# Patient Record
Sex: FEMALE | Race: WHITE | Employment: OTHER | URBAN - METROPOLITAN AREA
[De-identification: names, ages, dates, MRNs, and addresses within clinical notes are randomized per-mention and may not be internally consistent; named-entity substitution may affect disease eponyms.]

---

## 2018-09-18 ENCOUNTER — APPOINTMENT (OUTPATIENT)
Dept: GENERAL RADIOLOGY | Age: 53
End: 2018-09-18
Attending: PHYSICIAN ASSISTANT
Payer: COMMERCIAL

## 2018-09-18 ENCOUNTER — APPOINTMENT (OUTPATIENT)
Dept: CT IMAGING | Age: 53
End: 2018-09-18
Attending: PHYSICIAN ASSISTANT
Payer: COMMERCIAL

## 2018-09-18 ENCOUNTER — HOSPITAL ENCOUNTER (EMERGENCY)
Age: 53
Discharge: HOME | End: 2018-09-19
Attending: EMERGENCY MEDICINE
Payer: COMMERCIAL

## 2018-09-18 DIAGNOSIS — E87.6 HYPOKALEMIA: ICD-10-CM

## 2018-09-18 DIAGNOSIS — R42 DIZZINESS: Primary | ICD-10-CM

## 2018-09-18 DIAGNOSIS — R11.2 NAUSEA AND VOMITING, INTRACTABILITY OF VOMITING NOT SPECIFIED, UNSPECIFIED VOMITING TYPE: ICD-10-CM

## 2018-09-18 LAB
ALBUMIN SERPL-MCNC: 4.4 G/DL (ref 3.5–5)
ALBUMIN/GLOB SERPL: 1.2 {RATIO} (ref 1.1–2.2)
ALP SERPL-CCNC: 73 U/L (ref 45–117)
ALT SERPL-CCNC: 26 U/L (ref 12–78)
ANION GAP SERPL CALC-SCNC: 12 MMOL/L (ref 5–15)
APPEARANCE UR: CLEAR
AST SERPL-CCNC: 17 U/L (ref 15–37)
BACTERIA URNS QL MICRO: NEGATIVE /HPF
BASOPHILS # BLD: 0 K/UL (ref 0–0.1)
BASOPHILS NFR BLD: 0 % (ref 0–1)
BILIRUB SERPL-MCNC: 0.7 MG/DL (ref 0.2–1)
BILIRUB UR QL: NEGATIVE
BUN SERPL-MCNC: 10 MG/DL (ref 6–20)
BUN/CREAT SERPL: 12 (ref 12–20)
CALCIUM SERPL-MCNC: 10.7 MG/DL (ref 8.5–10.1)
CHLORIDE SERPL-SCNC: 102 MMOL/L (ref 97–108)
CO2 SERPL-SCNC: 26 MMOL/L (ref 21–32)
COLOR UR: ABNORMAL
COMMENT, HOLDF: NORMAL
CREAT SERPL-MCNC: 0.83 MG/DL (ref 0.55–1.02)
DIFFERENTIAL METHOD BLD: ABNORMAL
EOSINOPHIL # BLD: 0 K/UL (ref 0–0.4)
EOSINOPHIL NFR BLD: 1 % (ref 0–7)
EPITH CASTS URNS QL MICRO: ABNORMAL /LPF
ERYTHROCYTE [DISTWIDTH] IN BLOOD BY AUTOMATED COUNT: 11.6 % (ref 11.5–14.5)
GLOBULIN SER CALC-MCNC: 3.8 G/DL (ref 2–4)
GLUCOSE SERPL-MCNC: 109 MG/DL (ref 65–100)
GLUCOSE UR STRIP.AUTO-MCNC: NEGATIVE MG/DL
HCT VFR BLD AUTO: 42.9 % (ref 35–47)
HGB BLD-MCNC: 15.2 G/DL (ref 11.5–16)
HGB UR QL STRIP: NEGATIVE
IMM GRANULOCYTES # BLD: 0 K/UL (ref 0–0.04)
IMM GRANULOCYTES NFR BLD AUTO: 0 % (ref 0–0.5)
KETONES UR QL STRIP.AUTO: 40 MG/DL
LEUKOCYTE ESTERASE UR QL STRIP.AUTO: ABNORMAL
LYMPHOCYTES # BLD: 2.3 K/UL (ref 0.8–3.5)
LYMPHOCYTES NFR BLD: 31 % (ref 12–49)
MCH RBC QN AUTO: 34.8 PG (ref 26–34)
MCHC RBC AUTO-ENTMCNC: 35.4 G/DL (ref 30–36.5)
MCV RBC AUTO: 98.2 FL (ref 80–99)
MONOCYTES # BLD: 0.6 K/UL (ref 0–1)
MONOCYTES NFR BLD: 9 % (ref 5–13)
NEUTS SEG # BLD: 4.4 K/UL (ref 1.8–8)
NEUTS SEG NFR BLD: 59 % (ref 32–75)
NITRITE UR QL STRIP.AUTO: NEGATIVE
NRBC # BLD: 0 K/UL (ref 0–0.01)
NRBC BLD-RTO: 0 PER 100 WBC
PH UR STRIP: 8.5 [PH] (ref 5–8)
PLATELET # BLD AUTO: 257 K/UL (ref 150–400)
PMV BLD AUTO: 9.9 FL (ref 8.9–12.9)
POTASSIUM SERPL-SCNC: 3.2 MMOL/L (ref 3.5–5.1)
PROT SERPL-MCNC: 8.2 G/DL (ref 6.4–8.2)
PROT UR STRIP-MCNC: ABNORMAL MG/DL
RBC # BLD AUTO: 4.37 M/UL (ref 3.8–5.2)
RBC #/AREA URNS HPF: ABNORMAL /HPF (ref 0–5)
SAMPLES BEING HELD,HOLD: NORMAL
SODIUM SERPL-SCNC: 140 MMOL/L (ref 136–145)
SP GR UR REFRACTOMETRY: 1.02 (ref 1–1.03)
UR CULT HOLD, URHOLD: NORMAL
UROBILINOGEN UR QL STRIP.AUTO: 0.2 EU/DL (ref 0.2–1)
WBC # BLD AUTO: 7.4 K/UL (ref 3.6–11)
WBC URNS QL MICRO: ABNORMAL /HPF (ref 0–4)

## 2018-09-18 PROCEDURE — 36415 COLL VENOUS BLD VENIPUNCTURE: CPT | Performed by: EMERGENCY MEDICINE

## 2018-09-18 PROCEDURE — 93005 ELECTROCARDIOGRAM TRACING: CPT

## 2018-09-18 PROCEDURE — 70450 CT HEAD/BRAIN W/O DYE: CPT

## 2018-09-18 PROCEDURE — 80053 COMPREHEN METABOLIC PANEL: CPT | Performed by: EMERGENCY MEDICINE

## 2018-09-18 PROCEDURE — 71046 X-RAY EXAM CHEST 2 VIEWS: CPT

## 2018-09-18 PROCEDURE — 85025 COMPLETE CBC W/AUTO DIFF WBC: CPT | Performed by: EMERGENCY MEDICINE

## 2018-09-18 PROCEDURE — 74011250637 HC RX REV CODE- 250/637: Performed by: PHYSICIAN ASSISTANT

## 2018-09-18 PROCEDURE — 84484 ASSAY OF TROPONIN QUANT: CPT | Performed by: PHYSICIAN ASSISTANT

## 2018-09-18 PROCEDURE — 99285 EMERGENCY DEPT VISIT HI MDM: CPT

## 2018-09-18 PROCEDURE — 74011250636 HC RX REV CODE- 250/636: Performed by: PHYSICIAN ASSISTANT

## 2018-09-18 PROCEDURE — 96361 HYDRATE IV INFUSION ADD-ON: CPT

## 2018-09-18 PROCEDURE — 81001 URINALYSIS AUTO W/SCOPE: CPT | Performed by: PHYSICIAN ASSISTANT

## 2018-09-18 RX ORDER — SODIUM CHLORIDE 9 MG/ML
1000 INJECTION, SOLUTION INTRAVENOUS ONCE
Status: COMPLETED | OUTPATIENT
Start: 2018-09-18 | End: 2018-09-19

## 2018-09-18 RX ORDER — ONDANSETRON 4 MG/1
4 TABLET, ORALLY DISINTEGRATING ORAL
Status: COMPLETED | OUTPATIENT
Start: 2018-09-18 | End: 2018-09-18

## 2018-09-18 RX ADMIN — SODIUM CHLORIDE 1000 ML/HR: 900 INJECTION, SOLUTION INTRAVENOUS at 23:27

## 2018-09-18 RX ADMIN — ONDANSETRON 4 MG: 4 TABLET, ORALLY DISINTEGRATING ORAL at 22:47

## 2018-09-18 NOTE — Clinical Note
Klor merline twice daily Zofran 1-2 tabs every 8 hrs as needed for nausea and vomiting Push fluids Follow-up with primary care Return for any new or worsening.  Siena Bo [place]

## 2018-09-19 VITALS
SYSTOLIC BLOOD PRESSURE: 164 MMHG | OXYGEN SATURATION: 100 % | HEIGHT: 68 IN | TEMPERATURE: 97.4 F | RESPIRATION RATE: 20 BRPM | HEART RATE: 68 BPM | DIASTOLIC BLOOD PRESSURE: 89 MMHG

## 2018-09-19 LAB — TROPONIN I SERPL-MCNC: <0.05 NG/ML

## 2018-09-19 PROCEDURE — 74011250637 HC RX REV CODE- 250/637: Performed by: PHYSICIAN ASSISTANT

## 2018-09-19 PROCEDURE — 74011250636 HC RX REV CODE- 250/636: Performed by: PHYSICIAN ASSISTANT

## 2018-09-19 PROCEDURE — 74011250636 HC RX REV CODE- 250/636

## 2018-09-19 PROCEDURE — 74011250636 HC RX REV CODE- 250/636: Performed by: EMERGENCY MEDICINE

## 2018-09-19 PROCEDURE — 96374 THER/PROPH/DIAG INJ IV PUSH: CPT

## 2018-09-19 PROCEDURE — 96375 TX/PRO/DX INJ NEW DRUG ADDON: CPT

## 2018-09-19 PROCEDURE — 96361 HYDRATE IV INFUSION ADD-ON: CPT

## 2018-09-19 RX ORDER — FAMOTIDINE 10 MG/ML
INJECTION INTRAVENOUS
Status: COMPLETED
Start: 2018-09-19 | End: 2018-09-19

## 2018-09-19 RX ORDER — POTASSIUM CHLORIDE 1500 MG/1
20 TABLET, FILM COATED, EXTENDED RELEASE ORAL 2 TIMES DAILY
Qty: 14 TAB | Refills: 0 | Status: SHIPPED | OUTPATIENT
Start: 2018-09-19 | End: 2018-09-26

## 2018-09-19 RX ORDER — POTASSIUM CHLORIDE 750 MG/1
40 TABLET, FILM COATED, EXTENDED RELEASE ORAL
Status: COMPLETED | OUTPATIENT
Start: 2018-09-19 | End: 2018-09-19

## 2018-09-19 RX ORDER — ONDANSETRON 4 MG/1
4 TABLET, ORALLY DISINTEGRATING ORAL
Qty: 12 TAB | Refills: 0 | Status: SHIPPED | OUTPATIENT
Start: 2018-09-19 | End: 2019-03-26

## 2018-09-19 RX ORDER — MECLIZINE HCL 12.5 MG 12.5 MG/1
25 TABLET ORAL
Status: COMPLETED | OUTPATIENT
Start: 2018-09-19 | End: 2018-09-19

## 2018-09-19 RX ADMIN — MECLIZINE 25 MG: 12.5 TABLET ORAL at 00:43

## 2018-09-19 RX ADMIN — POTASSIUM CHLORIDE 40 MEQ: 750 TABLET, FILM COATED, EXTENDED RELEASE ORAL at 00:48

## 2018-09-19 RX ADMIN — PROMETHAZINE HYDROCHLORIDE 12.5 MG: 25 INJECTION INTRAMUSCULAR; INTRAVENOUS at 02:07

## 2018-09-19 RX ADMIN — SODIUM CHLORIDE 1000 ML: 900 INJECTION, SOLUTION INTRAVENOUS at 02:40

## 2018-09-19 RX ADMIN — FAMOTIDINE: 10 INJECTION, SOLUTION INTRAVENOUS at 02:07

## 2018-09-19 NOTE — ED NOTES
Pt is sitting in room crying states she's tired of feeling. therapeutic communication, music and imagery was used to calm pt

## 2018-09-19 NOTE — DISCHARGE INSTRUCTIONS
Dizziness: Care Instructions  Your Care Instructions  Dizziness is the feeling of unsteadiness or fuzziness in your head. It is different than having vertigo, which is a feeling that the room is spinning or that you are moving or falling. It is also different from lightheadedness, which is the feeling that you are about to faint. It can be hard to know what causes dizziness. Some people feel dizzy when they have migraine headaches. Sometimes bouts of flu can make you feel dizzy. Some medical conditions, such as heart problems or high blood pressure, can make you feel dizzy. Many medicines can cause dizziness, including medicines for high blood pressure, pain, or anxiety. If a medicine causes your symptoms, your doctor may recommend that you stop or change the medicine. If it is a problem with your heart, you may need medicine to help your heart work better. If there is no clear reason for your symptoms, your doctor may suggest watching and waiting for a while to see if the dizziness goes away on its own. Follow-up care is a key part of your treatment and safety. Be sure to make and go to all appointments, and call your doctor if you are having problems. It's also a good idea to know your test results and keep a list of the medicines you take. How can you care for yourself at home? · If your doctor recommends or prescribes medicine, take it exactly as directed. Call your doctor if you think you are having a problem with your medicine. · Do not drive while you feel dizzy. · Try to prevent falls. Steps you can take include:  ¨ Using nonskid mats, adding grab bars near the tub, and using night-lights. ¨ Clearing your home so that walkways are free of anything you might trip on. ¨ Letting family and friends know that you have been feeling dizzy. This will help them know how to help you. When should you call for help? Call 911 anytime you think you may need emergency care.  For example, call if:    · You passed out (lost consciousness).     · You have dizziness along with symptoms of a heart attack. These may include:  ¨ Chest pain or pressure, or a strange feeling in the chest.  ¨ Sweating. ¨ Shortness of breath. ¨ Nausea or vomiting. ¨ Pain, pressure, or a strange feeling in the back, neck, jaw, or upper belly or in one or both shoulders or arms. ¨ Lightheadedness or sudden weakness. ¨ A fast or irregular heartbeat.     · You have symptoms of a stroke. These may include:  ¨ Sudden numbness, tingling, weakness, or loss of movement in your face, arm, or leg, especially on only one side of your body. ¨ Sudden vision changes. ¨ Sudden trouble speaking. ¨ Sudden confusion or trouble understanding simple statements. ¨ Sudden problems with walking or balance. ¨ A sudden, severe headache that is different from past headaches.    Call your doctor now or seek immediate medical care if:    · You feel dizzy and have a fever, headache, or ringing in your ears.     · You have new or increased nausea and vomiting.     · Your dizziness does not go away or comes back.    Watch closely for changes in your health, and be sure to contact your doctor if:    · You do not get better as expected. Where can you learn more? Go to http://louis-daniel.info/. Enter F954 in the search box to learn more about \"Dizziness: Care Instructions. \"  Current as of: November 20, 2017  Content Version: 11.7  © 1337-7779 [employer]. Care instructions adapted under license by [employer] (which disclaims liability or warranty for this information). If you have questions about a medical condition or this instruction, always ask your healthcare professional. [place] [address] any warranty or liability for your use of this information. Hypokalemia: Care Instructions  Your Care Instructions    Hypokalemia (say \"xo-em-rth-FERNANDO-odilia-uh\") is a low level of potassium.  The heart, muscles, kidneys, and nervous system all need potassium to work well. This problem has many different causes. Kidney problems, diet, and medicines like diuretics and laxatives can cause it. So can vomiting or diarrhea. In some cases, cancer is the cause. Your doctor may do tests to find the cause of your low potassium levels. You may need medicines to bring your potassium levels back to normal. You may also need regular blood tests to check your potassium. If you have very low potassium, you may need intravenous (IV) medicines. You also may need tests to check the electrical activity of your heart. Heart problems caused by low potassium levels can be very serious. Follow-up care is a key part of your treatment and safety. Be sure to make and go to all appointments, and call your doctor if you are having problems. It's also a good idea to know your test results and keep a list of the medicines you take. How can you care for yourself at home? · If your doctor recommends it, eat foods that have a lot of potassium. These include fresh fruits, juices, and vegetables. They also include nuts, beans, and milk. · Be safe with medicines. If your doctor prescribes medicines or potassium supplements, take them exactly as directed. Call your doctor if you have any problems with your medicines. · Get your potassium levels tested as often as your doctor tells you. When should you call for help? Call 911 anytime you think you may need emergency care. For example, call if:    · You feel like your heart is missing beats.  Heart problems caused by low potassium can cause death.     · You passed out (lost consciousness).     · You have a seizure.    Call your doctor now or seek immediate medical care if:    · You feel weak or unusually tired.     · You have severe arm or leg cramps.     · You have tingling or numbness.     · You feel sick to your stomach, or you vomit.     · You have belly cramps.     · You feel bloated or constipated.     · You have to urinate a lot.     · You feel very thirsty most of the time.     · You are dizzy or lightheaded, or you feel like you may faint.     · You feel depressed, or you lose touch with reality.    Watch closely for changes in your health, and be sure to contact your doctor if:    · You do not get better as expected. Where can you learn more? Go to http://louis-daniel.info/. Enter G358 in the search box to learn more about \"Hypokalemia: Care Instructions. \"  Current as of: May 12, 2017  Content Version: 11.7  © 4552-9473 [employer]. Care instructions adapted under license by [employer] (which disclaims liability or warranty for this information). If you have questions about a medical condition or this instruction, always ask your healthcare professional. [address] any warranty or liability for your use of this information.

## 2018-09-19 NOTE — ED NOTES
Bedside shift change report given to meagan (oncoming nurse) by Tobias Suazo (offgoing nurse). Report included the following information SBAR, Kardex, ED Summary, Procedure Summary, Intake/Output, MAR and Recent Results.

## 2018-09-19 NOTE — ED PROVIDER NOTES
HPI Comments: This is a 51 yo  female with medical hx remarkable for arthritis and thyroid disease presenting ambulatory to the ED with complaint of dizziness that began about one week ago, described as feeling imbalanced while standing initially with associated nausea and vomiting. Was seen by PCP last week and prescribed valium for presumed vertigo, since hx of the same several years ago. The next day developed headache, and bilateral maxillary sinus pain with nasal congestion and \"odd taste\" in the mouth. Contacted PCP yesterday and was prescribed augmentin for sinus infection. Developed \"shaking\" last night and contacted PCP again by phone and was changed to cefdinir. Today has not been able to hold down PO including abx. Associated mid chest pain described as tightness with associated cough and upper abdominal pain which she thinks is secondary to vomiting. Dizziness has improved and now has more nausea and vomiting. No fever, chills, ear pain, sore throat, SOB, MULLER, extremity numbness, extremity weakness, LE edema, constipation, dysuria, urinary urgency or frequency. Urine output decreased. The history is provided by the patient. Past Medical History:  
Diagnosis Date  Arthritis  Thyroid disease Past Surgical History:  
Procedure Laterality Date  HX ORTHOPAEDIC    
 back  HX THYROIDECTOMY History reviewed. No pertinent family history. Social History Social History  Marital status: SINGLE Spouse name: N/A  
 Number of children: N/A  
 Years of education: N/A Occupational History  Not on file. Social History Main Topics  Smoking status: Never Smoker  Smokeless tobacco: Not on file  Alcohol use Yes  Drug use: No  
 Sexual activity: Not on file Other Topics Concern  Not on file Social History Narrative ALLERGIES: Augmentin [amoxicillin-pot clavulanate] Review of Systems Constitutional: Negative. Negative for chills, fatigue and fever. HENT: Positive for congestion. Negative for ear pain, facial swelling, rhinorrhea, sneezing and sore throat. Respiratory: Positive for cough and chest tightness. Negative for shortness of breath. Cardiovascular: Positive for chest pain. Gastrointestinal: Positive for abdominal pain. Negative for constipation, diarrhea, nausea and vomiting. Genitourinary: Negative for difficulty urinating, frequency and urgency. Skin: Negative for color change and rash. Neurological: Positive for dizziness. Negative for headaches. All other systems reviewed and are negative. Vitals:  
 09/18/18 2223 BP: 146/90 Pulse: 81 Resp: 20 Temp: 97.4 °F (36.3 °C) SpO2: 100% Height: 5' 8\" (1.727 m) Physical Exam  
Constitutional: She is oriented to person, place, and time. She appears well-developed and well-nourished. No distress.  adult female pleasant in NAD HENT:  
Head: Normocephalic and atraumatic. Right Ear: Tympanic membrane, external ear and ear canal normal.  
Left Ear: Tympanic membrane, external ear and ear canal normal.  
Nose: Nose normal.  
Mouth/Throat: Uvula is midline, oropharynx is clear and moist and mucous membranes are normal. No oropharyngeal exudate. Eyes: Conjunctivae and EOM are normal. Pupils are equal, round, and reactive to light. Right eye exhibits no discharge. Left eye exhibits no discharge. No scleral icterus. Right eye exhibits normal extraocular motion and no nystagmus. Left eye exhibits normal extraocular motion and no nystagmus. Neck: Normal range of motion. Neck supple. Cardiovascular: Normal rate and regular rhythm. Exam reveals no gallop and no friction rub. No murmur heard. Pulmonary/Chest: Effort normal and breath sounds normal. She has no wheezes. She has no rales. Abdominal: Soft. Bowel sounds are normal. She exhibits no distension. There is no tenderness. There is no rebound and no guarding. Neurological: She is alert and oriented to person, place, and time. No cranial nerve deficit. Coordination normal.  
Skin: Skin is warm and dry. She is not diaphoretic. Psychiatric: She has a normal mood and affect. Her behavior is normal.  
Nursing note and vitals reviewed. MDM Number of Diagnoses or Management Options Diagnosis management comments: 51 yo  female with complaint of dizziness, nausea, vomiting, chest pain and bilateral maxillary sinus pain. Concern for ACS vs arrhythmia vs electrolyte derangement vs intra cranial process or infection Plan EKg Trop CBC CMP Xray chest 
Ct head IVf 
zofran 
monitor Amount and/or Complexity of Data Reviewed Clinical lab tests: ordered and reviewed Tests in the radiology section of CPT®: ordered and reviewed Independent visualization of images, tracings, or specimens: yes ED Course Procedures Progress note EKG interpretation: 
Rhythm: normal sinus rhythm; and regular . Rate (approx.): 62; Axis: normal; P wave: normal; QRS interval: normal ; ST/T wave: normal; in  Leads. [place], [place] Labs and imaging reviewed. Hypokalemia noted. Appears dehydrate with ketones in urine. Calcium mildly elevated. Tolerating PO. Feeling better. April [place], [place] Patient's results have been reviewed with them. Patient and/or family have verbally conveyed their understanding and agreement of the patient's signs, symptoms, diagnosis, treatment and prognosis and additionally agree to follow up as recommended or return to the Emergency Room should their condition change prior to follow-up. Discharge instructions have also been provided to the patient with some educational information regarding their diagnosis as well a list of reasons why they would want to return to the ER prior to their follow-up appointment should their condition change. [place], [place] 
 
A/P Hypokalemia/nausea and vomiting/ dizziness: Push fluids. Zofran 1-2 tabs every 8 hrs as needed for nausea and vomiting. Timmyor merline twice daily. Return for any new or worsening.  Siena ASTORGA [facility] [place]

## 2018-09-19 NOTE — ED TRIAGE NOTES
Patient arrives ambulatory from home with c/o dizziness that started on Wednesday. Pt saw her PCP and was told it was vertigo and was given Valium with no relief. Pt also believes she has a sinus infection, +cough +congestion +nausea and vomiting. Pt reports chest tightness. Denies fevers /chills at home. Currently taking antibiotics for sinus infection.

## 2018-09-19 NOTE — ED NOTES
The documentation for this period is being entered following the guidelines as defined in the [facility] policy by Claudia Dillon RN. 
 
 
[id number]

## 2018-09-20 LAB
ATRIAL RATE: 62 BPM
CALCULATED P AXIS, ECG09: 66 DEGREES
CALCULATED R AXIS, ECG10: -2 DEGREES
CALCULATED T AXIS, ECG11: 44 DEGREES
DIAGNOSIS, 93000: NORMAL
P-R INTERVAL, ECG05: 180 MS
Q-T INTERVAL, ECG07: 406 MS
QRS DURATION, ECG06: 94 MS
QTC CALCULATION (BEZET), ECG08: 412 MS
VENTRICULAR RATE, ECG03: 62 BPM

## 2019-03-26 ENCOUNTER — OFFICE VISIT (OUTPATIENT)
Dept: URGENT CARE | Age: 54
End: 2019-03-26

## 2019-03-26 VITALS
WEIGHT: 194 LBS | OXYGEN SATURATION: 98 % | RESPIRATION RATE: 18 BRPM | HEIGHT: 68 IN | SYSTOLIC BLOOD PRESSURE: 154 MMHG | DIASTOLIC BLOOD PRESSURE: 88 MMHG | TEMPERATURE: 98.2 F | HEART RATE: 88 BPM | BODY MASS INDEX: 29.4 KG/M2

## 2019-03-26 DIAGNOSIS — J40 BRONCHITIS: Primary | ICD-10-CM

## 2019-03-26 RX ORDER — GLUCOSAMINE SULFATE 1500 MG
5000 POWDER IN PACKET (EA) ORAL DAILY
COMMUNITY

## 2019-03-26 RX ORDER — AZITHROMYCIN 250 MG/1
TABLET, FILM COATED ORAL
Qty: 6 TAB | Refills: 0 | Status: SHIPPED | OUTPATIENT
Start: 2019-03-26 | End: 2019-04-09

## 2019-03-26 RX ORDER — BENZONATATE 200 MG/1
200 CAPSULE ORAL
Qty: 30 CAP | Refills: 0 | Status: SHIPPED | OUTPATIENT
Start: 2019-03-26 | End: 2019-04-09

## 2019-03-26 RX ORDER — PREDNISONE 5 MG/1
TABLET ORAL
Qty: 21 TAB | Refills: 0 | Status: SHIPPED | OUTPATIENT
Start: 2019-03-26 | End: 2019-04-09

## 2019-03-26 NOTE — PATIENT INSTRUCTIONS
Bronchitis: Care Instructions Your Care Instructions Bronchitis is inflammation of the bronchial tubes, which carry air to the lungs. The tubes swell and produce mucus, or phlegm. The mucus and inflamed bronchial tubes make you cough. You may have trouble breathing. Most cases of bronchitis are caused by viruses like those that cause colds. Antibiotics usually do not help and they may be harmful. Bronchitis usually develops rapidly and lasts about 2 to 3 weeks in otherwise healthy people. Follow-up care is a key part of your treatment and safety. Be sure to make and go to all appointments, and call your doctor if you are having problems. It's also a good idea to know your test results and keep a list of the medicines you take. How can you care for yourself at home? · Take all medicines exactly as prescribed. Call your doctor if you think you are having a problem with your medicine. · Get some extra rest. 
· Take an over-the-counter pain medicine, such as acetaminophen (Tylenol), ibuprofen (Advil, Motrin), or naproxen (Aleve) to reduce fever and relieve body aches. Read and follow all instructions on the label. · Do not take two or more pain medicines at the same time unless the doctor told you to. Many pain medicines have acetaminophen, which is Tylenol. Too much acetaminophen (Tylenol) can be harmful. · Take an over-the-counter cough medicine that contains dextromethorphan to help quiet a dry, hacking cough so that you can sleep. Avoid cough medicines that have more than one active ingredient. Read and follow all instructions on the label. · Breathe moist air from a humidifier, hot shower, or sink filled with hot water. The heat and moisture will thin mucus so you can cough it out. · Do not smoke. Smoking can make bronchitis worse. If you need help quitting, talk to your doctor about stop-smoking programs and medicines. These can increase your chances of quitting for good. When should you call for help? Call 911 anytime you think you may need emergency care. For example, call if: 
  · You have severe trouble breathing.  
 Call your doctor now or seek immediate medical care if: 
  · You have new or worse trouble breathing.  
  · You cough up dark brown or bloody mucus (sputum).  
  · You have a new or higher fever.  
  · You have a new rash.  
 Watch closely for changes in your health, and be sure to contact your doctor if: 
  · You cough more deeply or more often, especially if you notice more mucus or a change in the color of your mucus.  
  · You are not getting better as expected. Where can you learn more? Go to http://louis-daniel.info/. Enter H333 in the search box to learn more about \"Bronchitis: Care Instructions. \" Current as of: September 5, 2018 Content Version: 11.9 © 1445-4005 "[employer]. Care instructions adapted under license by [employer] (which disclaims liability or warranty for this information). If you have questions about a medical condition or this instruction, always ask your healthcare professional. [address] any warranty or liability for your use of this information.

## 2019-03-26 NOTE — PROGRESS NOTES
Cold Symptoms   The history is provided by the patient. This is a new problem. Episode onset: 5 days ago. The problem occurs constantly. The problem has been gradually worsening. The cough is productive of sputum. There has been no fever. Associated symptoms include headaches and rhinorrhea. Pertinent negatives include no chest pain, no chills, no sweats, no ear congestion, no ear pain, no sore throat, no myalgias, no shortness of breath, no wheezing, no nausea and no vomiting. She has tried decongestants for the symptoms. The treatment provided no relief. She is not a smoker. Her past medical history does not include asthma. Past Medical History:   Diagnosis Date    Arthritis     Thyroid disease         Past Surgical History:   Procedure Laterality Date    HX ORTHOPAEDIC      back    HX THYROIDECTOMY           History reviewed. No pertinent family history. Social History     Socioeconomic History    Marital status: UNKNOWN     Spouse name: Not on file    Number of children: Not on file    Years of education: Not on file    Highest education level: Not on file   Occupational History    Not on file   Social Needs    Financial resource strain: Not on file    Food insecurity:     Worry: Not on file     Inability: Not on file    Transportation needs:     Medical: Not on file     Non-medical: Not on file   Tobacco Use    Smoking status: Never Smoker    Smokeless tobacco: Never Used   Substance and Sexual Activity    Alcohol use:  Yes    Drug use: No    Sexual activity: Not on file   Lifestyle    Physical activity:     Days per week: Not on file     Minutes per session: Not on file    Stress: Not on file   Relationships    Social connections:     Talks on phone: Not on file     Gets together: Not on file     Attends [employer] service: Not on file     Active member of club or organization: Not on file     Attends meetings of clubs or organizations: Not on file     Relationship status: Not on file    Intimate partner violence:     Fear of current or ex partner: Not on file     Emotionally abused: Not on file     Physically abused: Not on file     Forced sexual activity: Not on file   Other Topics Concern    Not on file   Social History Narrative    Not on file                ALLERGIES: Augmentin [amoxicillin-pot clavulanate]    Review of Systems   Constitutional: Negative for activity change, appetite change, chills and fever. HENT: Positive for congestion and rhinorrhea. Negative for ear pain, sinus pressure, sinus pain, sore throat and trouble swallowing. Respiratory: Positive for cough. Negative for shortness of breath and wheezing. Cardiovascular: Negative for chest pain and palpitations. Gastrointestinal: Negative for nausea and vomiting. Musculoskeletal: Negative for myalgias. Neurological: Positive for headaches. Negative for dizziness. Hematological: Negative for adenopathy. Vitals:    03/26/19 1409   BP: 154/88   Pulse: 88   Resp: 18   Temp: 98.2 °F (36.8 °C)   SpO2: 98%   Weight: 194 lb (88 kg)   Height: 5' 8\" (1.727 m)       Physical Exam   Constitutional: She appears well-developed and well-nourished. No distress. HENT:   Right Ear: Tympanic membrane, external ear and ear canal normal.   Left Ear: Tympanic membrane, external ear and ear canal normal.   Nose: Rhinorrhea present. Right sinus exhibits no maxillary sinus tenderness and no frontal sinus tenderness. Left sinus exhibits no maxillary sinus tenderness and no frontal sinus tenderness. Mouth/Throat: Oropharynx is clear and moist and mucous membranes are normal. No oropharyngeal exudate, posterior oropharyngeal edema, posterior oropharyngeal erythema or tonsillar abscesses. Cardiovascular: Normal rate, regular rhythm and normal heart sounds. Pulmonary/Chest: Effort normal. No respiratory distress. She has no decreased breath sounds. She has wheezes in the right lower field. She has no rhonchi.  She has no rales.   Lymphadenopathy:     She has no cervical adenopathy. Neurological: She is alert. Skin: She is not diaphoretic. Psychiatric: She has a normal mood and affect. Her behavior is normal. Judgment and thought content normal.   Nursing note and vitals reviewed. MDM    ICD-10-CM ICD-9-CM    1. Bronchitis J40 490      Medications Ordered Today   Medications    azithromycin (ZITHROMAX) 250 mg tablet     Sig: Take two tablets today then one tablet daily     Dispense:  6 Tab     Refill:  0    predniSONE (STERAPRED) 5 mg dose pack     Sig: See administration instruction per 5mg dose pack     Dispense:  21 Tab     Refill:  0    benzonatate (TESSALON) 200 mg capsule     Sig: Take 1 Cap by mouth three (3) times daily as needed for Cough. Dispense:  30 Cap     Refill:  0     The patients condition was discussed with the patient and they understand. The patient is to follow up with PCP [facility]. If signs and symptoms become worse the pt is to go to the ER. The patient is to take medications as prescribed.              Procedures

## 2019-04-09 ENCOUNTER — OFFICE VISIT (OUTPATIENT)
Dept: URGENT CARE | Age: 54
End: 2019-04-09

## 2019-04-09 VITALS
WEIGHT: 194 LBS | HEART RATE: 78 BPM | BODY MASS INDEX: 29.4 KG/M2 | SYSTOLIC BLOOD PRESSURE: 160 MMHG | TEMPERATURE: 97 F | RESPIRATION RATE: 18 BRPM | HEIGHT: 68 IN | OXYGEN SATURATION: 98 % | DIASTOLIC BLOOD PRESSURE: 71 MMHG

## 2019-04-09 DIAGNOSIS — R42 DIZZINESS: ICD-10-CM

## 2019-04-09 DIAGNOSIS — H66.92 ACUTE LEFT OTITIS MEDIA: Primary | ICD-10-CM

## 2019-04-09 RX ORDER — PREDNISONE 5 MG/1
TABLET ORAL
Qty: 21 TAB | Refills: 0 | Status: SHIPPED | OUTPATIENT
Start: 2019-04-09 | End: 2019-05-14 | Stop reason: ALTCHOICE

## 2019-04-09 RX ORDER — MECLIZINE HYDROCHLORIDE 25 MG/1
25 TABLET ORAL
Qty: 30 TAB | Refills: 0 | Status: SHIPPED | OUTPATIENT
Start: 2019-04-09 | End: 2019-05-14

## 2019-04-09 RX ORDER — CEFDINIR 300 MG/1
300 CAPSULE ORAL 2 TIMES DAILY
Qty: 20 CAP | Refills: 0 | Status: SHIPPED | OUTPATIENT
Start: 2019-04-09 | End: 2019-04-19

## 2019-04-09 NOTE — PROGRESS NOTES
The history is provided by the patient. Ear Pain   This is a new problem. The current episode started 2 days ago (left ear pain and popping). The problem occurs constantly. The problem has not changed since onset. Pertinent negatives include no chest pain, no abdominal pain, no headaches and no shortness of breath. Associated symptoms comments: Dizziness described as room spinning worse with head movement. She has tried nothing for the symptoms. Past Medical History:   Diagnosis Date    Arthritis     Thyroid disease         Past Surgical History:   Procedure Laterality Date    HX ORTHOPAEDIC      back    HX THYROIDECTOMY           History reviewed. No pertinent family history. Social History     Socioeconomic History    Marital status: SINGLE     Spouse name: Not on file    Number of children: Not on file    Years of education: Not on file    Highest education level: Not on file   Occupational History    Not on file   Social Needs    Financial resource strain: Not on file    Food insecurity:     Worry: Not on file     Inability: Not on file    Transportation needs:     Medical: Not on file     Non-medical: Not on file   Tobacco Use    Smoking status: Never Smoker    Smokeless tobacco: Never Used   Substance and Sexual Activity    Alcohol use:  Yes    Drug use: No    Sexual activity: Not on file   Lifestyle    Physical activity:     Days per week: Not on file     Minutes per session: Not on file    Stress: Not on file   Relationships    Social connections:     Talks on phone: Not on file     Gets together: Not on file     Attends Restorationist service: Not on file     Active member of club or organization: Not on file     Attends meetings of clubs or organizations: Not on file     Relationship status: Not on file    Intimate partner violence:     Fear of current or ex partner: Not on file     Emotionally abused: Not on file     Physically abused: Not on file     Forced sexual activity: Not on file   Other Topics Concern    Not on file   Social History Narrative    Not on file                ALLERGIES: Augmentin [amoxicillin-pot clavulanate]    Review of Systems   Constitutional: Negative for activity change, appetite change, chills and fever. HENT: Positive for congestion and ear pain. Negative for rhinorrhea, sinus pressure, sinus pain, sore throat and trouble swallowing. Respiratory: Negative for cough, shortness of breath and wheezing. Cardiovascular: Negative for chest pain and palpitations. Gastrointestinal: Negative for abdominal pain, nausea and vomiting. Musculoskeletal: Negative for myalgias. Neurological: Negative for dizziness and headaches. Hematological: Negative for adenopathy. Vitals:    04/09/19 1438   BP: 160/71   Pulse: 78   Resp: 18   Temp: 97 °F (36.1 °C)   SpO2: 98%   Weight: 194 lb (88 kg)   Height: 5' 8\" (1.727 m)       Physical Exam   Constitutional: She appears well-developed and well-nourished. No distress. HENT:   Right Ear: Tympanic membrane, external ear and ear canal normal.   Left Ear: External ear and ear canal normal. Tympanic membrane is erythematous and retracted. A middle ear effusion is present. Nose: Nose normal. Right sinus exhibits no maxillary sinus tenderness and no frontal sinus tenderness. Left sinus exhibits no maxillary sinus tenderness and no frontal sinus tenderness. Mouth/Throat: Oropharynx is clear and moist and mucous membranes are normal. No oropharyngeal exudate, posterior oropharyngeal edema, posterior oropharyngeal erythema or tonsillar abscesses. Eyes: Pupils are equal, round, and reactive to light. Conjunctivae and EOM are normal.   Cardiovascular: Normal rate, regular rhythm and normal heart sounds. Pulmonary/Chest: Effort normal and breath sounds normal. No respiratory distress. She has no wheezes. She has no rales. Lymphadenopathy:     She has no cervical adenopathy. Neurological: She is alert.  She has normal strength. No cranial nerve deficit or sensory deficit. Coordination and gait normal.   Skin: She is not diaphoretic. Psychiatric: She has a normal mood and affect. Her behavior is normal. Judgment and thought content normal.   Nursing note and vitals reviewed. MDM    ICD-10-CM ICD-9-CM    1. Acute left otitis media H66.92 382.9    2. Dizziness R42 780.4      Medications Ordered Today   Medications    predniSONE (STERAPRED) 5 mg dose pack     Sig: See administration instruction per 5mg dose pack     Dispense:  21 Tab     Refill:  0    meclizine (ANTIVERT) 25 mg tablet     Sig: Take 1 Tab by mouth three (3) times daily as needed for Dizziness. Dispense:  30 Tab     Refill:  0    cefdinir (OMNICEF) 300 mg capsule     Sig: Take 1 Cap by mouth two (2) times a day for 10 days. Dispense:  20 Cap     Refill:  0     The patients condition was discussed with the patient and they understand. The patient is to follow up with PCP. If signs and symptoms become worse the pt is to go to the ER. The patient is to take medications as prescribed.              Procedures

## 2019-04-09 NOTE — PATIENT INSTRUCTIONS
Ear Infection (Otitis Media): Care Instructions Your Care Instructions An ear infection may start with a cold and affect the middle ear (otitis media). It can hurt a lot. Most ear infections clear up on their own in a couple of days. Most often you will not need antibiotics. This is because many ear infections are caused by a virus. Antibiotics don't work against a virus. Regular doses of pain medicines are the best way to reduce your fever and help you feel better. Follow-up care is a key part of your treatment and safety. Be sure to make and go to all appointments, and call your doctor if you are having problems. It's also a good idea to know your test results and keep a list of the medicines you take. How can you care for yourself at home? · Take pain medicines exactly as directed. ? If the doctor gave you a prescription medicine for pain, take it as prescribed. ? If you are not taking a prescription pain medicine, take an over-the-counter medicine, such as acetaminophen (Tylenol), ibuprofen (Advil, Motrin), or naproxen (Aleve). Read and follow all instructions on the label. ? Do not take two or more pain medicines at the same time unless the doctor told you to. Many pain medicines have acetaminophen, which is Tylenol. Too much acetaminophen (Tylenol) can be harmful. · Plan to take a full dose of pain reliever before bedtime. Getting enough sleep will help you get better. · Try a warm, moist washcloth on the ear. It may help relieve pain. · If your doctor prescribed antibiotics, take them as directed. Do not stop taking them just because you feel better. You need to take the full course of antibiotics. When should you call for help? Call your doctor now or seek immediate medical care if: 
  · You have new or increasing ear pain.  
  · You have new or increasing pus or blood draining from your ear.  
  · You have a fever with a stiff neck or a severe headache.  Watch closely for changes in your health, and be sure to contact your doctor if: 
  · You have new or worse symptoms.  
  · You are not getting better after taking an antibiotic for 2 days. Where can you learn more? Go to http://louis-daniel.info/. Enter F451 in the search box to learn more about \"Ear Infection (Otitis Media): Care Instructions. \" Current as of: March 27, 2018 Content Version: 11.9 © 8143-3215 [employer]. Care instructions adapted under license by [employer] (which disclaims liability or warranty for this information). If you have questions about a medical condition or this instruction, always ask your healthcare professional. [address] any warranty or liability for your use of this information. Dizziness: Care Instructions Your Care Instructions Dizziness is the feeling of unsteadiness or fuzziness in your head. It is different than having vertigo, which is a feeling that the room is spinning or that you are moving or falling. It is also different from lightheadedness, which is the feeling that you are about to faint. It can be hard to know what causes dizziness. Some people feel dizzy when they have migraine headaches. Sometimes bouts of flu can make you feel dizzy. Some medical conditions, such as heart problems or high blood pressure, can make you feel dizzy. Many medicines can cause dizziness, including medicines for high blood pressure, pain, or anxiety. If a medicine causes your symptoms, your doctor may recommend that you stop or change the medicine. If it is a problem with your heart, you may need medicine to help your heart work better. If there is no clear reason for your symptoms, your doctor may suggest watching and waiting for a while to see if the dizziness goes away on its own. Follow-up care is a key part of your treatment and safety.  Be sure to make and go to all appointments, and call your doctor if you are having problems. It's also a good idea to know your test results and keep a list of the medicines you take. How can you care for yourself at home? · If your doctor recommends or prescribes medicine, take it exactly as directed. Call your doctor if you think you are having a problem with your medicine. · Do not drive while you feel dizzy. · Try to prevent falls. Steps you can take include: ? Using nonskid mats, adding grab bars near the tub, and using night-lights. ? Clearing your home so that walkways are free of anything you might trip on. 
? Letting family and friends know that you have been feeling dizzy. This will help them know how to help you. When should you call for help? Call 911 anytime you think you may need emergency care. For example, call if: 
  · You passed out (lost consciousness).  
  · You have dizziness along with symptoms of a heart attack. These may include: 
? Chest pain or pressure, or a strange feeling in the chest. 
? Sweating. ? Shortness of breath. ? Nausea or vomiting. ? Pain, pressure, or a strange feeling in the back, neck, jaw, or upper belly or in one or both shoulders or arms. ? Lightheadedness or sudden weakness. ? A fast or irregular heartbeat.  
  · You have symptoms of a stroke. These may include: 
? Sudden numbness, tingling, weakness, or loss of movement in your face, arm, or leg, especially on only one side of your body. ? Sudden vision changes. ? Sudden trouble speaking. ? Sudden confusion or trouble understanding simple statements. ? Sudden problems with walking or balance. ? A sudden, severe headache that is different from past headaches.  
 Call your doctor now or seek immediate medical care if: 
  · You feel dizzy and have a fever, headache, or ringing in your ears.  
  · You have new or increased nausea and vomiting.  
  · Your dizziness does not go away or comes back.  Watch closely for changes in your health, and be sure to contact your doctor if: 
  · You do not get better as expected. Where can you learn more? Go to http://louis-daniel.info/. Enter A873 in the search box to learn more about \"Dizziness: Care Instructions. \" Current as of: September 23, 2018 Content Version: 11.9 © 3532-8668 [employer]. Care instructions adapted under license by [employer] (which disclaims liability or warranty for this information). If you have questions about a medical condition or this instruction, always ask your healthcare professional. [place] [address] any warranty or liability for your use of this information.

## 2019-05-14 ENCOUNTER — OFFICE VISIT (OUTPATIENT)
Dept: INTERNAL MEDICINE CLINIC | Age: 54
End: 2019-05-14

## 2019-05-14 VITALS
WEIGHT: 198.8 LBS | OXYGEN SATURATION: 96 % | HEART RATE: 77 BPM | HEIGHT: 68 IN | DIASTOLIC BLOOD PRESSURE: 75 MMHG | TEMPERATURE: 97.7 F | SYSTOLIC BLOOD PRESSURE: 115 MMHG | RESPIRATION RATE: 17 BRPM | BODY MASS INDEX: 30.13 KG/M2

## 2019-05-14 DIAGNOSIS — F32.A ANXIETY AND DEPRESSION: ICD-10-CM

## 2019-05-14 DIAGNOSIS — F41.9 ANXIETY AND DEPRESSION: ICD-10-CM

## 2019-05-14 DIAGNOSIS — F32.A DEPRESSION, UNSPECIFIED DEPRESSION TYPE: Primary | ICD-10-CM

## 2019-05-14 DIAGNOSIS — G47.00 INSOMNIA, UNSPECIFIED TYPE: ICD-10-CM

## 2019-05-14 RX ORDER — SERTRALINE HYDROCHLORIDE 50 MG/1
50 TABLET, FILM COATED ORAL DAILY
Status: CANCELLED | OUTPATIENT
Start: 2019-05-14

## 2019-05-14 RX ORDER — CLONAZEPAM 0.5 MG/1
0.5 TABLET ORAL
Status: CANCELLED | OUTPATIENT
Start: 2019-05-14

## 2019-05-14 RX ORDER — TRAZODONE HYDROCHLORIDE 50 MG/1
50 TABLET ORAL
Qty: 30 TAB | Refills: 3 | Status: SHIPPED | OUTPATIENT
Start: 2019-05-14 | End: 2019-10-16 | Stop reason: SDUPTHER

## 2019-05-14 RX ORDER — SERTRALINE HYDROCHLORIDE 100 MG/1
50 TABLET, FILM COATED ORAL DAILY
Qty: 30 TAB | Refills: 3 | Status: SHIPPED | OUTPATIENT
Start: 2019-05-14 | End: 2019-10-16 | Stop reason: SDUPTHER

## 2019-05-14 NOTE — PROGRESS NOTES
HISTORY OF PRESENT ILLNESS  Lobo Neal is a 52 y.o. female presents to establish care   HPI   [facility] [address] [facility];  Clonazepam for 1 months and Zoloft 1 12 months     On Zoloft for a long time. Started antidepressant in her 19's,  was on Prozac and Effexor, had side effects    Clonazepam for sleep     Sleep aids wires her. On Nyquil since out of clonazepam     Could not tolerate Ambien and Belsomra    Endocrinologist Dr. Ludin Longo is seen q 6 months. Recent lab evaluation. Blood sugar and cholesterol wnl      SCC  left cheek, BSC back and chest.  Mohs procedure. Most resent  3 years. Dermatologist , Dr Anika Barrera, gyn provide. Menses stopped a few years ago after insertion of implantable contraceptive     Pap mammogram due. She plans to schedule    Dr Jenny Silva, GI provider. Colonoscopy at 48 needs 5 year follow. Father with colon cancer. She has received reminder to schedule follow up and plans to     Psychosocial Hx:  No tobacco  ETOH before bed  + depression  Single  2 children      Past Medical History:   Diagnosis Date    Arthritis     History of seasonal allergies     Thyroid disease        Current Outpatient Medications   Medication Sig    sertraline (ZOLOFT) 100 mg tablet Take 0.5 Tabs by mouth daily.  traZODone (DESYREL) 50 mg tablet Take 1 Tab by mouth nightly.  cholecalciferol (VITAMIN D3) 1,000 unit cap Take  by mouth daily.  liothyronine (CYTOMEL) 5 mcg tablet Take 5 mcg by mouth daily.  levothyroxine (SYNTHROID) 50 mcg tablet Take 50 mcg by mouth Daily (before breakfast).  clonazePAM (KLONOPIN) 0.5 mg tablet Take 0.5 mg by mouth nightly as needed. No current facility-administered medications for this visit.         Allergies   Allergen Reactions    Augmentin [Amoxicillin-Pot Clavulanate] Unknown (comments)       Past Surgical History:   Procedure Laterality Date    HX KNEE ARTHROSCOPY Left     HX ORTHOPAEDIC      back    HX SHOULDER ARTHROSCOPY Right     HX THYROIDECTOMY           wReview of Systems   Constitutional: Positive for malaise/fatigue. HENT: Negative. Eyes: Negative. Respiratory: Negative. Cardiovascular: Negative. Gastrointestinal: Negative. Genitourinary: Negative. Skin: Negative. Neurological: Negative for dizziness and headaches. Psychiatric/Behavioral: Positive for depression. The patient is nervous/anxious and has insomnia. Visit Vitals  /75 (BP 1 Location: Left arm, BP Patient Position: Sitting)   Pulse 77   Temp 97.7 °F (36.5 °C) (Oral)   Resp 17   Ht 5' 8\" (1.727 m)   Wt 198 lb 12.8 oz (90.2 kg)   SpO2 96%   BMI 30.23 kg/m²     Physical Exam   Constitutional: She is oriented to person, place, and time. She appears well-nourished. No distress. HENT:   Right Ear: External ear normal.   Left Ear: External ear normal.   Nose: Nose normal.   Mouth/Throat: No oropharyngeal exudate. Eyes: Conjunctivae are normal. Right eye exhibits no discharge. Left eye exhibits no discharge. Neck: Normal range of motion. Neck supple. Cardiovascular: Normal rate, regular rhythm and normal heart sounds. Pulmonary/Chest: Effort normal and breath sounds normal.   Abdominal: Soft. Musculoskeletal: She exhibits no edema or deformity. Lymphadenopathy:     She has no cervical adenopathy. Neurological: She is alert and oriented to person, place, and time. No cranial nerve deficit. Skin: Skin is warm and dry. No rash noted. She is not diaphoretic. No erythema. Surgical scars upper back and chest   Psychiatric: Her speech is normal and behavior is normal. Judgment and thought content normal. Her mood appears anxious.  Cognition and memory are normal.       Visit Vitals  /75 (BP 1 Location: Left arm, BP Patient Position: Sitting)   Pulse 77   Temp 97.7 °F (36.5 °C) (Oral)   Resp 17   Ht 5' 8\" (1.727 m)   Wt 198 lb 12.8 oz (90.2 kg)   SpO2 96%   BMI 30.23 kg/m²       ASSESSMENT and PLAN ICD-10-CM ICD-9-CM    1. Depression, unspecified depression type F32.9 311 sertraline (ZOLOFT) 100 mg tablet   2. Anxiety and depression F41.9 300.00 sertraline (ZOLOFT) 100 mg tablet    F32.9 311    3. Insomnia, unspecified type G47.00 780.52 traZODone (DESYREL) 50 mg tablet     current treatment plan is effective, no change in therapy  reviewed medications and side effects in detail    Discussed risk associated with long term use of  Benzodiazepine  Encouraged use of alternate medication since already off benzo. Patient willing to try new medication. Encouraged to call with any concerns r/t new medications,  Schedule follow up with PCP    Continue Zoloft at current dose    Follow up with specialists    She plans to schedule mammogram and follow up colonoscopy    Patient voices understanding and acceptance of this advice and will call back if any further questions or concerns. An After Visit Summary was printed and given to the patient.

## 2019-05-14 NOTE — PROGRESS NOTES
Exam room 9    Annalee Merrill is a 52 y.o. female    Chief Complaint   Patient presents with   [address]     per patient needs a refill on medications due to having to switch providers     1. Have you been to the ER, urgent care clinic since your last visit? Hospitalized since your last visit? Yes, 3/26/2019 Bronchitis, 4/9/2019, Left ear Infection, [facility]    2. Have you seen or consulted any other health care providers outside of the [address] [place] [address] since your last visit? Include any pap smears or colon screening.  No    Health Maintenance Due   Topic Date Due    Hepatitis C Screening  1965    PAP AKA CERVICAL CYTOLOGY  08/07/1984    Shingrix Vaccine Age 50> (1 of 2) 08/07/2013    FOBT Q 1 YEAR AGE 50-75  08/07/2013    BREAST CANCER SCRN MAMMOGRAM  07/13/2018

## 2019-05-14 NOTE — PATIENT INSTRUCTIONS
Anxiety Disorder: Care Instructions  Your Care Instructions    Anxiety is a normal reaction to stress. Difficult situations can cause you to have symptoms such as sweaty palms and a nervous feeling. In an anxiety disorder, the symptoms are far more severe. Constant worry, muscle tension, trouble sleeping, nausea and diarrhea, and other symptoms can make normal daily activities difficult or impossible. These symptoms may occur for no reason, and they can affect your work, school, or social life. Medicines, counseling, and self-care can all help. Follow-up care is a key part of your treatment and safety. Be sure to make and go to all appointments, and call your doctor if you are having problems. It's also a good idea to know your test results and keep a list of the medicines you take. How can you care for yourself at home? · Take medicines exactly as directed. Call your doctor if you think you are having a problem with your medicine. · Go to your counseling sessions and follow-up appointments. · Recognize and accept your anxiety. Then, when you are in a situation that makes you anxious, say to yourself, \"This is not an emergency. I feel uncomfortable, but I am not in danger. I can keep going even if I feel anxious. \"  · Be kind to your body:  ? Relieve tension with exercise or a massage. ? Get enough rest.  ? Avoid alcohol, caffeine, nicotine, and illegal drugs. They can increase your anxiety level and cause sleep problems. ? Learn and do relaxation techniques. See below for more about these techniques. · Engage your mind. Get out and do something you enjoy. Go to a funny movie, or take a walk or hike. Plan your day. Having too much or too little to do can make you anxious. · Keep a record of your symptoms. Discuss your fears with a good friend or family member, or join a support group for people with similar problems. Talking to others sometimes relieves stress.   · Get involved in social groups, or volunteer to help others. Being alone sometimes makes things seem worse than they are. · Get at least 30 minutes of exercise on most days of the week to relieve stress. Walking is a good choice. You also may want to do other activities, such as running, swimming, cycling, or playing tennis or team sports. Relaxation techniques  Do relaxation exercises 10 to 20 minutes a day. You can play soothing, relaxing music while you do them, if you wish. · Tell others in your house that you are going to do your relaxation exercises. Ask them not to disturb you. · Find a comfortable place, away from all distractions and noise. · Lie down on your back, or sit with your back straight. · Focus on your breathing. Make it slow and steady. · Breathe in through your nose. Breathe out through either your nose or mouth. · Breathe deeply, filling up the area between your navel and your rib cage. Breathe so that your belly goes up and down. · Do not hold your breath. · Breathe like this for 5 to 10 minutes. Notice the feeling of calmness throughout your whole body. As you continue to breathe slowly and deeply, relax by doing the following for another 5 to 10 minutes:  · Tighten and relax each muscle group in your body. You can begin at your toes and work your way up to your head. · Imagine your muscle groups relaxing and becoming heavy. · Empty your mind of all thoughts. · Let yourself relax more and more deeply. · Become aware of the state of calmness that surrounds you. · When your relaxation time is over, you can bring yourself back to alertness by moving your fingers and toes and then your hands and feet and then stretching and moving your entire body. Sometimes people fall asleep during relaxation, but they usually wake up shortly afterward. · Always give yourself time to return to full alertness before you drive a car or do anything that might cause an accident if you are not fully alert.  Never play a relaxation tape while you drive a car. When should you call for help? Call 911 anytime you think you may need emergency care. For example, call if:    · You feel you cannot stop from hurting yourself or someone else.   Cody [place] the numbers for these national suicide hotlines: 4-255-071-TALK (5-280-307-706-610-8039) and 6-698-FEPORRS (6-807-703--847-556-4802). If you or someone you know talks about suicide or feeling hopeless, get help right away.   Watch closely for changes in your health, and be sure to contact your doctor if:    · You have anxiety or fear that affects your life.     · You have symptoms of anxiety that are new or different from those you had before. Where can you learn more? Go to http://louisFunderadaniel.info/. Enter P754 in the search box to learn more about \"Anxiety Disorder: Care Instructions. \"  Current as of: September 11, 2018  Content Version: 11.9  © 1542-3794 [employer]. Care instructions adapted under license by [employer] (which disclaims liability or warranty for this information). If you have questions about a medical condition or this instruction, always ask your healthcare professional. [place] [address] any warranty or liability for your use of this information. Insomnia: Care Instructions  Your Care Instructions    Insomnia is the inability to sleep well. It is a common problem for most people at some time. Insomnia may make it hard for you to get to sleep, stay asleep, or sleep as long as you need to. This can make you tired and grouchy during the day. It can also make you forgetful, less effective at work, and unhappy. Insomnia can be caused by conditions such as depression or anxiety. Pain can also affect your ability to sleep. When these problems are solved, the insomnia usually clears up. But sometimes bad sleep habits can cause insomnia.   If insomnia is affecting your work or your enjoyment of life, you can take steps to improve your sleep. Follow-up care is a key part of your treatment and safety. Be sure to make and go to all appointments, and call your doctor if you are having problems. It's also a good idea to know your test results and keep a list of the medicines you take. How can you care for yourself at home? What to avoid  · Do not have drinks with caffeine, such as coffee or black tea, for 8 hours before bed. · Do not smoke or use other types of tobacco near bedtime. Nicotine is a stimulant and can keep you awake. · Avoid drinking alcohol late in the evening, because it can cause you to wake in the middle of the night. · Do not eat a big meal close to bedtime. If you are hungry, eat a light snack. · Do not drink a lot of water close to bedtime, because the need to urinate may wake you up during the night. · Do not read or watch TV in bed. Use the bed only for sleeping and sexual activity. What to try  · Go to bed at the same time every night, and wake up at the same time every morning. Do not take naps during the day. · Keep your bedroom quiet, dark, and cool. · Sleep on a comfortable pillow and mattress. · If watching the clock makes you anxious, turn it facing away from you so you cannot see the time. · If you worry when you lie down, start a worry book. Well before bedtime, write down your worries, and then set the book and your concerns aside. · Try meditation or other relaxation techniques before you go to bed. · If you cannot fall asleep, get up and go to another room until you feel sleepy. Do something relaxing. Repeat your bedtime routine before you go to bed again. · Make your house quiet and calm about an hour before bedtime. Turn down the lights, turn off the TV, log off the computer, and turn down the volume on music. This can help you relax after a busy day. When should you call for help?   Watch closely for changes in your health, and be sure to contact your doctor if:    · Your efforts to improve your sleep do not work.     · Your insomnia gets worse.     · You have been feeling down, depressed, or hopeless or have lost interest in things that you usually enjoy. Where can you learn more? Go to http://louis-daniel.info/. Enter P513 in the search box to learn more about \"Insomnia: Care Instructions. \"  Current as of: June 28, 2018  Content Version: 11.9  © 9373-0365 [employer]. Care instructions adapted under license by [employer] (which disclaims liability or warranty for this information). If you have questions about a medical condition or this instruction, always ask your healthcare professional. [address] any warranty or liability for your use of this information.

## 2019-05-24 ENCOUNTER — HOSPITAL ENCOUNTER (OUTPATIENT)
Dept: MAMMOGRAPHY | Age: 54
Discharge: HOME | End: 2019-05-24
Attending: OBSTETRICS & GYNECOLOGY
Payer: MEDICAID

## 2019-05-24 DIAGNOSIS — N64.59 INVERTED NIPPLE: ICD-10-CM

## 2019-05-24 PROCEDURE — 77066 DX MAMMO INCL CAD BI: CPT

## 2019-05-24 PROCEDURE — 76642 ULTRASOUND BREAST LIMITED: CPT

## 2019-05-24 NOTE — PROGRESS NOTES
I spoke with Dr Pb Heard she will fax an order for left breast and node bx, I also gave her the info for appt with Dr Antoine Park on 6/3/2019 at 8:30am, the pt is unaware of this appt!

## 2019-05-28 ENCOUNTER — HOSPITAL ENCOUNTER (OUTPATIENT)
Dept: MAMMOGRAPHY | Age: 54
Discharge: HOME | End: 2019-05-28
Attending: OBSTETRICS & GYNECOLOGY
Payer: MEDICAID

## 2019-05-28 DIAGNOSIS — N64.59 INVERTED NIPPLE: ICD-10-CM

## 2019-05-28 DIAGNOSIS — N63.20 MASS OF BREAST, LEFT: ICD-10-CM

## 2019-05-28 DIAGNOSIS — R22.32 AXILLARY MASS, LEFT: ICD-10-CM

## 2019-05-28 PROCEDURE — 88360 TUMOR IMMUNOHISTOCHEM/MANUAL: CPT

## 2019-05-28 PROCEDURE — 38505 NEEDLE BIOPSY LYMPH NODES: CPT

## 2019-05-28 PROCEDURE — 77065 DX MAMMO INCL CAD UNI: CPT

## 2019-05-28 PROCEDURE — 74011000250 HC RX REV CODE- 250: Performed by: RADIOLOGY

## 2019-05-28 PROCEDURE — 19083 BX BREAST 1ST LESION US IMAG: CPT

## 2019-05-28 PROCEDURE — 74011250636 HC RX REV CODE- 250/636: Performed by: RADIOLOGY

## 2019-05-28 PROCEDURE — 88305 TISSUE EXAM BY PATHOLOGIST: CPT

## 2019-05-28 RX ORDER — LIDOCAINE HYDROCHLORIDE 10 MG/ML
20 INJECTION INFILTRATION; PERINEURAL
Status: COMPLETED | OUTPATIENT
Start: 2019-05-28 | End: 2019-05-28

## 2019-05-28 RX ORDER — LIDOCAINE HYDROCHLORIDE AND EPINEPHRINE 10; 10 MG/ML; UG/ML
10 INJECTION, SOLUTION INFILTRATION; PERINEURAL ONCE
Status: COMPLETED | OUTPATIENT
Start: 2019-05-28 | End: 2019-05-28

## 2019-05-28 RX ADMIN — LIDOCAINE HYDROCHLORIDE,EPINEPHRINE BITARTRATE 100 MG: 10; .01 INJECTION, SOLUTION INFILTRATION; PERINEURAL at 08:15

## 2019-05-28 RX ADMIN — LIDOCAINE HYDROCHLORIDE 20 ML: 10 INJECTION, SOLUTION INFILTRATION; PERINEURAL at 08:15

## 2019-05-28 NOTE — PROGRESS NOTES
Patient tolerated left breast and left axilla biopsy well with scant bleeding. Biopsy site was bandaged in the usual fashion and discharge instructions were reviewed with the patient. She was provided with a written copy as well. Advised patient that results should be available by Thursday and that she will receive a phone call in the afternoon. Encouraged her to call with any questions or concerns.

## 2019-05-30 NOTE — PROGRESS NOTES
Pathology called to patient by Dr. Sterling Almendarez. Spoke with patient, she reports that the biopsy site is healing well and she has no concerns. Patient already has an appointment with Dr. Eri Wynne on 6/3. Encouraged patient to call with any questions or concerns.

## 2019-06-03 ENCOUNTER — HOSPITAL ENCOUNTER (OUTPATIENT)
Dept: LAB | Age: 54
Discharge: HOME | End: 2019-06-03

## 2019-06-03 ENCOUNTER — OFFICE VISIT (OUTPATIENT)
Dept: SURGERY | Age: 54
End: 2019-06-03

## 2019-06-03 ENCOUNTER — DOCUMENTATION ONLY (OUTPATIENT)
Dept: SURGERY | Age: 54
End: 2019-06-03

## 2019-06-03 VITALS
HEART RATE: 78 BPM | HEIGHT: 68 IN | BODY MASS INDEX: 30.23 KG/M2 | SYSTOLIC BLOOD PRESSURE: 160 MMHG | DIASTOLIC BLOOD PRESSURE: 80 MMHG

## 2019-06-03 DIAGNOSIS — Z80.3 FAMILY HISTORY OF BREAST CANCER: ICD-10-CM

## 2019-06-03 DIAGNOSIS — C50.912 BREAST CANCER METASTASIZED TO AXILLARY LYMPH NODE, LEFT (HCC): Primary | ICD-10-CM

## 2019-06-03 DIAGNOSIS — C77.3 BREAST CANCER METASTASIZED TO AXILLARY LYMPH NODE, LEFT (HCC): Primary | ICD-10-CM

## 2019-06-03 NOTE — COMMUNICATION BODY
HISTORY OF PRESENT ILLNESS  Pennie Whittaker is a 52 y.o. female. HPI NEW patient referral for consultation by [facility] imaging for a new LEFT breast cancer diagnosis. She is here to discuss her surgical options. The patient noticed LEFT breast skin changes and a palpable lump. The LEFT nipple was retracted and the entire breast was red and warm to touch. Approximately 2 weeks ago the patient had 2 episodes of spontaneous clear nipple discharge. The above symptoms prompted breast imaging which led to a biopsy. Family history - possibly maternal grandmother with breast cancer but not sure? Father - colon cancer  [facility] Results (most recent):  Results from [place] encounter on 05/28/19   [facility] POST BX IMAGING LT INCL CAD    Addendum Addendum: ADDENDUM TO ultrasound-guided left BREAST BIOPSY, PERFORMED ON 5/20/2019  PATHOLOGY RESULTS:  Breast mass: IDC and DCIS. Left axilla: Adipose tissue with rare detached atypical cells. No lymph  node tissue identified. RESULTS CONVEYED: To the patient via phone by Dr. Ramesh Valenzuela 5/30/2019 at  09:10  ASSESSMENT: These results are concordant with the imaging findings. While  only rare atypical cells were identified on the challenging left axillary  biopsy, the lymph node is considered highly suspicious. RECOMMENDATIONS: Surgical consultation. The patient has follow-up  scheduled with Dr. Adelaida Doan. Jaquan Zhang MD 5/30/2019  9:24 AM          Narrative STUDY:  ULTRASOUND-GUIDED CORE NEEDLE BIOPSY OF THE left BREAST and  ultrasound-guided biopsy of a left axillary lymph node    INDICATION: Suspicious left breast mass and left axillary node. PROCEDURE:    The risks and benefits of the procedure were explained to the patient, questions  were answered, and informed consent was obtained. The mass with calcifications at 2 o'clock in the left breast was localized with  ultrasound. The breast was prepped in the usual sterile manner.   Following the  administration of a local anesthetic and dermatotomy, and using ultrasound  guidance,  a 12 gauge Celero  needle was advanced to the lesion, and 5 cores  were obtained. Pre and post-fire images confirmed accurate needle trajectory. A metallic clip was placed at the biopsy site. A specimen radiograph confirmed  calcifications in the sample. The above steps were then repeated with an  18-gauge Temno device targeting the largest left axillary lymph node. The biopsy  was challenging due to the deep location of the node. Post-biopsy digital  mammogram confirms the presence of the clip at the intended breast biopsy site. The biopsied node is not visualized mammographically. The patient tolerated the  procedure well without complication. Final pathology is pending. Impression Successful ultrasound-guided biopsy yielding cores submitted for histologic  analysis. Clips were placed at the biopsy sites. Post-biopsy digital mammogram  confirms the presence of the clip at the intended biopsy site within the breast.   The sampled lymph node was too deep to be visualized mammographically. Further  recommendations will be based on final pathology results. Interpretation   HORMONE RECEPTOR IMMUNOHISTOCHEMISTRY   RESULTS:   Invasive carcinoma   ER [place]   Interpretation: Positive Interpretation: Positive   Nuclear staining %: 100 Intensity: 3 Nuclear staining %: 20 Intensity: 1   In situ carcinoma   ER [place]   Interpretation: Positive Interpretation: Positive   Nuclear staining %: 100 Intensity: 3 Nuclear staining %: 30 Intensity: 2   HER-2/greg Immunohistochemistry (IHC)   Results: Equivocal, Score = 2+.  See comment   Cold ischemia time: [<1 hour]   Fixation time: 6-72 hours   Fixation type: 10% Neutral buffered formalin   Processing Method: Routine (Eight hour processing)   Block Tested: [1A]   Standard Assay Conditions were met   Sample is adequate for evaluation   Comments:   Paraffin-based immunohistochemical assay of formalin fixed neoplastic cell nuclei using HER2/greg ([place] Rabbit Monoclonal Antibody) quantitated by light microscopy using established methods. High, Low and Negative external controls stain appropriately. Testing is performed by [address] [place] [address] Laboratory using the [facility] Immunostainer and a FDA Approved Kit. Tissue handling and scoring are in accordance with 2018 ASCO/CAP guidelines. This assay has not been validated on decalcified tissues. Results on decalcified tissues should be interpreted with caution given the likelihood of false negativity on decalcified specimens. Clinical History   BIRADS 5 left breast mass and calcifications, left axillary node with calcifications, challenging biopsy   FINAL PATHOLOGIC DIAGNOSIS   1. Breast, left, core biopsy: In situ and invasive ductal carcinoma   Invasive carcinoma is nuclear grade 2 and measures up to 0.6 cm in greatest dimension on slide   Ductal carcinoma in situ is nuclear grade 3 with central necrosis   2. Soft tissue, left axilla, core biopsy:    Adipose tissue with rare detached atypical cells   No lymph node tissue identified   See comment     Past Medical History:   Diagnosis Date    Arthritis     Basal cell carcinoma of skin     History of seasonal allergies     Squamous cell skin cancer     Dr. Francheska Tomlinson Thyroid disease      Past Surgical History:   Procedure Laterality Date    HX KNEE ARTHROSCOPY Left     HX ORTHOPAEDIC      back    HX SHOULDER ARTHROSCOPY Right     HX THYROIDECTOMY       Social History     Socioeconomic History    Marital status: SINGLE     Spouse name: Not on file    Number of children: Not on file    Years of education: Not on file    Highest education level: Not on file   Occupational History    Not on file   Social Needs    Financial resource strain: Not on file    Food insecurity:     Worry: Not on file     Inability: Not on file    Transportation needs:     Medical: Not on file Non-medical: Not on file   Tobacco Use    Smoking status: Never Smoker    Smokeless tobacco: Never Used   Substance and Sexual Activity    Alcohol use: Yes    Drug use: No    Sexual activity: Not on file   Lifestyle    Physical activity:     Days per week: Not on file     Minutes per session: Not on file    Stress: Not on file   Relationships    Social connections:     Talks on phone: Not on file     Gets together: Not on file     Attends Gnosticist service: Not on file     Active member of club or organization: Not on file     Attends meetings of clubs or organizations: Not on file     Relationship status: Not on file    Intimate partner violence:     Fear of current or ex partner: Not on file     Emotionally abused: Not on file     Physically abused: Not on file     Forced sexual activity: Not on file   Other Topics Concern    Not on file   Social History Narrative    Not on file     OB History    None         Current Outpatient Medications:     sertraline (ZOLOFT) 100 mg tablet, Take 0.5 Tabs by mouth daily. , Disp: 30 Tab, Rfl: 3    traZODone (DESYREL) 50 mg tablet, Take 1 Tab by mouth nightly., Disp: 30 Tab, Rfl: 3    cholecalciferol (VITAMIN D3) 1,000 unit cap, Take  by mouth daily. , Disp: , Rfl:     liothyronine (CYTOMEL) 5 mcg tablet, Take 5 mcg by mouth daily. , Disp: , Rfl:     levothyroxine (SYNTHROID) 50 mcg tablet, Take 50 mcg by mouth Daily (before breakfast). , Disp: , Rfl:   Allergies   Allergen Reactions    Augmentin [Amoxicillin-Pot Clavulanate] Unknown (comments)       Review of Systems   Constitutional: Negative. HENT: Negative. Eyes: Negative. Respiratory: Negative. Cardiovascular: Negative. Gastrointestinal: Negative. Genitourinary: Negative. Musculoskeletal: Negative. Skin: Negative. Neurological: Negative. Endo/Heme/Allergies: Negative. Psychiatric/Behavioral: Negative. All other systems reviewed and are negative.       Physical Exam Constitutional: She appears well-developed and well-nourished. HENT:   Head: Normocephalic. Eyes: EOM are normal.   Neck: Neck supple. No thyromegaly present. Cardiovascular: Normal rate, regular rhythm, normal heart sounds and intact distal pulses. Pulmonary/Chest: Effort normal and breath sounds normal. Right breast exhibits no inverted nipple, no mass, no nipple discharge, no skin change and no tenderness. Left breast exhibits inverted nipple, mass (retroareolar thickening; entire luoq firm) and skin change (periareolar erythema and skin thickening). Left breast exhibits no nipple discharge and no tenderness. Abdominal: Soft. Musculoskeletal: Normal range of motion. Lymphadenopathy:     She has no cervical adenopathy. Neurological: She is alert. Skin: Skin is warm and dry. Psychiatric: She has a normal mood and affect. Vitals reviewed. Procedure: skin punch biopsy 4 mm  Indication: nipple thickening, redness  After informed consent was obtained left breast skin was anesthesized with 1% lidocaine. A 4 mm punch biopsy of lesion was taken at 12:00 off the nipple. A 4-0 nylon mattress suture was used to close the skin. No complications.      [address] [facility] [place] MAIN OFFICE [address] [facility]  OFFICE PROCEDURE PROGRESS NOTE        Chart reviewed for the following:   Karla Patel MD, have reviewed the History, Physical and updated the Allergic reactions for [address] [place] [address] performed immediately prior to start of procedure:   Karla Patel MD, have performed the following reviews on Jonathan Singh prior to the start of the procedure:            * Patient was identified by name and date of birth   * Agreement on procedure being performed was verified  * Risks and Benefits explained to the patient  * Procedure site verified and marked as necessary  * Patient was positioned for comfort  * Consent was signed and verified     Time: 10:15 am      Date of procedure: 6/3/2019    Procedure performed by:  Carolyne Sequeira MD    Provider assisted by: Jenna Urban RN    Patient assisted by: self    How tolerated by patient: tolerated the procedure well with no complications    Post Procedural Pain Scale: 0 - No Hurt    Comments: none          ASSESSMENT and PLAN    ICD-10-CM ICD-9-CM    1. Breast cancer metastasized to axillary lymph node, left (HCC) C50.912 174.9 MRI BREAST BI W WO CONT    C77.3 196.3 CT CHEST W WO CONT      CT ABD W WO CONT      CT PELV W WO CONT      NM BONE SCAN [address] [facility] BODY      SURGICAL PATHOLOGY   2. Family history of breast cancer Z80.3 V16.3      51 yo female with left breast IDC and DCIS  T2 N1, potential skin involvement  Er/Pr + Her 2 greg equivocal   Not enough invasive to send Her 2 greg for fish  She is here with her family  I have reviewed the imaging and pathology with her and she was given copies of these reports. She will likely need a mastectomy on left with removal of nodes. Will go ahead refer to plastics and schedule mri. I will do skin punch biopsy today for skin changes. There is still more information to know. Will order breast mri and staging scans since likely skin involvement  I will send genetic testing and mammaprint off of biopsy. At this point still not clear as to if she will need chemo or when will depend on genomic profiling and skin punch biopsy. They understood we need more information to determine next step. I Will call as results come back. 90 minutes were spent face-to-face with the patient during this encounter and 90% of that time was spent on counseling and coordination of care.

## 2019-06-03 NOTE — LETTER
6/3/2019 10:45 AM 
 
Patient:  Corine Crawley YOB: 1965 Date of Visit: 6/3/2019 Dear Linda Montoya MD 
[address] [facility] [address] [facility] [address] VIA In Basket 
 : Thank you for referring Ms. Taisha Vann to me for evaluation/treatment. Below are the relevant portions of my assessment and plan of care. HISTORY OF PRESENT ILLNESS Corine Crawley is a 52 y.o. female. HPI NEW patient referral for consultation by [facility] imaging for a new LEFT breast cancer diagnosis. She is here to discuss her surgical options. The patient noticed LEFT breast skin changes and a palpable lump. The LEFT nipple was retracted and the entire breast was red and warm to touch. Approximately 2 weeks ago the patient had 2 episodes of spontaneous clear nipple discharge. The above symptoms prompted breast imaging which led to a biopsy. Family history - possibly maternal grandmother with breast cancer but not sure? Father - colon cancer DRAKE Results (most recent): 
Results from Hospital Encounter encounter on 05/28/19 [facility] POST BX IMAGING LT INCL CAD Addendum Addendum: ADDENDUM TO ultrasound-guided left BREAST BIOPSY, PERFORMED ON 5/20/2019  PATHOLOGY RESULTS:  Breast mass: IDC and DCIS. Left axilla: Adipose tissue with rare detached atypical cells. No lymph  node tissue identified. RESULTS CONVEYED: To the patient via phone by Dr. Titi Muse 5/30/2019 at  09:10  ASSESSMENT: These results are concordant with the imaging findings. While  only rare atypical cells were identified on the challenging left axillary  biopsy, the lymph node is considered highly suspicious. RECOMMENDATIONS: Surgical consultation. The patient has follow-up  scheduled with Dr. Kailyn Deluca. Nubia Anderson MD 5/30/2019  9:24 AM        
 Narrative STUDY:  ULTRASOUND-GUIDED CORE NEEDLE BIOPSY OF THE left BREAST and 
ultrasound-guided biopsy of a left axillary lymph node INDICATION: Suspicious left breast mass and left axillary node. PROCEDURE: The risks and benefits of the procedure were explained to the patient, questions 
were answered, and informed consent was obtained. The mass with calcifications at 2 o'clock in the left breast was localized with 
ultrasound. The breast was prepped in the usual sterile manner. Following the 
administration of a local anesthetic and dermatotomy, and using ultrasound 
guidance,  a 12 gauge Celero  needle was advanced to the lesion, and 5 cores 
were obtained. Pre and post-fire images confirmed accurate needle trajectory. A metallic clip was placed at the biopsy site. A specimen radiograph confirmed 
calcifications in the sample. The above steps were then repeated with an 
18-gauge Temno device targeting the largest left axillary lymph node. The biopsy 
was challenging due to the deep location of the node. Post-biopsy digital 
mammogram confirms the presence of the clip at the intended breast biopsy site. The biopsied node is not visualized mammographically. The patient tolerated the 
procedure well without complication. Final pathology is pending. Impression Successful ultrasound-guided biopsy yielding cores submitted for histologic 
analysis. Clips were placed at the biopsy sites. Post-biopsy digital mammogram 
confirms the presence of the clip at the intended biopsy site within the breast. 
 The sampled lymph node was too deep to be visualized mammographically. Further 
recommendations will be based on final pathology results. Interpretation HORMONE RECEPTOR IMMUNOHISTOCHEMISTRY RESULTS:  
Invasive carcinoma ER [place] Interpretation: Positive Interpretation: Positive Nuclear staining %: 100 Intensity: 3 Nuclear staining %: 20 Intensity: 1 In situ carcinoma ER [place] Interpretation: Positive Interpretation: Positive Nuclear staining %: 100 Intensity: 3 Nuclear staining %: 30 Intensity: 2 HER-2/greg Immunohistochemistry (IHC) Results: Equivocal, Score = 2+. See comment Cold ischemia time: [<1 hour] Fixation time: 6-72 hours Fixation type: 10% Neutral buffered formalin Processing Method: Routine (Eight hour processing) Block Tested: [1A] Standard Assay Conditions were met Sample is adequate for evaluation Comments:  
Paraffin-based immunohistochemical assay of formalin fixed neoplastic cell nuclei using HER2/greg ([place] Rabbit Monoclonal Antibody) quantitated by light microscopy using established methods. High, Low and Negative external controls stain appropriately. Testing is performed by [facility] using the [facility] Immunostainer and a FDA Approved Kit. Tissue handling and scoring are in accordance with 2018 ASCO/CAP guidelines. This assay has not been validated on decalcified tissues. Results on decalcified tissues should be interpreted with caution given the likelihood of false negativity on decalcified specimens. Clinical History BIRADS 5 left breast mass and calcifications, left axillary node with calcifications, challenging biopsy FINAL PATHOLOGIC DIAGNOSIS 1. 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 Soft tissue, left axilla, core biopsy: Adipose tissue with rare detached atypical cells No lymph node tissue identified See comment Past Medical History:  
Diagnosis Date  Arthritis  Basal cell carcinoma of skin  History of seasonal allergies  Squamous cell skin cancer Dr. Conti Just  Thyroid disease Past Surgical History:  
Procedure Laterality Date  HX KNEE ARTHROSCOPY Left  HX ORTHOPAEDIC    
 back  HX SHOULDER ARTHROSCOPY Right  HX THYROIDECTOMY Social History Socioeconomic History  Marital status: SINGLE Spouse name: Not on file  Number of children: Not on file  Years of education: Not on file  Highest education level: Not on file Occupational History  Not on file Social Needs  Financial resource strain: Not on file  Food insecurity:  
  Worry: Not on file Inability: Not on file  Transportation needs:  
  Medical: Not on file Non-medical: Not on file Tobacco Use  Smoking status: Never Smoker  Smokeless tobacco: Never Used Substance and Sexual Activity  Alcohol use: Yes  Drug use: No  
 Sexual activity: Not on file Lifestyle  Physical activity:  
  Days per week: Not on file Minutes per session: Not on file  Stress: Not on file Relationships  Social connections:  
  Talks on phone: Not on file Gets together: Not on file Attends Yarsani service: Not on file Active member of club or organization: Not on file Attends meetings of clubs or organizations: Not on file Relationship status: Not on file  Intimate partner violence:  
  Fear of current or ex partner: Not on file Emotionally abused: Not on file Physically abused: Not on file Forced sexual activity: Not on file Other Topics Concern  Not on file Social History Narrative  Not on file OB History None Current Outpatient Medications:  
  sertraline (ZOLOFT) 100 mg tablet, Take 0.5 Tabs by mouth daily. , Disp: 30 Tab, Rfl: 3 
  traZODone (DESYREL) 50 mg tablet, Take 1 Tab by mouth nightly., Disp: 30 Tab, Rfl: 3   cholecalciferol (VITAMIN D3) 1,000 unit cap, Take  by mouth daily. , Disp: , Rfl:  
  liothyronine (CYTOMEL) 5 mcg tablet, Take 5 mcg by mouth daily. , Disp: , Rfl:  
  levothyroxine (SYNTHROID) 50 mcg tablet, Take 50 mcg by mouth Daily (before breakfast). , Disp: , Rfl:  
Allergies Allergen Reactions  Augmentin [Amoxicillin-Pot Clavulanate] Unknown (comments) Review of Systems Constitutional: Negative. HENT: Negative. Eyes: Negative. Respiratory: Negative. Cardiovascular: Negative. Gastrointestinal: Negative. Genitourinary: Negative. Musculoskeletal: Negative. Skin: Negative. Neurological: Negative. Endo/Heme/Allergies: Negative. Psychiatric/Behavioral: Negative. All other systems reviewed and are negative. Physical Exam  
Constitutional: She appears well-developed and well-nourished. HENT:  
Head: Normocephalic. Eyes: EOM are normal.  
Neck: Neck supple. No thyromegaly present. Cardiovascular: Normal rate, regular rhythm, normal heart sounds and intact distal pulses. Pulmonary/Chest: Effort normal and breath sounds normal. Right breast exhibits no inverted nipple, no mass, no nipple discharge, no skin change and no tenderness. Left breast exhibits inverted nipple, mass (retroareolar thickening; entire luoq firm) and skin change (periareolar erythema and skin thickening). Left breast exhibits no nipple discharge and no tenderness. Abdominal: Soft. Musculoskeletal: Normal range of motion. Lymphadenopathy:  
  She has no cervical adenopathy. Neurological: She is alert. Skin: Skin is warm and dry. Psychiatric: She has a normal mood and affect. Vitals reviewed. Procedure: skin punch biopsy 4 mm Indication: nipple thickening, redness After informed consent was obtained left breast skin was anesthesized with 1% lidocaine. A 4 mm punch biopsy of lesion was taken at 12:00 off the nipple. A 4-0 nylon mattress suture was used to close the skin. No complications. [address] [facility] MAIN OFFICE [id number] PROCEDURE PROGRESS NOTE Chart reviewed for the following: 
 Quinn Ortiz MD, have reviewed the History, Physical and updated the Allergic reactions for Steven Maza TIME OUT performed immediately prior to start of procedure: Karla Patel MD, have performed the following reviews on Jonathan Singh prior to the start of the procedure: 
         
* Patient was identified by name and date of birth * Agreement on procedure being performed was verified * Risks and Benefits explained to the patient * Procedure site verified and marked as necessary * Patient was positioned for comfort * Consent was signed and verified Time: 10:15 am 
 
 
Date of procedure: 6/3/2019 Procedure performed by:  Pb Danielle MD 
 
Provider assisted by: Tiago Walker RN Patient assisted by: self How tolerated by patient: tolerated the procedure well with no complications Post Procedural Pain Scale: 0 - No Hurt Comments: none ASSESSMENT and PLAN 
  ICD-10-CM ICD-9-CM 1. Breast cancer metastasized to axillary lymph node, left (HCC) C50.912 174.9 MRI BREAST BI W WO CONT  
 C77.3 196.3 CT CHEST W WO CONT  
   CT ABD W WO CONT  
   CT PELV W WO CONT  
   NM BONE SCAN WH BODY SURGICAL PATHOLOGY 2. Family history of breast cancer Z80.3 V16.3   
 
53 yo female with left breast IDC and DCIS 
T2 N1, potential skin involvement Er/Pr + Her 2 greg equivocal  
Not enough invasive to send Her 2 greg for fish She is here with her family I have reviewed the imaging and pathology with her and she was given copies of these reports. She will likely need a mastectomy on left with removal of nodes. Will go ahead refer to plastics and schedule mri. I will do skin punch biopsy today for skin changes. There is still more information to know. Will order breast mri and staging scans since likely skin involvement I will send genetic testing and mammaprint off of biopsy. At this point still not clear as to if she will need chemo or when will depend on genomic profiling and skin punch biopsy. They understood we need more information to determine next step. I Will call as results come back. 90 minutes were spent face-to-face with the patient during this encounter and 90% of that time was spent on counseling and coordination of care. If you have questions, please do not hesitate to call me. I look forward to following [facility] along with you. Sincerely, Beverley Canela MD

## 2019-06-03 NOTE — PROGRESS NOTES
The patient's breast skin biopsy specimen was called to [facility] for . Spoke to Linda Gomez who will have   the specimen in the box outside [address].

## 2019-06-03 NOTE — PATIENT INSTRUCTIONS
Learning About Breast Cancer Surgery  What is breast cancer surgery? Surgery is a key part of treatment for breast cancer. The type of surgery you have depends on the size, location, and type of the cancer. It also depends on your health and what is important to you. Your doctor may combine treatments. This is a common way to treat breast cancer. You may have surgery to remove all the cancer that can be seen. After surgery you may also need radiation, chemotherapy, or hormone therapy. These treatments get rid of any cancer cells that may be left. During the surgery, the doctor may remove lymph nodes from the armpit. The lymph nodes will be looked at under a microscope. This is used to check if cancer has spread from the breast into the lymph nodes. What is a lumpectomy? Lumpectomy    Lumpectomy removes the tumor in the breast. The incision is made close to the tumor. This shows common places where the cut is made. Simple mastectomy    Simple mastectomy removes the whole breast, including nipple and skin. This shows where the cut is often made. Nipple-sparing mastectomy with inframammary cut    Nipple-sparing mastectomy removes the whole breast but leaves the skin and nipple. This shows the cut in the curve under the breast (inframammary). Nipple-sparing mastectomy with lateral cut    Nipple-sparing mastectomy removes the whole breast but leaves the skin and nipple. This shows the cut from the nipple along the side of the breast toward the armpit (lateral). Nipple-sparing mastectomy with periareolar cut    Nipple-sparing mastectomy removes the whole breast but leaves the skin and nipple. This shows the cut around the top of the nipple and along the side of the breast toward the armpit (periareolar).   Skin-sparing mastectomy with periareolar cut    Skin-sparing mastectomy removes the whole breast and the nipple, but it keeps the skin that covers the breast. This shows the cut around the nipple (periareloar). Skin-sparing mastectomy with teardrop cut    Skin-sparing mastectomy removes the whole breast and the nipple, but it keeps the skin that covers the breast. This shows the cut around the nipple in a teardrop shape. Skin-sparing mastectomy with tennis racket cut    Skin-sparing mastectomy removes the whole breast and the nipple, but it keeps the skin that covers the breast. This shows the cut around the nipple and along the side of the breast toward the armpit (tennis racket shape). What can you expect after surgery? Your doctor will send the breast tissue to a lab for testing. This will help the doctor know more about the type of cancer you have. It may take up to a week or more to get the results back. Your doctor will discuss the results with you. You may meet with a doctor who specializes in cancer treatment (an oncologist). This doctor can help you decide about any other treatment you may need. Your needs and values are important when choosing a treatment that is right for you. The amount of time you will need to recover depends on the type of surgery you had. It also depends on whether you need any more treatment. If you had a lumpectomy, you will probably look the same in a bra. But your breasts may not match in size or shape after surgery. This depends on the size of your breasts and how much tissue was removed. Surgery to create a new breast is called reconstruction. This may be done at the same time as a mastectomy. Or it may be done later. Some women choose not to do reconstruction at all. You choose what feels right for you. No matter what kind of surgery you have, you will get information about your treatment. This includes how to prepare, what to expect, and what to do afterward. Follow-up care is a key part of your treatment and safety. Be sure to make and go to all appointments, and call your doctor if you are having problems.  It's also a good idea to know your test results and keep a list of the medicines you take. Where can you learn more? Go to http://louis-daniel.info/. Enter P020 in the search box to learn more about \"Learning About Breast Cancer Surgery. \"  Current as of: March 27, 2018  Content Version: 11.9  © 8219-8579 [employer]. Care instructions adapted under license by [employer] (which disclaims liability or warranty for this information). If you have questions about a medical condition or this instruction, always ask your healthcare professional. [address] any warranty or liability for your use of this information.

## 2019-06-03 NOTE — PROGRESS NOTES
HISTORY OF PRESENT ILLNESS  Aldo Arias is a 52 y.o. female. HPI NEW patient referral for consultation by [facility] imaging for a new LEFT breast cancer diagnosis. She is here to discuss her surgical options. The patient noticed LEFT breast skin changes and a palpable lump. The LEFT nipple was retracted and the entire breast was red and warm to touch. Approximately 2 weeks ago the patient had 2 episodes of spontaneous clear nipple discharge. The above symptoms prompted breast imaging which led to a biopsy. Family history - possibly maternal grandmother with breast cancer but not sure? Father - colon cancer  [facility] Results (most recent):  Results from [place] encounter on 05/28/19   [facility] POST BX IMAGING LT INCL CAD    Addendum Addendum: ADDENDUM TO ultrasound-guided left BREAST BIOPSY, PERFORMED ON 5/20/2019  PATHOLOGY RESULTS:  Breast mass: IDC and DCIS. Left axilla: Adipose tissue with rare detached atypical cells. No lymph  node tissue identified. RESULTS CONVEYED: To the patient via phone by Dr. Jerri Felix 5/30/2019 at  09:10  ASSESSMENT: These results are concordant with the imaging findings. While  only rare atypical cells were identified on the challenging left axillary  biopsy, the lymph node is considered highly suspicious. RECOMMENDATIONS: Surgical consultation. The patient has follow-up  scheduled with Dr. Prateek Montgomery. Barbara Nice MD 5/30/2019  9:24 AM          Narrative STUDY:  ULTRASOUND-GUIDED CORE NEEDLE BIOPSY OF THE left BREAST and  ultrasound-guided biopsy of a left axillary lymph node    INDICATION: Suspicious left breast mass and left axillary node. PROCEDURE:    The risks and benefits of the procedure were explained to the patient, questions  were answered, and informed consent was obtained. The mass with calcifications at 2 o'clock in the left breast was localized with  ultrasound. The breast was prepped in the usual sterile manner.   Following the  administration of a local anesthetic and dermatotomy, and using ultrasound  guidance,  a 12 gauge Celero  needle was advanced to the lesion, and 5 cores  were obtained. Pre and post-fire images confirmed accurate needle trajectory. A metallic clip was placed at the biopsy site. A specimen radiograph confirmed  calcifications in the sample. The above steps were then repeated with an  18-gauge Temno device targeting the largest left axillary lymph node. The biopsy  was challenging due to the deep location of the node. Post-biopsy digital  mammogram confirms the presence of the clip at the intended breast biopsy site. The biopsied node is not visualized mammographically. The patient tolerated the  procedure well without complication. Final pathology is pending. Impression Successful ultrasound-guided biopsy yielding cores submitted for histologic  analysis. Clips were placed at the biopsy sites. Post-biopsy digital mammogram  confirms the presence of the clip at the intended biopsy site within the breast.   The sampled lymph node was too deep to be visualized mammographically. Further  recommendations will be based on final pathology results. Interpretation   HORMONE RECEPTOR IMMUNOHISTOCHEMISTRY   RESULTS:   Invasive carcinoma   ER UT   Interpretation: Positive Interpretation: Positive   Nuclear staining %: 100 Intensity: 3 Nuclear staining %: 20 Intensity: 1   In situ carcinoma   ER UT   Interpretation: Positive Interpretation: Positive   Nuclear staining %: 100 Intensity: 3 Nuclear staining %: 30 Intensity: 2   HER-2/greg Immunohistochemistry (IHC)   Results: Equivocal, Score = 2+.  See comment   Cold ischemia time: [<1 hour]   Fixation time: 6-72 hours   Fixation type: 10% Neutral buffered formalin   Processing Method: Routine (Eight hour processing)   Block Tested: [1A]   Standard Assay Conditions were met   Sample is adequate for evaluation   Comments:   Paraffin-based immunohistochemical assay of formalin fixed neoplastic cell nuclei using HER2/greg (Tucker Rabbit Monoclonal Antibody) quantitated by light microscopy using established methods. High, Low and Negative external controls stain appropriately. Testing is performed by [facility] using the [facility] Immunostainer and a FDA Approved Kit. Tissue handling and scoring are in accordance with 2018 ASCO/CAP guidelines. This assay has not been validated on decalcified tissues. Results on decalcified tissues should be interpreted with caution given the likelihood of false negativity on decalcified specimens. Clinical History   BIRADS 5 left breast mass and calcifications, left axillary node with calcifications, challenging biopsy   FINAL PATHOLOGIC DIAGNOSIS   1. Breast, left, core biopsy: In situ and invasive ductal carcinoma   Invasive carcinoma is nuclear grade 2 and measures up to 0.6 cm in greatest dimension on slide   Ductal carcinoma in situ is nuclear grade 3 with central necrosis   2. Soft tissue, left axilla, core biopsy:    Adipose tissue with rare detached atypical cells   No lymph node tissue identified   See comment     Past Medical History:   Diagnosis Date    Arthritis     Basal cell carcinoma of skin     History of seasonal allergies     Squamous cell skin cancer     Dr. Manzanares Leisure Thyroid disease      Past Surgical History:   Procedure Laterality Date    HX KNEE ARTHROSCOPY Left     HX ORTHOPAEDIC      back    HX SHOULDER ARTHROSCOPY Right     HX THYROIDECTOMY       Social History     Socioeconomic History    Marital status: SINGLE     Spouse name: Not on file    Number of children: Not on file    Years of education: Not on file    Highest education level: Not on file   Occupational History    Not on file   Social Needs    Financial resource strain: Not on file    Food insecurity:     Worry: Not on file     Inability: Not on file    Transportation needs:     Medical: Not on file Non-medical: Not on file   Tobacco Use    Smoking status: Never Smoker    Smokeless tobacco: Never Used   Substance and Sexual Activity    Alcohol use: Yes    Drug use: No    Sexual activity: Not on file   Lifestyle    Physical activity:     Days per week: Not on file     Minutes per session: Not on file    Stress: Not on file   Relationships    Social connections:     Talks on phone: Not on file     Gets together: Not on file     Attends Yazdanism service: Not on file     Active member of club or organization: Not on file     Attends meetings of clubs or organizations: Not on file     Relationship status: Not on file    Intimate partner violence:     Fear of current or ex partner: Not on file     Emotionally abused: Not on file     Physically abused: Not on file     Forced sexual activity: Not on file   Other Topics Concern    Not on file   Social History Narrative    Not on file     OB History    None         Current Outpatient Medications:     sertraline (ZOLOFT) 100 mg tablet, Take 0.5 Tabs by mouth daily. , Disp: 30 Tab, Rfl: 3    traZODone (DESYREL) 50 mg tablet, Take 1 Tab by mouth nightly., Disp: 30 Tab, Rfl: 3    cholecalciferol (VITAMIN D3) 1,000 unit cap, Take  by mouth daily. , Disp: , Rfl:     liothyronine (CYTOMEL) 5 mcg tablet, Take 5 mcg by mouth daily. , Disp: , Rfl:     levothyroxine (SYNTHROID) 50 mcg tablet, Take 50 mcg by mouth Daily (before breakfast). , Disp: , Rfl:   Allergies   Allergen Reactions    Augmentin [Amoxicillin-Pot Clavulanate] Unknown (comments)       Review of Systems   Constitutional: Negative. HENT: Negative. Eyes: Negative. Respiratory: Negative. Cardiovascular: Negative. Gastrointestinal: Negative. Genitourinary: Negative. Musculoskeletal: Negative. Skin: Negative. Neurological: Negative. Endo/Heme/Allergies: Negative. Psychiatric/Behavioral: Negative. All other systems reviewed and are negative.       Physical Exam Constitutional: She appears well-developed and well-nourished. HENT:   Head: Normocephalic. Eyes: EOM are normal.   Neck: Neck supple. No thyromegaly present. Cardiovascular: Normal rate, regular rhythm, normal heart sounds and intact distal pulses. Pulmonary/Chest: Effort normal and breath sounds normal. Right breast exhibits no inverted nipple, no mass, no nipple discharge, no skin change and no tenderness. Left breast exhibits inverted nipple, mass (retroareolar thickening; entire luoq firm) and skin change (periareolar erythema and skin thickening). Left breast exhibits no nipple discharge and no tenderness. Abdominal: Soft. Musculoskeletal: Normal range of motion. Lymphadenopathy:     She has no cervical adenopathy. Neurological: She is alert. Skin: Skin is warm and dry. Psychiatric: She has a normal mood and affect. Vitals reviewed. Procedure: skin punch biopsy 4 mm  Indication: nipple thickening, redness  After informed consent was obtained left breast skin was anesthesized with 1% lidocaine. A 4 mm punch biopsy of lesion was taken at 12:00 off the nipple. A 4-0 nylon mattress suture was used to close the skin. No complications.      [address] [facility] [place] MAIN OFFICE [address] [facility]  OFFICE PROCEDURE PROGRESS NOTE        Chart reviewed for the following:   Rama Gordon MD, have reviewed the History, Physical and updated the Allergic reactions for [address] [place] [address] performed immediately prior to start of procedure:   Rama Gordon MD, have performed the following reviews on Sherrilyn Hand prior to the start of the procedure:            * Patient was identified by name and date of birth   * Agreement on procedure being performed was verified  * Risks and Benefits explained to the patient  * Procedure site verified and marked as necessary  * Patient was positioned for comfort  * Consent was signed and verified     Time: 10:15 am      Date of procedure: 6/3/2019    Procedure performed by:  Candi Fofana MD    Provider assisted by: Wisam Jean RN    Patient assisted by: self    How tolerated by patient: tolerated the procedure well with no complications    Post Procedural Pain Scale: 0 - No Hurt    Comments: none          ASSESSMENT and PLAN    ICD-10-CM ICD-9-CM    1. Breast cancer metastasized to axillary lymph node, left (HCC) C50.912 174.9 MRI BREAST BI W WO CONT    C77.3 196.3 CT CHEST W WO CONT      CT ABD W WO CONT      CT PELV W WO CONT      NM BONE SCAN [facility] BODY      SURGICAL PATHOLOGY   2. Family history of breast cancer Z80.3 V16.3      51 yo female with left breast IDC and DCIS  T2 N1, potential skin involvement  Er/Pr + Her 2 greg equivocal   Not enough invasive to send Her 2 greg for fish  She is here with her family  I have reviewed the imaging and pathology with her and she was given copies of these reports. She will likely need a mastectomy on left with removal of nodes. Will go ahead refer to plastics and schedule mri. I will do skin punch biopsy today for skin changes. There is still more information to know. Will order breast mri and staging scans since likely skin involvement  I will send genetic testing and mammaprint off of biopsy. At this point still not clear as to if she will need chemo or when will depend on genomic profiling and skin punch biopsy. They understood we need more information to determine next step. I Will call as results come back. 90 minutes were spent face-to-face with the patient during this encounter and 90% of that time was spent on counseling and coordination of care.

## 2019-06-04 ENCOUNTER — CLINICAL SUPPORT (OUTPATIENT)
Dept: SURGERY | Age: 54
End: 2019-06-04

## 2019-06-04 DIAGNOSIS — C77.3 BREAST CANCER METASTASIZED TO AXILLARY LYMPH NODE, LEFT (HCC): Primary | ICD-10-CM

## 2019-06-04 DIAGNOSIS — C50.912 BREAST CANCER METASTASIZED TO AXILLARY LYMPH NODE, LEFT (HCC): Primary | ICD-10-CM

## 2019-06-04 NOTE — PROGRESS NOTES
HISTORY OF PRESENT ILLNESS  Humphrey Flaherty is a 52 y.o. female. HPI  Patient of Dr. Phillip Catching here for genetic testing. ROS na    Physical Exam na    ASSESSMENT and PLAN  Specimen for Breast Next - [facility] genetics collected and sent. Will call patient with results.

## 2019-06-05 ENCOUNTER — DOCUMENTATION ONLY (OUTPATIENT)
Dept: SURGERY | Age: 54
End: 2019-06-05

## 2019-06-10 ENCOUNTER — CLINICAL SUPPORT (OUTPATIENT)
Dept: SURGERY | Age: 54
End: 2019-06-10

## 2019-06-10 VITALS
WEIGHT: 198 LBS | BODY MASS INDEX: 30.01 KG/M2 | HEART RATE: 84 BPM | SYSTOLIC BLOOD PRESSURE: 130 MMHG | HEIGHT: 68 IN | DIASTOLIC BLOOD PRESSURE: 86 MMHG

## 2019-06-10 DIAGNOSIS — Z98.890 H/O LEFT BREAST BIOPSY: Primary | ICD-10-CM

## 2019-06-10 NOTE — PROGRESS NOTES
HISTORY OF PRESENT ILLNESS  Marleni Montaño is a 52 y.o. female. HPI ESTABLISHED patient here today for a suture removal on her LEFT breast. She denies any complaints. ROS    Physical Exam not done    ASSESSMENT and PLAN  The LEFT breast has one suture to be removed. The area is dry and intact. The suture was removed without incident.

## 2019-06-11 ENCOUNTER — HOSPITAL ENCOUNTER (OUTPATIENT)
Dept: NUCLEAR MEDICINE | Age: 54
Discharge: HOME | End: 2019-06-11
Attending: SURGERY
Payer: MEDICAID

## 2019-06-11 ENCOUNTER — HOSPITAL ENCOUNTER (OUTPATIENT)
Dept: MRI IMAGING | Age: 54
Discharge: HOME | End: 2019-06-11
Attending: SURGERY
Payer: MEDICAID

## 2019-06-11 ENCOUNTER — TELEPHONE (OUTPATIENT)
Dept: SURGERY | Age: 54
End: 2019-06-11

## 2019-06-11 ENCOUNTER — DOCUMENTATION ONLY (OUTPATIENT)
Dept: SURGERY | Age: 54
End: 2019-06-11

## 2019-06-11 DIAGNOSIS — C50.912 BREAST CANCER METASTASIZED TO AXILLARY LYMPH NODE, LEFT (HCC): ICD-10-CM

## 2019-06-11 DIAGNOSIS — C77.3 BREAST CANCER METASTASIZED TO AXILLARY LYMPH NODE, LEFT (HCC): ICD-10-CM

## 2019-06-11 PROCEDURE — A9585 GADOBUTROL INJECTION: HCPCS | Performed by: SURGERY

## 2019-06-11 PROCEDURE — 77049 MRI BREAST C-+ W/CAD BI: CPT

## 2019-06-11 PROCEDURE — 74011250636 HC RX REV CODE- 250/636: Performed by: SURGERY

## 2019-06-11 PROCEDURE — 74011000258 HC RX REV CODE- 258: Performed by: SURGERY

## 2019-06-11 PROCEDURE — 78306 BONE IMAGING WHOLE BODY: CPT

## 2019-06-11 RX ADMIN — GADOBUTROL 9 ML: 604.72 INJECTION INTRAVENOUS at 07:50

## 2019-06-11 RX ADMIN — SODIUM CHLORIDE 100 ML: 900 INJECTION, SOLUTION INTRAVENOUS at 07:50

## 2019-06-11 NOTE — TELEPHONE ENCOUNTER
Received request for peer to peer from Sara Iniguez RN. Peer to peer completed and CTs of chest, abdomen and pelvis with contrast approved. Case - [id number] - auth# - [id number]    Case [id number] - denied as it was a duplicate order to the above studies.

## 2019-06-11 NOTE — PROGRESS NOTES
Patient was in the building for an appointment. Stopped by the office at [facility] because she was called by John Michele at [address] [place] [address] to let her know that her CT Scan for tomorrow had not been approved. I let the patient know that Consuelo Rod NP had done a peer to peer earlier today and had gotten this approved. I called Jena Lockett at [address] [place] [address] to let her know the authorization number and that the study was approved for tomorrow. She made a note in the computer to this effect.

## 2019-06-12 ENCOUNTER — HOSPITAL ENCOUNTER (OUTPATIENT)
Dept: CT IMAGING | Age: 54
Discharge: HOME | End: 2019-06-12
Attending: SURGERY
Payer: MEDICAID

## 2019-06-12 DIAGNOSIS — C77.3 BREAST CANCER METASTASIZED TO AXILLARY LYMPH NODE, LEFT (HCC): ICD-10-CM

## 2019-06-12 DIAGNOSIS — C50.912 BREAST CANCER METASTASIZED TO AXILLARY LYMPH NODE, LEFT (HCC): ICD-10-CM

## 2019-06-12 PROCEDURE — 74177 CT ABD & PELVIS W/CONTRAST: CPT

## 2019-06-12 PROCEDURE — 71260 CT THORAX DX C+: CPT

## 2019-06-12 PROCEDURE — 74011636320 HC RX REV CODE- 636/320: Performed by: SURGERY

## 2019-06-12 PROCEDURE — 74011000258 HC RX REV CODE- 258: Performed by: SURGERY

## 2019-06-12 RX ORDER — SODIUM CHLORIDE 0.9 % (FLUSH) 0.9 %
10 SYRINGE (ML) INJECTION
Status: COMPLETED | OUTPATIENT
Start: 2019-06-12 | End: 2019-06-12

## 2019-06-12 RX ADMIN — Medication 10 ML: at 13:47

## 2019-06-12 RX ADMIN — IOPAMIDOL 100 ML: 755 INJECTION, SOLUTION INTRAVENOUS at 13:47

## 2019-06-12 RX ADMIN — SODIUM CHLORIDE 100 ML: 900 INJECTION, SOLUTION INTRAVENOUS at 13:47

## 2019-06-13 ENCOUNTER — TELEPHONE (OUTPATIENT)
Dept: SURGERY | Age: 54
End: 2019-06-13

## 2019-06-13 NOTE — TELEPHONE ENCOUNTER
Called patient with result of genetic test which is negative. She is happy to hear this. Confirmed her address so I can mail her a copy. Met with Dr. Sandy Rose yesterday and really liked him. Has appt with Dr. Jaycob Alfaro next week. Mammaprint pending. Patient was very appreciative.

## 2019-06-17 ENCOUNTER — DOCUMENTATION ONLY (OUTPATIENT)
Dept: SURGERY | Age: 54
End: 2019-06-17

## 2019-06-17 NOTE — PROGRESS NOTES
Just spoke to Scarlet Faulkner at [facility]  Dr. Jonelle Sr has inadvertently ordered mammaprint twice on same (biopsy) specimen. Mammaprint request is still pending from 6/3/19. Apparently there was hold up from [facility] because \"[employer]\"/HER 2 testing. Cuca is going to contact Tanya at [facility] now so that we can be sure to get a mammaprint result ASAP.

## 2019-06-19 ENCOUNTER — TELEPHONE (OUTPATIENT)
Dept: SURGERY | Age: 54
End: 2019-06-19

## 2019-06-19 ENCOUNTER — OFFICE VISIT (OUTPATIENT)
Dept: ONCOLOGY | Age: 54
End: 2019-06-19

## 2019-06-19 ENCOUNTER — DOCUMENTATION ONLY (OUTPATIENT)
Dept: ONCOLOGY | Age: 54
End: 2019-06-19

## 2019-06-19 VITALS
OXYGEN SATURATION: 90 % | WEIGHT: 200 LBS | BODY MASS INDEX: 30.31 KG/M2 | DIASTOLIC BLOOD PRESSURE: 109 MMHG | TEMPERATURE: 98.4 F | HEART RATE: 69 BPM | HEIGHT: 68 IN | SYSTOLIC BLOOD PRESSURE: 156 MMHG

## 2019-06-19 DIAGNOSIS — C50.412 MALIGNANT NEOPLASM OF UPPER-OUTER QUADRANT OF LEFT BREAST IN FEMALE, ESTROGEN RECEPTOR POSITIVE (HCC): Primary | ICD-10-CM

## 2019-06-19 DIAGNOSIS — E66.09 CLASS 1 OBESITY DUE TO EXCESS CALORIES WITHOUT SERIOUS COMORBIDITY WITH BODY MASS INDEX (BMI) OF 30.0 TO 30.9 IN ADULT: ICD-10-CM

## 2019-06-19 DIAGNOSIS — Z17.0 MALIGNANT NEOPLASM OF UPPER-OUTER QUADRANT OF LEFT BREAST IN FEMALE, ESTROGEN RECEPTOR POSITIVE (HCC): Primary | ICD-10-CM

## 2019-06-19 NOTE — TELEPHONE ENCOUNTER
Made appt for patient to see Dr. Anthony Ward tomorrow for US guided biopsy. Elizabeth - can you fill Mere in?     Thanks,  D

## 2019-06-19 NOTE — PROGRESS NOTES
This note will not be viewable in [address] Oncology Navigator  Psychosocial Assessment    Reason for Assessment:    []Depression  []Anxiety  []Caregiver [place]  []Maladaptive Coping with Serious Illness   [] Social Work Referral   [x] Other Initial assessment     Sources of Information:    [x]Patient  [x]Family  []Staff  []Medical Record    Advance Care Planning:  No flowsheet data found. Patient does not have an advanced medical directive, but plans to think about completing one. Provided an [facility] Invitation guide to the patient today to review.       Mental Status:    [x]Alert  []Lethargic  []Unresponsive   [] Unable to assess   Oriented to:  [x]Person  [x]Place  [x]Time  [x]Situation      Barriers to Learning:    []Language  []Developmental  []Cognitive  []Altered Mental Status  []Visual/Hearing Impairment  []Unable to Read/Write  []Motivational   [x]No Barriers Identified  []Other:    Relationship Status:  []Single  []  [x]Significant Other/Life Partner  []  []  []      Living Circumstances:  []Lives Alone  [x]Family/Significant Other in Household  []Roommates  []Children in the Home  []Paid Caregivers  []Assisted Living Facility/Group Home  []Skilled [address]  []Homeless  []Incarcerated  []Environmental/Care Concerns  []other:    Employment Status:  [x]Employed Full-time []Employed Part-time []Homemaker [] Disabled  [] Retired []Other:    Support System:    [x]Strong  []Fair  []Limited    Financial/Legal Concerns:    []Uninsured  []Limited Income/Resources  []Non-Citizen  [x]No Concerns Identified  []Financial POA:    []Other:    Roman Catholic/Spiritual/Existential:  []Strong Sense of Spirituality  []Involved in [employer]  []Request  Visit  []Expressing Ever Ego  [x]No Concerns Identified    Coping with Illness:         Patient: Family/Caregiver:   Understanding and Acceptance of Illness/Prognosis  [x] []   Strong Sense of Resilience [x] []   Self Reflection [x] []   Engaged Support System [x] []   Does not Readily Discuss Illness [] []   Denial of Terminal Status [] []   Anger [] []   Depression [] []   Anxiety/Fear [] []   Bargaining [] []   Recent Diagnosis/Prognosis [] []   Difficulties with Body Image [] []   Loss of Identity [] []   Excessive Substance Use [] []   Mental Health History [] []   Enmeshed Relationships [] []   History of Loss [] []   Anticipatory Grief [] []   Concern for Complicated Grief [] []   Suicidal Ideation or Plan [] []   Unable to assess [] []            Narrative:   Met with the patient and her life partner, Shavonne Ledesma, during her initial office visit today. The patient is being evaluated for left breast cancer. She explained that she felt a sensation in the shower, and her breast changed in appearance. She got a mammogram, and was evaluated by surgeon, Dr. Merced Kwok. The patient has a PCP - Dr. Karan Willams. The patient said   \"I'm ready to get started with whatever treatment I will need. \"      The patient lives with her life partner and their two children, Juno (16) and Santy (13). 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 The patient explained that she has a large social network, and has many people who are interested in helping with transportation, meals, and assisting her children. Offered active listening and emotional support as the patient explained \"a friend of ours just  after a 6 year castro with breast cancer, and the kids know that, so my oldest has been researching it. \"  This  provided a list of websites and books to assist in having conversations regarding cancer.       Patient was also provided a beauty bag through the [facility] Feeling Beautiful Again program.  Provided the patient with a list of places to obtain wigs, head scarves, mastectomy bras, prosthesis, and other accessories in the event that she needs it. Provided this 's contact information as well as information regarding the complementary services provided by the [facility]. Assessment/Action:  1. Introduced self and role of this  in the [facility]. 2.  Informed the patient of the [employer] and available resources there. 3.  Continue to meet with the patient when she returns to the clinic for ongoing assessment of the patients adjustment to her diagnosis and treatment. 4.  Ongoing psychosocial support as desired by patient.       Plan/Referral:   Transportation referral  Complementary therapies referral    Asher Wild LCSW

## 2019-06-19 NOTE — PROGRESS NOTES
Corine Crawley is a 52 y.o. female  Chief Complaint   Patient presents with    Breast Cancer     NP referral from [facility]

## 2019-06-19 NOTE — PROGRESS NOTES
This note will not be viewable in [employer].    ====Diana Edmond Invitation====    Patient was invited to [address] on this date and given the information folder for review. Recommended appointment with Diana Edmond facilitator for ACP conversation regarding advance directives. [x] Yes  [] No  Referral sent to [facility] Choices team member or Coordinator for follow-up    [] Yes  [x] No  Patient scheduled an appointment.         Site of Referral: [address] [place] [address], [facility]

## 2019-06-19 NOTE — PROGRESS NOTES
[facility] [place] [address] [address], [phone]  F: [phone]    Reason for Visit:   Linda Olivares is a 52 y.o. female who is seen in consultation at the request of Dr. Alberta Mir for evaluation of Left breast cancer    Treatment History:   · : Mammogram:  Left breast mass with skin thickening, 2 suspicious nodes. Mass measures 1.4 x 0.9 x 1.3  · : MRI breast: Multicentric left breast carcinoma. Non-masslike enhancement extends from the nipple to the posterior third, involves all quadrants, and measures 106 x 44 x 79 mm. · 19: Biopsy breast- IDC % % HER 2 new 2+ FISH could not be done, skin biopsy benign , node biopsy mostly adipose tissue with atypical cells . BRCA1 and 2 negative. CT and bone scan negative for metastasis    History of Present Illness:   Patient is a 52 y.o. seen for L breast cancer. She felt a sensation in the shower about 3-4 weeks ago. She also noted a lump and  an inverted nipple. She had a physician friend look at this who directed her to mammogram and recommended she see Dr. Alberta Mir. This led to imaging and diagnosis as above    She states that she is noting now a red and swollen L breast, no pain,  she has no arm swelling, declines fevers, chills, sweats, HA, falls, neuropathy or diarrhea. Comes with supportive partner Leida DUONG  She does not smoke.      Father had colon cancer      Past Medical History:   Diagnosis Date    Arthritis     Basal cell carcinoma of skin     History of seasonal allergies     Squamous cell skin cancer     Dr. Orlando Ventura Thyroid disease       Past Surgical History:   Procedure Laterality Date    HX KNEE ARTHROSCOPY Left     HX ORTHOPAEDIC      back    HX SHOULDER ARTHROSCOPY Right     HX THYROIDECTOMY        Social History     Tobacco Use    Smoking status: Never Smoker    Smokeless tobacco: Never Used   Substance Use Topics    Alcohol use: Yes      Family History   Problem Relation Age of Onset    Arthritis-osteo Mother     Cancer Father         Colon Cancer     Current Outpatient Medications   Medication Sig    sertraline (ZOLOFT) 100 mg tablet Take 0.5 Tabs by mouth daily.  traZODone (DESYREL) 50 mg tablet Take 1 Tab by mouth nightly.  cholecalciferol (VITAMIN D3) 1,000 unit cap Take  by mouth daily.  liothyronine (CYTOMEL) 5 mcg tablet Take 5 mcg by mouth daily.  levothyroxine (SYNTHROID) 50 mcg tablet Take 50 mcg by mouth Daily (before breakfast). No current facility-administered medications for this visit. Allergies   Allergen Reactions    Augmentin [Amoxicillin-Pot Clavulanate] Unknown (comments)        Review of Systems: A complete review of systems was obtained, negative except as described above. Physical Exam:     Visit Vitals  BP (!) 156/109   Pulse 69   Temp 98.4 °F (36.9 °C)   Ht 5' 8\" (1.727 m)   Wt 200 lb (90.7 kg)   SpO2 90%   BMI 30.41 kg/m²     ECOG PS: 1  General: No distress  Eyes: PERRLA, anicteric sclerae  HENT: Atraumatic, OP clear  Neck: Supple  Lymphatic: No cervical, supraclavicular, or inguinal adenopathy  Breasts: L breast with inverted nipple, minimal redness, thick skin, no nodules or ulcers, LUOQ mass with indiscrete borders  Respiratory: CTAB, normal respiratory effort  CV: Normal rate, regular rhythm, no murmurs, no peripheral edema  GI: Soft, nontender, nondistended, no masses, no hepatomegaly, no splenomegaly  MS: Normal gait and station. Digits without clubbing or cyanosis. Skin: No rashes, ecchymoses, or petechiae. Normal temperature, turgor, and texture. Psych: Alert, oriented, appropriate affect, normal judgment/insight    Results:     Lab Results   Component Value Date/Time    WBC 7.4 09/18/2018 11:05 PM    HGB 15.2 09/18/2018 11:05 PM    HCT 42.9 09/18/2018 11:05 PM    PLATELET 467 97/35/5619 11:05 PM    MCV 98.2 09/18/2018 11:05 PM    ABS.  NEUTROPHILS 4.4 09/18/2018 11:05 PM Lab Results   Component Value Date/Time    Sodium 140 09/18/2018 11:05 PM    Potassium 3.2 (L) 09/18/2018 11:05 PM    Chloride 102 09/18/2018 11:05 PM    CO2 26 09/18/2018 11:05 PM    Glucose 109 (H) 09/18/2018 11:05 PM    BUN 10 09/18/2018 11:05 PM    Creatinine 0.83 09/18/2018 11:05 PM    GFR est AA >60 09/18/2018 11:05 PM    GFR est non-AA >60 09/18/2018 11:05 PM    Calcium 10.7 (H) 09/18/2018 11:05 PM     Lab Results   Component Value Date/Time    Bilirubin, total 0.7 09/18/2018 11:05 PM    ALT (SGPT) 26 09/18/2018 11:05 PM    AST (SGOT) 17 09/18/2018 11:05 PM    Alk. phosphatase 73 09/18/2018 11:05 PM    Protein, total 8.2 09/18/2018 11:05 PM    Albumin 4.4 09/18/2018 11:05 PM    Globulin 3.8 09/18/2018 11:05 PM         Records reviewed and summarized above. Pathology report(s) reviewed above. 5/28/19    FINAL PATHOLOGIC DIAGNOSIS   1. Breast, left, core biopsy: In situ and invasive ductal carcinoma   Invasive carcinoma is nuclear grade 2 and measures up to 0.6 cm in greatest dimension on slide   Ductal carcinoma in situ is nuclear grade 3 with central necrosis   2. Soft tissue, left axilla, core biopsy: Adipose tissue with rare detached atypical cells   No lymph node tissue identified   See comment     Radiology report(s) reviewed above. Assessment:   1) Left breast Invasive ductal carcinoma    [id number]  GRADE 2  % ME 20% HER2 greg equivocal - FISH could not be done    Reviewed pathology in tumor board and noted that the tissue QNS for HER2 FISH.  In addition the nodes in the L axillar are highly suspicious for metastatic disease though we only had swati/ deep benign tissue when this was biopsied- likely sampling error due to difficult / deep location    Due to extensive surrounding breast changes a mastectomy is recommended by Dr. Lamb Mate    She has a clinically high risk disease, however that does not mean that she would benefit from chemotherapy unless she has a genomically high risk biology. We now know that some patients with <=3 positive nodes with a genomically low risk tumor could avoid chemotherapy. Hence I think that a mammaprint would be useful. However I am told by pathology that there was not enough tissue for this testing    If she does have a postive HER2 - neoadjuvant chemotherapy should be considered    She is anxious to have surgery or start chemotherapy if it is indicated    I spoke with Dr. Eri Wynne who kindly arranged for a repeat biopsy of the breast tomorrow. We will order a HER2 on this send this for mammaprint. If either suggest high risk disease will proceed with neoadjuvant chemotherapy. If HER 2 negative or mammaprint low risk she may proceed with mastectomy. She asked many well thought out questions which I answered to her appropriate satisfaction. We will discuss endocrine therapy/ post mastectomy RT at a later date    2) Hypothyroidism    3) Obesity    4) Psychosocial   Anxious to start treatment and understandably so  Reassured  SW consulted        Plan:     · Repeat breast biopsy- add HER2 and Mammaprint to this  · See me in 1 -2 weeks    > 50% of this 60 min visit spent in counseling and care co ordination    I appreciate the opportunity to participate in MsWendy Rachelle ROSELINE Garcia's care.     Signed By: Mi Quinn MD

## 2019-06-19 NOTE — Clinical Note
She had a repeat breast biopsy on 6/20/19We need to make sure that a HER 2 test is added as soon as possiblePlease call pathology and check with them if the previous biopsy done 5/28/19 had enough tissue to be sent for mammaprint

## 2019-06-20 ENCOUNTER — DOCUMENTATION ONLY (OUTPATIENT)
Dept: SURGERY | Age: 54
End: 2019-06-20

## 2019-06-20 ENCOUNTER — OFFICE VISIT (OUTPATIENT)
Dept: SURGERY | Age: 54
End: 2019-06-20

## 2019-06-20 VITALS
BODY MASS INDEX: 30.31 KG/M2 | HEIGHT: 68 IN | WEIGHT: 200 LBS | DIASTOLIC BLOOD PRESSURE: 83 MMHG | SYSTOLIC BLOOD PRESSURE: 135 MMHG | HEART RATE: 86 BPM

## 2019-06-20 DIAGNOSIS — C50.412 MALIGNANT NEOPLASM OF UPPER-OUTER QUADRANT OF LEFT FEMALE BREAST, UNSPECIFIED ESTROGEN RECEPTOR STATUS (HCC): ICD-10-CM

## 2019-06-20 DIAGNOSIS — Z17.0 MALIGNANT NEOPLASM OF LEFT BREAST IN FEMALE, ESTROGEN RECEPTOR POSITIVE, UNSPECIFIED SITE OF BREAST (HCC): Primary | ICD-10-CM

## 2019-06-20 DIAGNOSIS — C50.912 MALIGNANT NEOPLASM OF LEFT BREAST IN FEMALE, ESTROGEN RECEPTOR POSITIVE, UNSPECIFIED SITE OF BREAST (HCC): Primary | ICD-10-CM

## 2019-06-20 PROBLEM — E66.09 CLASS 1 OBESITY DUE TO EXCESS CALORIES WITHOUT SERIOUS COMORBIDITY WITH BODY MASS INDEX (BMI) OF 30.0 TO 30.9 IN ADULT: Status: ACTIVE | Noted: 2019-06-20

## 2019-06-20 NOTE — PROGRESS NOTES
[facility]  OFFICE PROCEDURE PROGRESS NOTE        Chart reviewed for the following:   I, Dr. Ramos Franks, have reviewed the History, Physical and updated the Allergic reactions for Anurag [place] Carl performed immediately prior to start of procedure:   I, Dr. Ramos Franks, have performed the following reviews on Annalise Raymond prior to the start of the procedure:            * Patient was identified by name and date of birth   * Agreement on procedure being performed was verified  * Risks and Benefits explained to the patient  * Procedure site verified and marked as necessary  * Patient was positioned for comfort  * Consent was signed and verified     Time: 3919      Date of procedure: 6/20/2019    Procedure performed by:  Ramos Franks MD    Provider assisted by: Dashawn Álvarez RN    Patient assisted by: self    How tolerated by patient: tolerated the procedure well with no complications    Post Procedural Pain Scale: 2 - Hurts Little Bit    Comments: Written and verbal post biopsy instructions reviewed with and given to patient with her understanding.

## 2019-06-20 NOTE — PATIENT INSTRUCTIONS
Needle Breast Biopsy: About This Test  What is it? A breast biopsy removes a sample of breast tissue that is looked at under a microscope to check for breast cancer. For a needle breast biopsy, your doctor uses a needle to take a small sample of fluid or cells from the breast for testing. Why is this test done? A breast biopsy is usually done to check a lump found during a breast exam or a suspicious area found on a mammogram or other imaging. If there is a good chance that your doctor can get a sample without doing an open (surgical) biopsy, you can have a needle biopsy. You may have a choice of what kind of biopsy you prefer. How can you prepare for the test?  Talk to your doctor about all your health conditions before the test. For example, tell your doctor if you:  · Are taking any medicines. · Are allergic to any medicines. · Have had bleeding problems, or if you take aspirin or some other blood thinner. · Are or might be pregnant. What happens before the test?  · You will take off your clothing above the waist. A paper or cloth gown will cover your shoulders. · You will sit or lie on an examination table. Your hands may be at your sides or raised above your head (whichever position makes it easiest to find the lump or suspicious area). · Your skin is washed with a special soap. · You may get a shot of medicine to numb the biopsy area on your breast.  What happens during the test?  · For a fine-needle aspiration biopsy, your doctor inserts a thin needle into the lump or suspicious area. Then he or she removes a sample of cells or fluid. · For a core needle biopsy:  ? A small cut is made in your skin. ? Your doctor inserts a needle with a special tip. Then he or she removes a sample of breast tissue about the size of a grain of rice. ? About 3 to 12 samples are needed to get the most accurate results.   After either type of biopsy, the needle is removed and pressure is put on the needle site to stop any bleeding. The area is covered with a bandage. What else should you know about the test?  · You will feel only a quick sting from the needle if you have a local anesthetic to numb the biopsy area. You may feel some pressure when the biopsy needle is put in. · For a core needle biopsy, the small cut for the needle does not usually need stitches. How long does the test take? · A fine-needle aspiration biopsy will take about 5 to 15 minutes. · A core needle biopsy will take about 15 minutes. What happens after the test?  · You'll be told how long it may take to get your results back. · You will probably be able to go home right away. · You can go back to your usual activities right away, but avoid heavy lifting for 24 hours. · The site may be tender for 2 to 3 days. You may also have some bruising, swelling, or slight bleeding. ? You can use an ice pack. Put ice or a cold pack on the area for 10 to 20 minutes at a time. Put a thin cloth between the ice and your skin. ? Ask your doctor if you can take an over-the-counter pain medicine, such as acetaminophen (Tylenol), ibuprofen (Advil, Motrin), or naproxen (Aleve). Be safe with medicines. Read and follow all instructions on the label. · After a specialist looks at the biopsy sample for signs of cancer, your doctor's office will let you know the results. · If the test results are not clear, you may have another biopsy or test.  Follow-up care is a key part of your treatment and safety. Be sure to make and go to all appointments, and call your doctor if you are having problems. It's also a good idea to keep a list of the medicines you take. Ask your doctor when you can expect to have your test results. Where can you learn more? Go to http://louis-daniel.info/.   Enter J773 in the search box to learn more about \"Needle Breast Biopsy: About This Test.\"  Current as of: March 27, 2018  Content Version: 11.9  © 2948-3417 [employer]. Care instructions adapted under license by [employer] (which disclaims liability or warranty for this information). If you have questions about a medical condition or this instruction, always ask your healthcare professional. [address] any warranty or liability for your use of this information.

## 2019-06-20 NOTE — PROGRESS NOTES
HISTORY OF PRESENT ILLNESS  Damián Poole is a 52 y.o. female. HPI ESTABLISHED patient here for a LEFT breast core needle biopsy. She has a diagnosis of breast cancer, but biopsy did not have enough tissue to run Her 2. LEFT axillary node biopsy did not have any lymphoid tissue. Family history - possibly maternal grandmother with breast cancer but not sure? Father - colon cancer        MRI Results (most recent):  Results from Hospital Encounter encounter on 06/11/19   MRI BREAST BI W WO CONT    Narrative INDICATION: Left breast invasive ductal carcinoma and DCIS. ER/HI positive  HER-2/greg equivocal. Negative punch biopsy of skin thickening. COMPARISON: 5/24/2019, 5/28/2019, 7/13/2016. TECHNIQUE:  Multisequence, multiplanar, bilateral breast MRI was performed in prone position  using a dedicated breast coil. Images were obtained without contrast and dynamic  postcontrast images were obtained in multiple phases. Only 4 postcontrast phases  were possible, as the patient was unable to continue to tolerate the  examination. Delayed phase images were not obtained. As such, kinetic analysis  software cannot be utilized. Subtraction images were reconstructed in PACS from  the postcontrast images, however. 9 mL IV Gadavist was administered. FINDINGS:  There is minimal background parenchymal enhancement (in the right breast) and  heterogeneous fibroglandular tissue. Left breast:  Extensive non-masslike enhancement in the left breast extends from the nipple to  the posterior third, measuring 106 mm in anteroposterior dimension. It is in  both the upper and lower breast, and predominantly in the lateral breasts. However, there is extension into the lower inner quadrant (10-91), and probably  into the upper inner quadrant as well (). Greatest mediolateral dimension  is 34 mm in the anterior third () and 44 mm in the middle-posterior third  junction ().  There are no postcontrast images and coronal or sagittal  planes. However, using slice thickness, craniocaudad extent is approximately 79  mm. The left breast is slightly smaller than the right, and there is diffuse  skin thickening, particularly inferiorly. The inferior most left axillary lymph  node is thickened (11 mm short axis), and there is a biopsy clip within it. 2  additional axillary lymph node superior to it are morphologically normal.    Right breast:  No suspicious enhancing foci. No axillary or internal mammary chain  lymphadenopathy. Impression IMPRESSION:   1. Multicentric left breast carcinoma. Non-masslike enhancement extends from the  nipple to the posterior third, involves all quadrants, and measures 106 x 44 x  79 mm. 2. Probable left axillary gretchen metastasis, with biopsy clip. 3. No suspicious enhancing foci in the right breast.  4. No right lymphadenopathy. 5. BI-RADS Assessment Category 6: Known biopsy proven malignancy. \           ROS    Physical Exam   Pulmonary/Chest: Right breast exhibits no mass, no nipple discharge, no skin change and no tenderness. Left breast exhibits mass (irregular hard fixed dense area UOQ.  very subtle skin dimple) and skin change (mild erythema around the nipple). Left breast exhibits no nipple discharge and no tenderness. Breasts are symmetrical.       Lymphadenopathy:     She has no cervical adenopathy. She has no axillary adenopathy. Right: No supraclavicular adenopathy present. Left: No supraclavicular adenopathy present. US - Guided Core Biopsy  Indication : Left Breast mass upper outer quadrant. Palpable. LEFT axillary node is not palpable  Ultrasound Findings: left breast at 2:00 6 cm from the nipple is an irregular heterogeneously  hyper and hypoechoic area with internal calcifications and posterior shadowing  measuring roughly 1.4 x 0.9 x 1.3 cm with internal vascularity.  There are 2  suspicious left axillary lymph nodes with sonographically visible  microcalcifications  Prep : alcohol. Anesthesia : 1% lidocaine with epinephrine, 6 cc at each site. Device : The hand-held 10 gauge BARD needle was inserted through the lesion and captured tissue with real-time Ultrasound Confirmation. .   Core Sampling :  3 cores were obtained from the LEFT breast mass and 1 core was obtained from the larger, medial lymph node . Marker: I did not put another clip in the breast mass as I could see the initial clip with ultrasound just superior to my biopsy site. I did not put in a clip in the axillary node because the core biopsy was extremely painful and when I inserted the needle to do a second biopsy the severe pain recurred. Dressing : Steristrips, gauze and tape. Instructions : Remove gauze this evening. Remove steristrips in one week. Tolerance: Pt tolerated procedure although she had an episode of severe pain with the axillary node biopsy  Pathology : Part A: BREAST, LEFT:   INVASIVE DUCTAL CARCINOMA. HISTOLOGICAL GRADE II OF III (MODERATELY   DIFFERENTIATED). DUCTAL CARCINOMA IN SITU, HIGH NUCLEAR GRADE WITH CENTRAL   NECROSIS. Part B: AXILLARY, LEFT:   LYMPH NODE WITH METASTATIC CARCINOMA, CONSISTENT WITH BREAST ORIGIN. Concordance: yes    ASSESSMENT and PLAN    ICD-10-CM ICD-9-CM    1. Malignant neoplasm of left breast in female, estrogen receptor positive, unspecified site of breast ([facility] [address] C50.912 174.9 SURGICAL PATHOLOGY    Z17.0 V86.0    2. Malignant neoplasm of upper-outer quadrant of left female breast, unspecified estrogen receptor status ([facility] [address] C50.412 174.4      LEFT breast and LEFT axillary node biopsied. Left breast was re-biopsied to get enough tissue for Her2 status.

## 2019-06-21 LAB
DX ICD CODE: NORMAL
DX ICD CODE: NORMAL
PATH REPORT.FINAL DX SPEC: NORMAL
PATH REPORT.GROSS SPEC: NORMAL
PATH REPORT.RELEVANT HX SPEC: NORMAL
PATH REPORT.SITE OF ORIGIN SPEC: NORMAL
PATHOLOGIST NAME: NORMAL
PAYMENT PROCEDURE: NORMAL

## 2019-06-23 ENCOUNTER — DOCUMENTATION ONLY (OUTPATIENT)
Dept: ONCOLOGY | Age: 54
End: 2019-06-23

## 2019-06-24 ENCOUNTER — DOCUMENTATION ONLY (OUTPATIENT)
Dept: SURGERY | Age: 54
End: 2019-06-24

## 2019-06-24 ENCOUNTER — TELEPHONE (OUTPATIENT)
Dept: SURGERY | Age: 54
End: 2019-06-24

## 2019-06-24 NOTE — TELEPHONE ENCOUNTER
----- Message from Joan Vang MD sent at 6/24/2019 10:32 AM EDT -----  I did a breat biopsy on this patient for Elizabeth because there was not enough tissue for Her2. Can you make sure they run Her2 on the specimen please.

## 2019-06-24 NOTE — TELEPHONE ENCOUNTER
Confirmed with Durene Mcburney at [facility] that hormone receptors are being run as STAT.

## 2019-06-24 NOTE — PROGRESS NOTES
I called Dr. Linda Bauer and left a message on her voicemail. She is the pathologist in charge of our specimens at [facility]. I asked her to make sure the hormone receptors have been ordered. Her # [phone].

## 2019-06-24 NOTE — PROGRESS NOTES
Eduin Don- please have pathology add on a HER 2 FISH on her biopsy done 6/20/19- this is urgent    Next check with pathology if the previous biopsy from 5/28/19 was sent for a mammaprint- if it was not then send the sample from 6/20/19 for a mammaprint asap    Thanks

## 2019-06-25 ENCOUNTER — DOCUMENTATION ONLY (OUTPATIENT)
Dept: ONCOLOGY | Age: 54
End: 2019-06-25

## 2019-06-25 ENCOUNTER — DOCUMENTATION ONLY (OUTPATIENT)
Dept: SURGERY | Age: 54
End: 2019-06-25

## 2019-06-25 NOTE — PROGRESS NOTES
Received voicemail from Dr. Deangelo Arreaga at [facility].  She is not in the office this week, but has checked on patient's receptors and verifies that these were sent out yesterday for ER/AZ and HER2 and that the results should be ready in 1-2 days.

## 2019-06-25 NOTE — PROGRESS NOTES
Followed up with pathology regarding HER2 testing on 6/20 biopsy    They say that this biopsy was read at [facility] and     Ana- add on HER2 and send out for mammaprint needs to be done asap    Could you figure out how to get these done if sample was sent to [facility] and not path?

## 2019-06-26 ENCOUNTER — TELEPHONE (OUTPATIENT)
Dept: ONCOLOGY | Age: 54
End: 2019-06-26

## 2019-06-26 ENCOUNTER — TELEPHONE (OUTPATIENT)
Dept: SURGERY | Age: 54
End: 2019-06-26

## 2019-06-26 ENCOUNTER — DOCUMENTATION ONLY (OUTPATIENT)
Dept: ONCOLOGY | Age: 54
End: 2019-06-26

## 2019-06-26 NOTE — PROGRESS NOTES
Faxed a request to [facility] at [phone] for a Mammoprint to be done from specimen collected by [facility] for 6/20/2019 pathology. [facility] will have a HER 2 result on Monday July 1, 2019.

## 2019-06-27 LAB
ER+ CELLS NFR TISS IMSTN: >90 %
FIXATION TIME 483256: NORMAL
FIXATIVE 483260: NORMAL
PR+ CELLS NFR TISS IMSTN: 40 %
REF LAB TEST REF RANGE: NORMAL

## 2019-07-02 ENCOUNTER — DOCUMENTATION ONLY (OUTPATIENT)
Dept: SURGERY | Age: 54
End: 2019-07-02

## 2019-07-02 LAB
FIXATION TIME 483256: NORMAL
HER2 AG TISS QL IMSTN: NORMAL
Lab: NORMAL
Lab: NORMAL
MEDICAL DIRECTOR REVIEW: NORMAL

## 2019-07-02 NOTE — PROGRESS NOTES
Faxed signed verbal request authorization form to [facility] at [phone], confirmation received.

## 2019-07-03 ENCOUNTER — DOCUMENTATION ONLY (OUTPATIENT)
Dept: SURGERY | Age: 54
End: 2019-07-03

## 2019-07-03 NOTE — PROGRESS NOTES
Spoke to Son at [facility].  Initial HER2 was equivocal.    FISH is pending and will be reported.

## 2019-07-04 LAB
AVG NUM CEP17 PROBES/NUCLEUS, 483329: 2.7
AVG NUM HER-2 SIGNALS/NUCLEUS, 483328: 4.1
CELLS COUNTED: 40
DIRECTOR REVIEW: NORMAL
ERBB2 COPY NUM/CHR 17 COPY NUM TISS FISH: 1.52
ERBB2 GENE DP TISS QL FISH: NORMAL
FIXATION TIME 483259: NORMAL
FIXATIVE 483260: NORMAL
NUMBER OF OBSERVERS, 483334: 2
REF LAB TEST REF RANGE: NORMAL
SPECIMEN STATUS REPORT, ROLRST: NORMAL

## 2019-07-05 ENCOUNTER — TELEPHONE (OUTPATIENT)
Dept: SURGERY | Age: 54
End: 2019-07-05

## 2019-07-05 ENCOUNTER — DOCUMENTATION ONLY (OUTPATIENT)
Dept: ONCOLOGY | Age: 54
End: 2019-07-05

## 2019-07-05 NOTE — TELEPHONE ENCOUNTER
Spoke to Joe at Dr. Jeremias Amador office, re: HER 2 FISH results are back and scanned into chart. Dr. Jeremias Amador office will contact the patient to make her aware of the results.

## 2019-07-09 ENCOUNTER — DOCUMENTATION ONLY (OUTPATIENT)
Dept: SURGERY | Age: 54
End: 2019-07-09

## 2019-07-09 ENCOUNTER — OFFICE VISIT (OUTPATIENT)
Dept: SURGERY | Age: 54
End: 2019-07-09

## 2019-07-09 ENCOUNTER — TELEPHONE (OUTPATIENT)
Dept: SURGERY | Age: 54
End: 2019-07-09

## 2019-07-09 VITALS — WEIGHT: 200 LBS | HEIGHT: 68 IN | BODY MASS INDEX: 30.31 KG/M2

## 2019-07-09 DIAGNOSIS — F41.8 ANXIETY ABOUT HEALTH: ICD-10-CM

## 2019-07-09 DIAGNOSIS — Z17.0 MALIGNANT NEOPLASM OF LEFT BREAST IN FEMALE, ESTROGEN RECEPTOR POSITIVE, UNSPECIFIED SITE OF BREAST (HCC): Primary | ICD-10-CM

## 2019-07-09 DIAGNOSIS — C50.912 MALIGNANT NEOPLASM OF LEFT BREAST IN FEMALE, ESTROGEN RECEPTOR POSITIVE, UNSPECIFIED SITE OF BREAST (HCC): Primary | ICD-10-CM

## 2019-07-09 RX ORDER — ALPRAZOLAM 0.25 MG/1
0.5 TABLET ORAL AS NEEDED
Qty: 30 TAB | Refills: 0 | Status: SHIPPED | OUTPATIENT
Start: 2019-07-09 | End: 2019-07-12 | Stop reason: DRUGHIGH

## 2019-07-09 NOTE — PROGRESS NOTES
HPI - ESTABLISHED patient here with questions about diagnosis and treatment plans and timeline. Breast cancer history -   5/28/19 - LEFT breast biopsy  FINAL PATHOLOGIC DIAGNOSIS   1. Breast, left, core biopsy: In situ and invasive ductal carcinoma   Invasive carcinoma is nuclear grade 2 and measures up to 0.6 cm in greatest dimension on slide   Ductal carcinoma in situ is nuclear grade 3 with central necrosis   2. Soft tissue, left axilla, core biopsy: Adipose tissue with rare detached atypical cells   No lymph node tissue identified   See comment   Has seen Dr. Elizabeth Graf for consult. Mammaprint pending. Past Surgical History:   Procedure Laterality Date    HX KNEE ARTHROSCOPY Left     HX ORTHOPAEDIC      back    HX SHOULDER ARTHROSCOPY Right     HX THYROIDECTOMY       Family history - possibly maternal grandmother with breast cancer but not sure? Father - colon cancer    PE - deferred    Visit Vitals  Ht 5' 8\" (1.727 m)   Wt 200 lb (90.7 kg)   BMI 30.41 kg/m²     ASSESSMENT AND PLAN  Encounter Diagnoses   Name Primary?  Malignant neoplasm of left breast in female, estrogen receptor positive, unspecified site of breast ([facility] [address] Yes    Anxiety about health      Reviewed diagnosis - receptors, size of tumor, positive lymph nodes and staging scans. Mammaprint pending - discussed information that this test would provide. Has follow-up appointment with Dr. Elizabeth Graf to discuss chemo on Thursday. She is very eager to start treatment and has anxiety over the time since diagnosis. Discussed used of Xanax PRN to help during this time. She is amenable to that. Has also started regular power walks to help with anxiety. Would like to schedule port placement if possible - will forward to Dr. Adela Padilla. Greater than 15 minutes was spent with this patient and greater than 50% of that time was spent in face to face counseling.

## 2019-07-09 NOTE — PROGRESS NOTES
I called and spoke to the patient to check on her and make sure she is aware that I called [facility] regarding her mammaprint. She wis very appreciative of all the care that she has received. She spoke to Jessi Thorne NP today and would love to have her port scheduled ahead of time. I instructed her that I would speak to Dr. Manuel Jimenez about ordering the port placement and I would get back to her tomorrow. The patient was very appreciative for my call.

## 2019-07-09 NOTE — PATIENT INSTRUCTIONS

## 2019-07-09 NOTE — Clinical Note
Elizabeth - she said Dr. Gabriele Jimenes said that she would likely have neoadjuvant chemo. She meets with Fernandez again on Thursday. She is eager to schedule her port placement. I told her I would forward the request to you to see if you were comfortable scheduling that at this time. Gave her some Xanax to help with anxiety during this stage as all the waiting is very stressful for her. Thanks!

## 2019-07-09 NOTE — PROGRESS NOTES
I called [facility] and spoke to [facility] in customer service. The mammaprint was delayed due to needing additional stains? The representative states it is due to report out 7/15/2019. We should call back on Friday to get a better idea of the progress.

## 2019-07-10 ENCOUNTER — DOCUMENTATION ONLY (OUTPATIENT)
Dept: SURGERY | Age: 54
End: 2019-07-10

## 2019-07-10 ENCOUNTER — TELEPHONE (OUTPATIENT)
Dept: SURGERY | Age: 54
End: 2019-07-10

## 2019-07-10 NOTE — TELEPHONE ENCOUNTER
I called the patient to let her know her order for the port has gone in. She is very happy for this news. I instructed her she will be hearing from [address] [place] [address] within 24 hours.

## 2019-07-10 NOTE — TELEPHONE ENCOUNTER
----- Message from Jyotsna Coon NP sent at 7/10/2019  1:15 PM EDT -----  Regarding: RE: question from pharmacy  She can take 0.5mg twice a day as needed. Thanks!    ----- Message -----  From: Kai Stout RN  Sent: 7/10/2019   1:11 PM  To: Jyotsna Coon NP  Subject: question from [address] [facility],  [address] [place] [address] [phone] is calling because they need to know how often (3 times a day?) the patient can take xanax. Do you mind calling to clarify? I see you wrote for xanax 0.25 mg take 2 pills? Thanks,  Patricia Agudelo 0.25 mg tablet [[id number]]     Order Details   Dose: 0.5 mg Route: Oral Frequency: AS NEEDED for Anxiety  Dispense Quantity: 30 Tab Refills: 0 Fills remaining: --         Sig: Take 2 Tabs by mouth as needed for Anxiety.  Max Daily Amount: 48 mg.

## 2019-07-10 NOTE — PROGRESS NOTES
Received a faxed request for most recent medical records from [facility] Patient Coordinator @ [facility].  Reports were faxed to [phone] on 7/10/19

## 2019-07-11 ENCOUNTER — DOCUMENTATION ONLY (OUTPATIENT)
Dept: ONCOLOGY | Age: 54
End: 2019-07-11

## 2019-07-11 ENCOUNTER — OFFICE VISIT (OUTPATIENT)
Dept: ONCOLOGY | Age: 54
End: 2019-07-11

## 2019-07-11 VITALS
DIASTOLIC BLOOD PRESSURE: 81 MMHG | TEMPERATURE: 97.7 F | BODY MASS INDEX: 30.77 KG/M2 | OXYGEN SATURATION: 98 % | HEART RATE: 78 BPM | HEIGHT: 68 IN | WEIGHT: 203 LBS | SYSTOLIC BLOOD PRESSURE: 134 MMHG

## 2019-07-11 DIAGNOSIS — R06.02 SOB (SHORTNESS OF BREATH): Primary | ICD-10-CM

## 2019-07-11 NOTE — ADVANCED PRACTICE NURSE
GAGAN 4 in PAT phone assessment with reported witnessed apnea. [facility]. Confirmation of fax received.

## 2019-07-11 NOTE — PROGRESS NOTES
[facility] [place] at [place] [address] [address], [place]: [phone]  F: [phone]    Reason for Visit:   Pat Joseph is a 52 y.o. female who is seen for Left breast cancer- ER+NC+HER2 greg neg    Treatment History:   · 6/19: Mammogram:  Left breast mass with skin thickening, 2 suspicious nodes. Mass measures 1.4 x 0.9 x 1.3  · 6/19: MRI breast: Multicentric left breast carcinoma. Non-masslike enhancement extends from the nipple to the posterior third, involves all quadrants, and measures 106 x 44 x 79 mm. · 5/28/19: Biopsy breast- IDC % % HER 2 negative, skin biopsy benign , node biopsy mostly adipose tissue with atypical cells . BRCA1 and 2 negative. CT and bone scan negative for metastasis. Mammaprint with insufficient tissue for testing. Repeat biopsy of axillary node 6/20/19 positive for metastatic disease- repeat mammaprint pending    History of Present Illness:   Patient is a 52 y.o. seen for L breast cancer. She felt a sensation in the shower about May 2019. She also noted a lump and  an inverted nipple. She had a physician friend look at this who directed her to mammogram and recommended she see Dr. Nathalie Cohen. This led to imaging and diagnosis as above. She comes today to discuss management    It has been distressing to wait for her repeat mammaprint- she just wants to get started on treatment. The breast feels the same/ heavy- no pain. She has no arm swelling, declines fevers, chills, sweats, HA, falls, neuropathy or diarrhea. Rare ETOH  She does not smoke.      Father had colon cancer      Past Medical History:   Diagnosis Date    Arthritis     Basal cell carcinoma of skin     Breast cancer ([facility] [address] 2019    LEFT    History of seasonal allergies     Squamous cell skin cancer     Dr. Young Pay Thyroid disease       Past Surgical History:   Procedure Laterality Date    HX KNEE ARTHROSCOPY Left     HX ORTHOPAEDIC      back    HX SHOULDER ARTHROSCOPY Right     HX THYROIDECTOMY        Social History     Tobacco Use    Smoking status: Never Smoker    Smokeless tobacco: Never Used   Substance Use Topics    Alcohol use: Yes      Family History   Problem Relation Age of Onset    Arthritis-osteo Mother     Cancer Father         Colon Cancer     Current Outpatient Medications   Medication Sig    ALPRAZolam (XANAX) 0.25 mg tablet Take 2 Tabs by mouth as needed for Anxiety. Max Daily Amount: 48 mg.  sertraline (ZOLOFT) 100 mg tablet Take 0.5 Tabs by mouth daily.  traZODone (DESYREL) 50 mg tablet Take 1 Tab by mouth nightly.  cholecalciferol (VITAMIN D3) 1,000 unit cap Take  by mouth daily.  liothyronine (CYTOMEL) 5 mcg tablet Take 5 mcg by mouth daily.  levothyroxine (SYNTHROID) 50 mcg tablet Take 50 mcg by mouth Daily (before breakfast). No current facility-administered medications for this visit. Allergies   Allergen Reactions    Augmentin [Amoxicillin-Pot Clavulanate] Unknown (comments)        Review of Systems: A complete review of systems was obtained, negative except as described above. Physical Exam:     Visit Vitals  /81   Pulse 78   Temp 97.7 °F (36.5 °C)   Ht 5' 8\" (1.727 m)   Wt 203 lb (92.1 kg)   SpO2 98%   BMI 30.87 kg/m²     ECOG PS: 1  General: No distress  Eyes: PERRLA, anicteric sclerae  HENT: Atraumatic, OP clear  Neck: Supple  Lymphatic: No cervical, supraclavicular, or inguinal adenopathy  Breasts: L breast with inverted nipple, minimal redness, thick skin, no nodules or ulcers, LUOQ mass with indiscrete borders  Respiratory: CTAB, normal respiratory effort  CV: Normal rate, regular rhythm, no murmurs, no peripheral edema  GI: Soft, nontender, nondistended, no masses, no hepatomegaly, no splenomegaly  MS: Normal gait and station. Digits without clubbing or cyanosis. Skin: No rashes, ecchymoses, or petechiae. Normal temperature, turgor, and texture.   Psych: Alert, oriented, appropriate affect, normal judgment/insight    Results:     Lab Results   Component Value Date/Time    WBC 7.4 09/18/2018 11:05 PM    HGB 15.2 09/18/2018 11:05 PM    HCT 42.9 09/18/2018 11:05 PM    PLATELET 891 38/55/1748 11:05 PM    MCV 98.2 09/18/2018 11:05 PM    ABS. NEUTROPHILS 4.4 09/18/2018 11:05 PM     Lab Results   Component Value Date/Time    Sodium 140 09/18/2018 11:05 PM    Potassium 3.2 (L) 09/18/2018 11:05 PM    Chloride 102 09/18/2018 11:05 PM    CO2 26 09/18/2018 11:05 PM    Glucose 109 (H) 09/18/2018 11:05 PM    BUN 10 09/18/2018 11:05 PM    Creatinine 0.83 09/18/2018 11:05 PM    GFR est AA >60 09/18/2018 11:05 PM    GFR est non-AA >60 09/18/2018 11:05 PM    Calcium 10.7 (H) 09/18/2018 11:05 PM     Lab Results   Component Value Date/Time    Bilirubin, total 0.7 09/18/2018 11:05 PM    ALT (SGPT) 26 09/18/2018 11:05 PM    AST (SGOT) 17 09/18/2018 11:05 PM    Alk. phosphatase 73 09/18/2018 11:05 PM    Protein, total 8.2 09/18/2018 11:05 PM    Albumin 4.4 09/18/2018 11:05 PM    Globulin 3.8 09/18/2018 11:05 PM         Records reviewed and summarized above. Pathology report(s) reviewed above. 5/28/19    FINAL PATHOLOGIC DIAGNOSIS   1. Breast, left, core biopsy: In situ and invasive ductal carcinoma   Invasive carcinoma is nuclear grade 2 and measures up to 0.6 cm in greatest dimension on slide   Ductal carcinoma in situ is nuclear grade 3 with central necrosis   2. Soft tissue, left axilla, core biopsy: Adipose tissue with rare detached atypical cells   No lymph node tissue identified   See comment     Radiology report(s) reviewed above.       Assessment:   1) Left breast Invasive ductal carcinoma    [id number]  GRADE 2  % DC 20% HER2 greg equivocal - FISH could not be done  Repeat biopsy of breast mass 6/24/19 - HER2 greg negative, but mamma print still pending    Due to extensive surrounding breast changes a mastectomy is recommended by Dr. Vasquez Lonnie    She does have clinically high risk disease and in all likelihood should have a high risk genomic signature. Although ideally we would want to have this information before we consent for neoadjuvant chemotherapy- in order to proceed with timely care once we have this information we discussed neoadjuvant chemotherapy today    The goal of neoadjuvant chemotherapy is to help shrink the tumor, eliminate micrometastasis and understand the biology of the disease. Assuming that she would have a genomically high risk disease we would also expect this to significantly improve OS. I have recommended dd AC-T  We discussed the chemotherapy regimen, it's logistics, and potential toxicities in detail. Potential side effects include, but are not limited to, nausea, vomiting, diarrhea, taste changes, myelosuppression, infection, fatigue, allergic reactions, rash, edema, neuropathy, and rarely, death. The patient asked several well thought out questions which I answered to the best of my ability and to their apparent satisfaction. The patient has given consent for chemotherapy. She declined a cooling cap    High risk for grade IV neutropenia and hence will add primary prophylaxis with Neulasta    She asked many well thought out questions which I answered to her appropriate satisfaction.     We will discuss endocrine therapy/ post mastectomy RT at a later date    2) Hypothyroidism    3) Obesity    4) Psychosocial   Anxious to start treatment and understandably so  Reassured  Consented         Plan:     · Port with Dr. Vinayak Pearson and approval for neoadjuvant dd AC-T to start no later than 7/19/19  · Should the mammaprint suggest low risk disease will cancel chemotherapy  · ddACT-T (doxorubicin 60mg/m2, cytoxan 600mg/m2)  Every 2 weeks x 4 then weekly taxol at 80mg/m2 x 12  · Labs to include CBC with diff and CMP prior to each infusion  · Premeds to include zofran, dexamethasone, and emend   · Neulasta onpro due to high risk for grade IV neutropenia   · Dexamethasone on days 2 & 3  · Zofran PRN  · Add Zyprexa QHS if additional anti-emetics are needed   · Antiemetics to be called in on day 1 of chemotherapy  · ECHO    > 50% of this 40 min visit spent in counseling and care co ordination    I appreciate the opportunity to participate in Ms. Rachelle Garcia's care.     Signed By: Danish Suárez MD

## 2019-07-11 NOTE — PROGRESS NOTES
Pharmacy Note- Chemotherapy Education    Bob Wiggins is a  52 y. o.female  diagnosed with breast cancer here today for chemotherapy counseling. Ms. Saint Clair is being treated with DD AC followed by T. Provided education on DOXOrubcin, cyclophosphamide, PACLItaxel and premedications - fosaprepitant, ondansetron, dexamethasone, diphenhydramine, famotidine. Also, reviewed Neulasta On-Pro    Side effects of chemotherapy reviewed included s/s infection, anemia, appetite changes, thromboyctopenia, fatigue, hair loss/alopecia, bone pain, skin and nail changes, diarrhea/constipation, mucositis, peripheral neuropathy and urinary issues (urine discoloration/bladder irritation). Patient given ways to manage these side effects and when to contact office. [employer] Handout of medications provided to patient. Ms. Saint Clair verbalized understanding of the information presented and all of the patient's questions were answered.     Aimee Conrad, PharmD, BCPS

## 2019-07-11 NOTE — PROGRESS NOTES
Lorrie Ma is a 52 y.o. female  Chief Complaint   Patient presents with    Breast Cancer     follow up      1. Have you been to the ER, urgent care clinic since your last visit? Hospitalized since your last visit? No   2. Have you seen or consulted any other health care providers outside of the [address] [place] [address] since your last visit?   No

## 2019-07-11 NOTE — PERIOP NOTES
[facility]  Ambulatory Surgery Unit  Pre-operative Instructions    Surgery/Procedure Date 07/19/19            Tentative Arrival Time 0615      1. On the day of your surgery/procedure, please report to the Ambulatory Surgery Unit Registration Desk and sign in at your designated time. The Ambulatory Surgery Unit is located in [facility] on the [facility] side of the [facility] across from the [address] [place] [address]. Please have all of your health insurance cards and a photo ID. 2. You must have someone with you to drive you home, as you should not drive a car for 24 hours following anesthesia. Please make arrangements for a responsible adult friend or family member to stay with you for at least the first 24 hours after your surgery. 3. Do not have anything to eat or drink (including water, gum, mints, coffee, juice) after 11:59 PM  07/18/19. This may not apply to medications prescribed by your physician. (Please note below the special instructions with medications to take the morning of surgery, if applicable.)    4. We recommend you do not drink any alcoholic beverages for 24 hours before and after your surgery. 5. Contact your surgeons office for instructions on the following medications: non-steroidal anti-inflammatory drugs (i.e. Advil, Aleve), vitamins, and supplements. (Some surgeons will want you to stop these medications prior to surgery and others may allow you to take them)   **If you are currently taking Plavix, Coumadin, Aspirin and/or other blood-thinning agents, contact your surgeon for instructions. ** Your surgeon will partner with the physician prescribing these medications to determine if it is safe to stop or if you need to continue taking. Please do not stop taking these medications without instructions from your surgeon.     6. In an effort to help prevent surgical site infection, we ask that you shower with an anti-bacterial soap (i.e. Dial/Safeguard, or the soap provided to you at your preadmission testing appointment) for 3 days prior to and on the morning of surgery, using a fresh towel after each shower. (Please begin this process with fresh bed linens.) Do not apply any lotions, powders, or deodorants after the shower on the day of your procedure. If applicable, please do not shave the operative site for 48 hours prior to surgery. 7. Wear comfortable clothes. Wear glasses instead of contacts. Do not bring any jewelry or money (other than copays or fees as instructed). Do not wear make-up, particularly mascara, the morning of your surgery. Do not wear nail polish, particularly if you are having foot /hand surgery. Wear your hair loose or down, no ponytails, buns, kev pins or clips. All body piercings must be removed. 8. You should understand that if you do not follow these instructions your surgery may be cancelled. If your physical condition changes (i.e. fever, cold or flu) please contact your surgeon as soon as possible. 9. It is important that you be on time. If a situation occurs where you may be late, or if you have any questions or problems, please call [phone].    10. Your surgery time may be subject to change. You will receive a phone call the day prior to surgery to confirm your arrival time. 11. Pediatric patients: please bring a change of clothes, diapers, bottle/sippy cup, pacifier, etc.      Special Instructions: Take all medications and inhalers, as prescribed, on the morning of surgery with a sip of water EXCEPT: n/a        I understand a pre-operative phone call will be made to verify my surgery time. In the event that I am not available, I give permission for a message to be left on my answering service and/or with another person?       yes         ___________________      ___________________      ________________  (Signature of Patient)          (Witness)                   (Date and Time)

## 2019-07-12 ENCOUNTER — DOCUMENTATION ONLY (OUTPATIENT)
Dept: ONCOLOGY | Age: 54
End: 2019-07-12

## 2019-07-12 ENCOUNTER — DOCUMENTATION ONLY (OUTPATIENT)
Dept: SURGERY | Age: 54
End: 2019-07-12

## 2019-07-12 DIAGNOSIS — F41.8 ANXIETY ABOUT HEALTH: ICD-10-CM

## 2019-07-12 DIAGNOSIS — C50.412 MALIGNANT NEOPLASM OF UPPER-OUTER QUADRANT OF LEFT BREAST IN FEMALE, ESTROGEN RECEPTOR POSITIVE (HCC): Primary | ICD-10-CM

## 2019-07-12 DIAGNOSIS — Z17.0 MALIGNANT NEOPLASM OF UPPER-OUTER QUADRANT OF LEFT BREAST IN FEMALE, ESTROGEN RECEPTOR POSITIVE (HCC): Primary | ICD-10-CM

## 2019-07-12 DIAGNOSIS — C50.912 MALIGNANT NEOPLASM OF LEFT BREAST IN FEMALE, ESTROGEN RECEPTOR POSITIVE, UNSPECIFIED SITE OF BREAST (HCC): Primary | ICD-10-CM

## 2019-07-12 DIAGNOSIS — Z17.0 MALIGNANT NEOPLASM OF LEFT BREAST IN FEMALE, ESTROGEN RECEPTOR POSITIVE, UNSPECIFIED SITE OF BREAST (HCC): Primary | ICD-10-CM

## 2019-07-12 RX ORDER — ALPRAZOLAM 0.5 MG/1
0.5 TABLET ORAL
Qty: 20 TAB | Refills: 0 | Status: SHIPPED | OUTPATIENT
Start: 2019-07-12 | End: 2019-10-16

## 2019-07-12 RX ORDER — HEPARIN 100 UNIT/ML
300-500 SYRINGE INTRAVENOUS AS NEEDED
Status: CANCELLED
Start: 2019-07-19

## 2019-07-12 RX ORDER — ONDANSETRON 2 MG/ML
8 INJECTION INTRAMUSCULAR; INTRAVENOUS ONCE
Status: CANCELLED | OUTPATIENT
Start: 2019-07-19

## 2019-07-12 RX ORDER — DOXORUBICIN HYDROCHLORIDE 2 MG/ML
60 INJECTION, SOLUTION INTRAVENOUS ONCE
Status: CANCELLED
Start: 2019-07-19

## 2019-07-12 RX ORDER — DIPHENHYDRAMINE HYDROCHLORIDE 50 MG/ML
50 INJECTION, SOLUTION INTRAMUSCULAR; INTRAVENOUS AS NEEDED
Status: CANCELLED
Start: 2019-07-19

## 2019-07-12 RX ORDER — ALBUTEROL SULFATE 0.83 MG/ML
2.5 SOLUTION RESPIRATORY (INHALATION) AS NEEDED
Status: CANCELLED
Start: 2019-07-19

## 2019-07-12 RX ORDER — SODIUM CHLORIDE 9 MG/ML
10 INJECTION INTRAMUSCULAR; INTRAVENOUS; SUBCUTANEOUS AS NEEDED
Status: CANCELLED | OUTPATIENT
Start: 2019-07-19

## 2019-07-12 RX ORDER — ONDANSETRON 8 MG/1
8 TABLET, ORALLY DISINTEGRATING ORAL
Qty: 60 TAB | Refills: 2 | Status: SHIPPED | OUTPATIENT
Start: 2019-07-12 | End: 2019-09-09 | Stop reason: ALTCHOICE

## 2019-07-12 RX ORDER — DEXAMETHASONE 4 MG/1
TABLET ORAL
Qty: 16 TAB | Refills: 0 | Status: SHIPPED | OUTPATIENT
Start: 2019-07-12 | End: 2019-10-25

## 2019-07-12 RX ORDER — ONDANSETRON 2 MG/ML
8 INJECTION INTRAMUSCULAR; INTRAVENOUS AS NEEDED
Status: CANCELLED | OUTPATIENT
Start: 2019-07-19

## 2019-07-12 RX ORDER — ACETAMINOPHEN 325 MG/1
650 TABLET ORAL AS NEEDED
Status: CANCELLED
Start: 2019-07-19

## 2019-07-12 RX ORDER — SODIUM CHLORIDE 0.9 % (FLUSH) 0.9 %
10 SYRINGE (ML) INJECTION AS NEEDED
Status: CANCELLED
Start: 2019-07-19

## 2019-07-12 RX ORDER — HYDROCORTISONE SODIUM SUCCINATE 100 MG/2ML
100 INJECTION, POWDER, FOR SOLUTION INTRAMUSCULAR; INTRAVENOUS AS NEEDED
Status: CANCELLED | OUTPATIENT
Start: 2019-07-19

## 2019-07-12 RX ORDER — SODIUM CHLORIDE 9 MG/ML
25 INJECTION, SOLUTION INTRAVENOUS CONTINUOUS
Status: CANCELLED | OUTPATIENT
Start: 2019-07-19

## 2019-07-12 RX ORDER — EPINEPHRINE 1 MG/ML
0.3 INJECTION, SOLUTION, CONCENTRATE INTRAVENOUS AS NEEDED
Status: CANCELLED | OUTPATIENT
Start: 2019-07-19

## 2019-07-12 NOTE — PROGRESS NOTES
Called patient  Her mammaprint is high risk  Explained what that meant  She has port placement on 7/19/19 and wants to start chemotherapy the same day  We will arrange for this    Cory Donohue please send all antiemetics and dexamethasone prescriptions and ensure she has a spot in [facility]

## 2019-07-12 NOTE — PROGRESS NOTES
Called in patient's alprazolam 0.5 mg #20 to Jerome Dumas at [facility] at [phone] per Ally Greene NP.

## 2019-07-12 NOTE — ONCOLOGY PATHWAY NOTE
DISCONTINUE ON PATHWAY REGIMEN - Breast    [id number]    Doxorubicin + Cyclophosphamide (AC):      Doxorubicin (Adriamycin(R))       Cyclophosphamide (Cytoxan(R))     **Always confirm dose/schedule in your pharmacy ordering system**    Paclitaxel 80 mg/m2 Weekly:      Paclitaxel (Taxol(R))     **Always confirm dose/schedule in your pharmacy ordering system**    REASON: Other Reason  PRIOR TREATMENT: DON423: AC-T - [Doxorubicin + Cyclophosphamide q21 Days x 4 Cycles, Followed by Paclitaxel Weekly x 12 Weeks]  TREATMENT RESPONSE: Unable to Evaluate    START ON PATHWAY REGIMEN - Breast    [id number]    Dose-Dense AC q14 days:      Doxorubicin (Adriamycin(R))       Cyclophosphamide (Cytoxan(R))       Pegfilgrastim-xxxx     **Always confirm dose/schedule in your pharmacy ordering system**    Paclitaxel 80 mg/m2 Weekly:      Paclitaxel (Taxol(R))     **Always confirm dose/schedule in your pharmacy ordering system**    Patient Characteristics:  Preoperative or Nonsurgical Candidate (Clinical Staging), Neoadjuvant Therapy followed by Surgery, Invasive Disease, Chemotherapy, HER2 Negative/Unknown/Equivocal, ER Positive  Therapeutic Status: Preoperative or Nonsurgical Candidate (Clinical Staging)  AJCC M Category: cM0  AJCC Grade: GX  Breast Surgical Plan: Neoadjuvant Therapy followed by Surgery  ER Status: Positive (+)  AJCC 8 Stage Grouping: Unknown  HER2 Status: Equivocal  AJCC T Category: cTX  AJCC N Category: cN1  MD Status: Positive (+)  Intent of Therapy:  Curative Intent, Discussed with Patient

## 2019-07-12 NOTE — ONCOLOGY PATHWAY NOTE
START ON PATHWAY REGIMEN - Breast    [id number]    Doxorubicin + Cyclophosphamide (AC):      Doxorubicin (Adriamycin(R))       Cyclophosphamide (Cytoxan(R))     **Always confirm dose/schedule in your pharmacy ordering system**    Paclitaxel 80 mg/m2 Weekly:      Paclitaxel (Taxol(R))     **Always confirm dose/schedule in your pharmacy ordering system**    Patient Characteristics:  Preoperative or Nonsurgical Candidate (Clinical Staging), Neoadjuvant Therapy followed by Surgery, Invasive Disease, Chemotherapy, HER2 Negative/Unknown/Equivocal, ER Positive  Therapeutic Status: Preoperative or Nonsurgical Candidate (Clinical Staging)  AJCC M Category: cM0  AJCC Grade: GX  Breast Surgical Plan: Neoadjuvant Therapy followed by Surgery  ER Status: Positive (+)  AJCC 8 Stage Grouping: Unknown  HER2 Status: Equivocal  AJCC T Category: cTX  AJCC N Category: cN1  SD Status: Positive (+)  Intent of Therapy:  Curative Intent, Discussed with Patient

## 2019-07-12 NOTE — H&P (VIEW-ONLY)
Called patient  Her mammaprint is high risk  Explained what that meant  She has port placement on 7/19/19 and wants to start chemotherapy the same day  We will arrange for this    Claudette Franks please send all antiemetics and dexamethasone prescriptions and ensure she has a spot in [facility]

## 2019-07-15 ENCOUNTER — DOCUMENTATION ONLY (OUTPATIENT)
Dept: ONCOLOGY | Age: 54
End: 2019-07-15

## 2019-07-15 NOTE — PROGRESS NOTES
Pt needs ECHO done prior to starting chemo on Friday. This has not yet been scheduled. Please assist in getting this scheduled for pt and advise her.

## 2019-07-16 ENCOUNTER — DOCUMENTATION ONLY (OUTPATIENT)
Dept: SURGERY | Age: 54
End: 2019-07-16

## 2019-07-16 NOTE — PROGRESS NOTES
I called and spoke with patient. She know the surgery has been moved from Friday the 19 to Thursday the 18th. She states she is having a ct at 2:30 pm on July 18th.

## 2019-07-17 ENCOUNTER — ANESTHESIA EVENT (OUTPATIENT)
Dept: SURGERY | Age: 54
End: 2019-07-17
Payer: COMMERCIAL

## 2019-07-18 ENCOUNTER — ANESTHESIA (OUTPATIENT)
Dept: SURGERY | Age: 54
End: 2019-07-18
Payer: COMMERCIAL

## 2019-07-18 ENCOUNTER — HOSPITAL ENCOUNTER (OUTPATIENT)
Dept: NON INVASIVE DIAGNOSTICS | Age: 54
Discharge: HOME | End: 2019-07-18
Attending: INTERNAL MEDICINE
Payer: COMMERCIAL

## 2019-07-18 ENCOUNTER — APPOINTMENT (OUTPATIENT)
Dept: GENERAL RADIOLOGY | Age: 54
End: 2019-07-18
Attending: SURGERY
Payer: COMMERCIAL

## 2019-07-18 ENCOUNTER — DOCUMENTATION ONLY (OUTPATIENT)
Dept: SURGERY | Age: 54
End: 2019-07-18

## 2019-07-18 ENCOUNTER — HOSPITAL ENCOUNTER (OUTPATIENT)
Age: 54
Setting detail: OUTPATIENT SURGERY
Discharge: HOME | End: 2019-07-18
Attending: SURGERY | Admitting: SURGERY
Payer: COMMERCIAL

## 2019-07-18 VITALS
BODY MASS INDEX: 30.46 KG/M2 | TEMPERATURE: 97.7 F | SYSTOLIC BLOOD PRESSURE: 116 MMHG | WEIGHT: 201 LBS | HEART RATE: 74 BPM | HEIGHT: 68 IN | OXYGEN SATURATION: 99 % | DIASTOLIC BLOOD PRESSURE: 66 MMHG | RESPIRATION RATE: 24 BRPM

## 2019-07-18 DIAGNOSIS — Z17.0 MALIGNANT NEOPLASM OF UPPER-OUTER QUADRANT OF LEFT BREAST IN FEMALE, ESTROGEN RECEPTOR POSITIVE (HCC): ICD-10-CM

## 2019-07-18 DIAGNOSIS — R06.02 SOB (SHORTNESS OF BREATH): ICD-10-CM

## 2019-07-18 DIAGNOSIS — C50.412 MALIGNANT NEOPLASM OF UPPER-OUTER QUADRANT OF LEFT BREAST IN FEMALE, ESTROGEN RECEPTOR POSITIVE (HCC): ICD-10-CM

## 2019-07-18 LAB
ALBUMIN SERPL-MCNC: 4.1 G/DL (ref 3.5–5)
ALBUMIN/GLOB SERPL: 1.2 {RATIO} (ref 1.1–2.2)
ALP SERPL-CCNC: 84 U/L (ref 45–117)
ALT SERPL-CCNC: 20 U/L (ref 12–78)
ANION GAP SERPL CALC-SCNC: 2 MMOL/L (ref 5–15)
AST SERPL-CCNC: 12 U/L (ref 15–37)
BASOPHILS # BLD: 0 K/UL (ref 0–0.1)
BASOPHILS NFR BLD: 1 % (ref 0–1)
BILIRUB SERPL-MCNC: 0.3 MG/DL (ref 0.2–1)
BUN SERPL-MCNC: 13 MG/DL (ref 6–20)
BUN/CREAT SERPL: 18 (ref 12–20)
CALCIUM SERPL-MCNC: 9.1 MG/DL (ref 8.5–10.1)
CHLORIDE SERPL-SCNC: 108 MMOL/L (ref 97–108)
CO2 SERPL-SCNC: 31 MMOL/L (ref 21–32)
CREAT SERPL-MCNC: 0.72 MG/DL (ref 0.55–1.02)
DIFFERENTIAL METHOD BLD: ABNORMAL
ECHO AO ROOT DIAM: 2.83 CM
ECHO AV AREA PEAK VELOCITY: 3.2 CM2
ECHO AV PEAK GRADIENT: 5.5 MMHG
ECHO AV PEAK VELOCITY: 116.9 CM/S
ECHO EST RA PRESSURE: 3 MMHG
ECHO LA AREA 4C: 19.6 CM2
ECHO LA MAJOR AXIS: 3.99 CM
ECHO LA TO AORTIC ROOT RATIO: 1.41
ECHO LA VOL 2C: 52.9 ML (ref 22–52)
ECHO LA VOL 4C: 56.76 ML (ref 22–52)
ECHO LA VOL BP: 61.18 ML (ref 22–52)
ECHO LA VOL/BSA BIPLANE: 29.87 ML/M2 (ref 16–28)
ECHO LA VOLUME INDEX A2C: 25.83 ML/M2 (ref 16–28)
ECHO LA VOLUME INDEX A4C: 27.71 ML/M2 (ref 16–28)
ECHO LV E' LATERAL VELOCITY: 8.19 CM/S
ECHO LV E' SEPTAL VELOCITY: 7.77 CM/S
ECHO LV INTERNAL DIMENSION DIASTOLIC: 4.15 CM (ref 3.9–5.3)
ECHO LV INTERNAL DIMENSION SYSTOLIC: 2.54 CM
ECHO LV IVSD: 1.02 CM (ref 0.6–0.9)
ECHO LV MASS 2D: 177.8 G (ref 67–162)
ECHO LV MASS INDEX 2D: 86.8 G/M2 (ref 43–95)
ECHO LV POSTERIOR WALL DIASTOLIC: 1.17 CM (ref 0.6–0.9)
ECHO LVOT DIAM: 2.2 CM
ECHO LVOT PEAK GRADIENT: 4 MMHG
ECHO LVOT PEAK VELOCITY: 99.78 CM/S
ECHO MV A VELOCITY: 57.98 CM/S
ECHO MV AREA PHT: 2.9 CM2
ECHO MV E DECELERATION TIME (DT): 262.5 MS
ECHO MV E VELOCITY: 72.18 CM/S
ECHO MV E/A RATIO: 1.24
ECHO MV E/E' LATERAL: 8.81
ECHO MV E/E' RATIO (AVERAGED): 9.05
ECHO MV E/E' SEPTAL: 9.29
ECHO MV PRESSURE HALF TIME (PHT): 76.1 MS
ECHO PULMONARY ARTERY SYSTOLIC PRESSURE (PASP): 23.3 MMHG
ECHO PV MAX VELOCITY: 86.13 CM/S
ECHO PV PEAK GRADIENT: 3 MMHG
ECHO RIGHT VENTRICULAR SYSTOLIC PRESSURE (RVSP): 23.3 MMHG
ECHO RV TAPSE: 2.27 CM (ref 1.5–2)
ECHO TV REGURGITANT MAX VELOCITY: 225.31 CM/S
ECHO TV REGURGITANT PEAK GRADIENT: 20.3 MMHG
EOSINOPHIL # BLD: 0.1 K/UL (ref 0–0.4)
EOSINOPHIL NFR BLD: 2 % (ref 0–7)
ERYTHROCYTE [DISTWIDTH] IN BLOOD BY AUTOMATED COUNT: 11.7 % (ref 11.5–14.5)
GLOBULIN SER CALC-MCNC: 3.3 G/DL (ref 2–4)
GLUCOSE SERPL-MCNC: 89 MG/DL (ref 65–100)
HCT VFR BLD AUTO: 44.1 % (ref 35–47)
HGB BLD-MCNC: 15.2 G/DL (ref 11.5–16)
IMM GRANULOCYTES # BLD AUTO: 0 K/UL (ref 0–0.04)
IMM GRANULOCYTES NFR BLD AUTO: 0 % (ref 0–0.5)
LYMPHOCYTES # BLD: 2.2 K/UL (ref 0.8–3.5)
LYMPHOCYTES NFR BLD: 41 % (ref 12–49)
MCH RBC QN AUTO: 34.3 PG (ref 26–34)
MCHC RBC AUTO-ENTMCNC: 34.5 G/DL (ref 30–36.5)
MCV RBC AUTO: 99.5 FL (ref 80–99)
MONOCYTES # BLD: 0.4 K/UL (ref 0–1)
MONOCYTES NFR BLD: 8 % (ref 5–13)
NEUTS SEG # BLD: 2.6 K/UL (ref 1.8–8)
NEUTS SEG NFR BLD: 48 % (ref 32–75)
NRBC # BLD: 0 K/UL (ref 0–0.01)
NRBC BLD-RTO: 0 PER 100 WBC
PLATELET # BLD AUTO: 234 K/UL (ref 150–400)
PMV BLD AUTO: 9.9 FL (ref 8.9–12.9)
POTASSIUM SERPL-SCNC: 3.8 MMOL/L (ref 3.5–5.1)
PROT SERPL-MCNC: 7.4 G/DL (ref 6.4–8.2)
RBC # BLD AUTO: 4.43 M/UL (ref 3.8–5.2)
SODIUM SERPL-SCNC: 141 MMOL/L (ref 136–145)
WBC # BLD AUTO: 5.3 K/UL (ref 3.6–11)

## 2019-07-18 PROCEDURE — 77030011640 HC PAD GRND REM COVD -A: Performed by: SURGERY

## 2019-07-18 PROCEDURE — 77030011267 HC ELECTRD BLD COVD -A: Performed by: SURGERY

## 2019-07-18 PROCEDURE — 36415 COLL VENOUS BLD VENIPUNCTURE: CPT

## 2019-07-18 PROCEDURE — 77030020256 HC SOL INJ NACL 0.9%  500ML: Performed by: SURGERY

## 2019-07-18 PROCEDURE — 0399T ECHO ADULT COMPLETE: CPT

## 2019-07-18 PROCEDURE — 74011250636 HC RX REV CODE- 250/636

## 2019-07-18 PROCEDURE — 77030039266 HC ADH SKN EXOFIN S2SG -A: Performed by: SURGERY

## 2019-07-18 PROCEDURE — 77030002986 HC SUT PROL J&J -A: Performed by: SURGERY

## 2019-07-18 PROCEDURE — 74011250636 HC RX REV CODE- 250/636: Performed by: ANESTHESIOLOGY

## 2019-07-18 PROCEDURE — 80053 COMPREHEN METABOLIC PANEL: CPT

## 2019-07-18 PROCEDURE — 74011250636 HC RX REV CODE- 250/636: Performed by: SURGERY

## 2019-07-18 PROCEDURE — 74011000250 HC RX REV CODE- 250: Performed by: SURGERY

## 2019-07-18 PROCEDURE — 77030020255 HC SOL INJ LR 1000ML BG: Performed by: SURGERY

## 2019-07-18 PROCEDURE — 77030031139 HC SUT VCRL2 J&J -A: Performed by: SURGERY

## 2019-07-18 PROCEDURE — 71045 X-RAY EXAM CHEST 1 VIEW: CPT

## 2019-07-18 PROCEDURE — 76000 FLUOROSCOPY <1 HR PHYS/QHP: CPT

## 2019-07-18 PROCEDURE — 77030002933 HC SUT MCRYL J&J -A: Performed by: SURGERY

## 2019-07-18 PROCEDURE — 76210000040 HC AMBSU PH I REC FIRST 0.5 HR: Performed by: SURGERY

## 2019-07-18 PROCEDURE — 77030021352 HC CBL LD SYS DISP COVD -B: Performed by: SURGERY

## 2019-07-18 PROCEDURE — 76060000061 HC AMB SURG ANES 0.5 TO 1 HR: Performed by: SURGERY

## 2019-07-18 PROCEDURE — 85025 COMPLETE CBC W/AUTO DIFF WBC: CPT

## 2019-07-18 PROCEDURE — 76030000000 HC AMB SURG OR TIME 0.5 TO 1: Performed by: SURGERY

## 2019-07-18 PROCEDURE — 76210000046 HC AMBSU PH II REC FIRST 0.5 HR: Performed by: SURGERY

## 2019-07-18 PROCEDURE — C1788 PORT, INDWELLING, IMP: HCPCS | Performed by: SURGERY

## 2019-07-18 DEVICE — POWERPORT ISP IMPLANTABLE PORT WITH ATTACHABLE 8F CHRONOFLEX OPEN-ENDED SINGLE-LUMEN VENOUS CATHETER INTERMEDIATE KIT (WITH SUTURE PLUGS)
Type: IMPLANTABLE DEVICE | Site: CHEST  WALL | Status: FUNCTIONAL
Brand: POWERPORT, CHRONOFLEX

## 2019-07-18 RX ORDER — PROPOFOL 10 MG/ML
INJECTION, EMULSION INTRAVENOUS
Status: DISCONTINUED | OUTPATIENT
Start: 2019-07-18 | End: 2019-07-18 | Stop reason: HOSPADM

## 2019-07-18 RX ORDER — ONDANSETRON 2 MG/ML
INJECTION INTRAMUSCULAR; INTRAVENOUS AS NEEDED
Status: DISCONTINUED | OUTPATIENT
Start: 2019-07-18 | End: 2019-07-18 | Stop reason: HOSPADM

## 2019-07-18 RX ORDER — SODIUM CHLORIDE, SODIUM LACTATE, POTASSIUM CHLORIDE, CALCIUM CHLORIDE 600; 310; 30; 20 MG/100ML; MG/100ML; MG/100ML; MG/100ML
25 INJECTION, SOLUTION INTRAVENOUS CONTINUOUS
Status: DISCONTINUED | OUTPATIENT
Start: 2019-07-18 | End: 2019-07-18 | Stop reason: HOSPADM

## 2019-07-18 RX ORDER — DEXAMETHASONE SODIUM PHOSPHATE 4 MG/ML
INJECTION, SOLUTION INTRA-ARTICULAR; INTRALESIONAL; INTRAMUSCULAR; INTRAVENOUS; SOFT TISSUE AS NEEDED
Status: DISCONTINUED | OUTPATIENT
Start: 2019-07-18 | End: 2019-07-18 | Stop reason: HOSPADM

## 2019-07-18 RX ORDER — MIDAZOLAM HYDROCHLORIDE 1 MG/ML
INJECTION, SOLUTION INTRAMUSCULAR; INTRAVENOUS AS NEEDED
Status: DISCONTINUED | OUTPATIENT
Start: 2019-07-18 | End: 2019-07-18 | Stop reason: HOSPADM

## 2019-07-18 RX ORDER — HYDROMORPHONE HYDROCHLORIDE 1 MG/ML
.2-.5 INJECTION, SOLUTION INTRAMUSCULAR; INTRAVENOUS; SUBCUTANEOUS ONCE
Status: DISCONTINUED | OUTPATIENT
Start: 2019-07-18 | End: 2019-07-18 | Stop reason: HOSPADM

## 2019-07-18 RX ORDER — CLINDAMYCIN PHOSPHATE 900 MG/50ML
INJECTION INTRAVENOUS
Status: COMPLETED
Start: 2019-07-18 | End: 2019-07-18

## 2019-07-18 RX ORDER — LIDOCAINE HYDROCHLORIDE 10 MG/ML
0.1 INJECTION, SOLUTION EPIDURAL; INFILTRATION; INTRACAUDAL; PERINEURAL AS NEEDED
Status: DISCONTINUED | OUTPATIENT
Start: 2019-07-18 | End: 2019-07-18 | Stop reason: HOSPADM

## 2019-07-18 RX ORDER — HYDROCODONE BITARTRATE AND ACETAMINOPHEN 5; 325 MG/1; MG/1
1 TABLET ORAL
Qty: 10 TAB | Refills: 0 | Status: SHIPPED | OUTPATIENT
Start: 2019-07-18 | End: 2019-07-21

## 2019-07-18 RX ORDER — SODIUM CHLORIDE 0.9 % (FLUSH) 0.9 %
5-40 SYRINGE (ML) INJECTION EVERY 8 HOURS
Status: DISCONTINUED | OUTPATIENT
Start: 2019-07-18 | End: 2019-07-18 | Stop reason: HOSPADM

## 2019-07-18 RX ORDER — MORPHINE SULFATE 10 MG/ML
2 INJECTION, SOLUTION INTRAMUSCULAR; INTRAVENOUS
Status: DISCONTINUED | OUTPATIENT
Start: 2019-07-18 | End: 2019-07-18 | Stop reason: HOSPADM

## 2019-07-18 RX ORDER — FENTANYL CITRATE 50 UG/ML
25 INJECTION, SOLUTION INTRAMUSCULAR; INTRAVENOUS
Status: DISCONTINUED | OUTPATIENT
Start: 2019-07-18 | End: 2019-07-18 | Stop reason: HOSPADM

## 2019-07-18 RX ORDER — OXYCODONE AND ACETAMINOPHEN 5; 325 MG/1; MG/1
1 TABLET ORAL
Status: DISCONTINUED | OUTPATIENT
Start: 2019-07-18 | End: 2019-07-18 | Stop reason: HOSPADM

## 2019-07-18 RX ORDER — CLINDAMYCIN PHOSPHATE 900 MG/50ML
900 INJECTION INTRAVENOUS ONCE
Status: DISCONTINUED | OUTPATIENT
Start: 2019-07-18 | End: 2019-07-18 | Stop reason: HOSPADM

## 2019-07-18 RX ORDER — DIPHENHYDRAMINE HYDROCHLORIDE 50 MG/ML
12.5 INJECTION, SOLUTION INTRAMUSCULAR; INTRAVENOUS AS NEEDED
Status: DISCONTINUED | OUTPATIENT
Start: 2019-07-18 | End: 2019-07-18 | Stop reason: HOSPADM

## 2019-07-18 RX ORDER — CLINDAMYCIN PHOSPHATE 900 MG/50ML
INJECTION INTRAVENOUS AS NEEDED
Status: DISCONTINUED | OUTPATIENT
Start: 2019-07-18 | End: 2019-07-18 | Stop reason: HOSPADM

## 2019-07-18 RX ORDER — SODIUM CHLORIDE 0.9 % (FLUSH) 0.9 %
5-40 SYRINGE (ML) INJECTION AS NEEDED
Status: DISCONTINUED | OUTPATIENT
Start: 2019-07-18 | End: 2019-07-18 | Stop reason: HOSPADM

## 2019-07-18 RX ORDER — ONDANSETRON 2 MG/ML
4 INJECTION INTRAMUSCULAR; INTRAVENOUS AS NEEDED
Status: DISCONTINUED | OUTPATIENT
Start: 2019-07-18 | End: 2019-07-18 | Stop reason: HOSPADM

## 2019-07-18 RX ORDER — LIDOCAINE HYDROCHLORIDE 20 MG/ML
INJECTION, SOLUTION EPIDURAL; INFILTRATION; INTRACAUDAL; PERINEURAL AS NEEDED
Status: DISCONTINUED | OUTPATIENT
Start: 2019-07-18 | End: 2019-07-18 | Stop reason: HOSPADM

## 2019-07-18 RX ORDER — FENTANYL CITRATE 50 UG/ML
INJECTION, SOLUTION INTRAMUSCULAR; INTRAVENOUS AS NEEDED
Status: DISCONTINUED | OUTPATIENT
Start: 2019-07-18 | End: 2019-07-18 | Stop reason: HOSPADM

## 2019-07-18 RX ADMIN — MIDAZOLAM HYDROCHLORIDE 2 MG: 1 INJECTION, SOLUTION INTRAMUSCULAR; INTRAVENOUS at 07:30

## 2019-07-18 RX ADMIN — MIDAZOLAM HYDROCHLORIDE 1 MG: 1 INJECTION, SOLUTION INTRAMUSCULAR; INTRAVENOUS at 07:38

## 2019-07-18 RX ADMIN — DEXAMETHASONE SODIUM PHOSPHATE 4 MG: 4 INJECTION, SOLUTION INTRA-ARTICULAR; INTRALESIONAL; INTRAMUSCULAR; INTRAVENOUS; SOFT TISSUE at 07:50

## 2019-07-18 RX ADMIN — FENTANYL CITRATE 25 MCG: 50 INJECTION, SOLUTION INTRAMUSCULAR; INTRAVENOUS at 07:38

## 2019-07-18 RX ADMIN — PROPOFOL 120 MCG/KG/MIN: 10 INJECTION, EMULSION INTRAVENOUS at 07:38

## 2019-07-18 RX ADMIN — ONDANSETRON 4 MG: 2 INJECTION INTRAMUSCULAR; INTRAVENOUS at 08:07

## 2019-07-18 RX ADMIN — LIDOCAINE HYDROCHLORIDE 40 MG: 20 INJECTION, SOLUTION EPIDURAL; INFILTRATION; INTRACAUDAL; PERINEURAL at 07:38

## 2019-07-18 RX ADMIN — SODIUM CHLORIDE, SODIUM LACTATE, POTASSIUM CHLORIDE, AND CALCIUM CHLORIDE 25 ML/HR: 600; 310; 30; 20 INJECTION, SOLUTION INTRAVENOUS at 06:40

## 2019-07-18 RX ADMIN — CLINDAMYCIN PHOSPHATE 900 MG: 900 INJECTION INTRAVENOUS at 07:53

## 2019-07-18 RX ADMIN — FENTANYL CITRATE 25 MCG: 50 INJECTION, SOLUTION INTRAMUSCULAR; INTRAVENOUS at 07:45

## 2019-07-18 NOTE — INTERVAL H&P NOTE
H&P Update:  Unknown Aver was seen and examined. History and physical has been reviewed.  Significant clinical changes have occurred as noted:  Port placement

## 2019-07-18 NOTE — ANESTHESIA POSTPROCEDURE EVALUATION
Procedure(s):  PORT A CATH  INSERTION.     MAC, total IV anesthesia    Anesthesia Post Evaluation      Multimodal analgesia: multimodal analgesia used between 6 hours prior to anesthesia start to PACU discharge  Patient location during evaluation: bedside  Patient participation: complete - patient participated  Level of consciousness: awake and alert  Pain score: 0  Airway patency: patent  Anesthetic complications: no  Cardiovascular status: acceptable  Respiratory status: acceptable  Hydration status: acceptable  Post anesthesia nausea and vomiting:  none      Vitals Value Taken Time   /67 7/18/2019  8:25 AM   Temp 36.6 °C (97.8 °F) 7/18/2019  8:15 AM   Pulse 72 7/18/2019  8:25 AM   Resp 14 7/18/2019  8:25 AM   SpO2 98 % 7/18/2019  8:25 AM

## 2019-07-18 NOTE — PROGRESS NOTES
Poor lady - I feel like she's experienced a lot of set backs between this and the lab matty path taking forever. ..

## 2019-07-18 NOTE — PERIOP NOTES
Portable CXR done. Pt alert/awake. No complaints. [address] to bedside. D/C instructions reviewed with pt/friend and friend signed for them. Awaiting CXR results. 200 Dr. Arevalo Seek notified of CXR results. 1 [facility] without difficulty. 4923 Discharged to home via/wc,accompanied to car per RN. Skin warm and dry, awake and alert. Respirations even, unlabored. Pt and family members questions and concerns addressed prior to discharge. All belongings with pt.

## 2019-07-18 NOTE — OP NOTES
[address]  OPERATIVE REPORT    Name:  John Nguyen  MR#:  [id number]  :  1965  ACCOUNT #:  [de-identified]  DATE OF SERVICE:  2019    PREOPERATIVE DIAGNOSIS:  Left breast cancer, need intravenous access for chemotherapy. POSTOPERATIVE DIAGNOSIS:  Left breast cancer, need intravenous access for chemotherapy. PROCEDURE PERFORMED:  Right subclavian port insertion. SURGEON:  Willie García MD    ASSISTANT:  Ashwin Scherer. ANESTHESIA:  MAC.    COMPLICATIONS:  None. SPECIMENS REMOVED:  None. IMPLANTS:  An 8-[employer] and an 8-[employer]hal[employer] PowerPort. ESTIMATED BLOOD LOSS:  Minimal.    FINDINGS:  Port intact. INDICATIONS:  This is a 57-year-old female who had a locally advanced left breast cancer, needed IV access for chemo. PROCEDURE IN DETAIL:  The patient was seen in the preop holding area where surgical site was marked by surgeon. Informed consent was obtained. She was taken to the operating room, laid in supine position where MAC anesthesia was induced. Bilateral chest prepped and draped in usual fashion. Time-out was performed. The patient was placed in Trendelenburg position with 20 mL of local anesthetic injected into the right chest wall. An 18-gauge introducer needle was used to access the right subclavian vein. The syringe was removed, the wire was threaded through the needle and needle was removed. The wire was going to the SVC and right atrium on fluoroscopy. Next, an incision was made at the wire with a #15 blade. Bovie cautery was used to create a port pocket. The dilator sheath was threaded over the wire. The inner cannula and wire were removed and the catheter was clamped and threaded through the dilator sheath. The sheath was torn away. The fluoro confirmed the tip of the catheter at the [facility] and right atrium.   Next, the catheter was cut, placed onto the port and unclamped and the port was secured in the port pocket. This aspirated, flushed well with injectable saline and final heparin flush. The foreskin on either side with 3-0 Prolene. The incision was closed with interrupted 3-0 Vicryl and 4-0 subcuticular Monocryl. Skin glue was placed on the incision. All sponge, needle and instrument counts were correct. The patient went to Recovery in stable condition.       India Helms MD      [id number]  D:  07/18/2019 8:25  T:  07/18/2019 8:34  JOB #:  [id number]

## 2019-07-18 NOTE — PERIOP NOTES
Maggi Trinidad  1965  [id number]    Situation:  Verbal report given from: Teola Najjar CRNA  Procedure: Procedure(s):  PORT A CATH  INSERTION    Background:    Preoperative diagnosis: LEFT BREAST CANCER    Postoperative diagnosis: LEFT BREAST CANCER    :  Dr. Kody Perez    Assistant(s): Circ-1: Melisa Barreto RN  Radiology Technician: Margarita Hatch Tech-1: Tonie Monzon     Specimens: * No specimens in log *    Assessment:  Intra-procedure medications         Anesthesia gave intra-procedure sedation and medications, see anesthesia flow sheet     Intravenous fluids: LR@ KVO     Vital signs stable       Recommendation:    Permission to share finding with friend Zeyad Andino : yes

## 2019-07-18 NOTE — PROGRESS NOTES
I called the patient today and she never received her Xanax prescription that was called into [facility] @ [facility] 7/12/2019. The pharmacy was called and I spoke to pharmacist Sharon who was very abrupt on the phone and states the prescription was never received. After numerous attempts the prescription went through and the pharmacist will call the patient to let her know it is ready.

## 2019-07-18 NOTE — ANESTHESIA PREPROCEDURE EVALUATION
Relevant Problems   No relevant active problems       Anesthetic History     PONV          Review of Systems / Medical History  Patient summary reviewed, nursing notes reviewed and pertinent labs reviewed    Pulmonary  Within defined limits              Comments: GAGAN risk SB score 5   Neuro/Psych         Psychiatric history (anxiety)     Cardiovascular  Within defined limits                Exercise tolerance: >4 METS     GI/Hepatic/Renal  Within defined limits              Endo/Other      Hypothyroidism  Obesity, arthritis and cancer (left breast)     Other Findings            Physical Exam    Airway  Mallampati: II  TM Distance: > 6 cm  Neck ROM: decreased range of motion   Mouth opening: Normal     Cardiovascular    Rhythm: regular  Rate: normal      Pertinent negatives: No murmur   Dental    Dentition: Caps/crowns     Pulmonary  Breath sounds clear to auscultation               Abdominal  GI exam deferred       Other Findings            Anesthetic Plan    ASA: 2  Anesthesia type: MAC          Induction: Intravenous  Anesthetic plan and risks discussed with: Patient      PONV prophylaxis

## 2019-07-18 NOTE — BRIEF OP NOTE
BRIEF OPERATIVE NOTE    Date of Procedure: 7/18/2019   Preoperative Diagnosis: LEFT BREAST CANCER, need iv access for chemo  Postoperative Diagnosis: LEFT BREAST CANCER    Procedure(s):  PORT A CATH  INSERTION  Surgeon(s) and Role:     Jg Fish MD - Primary         Surgical Assistant: Joy Boucher    Surgical Staff:  Glenda Schneider: Judson Atkinson RN  Radiology Technician: Sheldon Shah  Scrub Tech-1: Pj Rivera Event Time In Time Out   Incision Start 0754    Incision Close 0808      Anesthesia: MAC   Estimated Blood Loss: minimal  Specimens: * No specimens in log *   Findings: port intact    Complications:  none  Implants:   Implant Name Type Inv.  Item Serial No.  Lot No. LRB No. Used Action   PORT SL POWERPORT 8FR TI --  - SNA  PORT SL POWERPORT 8FR TI --  NA [employer] PERIPHERAL VASCULAR [id number] 1 Implanted       Dictated stat

## 2019-07-18 NOTE — DISCHARGE INSTRUCTIONS
Discharge Instructions from Dr. Sho Cameron    · You may shower, but no hot tubs, swimming pools, or baths until your incision is healed. · No heavy lifting with the affected extremity (nothing greater than 5 pounds), and limit its use for the next 4-5 days. · You may use an ice pack for comfort for the next couple of days, but do not place ice directly on the skin. Rather, use a towel or clothing to serve as a barrier between skin and ice to prevent injury. · Follow medication instructions carefully. · You will have bruising and swelling  · Watch for signs of infection as listed below. · Redness  · Swelling  · Drainage from the incision or from your nipple that appears infected  · Fever over 101.5 degrees for consecutive readings, or over 99.5 if you are currently undergoing chemotherapy. · Call our office (number is below) for a follow-up appointment. · If you have any problems, our phone number is [phone]    DO NOT TAKE TYLENOL/ACETAMINOPHEN [address]     TAKE NARCOTIC PAIN MEDICATIONS WITH FOOD     Narcotics tend to be constipating, we suggest taking a stool softener such as Colace or Miralax (follow package instructions). DO NOT DRIVE WHILE TAKING NARCOTIC PAIN MEDICATIONS. DO NOT TAKE SLEEPING MEDICATIONS OR ANTIANXIETY MEDICATIONS WHILE TAKING NARCOTIC PAIN MEDICATIONS,  ESPECIALLY THE NIGHT OF ANESTHESIA! DISCHARGE SUMMARY from Nurse    The following personal items collected during your admission are returned to you:   Dental Appliance: Dental Appliances: None  Vision: Visual Aid: None  Hearing Aid:    Jewelry: Jewelry: None  Clothing: Clothing: Footwear, Pants, With patient, Shirt, Undergarments  Other Valuables: Other Valuables: None  Valuables sent to safe:        PATIENT INSTRUCTIONS:    After General Anesthesia or Intravenous Sedation, for 24 hours or while taking prescription Narcotics:        Someone should be with you for the next 24 hours.         For your own safety, a responsible adult must drive you home. · Limit your activities  · Recommended activity: Rest today, up with assistance today. Do not climb stairs or shower unattended for the next 24 hours. · Please start with a soft bland diet and advance as tolerated (no nausea) to regular diet. · If you have a sore throat you should try the following: fluids, warm salt water gargles, or throat lozenges. If it does not improve after several days please follow up with your primary physician. · Do not drive and operate hazardous machinery  · Do not make important personal or business decisions  · Do  not drink alcoholic beverages  · If you have not urinated within 8 hours after discharge, please contact your surgeon on call. Report the following to your surgeon:  · Excessive pain, swelling, redness or odor of or around the surgical area  · Temperature over 100.5  · Nausea and vomiting lasting longer than 4 hours or if unable to take medications  · Any signs of decreased circulation or nerve impairment to extremity: change in color, persistent  numbness, tingling, coldness or increase pain      · You will receive a Post Operative Call from one of the Recovery Room Nurses on the day after your surgery to check on you. It is very important for us to know how you are recovering after your surgery. If you have an issue or need to speak with someone, please call your surgeon, do not wait for the post operative call. · You may receive an e-mail or letter in the mail from [facility] regarding your experience with us in the Ambulatory Surgery Unit. Your feedback is valuable to us and we appreciate your participation in the survey. · If the above instructions are not adequate or you are having problems after your surgery, call the physician at their office number. · We wish you a speedy recovery ? What to do at Home:      *  Please give a list of your current medications to your Primary Care Provider.     *  Please update this list whenever your medications are discontinued, doses are      changed, or new medications (including over-the-counter products) are added. *  Please carry medication information at all times in case of emergency situations. If you have not received your influenza and/or pneumococcal vaccine, please follow up with your primary care physician. The discharge information has been reviewed with the patient and caregiver. The patient and caregiver verbalized understanding. Yousif Harvey THROMBOSIS AND PULMONARY EMBOLUS    SURGICAL PATIENTS  Surgical patients are the #1 risk for DVT and PE. WHAT IS DVT? WHAT IS PE?  DVT is a serious condition where blood clots develop deep in the veins of the legs. PE occurs when a blood clot breaks loose from the wall of a vein and travels to the lungs blocking the pulmonary artery or one of its branches impairing blood flow from the heart, which could result in death.   RISK FACTORS   Surgery lasting longer than 45 minutes   History of inflammatory bowel disease   Oral contraceptive or hormone replacement therapy   Immobilization   Varicose veins / swollen legs   Smoking    CHF / Acute MI / Irregular heart beat   Family history of thrombosis   General anesthesia greater than 2 hours   Obesity   Infection of less than one month   Less than 1 month postpartum   COPD / Pneumonia   Arthroscopic surgery   Malignancy / cancer   Spine surgery   Blood abnormalities   Stroke / Paralysis / Coma    SIGNS AND SYMPTOMS OF DEEP VEIN TROMBOSIS   -  ER VISIT  Usually occurs in one leg, above or below the knee   Swelling - one calf or thigh may be larger than the other   Feeling increased warmth in the area of the leg that is swollen or painful   Leg pain, which may increase when standing or walking   Swelling along the vein of the leg   When swollen areas is pressed with a finger, a depression may remain   Tenderness of the leg that may be confined to one area   Change in leg color (bluish or red)    SIGNS AND SYMPTOMS OF PULMONARY EMBOLUS  - 911 CALL   Chest pain that gets worse with deep breath, coughing or chest movement   Coughing up blood   Sweating   Shortness of breath or difficulty breathing   Rapid heart beat   Lightheadedness    HOW TO REDUCE THE POSSIBLE RISK OF DVT   Exercise - simple activities as rotating ankles and wrists, wiggling toes and fingers, tightening and relaxing muscles in calves and thighs promotes circulation while recovering from surgery. Please do these exercises every hour during waking hours   Take mediation as prescribed by your physician (Lovenox, Coumadin, Aspirin)   Resume your normal activities as soon as your doctor advises you to do so.  Remember, when traveling, to [place] your legs frequently. PATIENTS WHO BELIEVE THEY MAY BE EXPERIENCING SIGNS AND SYMPTOMS OF DVT OR PE SHOULD SEEK MEDICAL HELP IMMEDIATELY         TO PREVENT AN INFECTION      1. [address] YOUR HANDS     To prevent infection, good handwashing is the most important thing you or your caregiver can do.  Wash your hands with soap and water or use the hand  we gave you before you touch any wounds. 2. SHOWER     Use the antibacterial soap we gave you when you take a shower.  Shower with this soap until your wounds are healed.  To reach all areas of your body, you may need someone to help you.  Dont forget to clean your belly button with every shower. 3.  USE CLEAN SHEETS     Use freshly cleaned sheets on your bed after surgery.  To keep the surgery site clean, do not allow pets to sleep with you while your wound is still healing.      

## 2019-07-18 NOTE — PERIOP NOTES
Permission received to review discharge instructions and discuss private health information with friend Filemon Hook

## 2019-07-19 ENCOUNTER — OFFICE VISIT (OUTPATIENT)
Dept: ONCOLOGY | Age: 54
End: 2019-07-19

## 2019-07-19 ENCOUNTER — HOSPITAL ENCOUNTER (OUTPATIENT)
Dept: INFUSION THERAPY | Age: 54
Discharge: HOME | End: 2019-07-19
Payer: COMMERCIAL

## 2019-07-19 ENCOUNTER — TELEPHONE (OUTPATIENT)
Dept: SURGERY | Age: 54
End: 2019-07-19

## 2019-07-19 VITALS
HEART RATE: 73 BPM | OXYGEN SATURATION: 96 % | SYSTOLIC BLOOD PRESSURE: 129 MMHG | HEIGHT: 68 IN | DIASTOLIC BLOOD PRESSURE: 84 MMHG | TEMPERATURE: 97.8 F | WEIGHT: 204 LBS | BODY MASS INDEX: 30.92 KG/M2

## 2019-07-19 VITALS
BODY MASS INDEX: 30.77 KG/M2 | HEART RATE: 64 BPM | HEIGHT: 68 IN | TEMPERATURE: 96.8 F | RESPIRATION RATE: 18 BRPM | DIASTOLIC BLOOD PRESSURE: 74 MMHG | SYSTOLIC BLOOD PRESSURE: 111 MMHG | WEIGHT: 203 LBS

## 2019-07-19 DIAGNOSIS — E66.09 CLASS 1 OBESITY DUE TO EXCESS CALORIES WITHOUT SERIOUS COMORBIDITY WITH BODY MASS INDEX (BMI) OF 30.0 TO 30.9 IN ADULT: ICD-10-CM

## 2019-07-19 DIAGNOSIS — Z17.0 MALIGNANT NEOPLASM OF UPPER-OUTER QUADRANT OF LEFT BREAST IN FEMALE, ESTROGEN RECEPTOR POSITIVE (HCC): Primary | ICD-10-CM

## 2019-07-19 DIAGNOSIS — C50.412 MALIGNANT NEOPLASM OF UPPER-OUTER QUADRANT OF LEFT BREAST IN FEMALE, ESTROGEN RECEPTOR POSITIVE (HCC): Primary | ICD-10-CM

## 2019-07-19 LAB
ALBUMIN SERPL-MCNC: 3.8 G/DL (ref 3.5–5)
ALBUMIN/GLOB SERPL: 1.4 {RATIO} (ref 1.1–2.2)
ALP SERPL-CCNC: 73 U/L (ref 45–117)
ALT SERPL-CCNC: 18 U/L (ref 12–78)
ANION GAP SERPL CALC-SCNC: 5 MMOL/L (ref 5–15)
AST SERPL-CCNC: 13 U/L (ref 15–37)
BASOPHILS # BLD: 0 K/UL (ref 0–0.1)
BASOPHILS NFR BLD: 0 % (ref 0–1)
BILIRUB SERPL-MCNC: 0.5 MG/DL (ref 0.2–1)
BUN SERPL-MCNC: 10 MG/DL (ref 6–20)
BUN/CREAT SERPL: 14 (ref 12–20)
CALCIUM SERPL-MCNC: 9 MG/DL (ref 8.5–10.1)
CHLORIDE SERPL-SCNC: 104 MMOL/L (ref 97–108)
CO2 SERPL-SCNC: 29 MMOL/L (ref 21–32)
CREAT SERPL-MCNC: 0.72 MG/DL (ref 0.55–1.02)
DIFFERENTIAL METHOD BLD: ABNORMAL
EOSINOPHIL # BLD: 0.1 K/UL (ref 0–0.4)
EOSINOPHIL NFR BLD: 1 % (ref 0–7)
ERYTHROCYTE [DISTWIDTH] IN BLOOD BY AUTOMATED COUNT: 11.5 % (ref 11.5–14.5)
GLOBULIN SER CALC-MCNC: 2.8 G/DL (ref 2–4)
GLUCOSE SERPL-MCNC: 89 MG/DL (ref 65–100)
HCT VFR BLD AUTO: 39.1 % (ref 35–47)
HGB BLD-MCNC: 13.3 G/DL (ref 11.5–16)
IMM GRANULOCYTES # BLD AUTO: 0 K/UL (ref 0–0.04)
IMM GRANULOCYTES NFR BLD AUTO: 0 % (ref 0–0.5)
LYMPHOCYTES # BLD: 3.3 K/UL (ref 0.8–3.5)
LYMPHOCYTES NFR BLD: 41 % (ref 12–49)
MCH RBC QN AUTO: 34.2 PG (ref 26–34)
MCHC RBC AUTO-ENTMCNC: 34 G/DL (ref 30–36.5)
MCV RBC AUTO: 100.5 FL (ref 80–99)
MONOCYTES # BLD: 0.6 K/UL (ref 0–1)
MONOCYTES NFR BLD: 7 % (ref 5–13)
NEUTS SEG # BLD: 4 K/UL (ref 1.8–8)
NEUTS SEG NFR BLD: 51 % (ref 32–75)
NRBC # BLD: 0 K/UL (ref 0–0.01)
NRBC BLD-RTO: 0 PER 100 WBC
PLATELET # BLD AUTO: 214 K/UL (ref 150–400)
PMV BLD AUTO: 10 FL (ref 8.9–12.9)
POTASSIUM SERPL-SCNC: 4.3 MMOL/L (ref 3.5–5.1)
PROT SERPL-MCNC: 6.6 G/DL (ref 6.4–8.2)
RBC # BLD AUTO: 3.89 M/UL (ref 3.8–5.2)
SODIUM SERPL-SCNC: 138 MMOL/L (ref 136–145)
WBC # BLD AUTO: 7.9 K/UL (ref 3.6–11)

## 2019-07-19 PROCEDURE — 80053 COMPREHEN METABOLIC PANEL: CPT

## 2019-07-19 PROCEDURE — 74011250636 HC RX REV CODE- 250/636: Performed by: INTERNAL MEDICINE

## 2019-07-19 PROCEDURE — 74011000258 HC RX REV CODE- 258: Performed by: INTERNAL MEDICINE

## 2019-07-19 PROCEDURE — 96366 THER/PROPH/DIAG IV INF ADDON: CPT

## 2019-07-19 PROCEDURE — 96411 CHEMO IV PUSH ADDL DRUG: CPT

## 2019-07-19 PROCEDURE — 36415 COLL VENOUS BLD VENIPUNCTURE: CPT

## 2019-07-19 PROCEDURE — 77030012965 HC NDL HUBR BBMI -A

## 2019-07-19 PROCEDURE — 96375 TX/PRO/DX INJ NEW DRUG ADDON: CPT

## 2019-07-19 PROCEDURE — 74011250636 HC RX REV CODE- 250/636

## 2019-07-19 PROCEDURE — 85025 COMPLETE CBC W/AUTO DIFF WBC: CPT

## 2019-07-19 PROCEDURE — 96413 CHEMO IV INFUSION 1 HR: CPT

## 2019-07-19 RX ORDER — SODIUM CHLORIDE 9 MG/ML
25 INJECTION, SOLUTION INTRAVENOUS CONTINUOUS
Status: DISPENSED | OUTPATIENT
Start: 2019-07-19 | End: 2019-07-19

## 2019-07-19 RX ORDER — SODIUM CHLORIDE 0.9 % (FLUSH) 0.9 %
10 SYRINGE (ML) INJECTION AS NEEDED
Status: ACTIVE | OUTPATIENT
Start: 2019-07-19 | End: 2019-07-19

## 2019-07-19 RX ORDER — DOXORUBICIN HYDROCHLORIDE 2 MG/ML
30 INJECTION, SOLUTION INTRAVENOUS ONCE
Status: COMPLETED | OUTPATIENT
Start: 2019-07-19 | End: 2019-07-19

## 2019-07-19 RX ORDER — SODIUM CHLORIDE 9 MG/ML
10 INJECTION INTRAMUSCULAR; INTRAVENOUS; SUBCUTANEOUS AS NEEDED
Status: ACTIVE | OUTPATIENT
Start: 2019-07-19 | End: 2019-07-19

## 2019-07-19 RX ORDER — HEPARIN 100 UNIT/ML
300-500 SYRINGE INTRAVENOUS AS NEEDED
Status: ACTIVE | OUTPATIENT
Start: 2019-07-19 | End: 2019-07-19

## 2019-07-19 RX ORDER — ONDANSETRON 2 MG/ML
8 INJECTION INTRAMUSCULAR; INTRAVENOUS ONCE
Status: COMPLETED | OUTPATIENT
Start: 2019-07-19 | End: 2019-07-19

## 2019-07-19 RX ORDER — LIDOCAINE AND PRILOCAINE 25; 25 MG/G; MG/G
CREAM TOPICAL AS NEEDED
Qty: 30 G | Refills: 0 | Status: SHIPPED | OUTPATIENT
Start: 2019-07-19 | End: 2020-01-13

## 2019-07-19 RX ADMIN — DEXAMETHASONE SODIUM PHOSPHATE 12 MG: 4 INJECTION, SOLUTION INTRAMUSCULAR; INTRAVENOUS at 11:58

## 2019-07-19 RX ADMIN — Medication 500 UNITS: at 13:45

## 2019-07-19 RX ADMIN — ONDANSETRON 8 MG: 2 INJECTION, SOLUTION INTRAMUSCULAR; INTRAVENOUS at 11:57

## 2019-07-19 RX ADMIN — Medication 10 ML: at 13:45

## 2019-07-19 RX ADMIN — DOXORUBICIN HYDROCHLORIDE 62.4 MG: 2 INJECTION, SOLUTION INTRAVENOUS at 12:25

## 2019-07-19 RX ADMIN — SODIUM CHLORIDE 25 ML/HR: 900 INJECTION, SOLUTION INTRAVENOUS at 11:30

## 2019-07-19 RX ADMIN — Medication 10 ML: at 09:49

## 2019-07-19 RX ADMIN — SODIUM CHLORIDE 150 MG: 900 INJECTION, SOLUTION INTRAVENOUS at 11:35

## 2019-07-19 RX ADMIN — SODIUM CHLORIDE 10 ML: 9 INJECTION INTRAMUSCULAR; INTRAVENOUS; SUBCUTANEOUS at 09:48

## 2019-07-19 RX ADMIN — CYCLOPHOSPHAMIDE 1248 MG: 1 INJECTION, POWDER, FOR SOLUTION INTRAVENOUS; ORAL at 12:45

## 2019-07-19 RX ADMIN — Medication 10 ML: at 09:48

## 2019-07-19 NOTE — PROGRESS NOTES
[facility] VISIT NOTE    0900  Pt arrived at [facility] and in no distress for C1 DD AC. Assessment completed, no acute complaints at this time. Right chest port accessed with 1 in martinez with no difficulty. Positive blood return noted and labs drawn. Recent Results (from the past 12 hour(s))   CBC WITH AUTOMATED DIFF    Collection Time: 07/19/19  9:47 AM   Result Value Ref Range    WBC 7.9 3.6 - 11.0 K/uL    RBC 3.89 3.80 - 5.20 M/uL    HGB 13.3 11.5 - 16.0 g/dL    HCT 39.1 35.0 - 47.0 %    .5 (H) 80.0 - 99.0 FL    MCH 34.2 (H) 26.0 - 34.0 PG    MCHC 34.0 30.0 - 36.5 g/dL    RDW 11.5 11.5 - 14.5 %    PLATELET 412 414 - 666 K/uL    MPV 10.0 8.9 - 12.9 FL    NRBC 0.0 0  WBC    ABSOLUTE NRBC 0.00 0.00 - 0.01 K/uL    NEUTROPHILS 51 32 - 75 %    LYMPHOCYTES 41 12 - 49 %    MONOCYTES 7 5 - 13 %    EOSINOPHILS 1 0 - 7 %    BASOPHILS 0 0 - 1 %    IMMATURE GRANULOCYTES 0 0.0 - 0.5 %    ABS. NEUTROPHILS 4.0 1.8 - 8.0 K/UL    ABS. LYMPHOCYTES 3.3 0.8 - 3.5 K/UL    ABS. MONOCYTES 0.6 0.0 - 1.0 K/UL    ABS. EOSINOPHILS 0.1 0.0 - 0.4 K/UL    ABS. BASOPHILS 0.0 0.0 - 0.1 K/UL    ABS. IMM. GRANS. 0.0 0.00 - 0.04 K/UL    DF AUTOMATED       See [facility] for other results. Medications received:  NS at [facility] IV  Emend IV  Decadron IV  Zofran IVP  Doxorubicin IVP  Cytoxan IV    Patient Vitals for the past 12 hrs:   Temp Pulse Resp BP   07/19/19 1345 96.8 °F (36 °C) 64 18 111/74   07/19/19 0910 97.5 °F (36.4 °C) 78 18 135/85     Tolerated treatment well, no adverse reaction noted. Port de-accessed and flushed per protocol. Positive blood return noted. 1350  D/C'd from [facility] ambulatory and in no distress accompanied by a friend. Next appointment is 7/20/19 at 1000.

## 2019-07-19 NOTE — PROGRESS NOTES
[facility] [place] at [place] [address], [place]: [phone]  F: [phone]    Reason for Visit:   Abram Key is a 52 y.o. female who is seen for Left breast cancer- ER+ID+HER2 greg neg    Treatment History:   · 6/19: Mammogram:  Left breast mass with skin thickening, 2 suspicious nodes. Mass measures 1.4 x 0.9 x 1.3  · 6/19: MRI breast: Multicentric left breast carcinoma. Non-masslike enhancement extends from the nipple to the posterior third, involves all quadrants, and measures 106 x 44 x 79 mm. · 5/28/19: Biopsy breast- IDC % % HER 2 negative, skin biopsy benign , node biopsy mostly adipose tissue with atypical cells . BRCA1 and 2 negative. CT and bone scan negative for metastasis. Mammaprint with insufficient tissue for testing. Repeat biopsy of axillary node 6/20/19 positive for metastatic disease- repeat mammaprint - high risk  · 7/19/19: cycle 1 neoadjuvant of dd AC-T    History of Present Illness:   Patient is a 52 y.o. seen for L breast cancer. She felt a sensation in the shower about May 2019. She also noted a lump and  an inverted nipple. She had a physician friend look at this who directed her to mammogram and recommended she see Dr. Abe Quiroz. This led to imaging and diagnosis as above. She comes in today for cycle #1 of neoadjuvant ddAC-T. She had port placed yesterday and area is sore. No changes in the way her breast feels or appearance. She denies SOB, CP, cough, fevers/chills, bleeding. No pain. No LE swelling. Denies diarrhea/constipation. Feels ready to start treatment. Rare ETOH  She does not smoke.      Father had colon cancer    Past Medical History:   Diagnosis Date    Anxiety     Arthritis     At risk for sleep apnea     GAGAN 5     Basal cell carcinoma of skin     Breast cancer ([facility] [address] 2019    LEFT    History of seasonal allergies     Nausea & vomiting     Squamous cell skin cancer     Dr. Rivka Cage  Thyroid disease       Past Surgical History:   Procedure Laterality Date    HX COLONOSCOPY      HX KNEE ARTHROSCOPY Left     HX LUMBAR FUSION  1995    HX MOHS PROCEDURES      x 4 times     HX SHOULDER ARTHROSCOPY Right     HX THYROIDECTOMY  2014      Social History     Tobacco Use    Smoking status: Never Smoker    Smokeless tobacco: Never Used   Substance Use Topics    Alcohol use: Yes      Family History   Problem Relation Age of Onset    Arthritis-osteo Mother     Cancer Father         Colon Cancer     Current Outpatient Medications   Medication Sig    HYDROcodone-acetaminophen (NORCO) 5-325 mg per tablet Take 1 Tab by mouth every six (6) hours as needed for Pain for up to 3 days. Max Daily Amount: 4 Tabs.  naloxone (NARCAN) 2 mg/actuation spry Use 1 spray intranasally, then discard. Repeat with new spray every 2 min as needed for opioid overdose symptoms, alternating nostrils.  ALPRAZolam (XANAX) 0.5 mg tablet Take 1 Tab by mouth two (2) times daily as needed for Anxiety. Max Daily Amount: 1 mg.  dexAMETHasone (DECADRON) 4 mg tablet Take 2 tabs (8mg) on days 2 & 3 of chemotherapy    ondansetron (ZOFRAN ODT) 8 mg disintegrating tablet Take 1 Tab by mouth every eight (8) hours as needed for Nausea.  sertraline (ZOLOFT) 100 mg tablet Take 0.5 Tabs by mouth daily.  traZODone (DESYREL) 50 mg tablet Take 1 Tab by mouth nightly. (Patient taking differently: Take 50 mg by mouth as needed.)    cholecalciferol (VITAMIN D3) 1,000 unit cap Take  by mouth daily.  liothyronine (CYTOMEL) 5 mcg tablet Take 5 mcg by mouth daily.  levothyroxine (SYNTHROID) 50 mcg tablet Take 50 mcg by mouth Daily (before breakfast). No current facility-administered medications for this visit.       Facility-Administered Medications Ordered in Other Visits   Medication Dose Route Frequency    saline peripheral flush soln 10 mL  10 mL InterCATHeter PRN    sodium chloride 0.9% injection 10 mL  10 mL IntraVENous PRN    heparin (porcine) pf 300-500 Units  300-500 Units InterCATHeter PRN      Allergies   Allergen Reactions    Augmentin [Amoxicillin-Pot Clavulanate] Unknown (comments)        Review of Systems: A complete review of systems was obtained, negative except as described above. Physical Exam:     Visit Vitals  Ht 5' 8\" (1.727 m)   BMI 30.87 kg/m²     ECOG PS: 1  General: No distress  Eyes: PERRLA, anicteric sclerae  HENT: Atraumatic, OP clear  Neck: Supple; PAC looks good  Lymphatic: No cervical, supraclavicular, or inguinal adenopathy  Breasts: L breast with inverted nipple, minimal redness, thick skin, no nodules or ulcers, LUOQ mass with indiscrete borders about 3x 4 cm  Respiratory: CTAB, normal respiratory effort  CV: Normal rate, regular rhythm, no murmurs, no peripheral edema  GI: Soft, nontender, nondistended, no masses, no hepatomegaly, no splenomegaly  MS: Normal gait and station. Digits without clubbing or cyanosis. Skin: No rashes, ecchymoses, or petechiae. Normal temperature, turgor, and texture. Psych: Alert, oriented, appropriate affect, normal judgment/insight    Results:     Lab Results   Component Value Date/Time    WBC 5.3 07/18/2019 07:28 AM    HGB 15.2 07/18/2019 07:28 AM    HCT 44.1 07/18/2019 07:28 AM    PLATELET 025 57/29/6919 07:28 AM    MCV 99.5 (H) 07/18/2019 07:28 AM    ABS.  NEUTROPHILS 2.6 07/18/2019 07:28 AM     Lab Results   Component Value Date/Time    Sodium 141 07/18/2019 07:28 AM    Potassium 3.8 07/18/2019 07:28 AM    Chloride 108 07/18/2019 07:28 AM    CO2 31 07/18/2019 07:28 AM    Glucose 89 07/18/2019 07:28 AM    BUN 13 07/18/2019 07:28 AM    Creatinine 0.72 07/18/2019 07:28 AM    GFR est AA >60 07/18/2019 07:28 AM    GFR est non-AA >60 07/18/2019 07:28 AM    Calcium 9.1 07/18/2019 07:28 AM     Lab Results   Component Value Date/Time    Bilirubin, total 0.3 07/18/2019 07:28 AM    ALT (SGPT) 20 07/18/2019 07:28 AM    AST (SGOT) 12 (L) 07/18/2019 07:28 AM Alk. phosphatase 84 07/18/2019 07:28 AM    Protein, total 7.4 07/18/2019 07:28 AM    Albumin 4.1 07/18/2019 07:28 AM    Globulin 3.3 07/18/2019 07:28 AM       Records reviewed and summarized above. Pathology report(s) reviewed above. 5/28/19    FINAL PATHOLOGIC DIAGNOSIS   1. Breast, left, core biopsy: In situ and invasive ductal carcinoma   Invasive carcinoma is nuclear grade 2 and measures up to 0.6 cm in greatest dimension on slide   Ductal carcinoma in situ is nuclear grade 3 with central necrosis   2. Soft tissue, left axilla, core biopsy: Adipose tissue with rare detached atypical cells   No lymph node tissue identified   See comment     Radiology report(s) reviewed above. Assessment:   1) Left breast Invasive ductal carcinoma    [id number]  GRADE 2  % [place] 20% HER2 greg equivocal - FISH could not be done  Repeat biopsy of breast mass 6/24/19 - HER2 greg negative, mamma print indicates she has genomically high risk disease    Due to extensive surrounding breast changes a mastectomy is recommended by Dr. Cristel Zamarripa    Today is cycle #1 of neoadjuvant dd AC-T     Side effects and anti-emetics reviewed. She is high risk for grade IV neutropenia hence will add primary prophylaxis with Udenyca     We will discuss endocrine therapy/ post mastectomy RT at a later date    Baseline ECHO reviewed and shows EF 61-65%    2) Hypothyroidism    3) Obesity    4) Psychosocial   Anxious to start treatment and understandably so  Reassured    Plan:     · Proceed today with cycle #1 of neoadjuvant ddACT-T (doxorubicin 60mg/m2, cytoxan 600mg/m2)  Given every 2 weeks x 4 then weekly taxol at 80mg/m2 x 12  · Labs to include CBC with diff and CMP prior to each infusion  · Premeds to include zofran, dexamethasone, and emend   · Udenyca due to high risk for grade IV neutropenia.  Patient prefers onpro so will see if we can get this approved for cycle #2  · Dexamethasone 8mg daily on days 2 & 3  · Zofran PRN  · Will add Zyprexa QHS if additional anti-emetics are needed     RTC in 2 weeks for cycle #2 on 8/2/19    I appreciate the opportunity to participate in MsWendy Rachelle DUKES Jose's care.     Signed By: Indigo Adam MD

## 2019-07-19 NOTE — PROGRESS NOTES
Elmira Delarosa is a 52 y.o. female  Chief Complaint   Patient presents with    Breast Cancer     follow up      1. Have you been to the ER, urgent care clinic since your last visit? Hospitalized since your last visit? No   2. Have you seen or consulted any other health care providers outside of the [address] [place] [address] since your last visit?  No

## 2019-07-19 NOTE — TELEPHONE ENCOUNTER
Called patient to see how she is doing after surgery. She is doing well. Had first chemo today. Has a little bit of pain at the port site but overall feeling well so far. She said \"infusion nurse said that is an A+ port! \"

## 2019-07-20 ENCOUNTER — HOSPITAL ENCOUNTER (OUTPATIENT)
Dept: INFUSION THERAPY | Age: 54
End: 2019-07-20
Payer: COMMERCIAL

## 2019-07-21 ENCOUNTER — APPOINTMENT (OUTPATIENT)
Dept: INFUSION THERAPY | Age: 54
End: 2019-07-21
Payer: COMMERCIAL

## 2019-07-21 ENCOUNTER — HOSPITAL ENCOUNTER (OUTPATIENT)
Dept: INFUSION THERAPY | Age: 54
Discharge: HOME | End: 2019-07-21
Payer: COMMERCIAL

## 2019-07-21 VITALS
TEMPERATURE: 97.9 F | RESPIRATION RATE: 18 BRPM | DIASTOLIC BLOOD PRESSURE: 81 MMHG | SYSTOLIC BLOOD PRESSURE: 134 MMHG | HEART RATE: 75 BPM

## 2019-07-21 DIAGNOSIS — C50.412 MALIGNANT NEOPLASM OF UPPER-OUTER QUADRANT OF LEFT BREAST IN FEMALE, ESTROGEN RECEPTOR POSITIVE (HCC): Primary | ICD-10-CM

## 2019-07-21 DIAGNOSIS — Z17.0 MALIGNANT NEOPLASM OF UPPER-OUTER QUADRANT OF LEFT BREAST IN FEMALE, ESTROGEN RECEPTOR POSITIVE (HCC): Primary | ICD-10-CM

## 2019-07-21 PROCEDURE — 74011250636 HC RX REV CODE- 250/636: Performed by: INTERNAL MEDICINE

## 2019-07-21 PROCEDURE — 96372 THER/PROPH/DIAG INJ SC/IM: CPT

## 2019-07-21 RX ADMIN — PEGFILGRASTIM-CBQV 6 MG: 6 INJECTION, SOLUTION SUBCUTANEOUS at 10:13

## 2019-07-21 NOTE — PROGRESS NOTES
[facility] VISIT NOTE    1000  Pt arrived at [facility] ambulatory and in no distress for Udenyca injection. Assessment completed, pt c/o increased fatigue and some nausea since chemo on Friday. No other acute complaints at this time. Medications received:  Udenyca SQ in left arm    Blood pressure 134/81, pulse 75, temperature 97.9 °F (36.6 °C), resp. rate 18. Tolerated treatment well, no adverse reaction noted. 21   D/C'd from [facility] ambulatory and in no distress accompanied by a friend. Next appointment is 8/2/19 at 1000.

## 2019-07-26 ENCOUNTER — TELEPHONE (OUTPATIENT)
Dept: SURGERY | Age: 54
End: 2019-07-26

## 2019-07-26 ENCOUNTER — DOCUMENTATION ONLY (OUTPATIENT)
Dept: ONCOLOGY | Age: 54
End: 2019-07-26

## 2019-07-26 RX ORDER — SODIUM CHLORIDE 9 MG/ML
25 INJECTION, SOLUTION INTRAVENOUS CONTINUOUS
Status: CANCELLED | OUTPATIENT
Start: 2019-08-02

## 2019-07-26 RX ORDER — EPINEPHRINE 1 MG/ML
0.3 INJECTION, SOLUTION, CONCENTRATE INTRAVENOUS AS NEEDED
Status: CANCELLED | OUTPATIENT
Start: 2019-08-02

## 2019-07-26 RX ORDER — HEPARIN 100 UNIT/ML
300-500 SYRINGE INTRAVENOUS AS NEEDED
Status: CANCELLED
Start: 2019-08-02

## 2019-07-26 RX ORDER — HYDROCORTISONE SODIUM SUCCINATE 100 MG/2ML
100 INJECTION, POWDER, FOR SOLUTION INTRAMUSCULAR; INTRAVENOUS AS NEEDED
Status: CANCELLED | OUTPATIENT
Start: 2019-08-02

## 2019-07-26 RX ORDER — SODIUM CHLORIDE 9 MG/ML
10 INJECTION INTRAMUSCULAR; INTRAVENOUS; SUBCUTANEOUS AS NEEDED
Status: CANCELLED | OUTPATIENT
Start: 2019-08-02

## 2019-07-26 RX ORDER — DIPHENHYDRAMINE HYDROCHLORIDE 50 MG/ML
50 INJECTION, SOLUTION INTRAMUSCULAR; INTRAVENOUS AS NEEDED
Status: CANCELLED
Start: 2019-08-02

## 2019-07-26 RX ORDER — ONDANSETRON 2 MG/ML
8 INJECTION INTRAMUSCULAR; INTRAVENOUS AS NEEDED
Status: CANCELLED | OUTPATIENT
Start: 2019-08-02

## 2019-07-26 RX ORDER — DOXORUBICIN HYDROCHLORIDE 2 MG/ML
30 INJECTION, SOLUTION INTRAVENOUS ONCE
Status: CANCELLED
Start: 2019-08-02 | End: 2019-08-02

## 2019-07-26 RX ORDER — ACETAMINOPHEN 325 MG/1
650 TABLET ORAL AS NEEDED
Status: CANCELLED
Start: 2019-08-02

## 2019-07-26 RX ORDER — ALBUTEROL SULFATE 0.83 MG/ML
2.5 SOLUTION RESPIRATORY (INHALATION) AS NEEDED
Status: CANCELLED
Start: 2019-08-02

## 2019-07-26 RX ORDER — SODIUM CHLORIDE 0.9 % (FLUSH) 0.9 %
10 SYRINGE (ML) INJECTION AS NEEDED
Status: CANCELLED
Start: 2019-08-02

## 2019-07-26 RX ORDER — ONDANSETRON 2 MG/ML
8 INJECTION INTRAMUSCULAR; INTRAVENOUS ONCE
Status: CANCELLED | OUTPATIENT
Start: 2019-08-02

## 2019-07-26 NOTE — TELEPHONE ENCOUNTER
Spoke with patient and was able to pick-up Xanax without issue. Discarded CCC Plus from insurance company.

## 2019-07-31 ENCOUNTER — TELEPHONE (OUTPATIENT)
Dept: SLEEP MEDICINE | Age: 54
End: 2019-07-31

## 2019-07-31 NOTE — TELEPHONE ENCOUNTER
Spoke to patient on 07/31/2019 to schedule a sleep medicine consult visit per Gilbert Joseph NP. Patient states she does not wish to schedule at this time.

## 2019-08-02 ENCOUNTER — HOSPITAL ENCOUNTER (OUTPATIENT)
Dept: INFUSION THERAPY | Age: 54
Discharge: HOME | End: 2019-08-02
Payer: COMMERCIAL

## 2019-08-02 ENCOUNTER — OFFICE VISIT (OUTPATIENT)
Dept: ONCOLOGY | Age: 54
End: 2019-08-02

## 2019-08-02 VITALS
OXYGEN SATURATION: 95 % | RESPIRATION RATE: 16 BRPM | HEIGHT: 68 IN | HEART RATE: 85 BPM | BODY MASS INDEX: 30.58 KG/M2 | WEIGHT: 201.8 LBS | TEMPERATURE: 97.5 F | SYSTOLIC BLOOD PRESSURE: 108 MMHG | DIASTOLIC BLOOD PRESSURE: 74 MMHG

## 2019-08-02 VITALS
HEART RATE: 69 BPM | DIASTOLIC BLOOD PRESSURE: 70 MMHG | SYSTOLIC BLOOD PRESSURE: 108 MMHG | RESPIRATION RATE: 18 BRPM | TEMPERATURE: 97.5 F | WEIGHT: 200.4 LBS | BODY MASS INDEX: 30.37 KG/M2 | HEIGHT: 68 IN

## 2019-08-02 DIAGNOSIS — Z17.0 MALIGNANT NEOPLASM OF UPPER-OUTER QUADRANT OF LEFT BREAST IN FEMALE, ESTROGEN RECEPTOR POSITIVE (HCC): Primary | ICD-10-CM

## 2019-08-02 DIAGNOSIS — C50.412 MALIGNANT NEOPLASM OF UPPER-OUTER QUADRANT OF LEFT BREAST IN FEMALE, ESTROGEN RECEPTOR POSITIVE (HCC): Primary | ICD-10-CM

## 2019-08-02 LAB
BASOPHILS # BLD: 0.1 K/UL (ref 0–0.1)
BASOPHILS NFR BLD: 1 % (ref 0–1)
DIFFERENTIAL METHOD BLD: ABNORMAL
EOSINOPHIL # BLD: 0 K/UL (ref 0–0.4)
EOSINOPHIL NFR BLD: 0 % (ref 0–7)
ERYTHROCYTE [DISTWIDTH] IN BLOOD BY AUTOMATED COUNT: 11.5 % (ref 11.5–14.5)
HCT VFR BLD AUTO: 37.8 % (ref 35–47)
HGB BLD-MCNC: 12.7 G/DL (ref 11.5–16)
IMM GRANULOCYTES # BLD AUTO: 0 K/UL
IMM GRANULOCYTES NFR BLD AUTO: 0 %
LYMPHOCYTES # BLD: 1.8 K/UL (ref 0.8–3.5)
LYMPHOCYTES NFR BLD: 27 % (ref 12–49)
MCH RBC QN AUTO: 34.1 PG (ref 26–34)
MCHC RBC AUTO-ENTMCNC: 33.6 G/DL (ref 30–36.5)
MCV RBC AUTO: 101.6 FL (ref 80–99)
METAMYELOCYTES NFR BLD MANUAL: 1 %
MONOCYTES # BLD: 0.5 K/UL (ref 0–1)
MONOCYTES NFR BLD: 7 % (ref 5–13)
NEUTS BAND NFR BLD MANUAL: 4 % (ref 0–6)
NEUTS SEG # BLD: 4.2 K/UL (ref 1.8–8)
NEUTS SEG NFR BLD: 60 % (ref 32–75)
NRBC # BLD: 0 K/UL (ref 0–0.01)
NRBC BLD-RTO: 0 PER 100 WBC
PLATELET # BLD AUTO: 217 K/UL (ref 150–400)
PMV BLD AUTO: 9.4 FL (ref 8.9–12.9)
RBC # BLD AUTO: 3.72 M/UL (ref 3.8–5.2)
RBC MORPH BLD: ABNORMAL
WBC # BLD AUTO: 6.5 K/UL (ref 3.6–11)

## 2019-08-02 PROCEDURE — 96375 TX/PRO/DX INJ NEW DRUG ADDON: CPT

## 2019-08-02 PROCEDURE — 96411 CHEMO IV PUSH ADDL DRUG: CPT

## 2019-08-02 PROCEDURE — 96367 TX/PROPH/DG ADDL SEQ IV INF: CPT

## 2019-08-02 PROCEDURE — 85025 COMPLETE CBC W/AUTO DIFF WBC: CPT

## 2019-08-02 PROCEDURE — 77030012965 HC NDL HUBR BBMI -A

## 2019-08-02 PROCEDURE — 74011000250 HC RX REV CODE- 250: Performed by: REGISTERED NURSE

## 2019-08-02 PROCEDURE — 74011250636 HC RX REV CODE- 250/636: Performed by: REGISTERED NURSE

## 2019-08-02 PROCEDURE — 74011250636 HC RX REV CODE- 250/636

## 2019-08-02 PROCEDURE — 74011000258 HC RX REV CODE- 258: Performed by: REGISTERED NURSE

## 2019-08-02 PROCEDURE — 96377 APPLICATON ON-BODY INJECTOR: CPT

## 2019-08-02 PROCEDURE — 96413 CHEMO IV INFUSION 1 HR: CPT

## 2019-08-02 PROCEDURE — 36415 COLL VENOUS BLD VENIPUNCTURE: CPT

## 2019-08-02 RX ORDER — SODIUM CHLORIDE 9 MG/ML
10 INJECTION INTRAMUSCULAR; INTRAVENOUS; SUBCUTANEOUS AS NEEDED
Status: ACTIVE | OUTPATIENT
Start: 2019-08-02 | End: 2019-08-02

## 2019-08-02 RX ORDER — SODIUM CHLORIDE 9 MG/ML
25 INJECTION, SOLUTION INTRAVENOUS CONTINUOUS
Status: DISPENSED | OUTPATIENT
Start: 2019-08-02 | End: 2019-08-02

## 2019-08-02 RX ORDER — DOXORUBICIN HYDROCHLORIDE 2 MG/ML
30 INJECTION, SOLUTION INTRAVENOUS ONCE
Status: COMPLETED | OUTPATIENT
Start: 2019-08-02 | End: 2019-08-02

## 2019-08-02 RX ORDER — HEPARIN 100 UNIT/ML
300-500 SYRINGE INTRAVENOUS AS NEEDED
Status: ACTIVE | OUTPATIENT
Start: 2019-08-02 | End: 2019-08-02

## 2019-08-02 RX ORDER — SODIUM CHLORIDE 0.9 % (FLUSH) 0.9 %
10 SYRINGE (ML) INJECTION AS NEEDED
Status: ACTIVE | OUTPATIENT
Start: 2019-08-02 | End: 2019-08-02

## 2019-08-02 RX ORDER — ONDANSETRON 2 MG/ML
8 INJECTION INTRAMUSCULAR; INTRAVENOUS ONCE
Status: COMPLETED | OUTPATIENT
Start: 2019-08-02 | End: 2019-08-02

## 2019-08-02 RX ORDER — PROCHLORPERAZINE MALEATE 5 MG
5 TABLET ORAL
Qty: 30 TAB | Refills: 2 | Status: SHIPPED | OUTPATIENT
Start: 2019-08-02 | End: 2019-09-01

## 2019-08-02 RX ADMIN — Medication 500 UNITS: at 15:30

## 2019-08-02 RX ADMIN — ONDANSETRON 8 MG: 2 INJECTION, SOLUTION INTRAMUSCULAR; INTRAVENOUS at 12:03

## 2019-08-02 RX ADMIN — SODIUM CHLORIDE 10 ML: 9 INJECTION INTRAMUSCULAR; INTRAVENOUS; SUBCUTANEOUS at 10:23

## 2019-08-02 RX ADMIN — DEXAMETHASONE SODIUM PHOSPHATE 12 MG: 4 INJECTION, SOLUTION INTRAMUSCULAR; INTRAVENOUS at 13:34

## 2019-08-02 RX ADMIN — Medication 10 ML: at 10:24

## 2019-08-02 RX ADMIN — SODIUM CHLORIDE 150 MG: 900 INJECTION, SOLUTION INTRAVENOUS at 13:01

## 2019-08-02 RX ADMIN — DOXORUBICIN HYDROCHLORIDE 62.4 MG: 2 INJECTION, SOLUTION INTRAVENOUS at 14:19

## 2019-08-02 RX ADMIN — Medication 10 ML: at 15:29

## 2019-08-02 RX ADMIN — SODIUM CHLORIDE 25 ML/HR: 900 INJECTION, SOLUTION INTRAVENOUS at 12:01

## 2019-08-02 RX ADMIN — CYCLOPHOSPHAMIDE 1248 MG: 1 INJECTION, POWDER, FOR SOLUTION INTRAVENOUS; ORAL at 14:38

## 2019-08-02 RX ADMIN — PEGFILGRASTIM 6 MG: KIT SUBCUTANEOUS at 15:26

## 2019-08-02 NOTE — PROGRESS NOTES
[facility] - Chemotherapy Progress Note    1010 Pt admit to [facility] for DDAC ambulatory in stable condition. Assessment completed. No new concerns voiced. Port accessed with positive blood return. Labs drawn and sent for processing. IV capped and pt left [facility] for MD appt. Upon returning, IV attached to NS at [address] 8/2/2019   Cycle C2   Date 8/2/2019   Drug / Regimen DDAC       Visit Vitals  /70 (BP 1 Location: Left arm, BP Patient Position: At rest)   Pulse 69   Temp 97.5 °F (36.4 °C)   Resp 18   Ht 5' 8\" (1.727 m)   Wt 90.9 kg (200 lb 6.4 oz)   Breastfeeding? No   BMI 30.47 kg/m²       Medications:  NS  Emend  Zofran  Decadron  Doxorubicin IVP  Cytoxan IVPB  Neulasta ON body to left arm    1530 Pt tolerated treatment well. Port maintained positive blood return throughout treatment, flushed with positive blood return at conclusion. D/c home ambulatory in no distress. Pt aware of next appointment scheduled for 8/16/19    Recent Results (from the past 12 hour(s))   CBC WITH AUTOMATED DIFF    Collection Time: 08/02/19 10:16 AM   Result Value Ref Range    WBC 6.5 3.6 - 11.0 K/uL    RBC 3.72 (L) 3.80 - 5.20 M/uL    HGB 12.7 11.5 - 16.0 g/dL    HCT 37.8 35.0 - 47.0 %    .6 (H) 80.0 - 99.0 FL    MCH 34.1 (H) 26.0 - 34.0 PG    MCHC 33.6 30.0 - 36.5 g/dL    RDW 11.5 11.5 - 14.5 %    PLATELET 698 240 - 600 K/uL    MPV 9.4 8.9 - 12.9 FL    NRBC 0.0 0  WBC    ABSOLUTE NRBC 0.00 0.00 - 0.01 K/uL    NEUTROPHILS 60 32 - 75 %    BAND NEUTROPHILS 4 0 - 6 %    LYMPHOCYTES 27 12 - 49 %    MONOCYTES 7 5 - 13 %    EOSINOPHILS 0 0 - 7 %    BASOPHILS 1 0 - 1 %    METAMYELOCYTES 1 (H) 0 %    IMMATURE GRANULOCYTES 0 %    ABS. NEUTROPHILS 4.2 1.8 - 8.0 K/UL    ABS. LYMPHOCYTES 1.8 0.8 - 3.5 K/UL    ABS. MONOCYTES 0.5 0.0 - 1.0 K/UL    ABS. EOSINOPHILS 0.0 0.0 - 0.4 K/UL    ABS. BASOPHILS 0.1 0.0 - 0.1 K/UL    ABS. IMM.  GRANS. 0.0 K/UL    DF MANUAL      RBC COMMENTS MACROCYTOSIS  1+

## 2019-08-02 NOTE — PROGRESS NOTES
Payam Durant is a 52 y.o. female    Chief Complaint   Patient presents with    Follow-up    Breast Cancer       Visit Vitals  Resp 16   Ht 5' 8\" (1.727 m)   Wt 201 lb 12.8 oz (91.5 kg)   BMI 30.68 kg/m²       1. Have you been to the ER, urgent care clinic since your last visit? Hospitalized since your last visit? NO    2. Have you seen or consulted any other health care providers outside of the [address] [place] [address] since your last visit? Include any pap smears or colon screening.   NO

## 2019-08-02 NOTE — PROGRESS NOTES
[facility] [place] [address] [address], [address] [facility] [address], [place]: [phone]  F: [phone]    Reason for Visit:   Angeles Uri[place] is a 52 y.o. female who is seen for Left breast cancer- ER+UT+HER2 greg neg    Treatment History:   · 6/19: Mammogram:  Left breast mass with skin thickening, 2 suspicious nodes. Mass measures 1.4 x 0.9 x 1.3  · 6/19: MRI breast: Multicentric left breast carcinoma. Non-masslike enhancement extends from the nipple to the posterior third, involves all quadrants, and measures 106 x 44 x 79 mm. · 5/28/19: Biopsy breast- IDC % % HER 2 negative, skin biopsy benign , node biopsy mostly adipose tissue with atypical cells . BRCA1 and 2 negative. CT and bone scan negative for metastasis. Mammaprint with insufficient tissue for testing. Repeat biopsy of axillary node 6/20/19 positive for metastatic disease- repeat mammaprint - high risk  · 7/19/19: cycle 1 neoadjuvant of dd AC-T    History of Present Illness:   Patient is a 52 y.o. seen for L breast cancer. She felt a sensation in the shower about May 2019. She also noted a lump and  an inverted nipple. She had a physician friend look at this who directed her to mammogram and recommended she see Dr. Samm Marquez. This led to imaging and diagnosis as above. She comes in today for cycle #2 of neoadjuvant ddAC-T. She feels well today. She reports after cycle 1 she developed mild nausea without emesis that was well controlled with zofran. However, zofran caused headache that was relieved with Tylenol. Her scalp is tender and her hair is beginning to fall out. She has not felt her breast so unsure of mass has changed. Denies diarrhea/constipation. No mouth sores. Had fatigue that was relieved by rest. Has strong support around her with family and friends. Rare ETOH  She does not smoke.      Father had colon cancer    Past Medical History:   Diagnosis Date    Anxiety     Arthritis     At risk for sleep apnea     GAGAN 5     Basal cell carcinoma of skin     Breast cancer ([facility] [address] 2019    LEFT    History of seasonal allergies     Nausea & vomiting     Squamous cell skin cancer     Dr. Isela Fontaine Thyroid disease       Past Surgical History:   Procedure Laterality Date    HX COLONOSCOPY      HX KNEE ARTHROSCOPY Left     HX LUMBAR FUSION  1995    HX MOHS PROCEDURES      x 4 times     HX SHOULDER ARTHROSCOPY Right     HX THYROIDECTOMY  2014      Social History     Tobacco Use    Smoking status: Never Smoker    Smokeless tobacco: Never Used   Substance Use Topics    Alcohol use: Yes      Family History   Problem Relation Age of Onset    Arthritis-osteo Mother     Cancer Father         Colon Cancer     Current Outpatient Medications   Medication Sig    lidocaine-prilocaine (EMLA) topical cream Apply  to affected area as needed for Pain.  naloxone (NARCAN) 2 mg/actuation spry Use 1 spray intranasally, then discard. Repeat with new spray every 2 min as needed for opioid overdose symptoms, alternating nostrils.  ALPRAZolam (XANAX) 0.5 mg tablet Take 1 Tab by mouth two (2) times daily as needed for Anxiety. Max Daily Amount: 1 mg.  dexAMETHasone (DECADRON) 4 mg tablet Take 2 tabs (8mg) on days 2 & 3 of chemotherapy    ondansetron (ZOFRAN ODT) 8 mg disintegrating tablet Take 1 Tab by mouth every eight (8) hours as needed for Nausea.  sertraline (ZOLOFT) 100 mg tablet Take 0.5 Tabs by mouth daily.  traZODone (DESYREL) 50 mg tablet Take 1 Tab by mouth nightly. (Patient taking differently: Take 50 mg by mouth as needed.)    cholecalciferol (VITAMIN D3) 1,000 unit cap Take 5,000 Units by mouth daily.  liothyronine (CYTOMEL) 5 mcg tablet Take 5 mcg by mouth daily.  levothyroxine (SYNTHROID) 50 mcg tablet Take 50 mcg by mouth Daily (before breakfast). No current facility-administered medications for this visit.       Facility-Administered Medications Ordered in Other Visits Medication Dose Route Frequency    pegfilgrastim (NEULASTA) wearable SQ injector 6 mg  6 mg SubCUTAneous ONCE    saline peripheral flush soln 10 mL  10 mL InterCATHeter PRN    sodium chloride 0.9% injection 10 mL  10 mL IntraVENous PRN    heparin (porcine) pf 300-500 Units  300-500 Units InterCATHeter PRN      Allergies   Allergen Reactions    Augmentin [Amoxicillin-Pot Clavulanate] Unknown (comments)        Review of Systems: A complete review of systems was obtained, negative except as described above. Physical Exam:     Visit Vitals  /74 (BP 1 Location: Left arm, BP Patient Position: Sitting)   Pulse 85   Temp 97.5 °F (36.4 °C) (Oral)   Resp 16   Ht 5' 8\" (1.727 m)   Wt 201 lb 12.8 oz (91.5 kg)   SpO2 95%   BMI 30.68 kg/m²     ECOG PS: 1  General: No distress  Eyes: PERRLA, anicteric sclerae  HENT: Atraumatic, OP clear  Neck: Supple; PAC looks good  Lymphatic: No cervical, supraclavicular, or inguinal adenopathy  Breasts: L breast with inverted nipple, resolved redness, no nodules or ulcers, LUQ mass has decreased in size, no axillary adenopathy   Respiratory: CTAB, normal respiratory effort  CV: Normal rate, regular rhythm, no murmurs, no peripheral edema  GI: Soft, nontender, nondistended, no masses, no hepatomegaly, no splenomegaly  MS: Normal gait and station. Digits without clubbing or cyanosis. Skin: No rashes, ecchymoses, or petechiae. Normal temperature, turgor, and texture. Psych: Alert, oriented, appropriate affect, normal judgment/insight    Results:     Lab Results   Component Value Date/Time    WBC 6.5 08/02/2019 10:16 AM    HGB 12.7 08/02/2019 10:16 AM    HCT 37.8 08/02/2019 10:16 AM    PLATELET 105 96/39/1534 10:16 AM    .6 (H) 08/02/2019 10:16 AM    ABS.  NEUTROPHILS PENDING 08/02/2019 10:16 AM     Lab Results   Component Value Date/Time    Sodium 138 07/19/2019 09:47 AM    Potassium 4.3 07/19/2019 09:47 AM    Chloride 104 07/19/2019 09:47 AM    CO2 29 07/19/2019 09:47 AM Glucose 89 07/19/2019 09:47 AM    BUN 10 07/19/2019 09:47 AM    Creatinine 0.72 07/19/2019 09:47 AM    GFR est AA >60 07/19/2019 09:47 AM    GFR est non-AA >60 07/19/2019 09:47 AM    Calcium 9.0 07/19/2019 09:47 AM     Lab Results   Component Value Date/Time    Bilirubin, total 0.5 07/19/2019 09:47 AM    ALT (SGPT) 18 07/19/2019 09:47 AM    AST (SGOT) 13 (L) 07/19/2019 09:47 AM    Alk. phosphatase 73 07/19/2019 09:47 AM    Protein, total 6.6 07/19/2019 09:47 AM    Albumin 3.8 07/19/2019 09:47 AM    Globulin 2.8 07/19/2019 09:47 AM       Records reviewed and summarized above. Pathology report(s) reviewed above. 5/28/19    FINAL PATHOLOGIC DIAGNOSIS   1. Breast, left, core biopsy: In situ and invasive ductal carcinoma   Invasive carcinoma is nuclear grade 2 and measures up to 0.6 cm in greatest dimension on slide   Ductal carcinoma in situ is nuclear grade 3 with central necrosis   2. Soft tissue, left axilla, core biopsy: Adipose tissue with rare detached atypical cells   No lymph node tissue identified   See comment     Radiology report(s) reviewed above. Assessment:   1) Left breast Invasive ductal carcinoma    [id number]  GRADE 2  % ME 20% HER2 greg equivocal - FISH could not be done  Repeat biopsy of breast mass 6/24/19 - HER2 greg negative, mamma print indicates she has genomically high risk disease    Due to extensive surrounding breast changes a mastectomy is recommended by Dr. Marleny Melton    Today is cycle #2 of neoadjuvant dd AC-T     Tolerating well thus far with grade 1 fatigue, grade 1 nausea, grade 1 headaches. Breast exam is reassuring.     She is high risk for grade IV neutropenia hence will add primary prophylaxis with Neulasta OP    We will discuss endocrine therapy/ post mastectomy RT at a later date    2) Hypothyroidism    3) Obesity    4) Psychosocial   Anxious to start treatment and understandably so  Reassured    Plan:     · Proceed today with cycle #2 of neoadjuvant ddACT-T (doxorubicin 60mg/m2, cytoxan 600mg/m2)  Given every 2 weeks x 4 then weekly taxol at 80mg/m2 x 12  · Labs to include CBC with diff and CMP prior to each infusion  · Premeds to include zofran, dexamethasone, and emend   · Neulasta OP due to high risk for grade IV neutropenia  · Dexamethasone 8mg daily on days 2 & 3  · Will switch to Compazine q6 hours PRN for nausea as Zofran causes headache   · Will add Zyprexa QHS if additional anti-emetics are needed     RTC in 2 weeks for cycle #3    I appreciate the opportunity to participate in Ms. Rachelle Garcia's care.   Chiquita Henderosn MD, [address] [facility]

## 2019-08-03 ENCOUNTER — APPOINTMENT (OUTPATIENT)
Dept: INFUSION THERAPY | Age: 54
End: 2019-08-03
Payer: COMMERCIAL

## 2019-08-04 ENCOUNTER — APPOINTMENT (OUTPATIENT)
Dept: INFUSION THERAPY | Age: 54
End: 2019-08-04
Payer: COMMERCIAL

## 2019-08-05 ENCOUNTER — OFFICE VISIT (OUTPATIENT)
Dept: SURGERY | Age: 54
End: 2019-08-05

## 2019-08-05 VITALS
HEART RATE: 82 BPM | HEIGHT: 68 IN | DIASTOLIC BLOOD PRESSURE: 73 MMHG | SYSTOLIC BLOOD PRESSURE: 120 MMHG | WEIGHT: 201 LBS | BODY MASS INDEX: 30.46 KG/M2

## 2019-08-05 DIAGNOSIS — C50.912 BREAST CANCER METASTASIZED TO AXILLARY LYMPH NODE, LEFT (HCC): Primary | ICD-10-CM

## 2019-08-05 DIAGNOSIS — C77.3 BREAST CANCER METASTASIZED TO AXILLARY LYMPH NODE, LEFT (HCC): Primary | ICD-10-CM

## 2019-08-05 NOTE — PROGRESS NOTES
HISTORY OF PRESENT ILLNESS  Pat Joseph is a 52 y.o. female. HPI ESTABLISHED patient here today for follow up LEFT breast cancer. She has completed 2 cycles of chemotherapy. Today she has noticed a large amount of hair loss and she is tearful. The patient states she was told by oncology that her breast lump has decreased in size. · 6/19: Mammogram:  Left breast mass with skin thickening, 2 suspicious nodes. Mass measures 1.4 x 0.9 x 1.3  · 6/19: MRI breast: Multicentric left breast carcinoma. Non-masslike enhancement extends from the nipple to the posterior third, involves all quadrants, and measures 106 x 44 x 79 mm. · 5/28/19: Biopsy breast- IDC % % HER 2 negative, skin biopsy benign , node biopsy mostly adipose tissue with atypical cells . BRCA1 and 2 negative. CT and bone scan negative for metastasis. Mammaprint with insufficient tissue for testing. Repeat biopsy of axillary node 6/20/19 positive for metastatic disease- repeat mammaprint - high risk  · 7/19/19: cycle 1 neoadjuvant of dd AC-T  Review of Systems   All other systems reviewed and are negative. Physical Exam   Pulmonary/Chest:       Port intact  Breast on left much softer     Nursing note and vitals reviewed. ASSESSMENT and PLAN    ICD-10-CM ICD-9-CM    1.  Breast cancer metastasized to axillary lymph node, left (HCC) C50.912 174.9     C77.3 196.3      - responding to chemo  F/u after 2 more cycles  Plan is us then and after chemo mri  Will still need mastectomy, alnd recon

## 2019-08-05 NOTE — PATIENT INSTRUCTIONS

## 2019-08-12 RX ORDER — HYDROCORTISONE SODIUM SUCCINATE 100 MG/2ML
100 INJECTION, POWDER, FOR SOLUTION INTRAMUSCULAR; INTRAVENOUS AS NEEDED
Status: CANCELLED | OUTPATIENT
Start: 2019-08-16

## 2019-08-12 RX ORDER — EPINEPHRINE 1 MG/ML
0.3 INJECTION, SOLUTION, CONCENTRATE INTRAVENOUS AS NEEDED
Status: CANCELLED | OUTPATIENT
Start: 2019-08-16

## 2019-08-12 RX ORDER — ONDANSETRON 2 MG/ML
8 INJECTION INTRAMUSCULAR; INTRAVENOUS AS NEEDED
Status: CANCELLED | OUTPATIENT
Start: 2019-08-16

## 2019-08-12 RX ORDER — ALBUTEROL SULFATE 0.83 MG/ML
2.5 SOLUTION RESPIRATORY (INHALATION) AS NEEDED
Status: CANCELLED
Start: 2019-08-16

## 2019-08-12 RX ORDER — SODIUM CHLORIDE 9 MG/ML
10 INJECTION INTRAMUSCULAR; INTRAVENOUS; SUBCUTANEOUS AS NEEDED
Status: CANCELLED | OUTPATIENT
Start: 2019-08-16

## 2019-08-12 RX ORDER — DIPHENHYDRAMINE HYDROCHLORIDE 50 MG/ML
50 INJECTION, SOLUTION INTRAMUSCULAR; INTRAVENOUS AS NEEDED
Status: CANCELLED
Start: 2019-08-16

## 2019-08-12 RX ORDER — DOXORUBICIN HYDROCHLORIDE 2 MG/ML
30 INJECTION, SOLUTION INTRAVENOUS ONCE
Status: CANCELLED
Start: 2019-08-16 | End: 2019-08-16

## 2019-08-12 RX ORDER — PALONOSETRON 0.05 MG/ML
0.25 INJECTION, SOLUTION INTRAVENOUS ONCE
Status: CANCELLED
Start: 2019-08-16

## 2019-08-12 RX ORDER — SODIUM CHLORIDE 0.9 % (FLUSH) 0.9 %
10 SYRINGE (ML) INJECTION AS NEEDED
Status: CANCELLED
Start: 2019-08-16

## 2019-08-12 RX ORDER — ACETAMINOPHEN 325 MG/1
650 TABLET ORAL AS NEEDED
Status: CANCELLED
Start: 2019-08-16

## 2019-08-12 RX ORDER — HEPARIN 100 UNIT/ML
300-500 SYRINGE INTRAVENOUS AS NEEDED
Status: CANCELLED
Start: 2019-08-16

## 2019-08-12 RX ORDER — SODIUM CHLORIDE 9 MG/ML
25 INJECTION, SOLUTION INTRAVENOUS CONTINUOUS
Status: CANCELLED | OUTPATIENT
Start: 2019-08-16

## 2019-08-16 ENCOUNTER — HOSPITAL ENCOUNTER (OUTPATIENT)
Dept: INFUSION THERAPY | Age: 54
Discharge: HOME | End: 2019-08-16
Payer: COMMERCIAL

## 2019-08-16 ENCOUNTER — DOCUMENTATION ONLY (OUTPATIENT)
Dept: ONCOLOGY | Age: 54
End: 2019-08-16

## 2019-08-16 ENCOUNTER — OFFICE VISIT (OUTPATIENT)
Dept: ONCOLOGY | Age: 54
End: 2019-08-16

## 2019-08-16 VITALS
HEART RATE: 70 BPM | DIASTOLIC BLOOD PRESSURE: 72 MMHG | RESPIRATION RATE: 18 BRPM | HEIGHT: 68 IN | BODY MASS INDEX: 30.52 KG/M2 | WEIGHT: 201.4 LBS | TEMPERATURE: 97.2 F | SYSTOLIC BLOOD PRESSURE: 105 MMHG

## 2019-08-16 VITALS
TEMPERATURE: 98 F | DIASTOLIC BLOOD PRESSURE: 69 MMHG | OXYGEN SATURATION: 93 % | WEIGHT: 201.8 LBS | HEIGHT: 68 IN | HEART RATE: 78 BPM | BODY MASS INDEX: 30.58 KG/M2 | SYSTOLIC BLOOD PRESSURE: 104 MMHG

## 2019-08-16 DIAGNOSIS — Z17.0 MALIGNANT NEOPLASM OF UPPER-OUTER QUADRANT OF LEFT BREAST IN FEMALE, ESTROGEN RECEPTOR POSITIVE (HCC): Primary | ICD-10-CM

## 2019-08-16 DIAGNOSIS — E66.09 CLASS 1 OBESITY DUE TO EXCESS CALORIES WITHOUT SERIOUS COMORBIDITY WITH BODY MASS INDEX (BMI) OF 30.0 TO 30.9 IN ADULT: ICD-10-CM

## 2019-08-16 DIAGNOSIS — C50.412 MALIGNANT NEOPLASM OF UPPER-OUTER QUADRANT OF LEFT BREAST IN FEMALE, ESTROGEN RECEPTOR POSITIVE (HCC): Primary | ICD-10-CM

## 2019-08-16 LAB
ALBUMIN SERPL-MCNC: 3.8 G/DL (ref 3.5–5)
ALBUMIN/GLOB SERPL: 1.4 {RATIO} (ref 1.1–2.2)
ALP SERPL-CCNC: 120 U/L (ref 45–117)
ALT SERPL-CCNC: 33 U/L (ref 12–78)
ANION GAP SERPL CALC-SCNC: 6 MMOL/L (ref 5–15)
AST SERPL-CCNC: 16 U/L (ref 15–37)
BASOPHILS # BLD: 0 K/UL (ref 0–0.1)
BASOPHILS NFR BLD: 0 % (ref 0–1)
BILIRUB SERPL-MCNC: 0.2 MG/DL (ref 0.2–1)
BUN SERPL-MCNC: 11 MG/DL (ref 6–20)
BUN/CREAT SERPL: 17 (ref 12–20)
CALCIUM SERPL-MCNC: 8.9 MG/DL (ref 8.5–10.1)
CHLORIDE SERPL-SCNC: 105 MMOL/L (ref 97–108)
CO2 SERPL-SCNC: 28 MMOL/L (ref 21–32)
CREAT SERPL-MCNC: 0.66 MG/DL (ref 0.55–1.02)
DIFFERENTIAL METHOD BLD: ABNORMAL
EOSINOPHIL # BLD: 0 K/UL (ref 0–0.4)
EOSINOPHIL NFR BLD: 0 % (ref 0–7)
ERYTHROCYTE [DISTWIDTH] IN BLOOD BY AUTOMATED COUNT: 11.6 % (ref 11.5–14.5)
GLOBULIN SER CALC-MCNC: 2.8 G/DL (ref 2–4)
GLUCOSE SERPL-MCNC: 95 MG/DL (ref 65–100)
HCT VFR BLD AUTO: 35.4 % (ref 35–47)
HGB BLD-MCNC: 11.8 G/DL (ref 11.5–16)
IMM GRANULOCYTES # BLD AUTO: 0 K/UL
IMM GRANULOCYTES NFR BLD AUTO: 0 %
LYMPHOCYTES # BLD: 1.1 K/UL (ref 0.8–3.5)
LYMPHOCYTES NFR BLD: 16 % (ref 12–49)
MCH RBC QN AUTO: 33.5 PG (ref 26–34)
MCHC RBC AUTO-ENTMCNC: 33.3 G/DL (ref 30–36.5)
MCV RBC AUTO: 100.6 FL (ref 80–99)
METAMYELOCYTES NFR BLD MANUAL: 5 %
MONOCYTES # BLD: 0.4 K/UL (ref 0–1)
MONOCYTES NFR BLD: 6 % (ref 5–13)
MYELOCYTES NFR BLD MANUAL: 1 %
NEUTS BAND NFR BLD MANUAL: 7 % (ref 0–6)
NEUTS SEG # BLD: 5 K/UL (ref 1.8–8)
NEUTS SEG NFR BLD: 65 % (ref 32–75)
NRBC # BLD: 0.02 K/UL (ref 0–0.01)
NRBC BLD-RTO: 0.3 PER 100 WBC
PLATELET # BLD AUTO: 157 K/UL (ref 150–400)
PLATELET COMMENTS,PCOM: ABNORMAL
PMV BLD AUTO: 9.6 FL (ref 8.9–12.9)
POTASSIUM SERPL-SCNC: 4.3 MMOL/L (ref 3.5–5.1)
PROT SERPL-MCNC: 6.6 G/DL (ref 6.4–8.2)
RBC # BLD AUTO: 3.52 M/UL (ref 3.8–5.2)
RBC MORPH BLD: ABNORMAL
RBC MORPH BLD: ABNORMAL
SODIUM SERPL-SCNC: 139 MMOL/L (ref 136–145)
TROPONIN I SERPL-MCNC: <0.05 NG/ML
WBC # BLD AUTO: 7 K/UL (ref 3.6–11)

## 2019-08-16 PROCEDURE — 96375 TX/PRO/DX INJ NEW DRUG ADDON: CPT

## 2019-08-16 PROCEDURE — 96367 TX/PROPH/DG ADDL SEQ IV INF: CPT

## 2019-08-16 PROCEDURE — 85025 COMPLETE CBC W/AUTO DIFF WBC: CPT

## 2019-08-16 PROCEDURE — 96377 APPLICATON ON-BODY INJECTOR: CPT

## 2019-08-16 PROCEDURE — 96411 CHEMO IV PUSH ADDL DRUG: CPT

## 2019-08-16 PROCEDURE — 74011250636 HC RX REV CODE- 250/636

## 2019-08-16 PROCEDURE — 96413 CHEMO IV INFUSION 1 HR: CPT

## 2019-08-16 PROCEDURE — 74011250636 HC RX REV CODE- 250/636: Performed by: REGISTERED NURSE

## 2019-08-16 PROCEDURE — 74011000258 HC RX REV CODE- 258: Performed by: REGISTERED NURSE

## 2019-08-16 PROCEDURE — 77030012965 HC NDL HUBR BBMI -A

## 2019-08-16 PROCEDURE — 80053 COMPREHEN METABOLIC PANEL: CPT

## 2019-08-16 PROCEDURE — 36415 COLL VENOUS BLD VENIPUNCTURE: CPT

## 2019-08-16 PROCEDURE — 84484 ASSAY OF TROPONIN QUANT: CPT

## 2019-08-16 RX ORDER — SODIUM CHLORIDE 9 MG/ML
10 INJECTION INTRAMUSCULAR; INTRAVENOUS; SUBCUTANEOUS AS NEEDED
Status: ACTIVE | OUTPATIENT
Start: 2019-08-16 | End: 2019-08-16

## 2019-08-16 RX ORDER — DOXORUBICIN HYDROCHLORIDE 2 MG/ML
30 INJECTION, SOLUTION INTRAVENOUS ONCE
Status: COMPLETED | OUTPATIENT
Start: 2019-08-16 | End: 2019-08-16

## 2019-08-16 RX ORDER — HEPARIN 100 UNIT/ML
300-500 SYRINGE INTRAVENOUS AS NEEDED
Status: ACTIVE | OUTPATIENT
Start: 2019-08-16 | End: 2019-08-16

## 2019-08-16 RX ORDER — SODIUM CHLORIDE 0.9 % (FLUSH) 0.9 %
10 SYRINGE (ML) INJECTION AS NEEDED
Status: ACTIVE | OUTPATIENT
Start: 2019-08-16 | End: 2019-08-16

## 2019-08-16 RX ORDER — PALONOSETRON 0.05 MG/ML
0.25 INJECTION, SOLUTION INTRAVENOUS ONCE
Status: COMPLETED | OUTPATIENT
Start: 2019-08-16 | End: 2019-08-16

## 2019-08-16 RX ORDER — SODIUM CHLORIDE 9 MG/ML
25 INJECTION, SOLUTION INTRAVENOUS CONTINUOUS
Status: DISPENSED | OUTPATIENT
Start: 2019-08-16 | End: 2019-08-16

## 2019-08-16 RX ADMIN — PEGFILGRASTIM 6 MG: KIT SUBCUTANEOUS at 14:48

## 2019-08-16 RX ADMIN — PALONOSETRON HYDROCHLORIDE 0.25 MG: 0.25 INJECTION, SOLUTION INTRAVENOUS at 13:02

## 2019-08-16 RX ADMIN — CYCLOPHOSPHAMIDE 1248 MG: 500 INJECTION, POWDER, FOR SOLUTION INTRAVENOUS; ORAL at 14:20

## 2019-08-16 RX ADMIN — DOXORUBICIN HYDROCHLORIDE 62.4 MG: 2 INJECTION, SUSPENSION, LIPOSOMAL INTRAVENOUS at 13:55

## 2019-08-16 RX ADMIN — DOXORUBICIN HYDROCHLORIDE 62.4 MG: 2 INJECTION, SUSPENSION, LIPOSOMAL INTRAVENOUS at 13:56

## 2019-08-16 RX ADMIN — SODIUM CHLORIDE 150 MG: 900 INJECTION, SOLUTION INTRAVENOUS at 13:23

## 2019-08-16 RX ADMIN — DEXAMETHASONE SODIUM PHOSPHATE 12 MG: 4 INJECTION, SOLUTION INTRAMUSCULAR; INTRAVENOUS at 13:02

## 2019-08-16 NOTE — PROGRESS NOTES
[facility] [place] [address] [address], [id number] [place] [address] Road, [address] [place] [address] Sumit  W: [phone]  F: [phone]    Reason for Visit:   Saleem Vergara is a 62 y.o. female who is seen for Left breast cancer- ER+OH+HER2 greg neg    Treatment History:   · 6/19: Mammogram:  Left breast mass with skin thickening, 2 suspicious nodes. Mass measures 1.4 x 0.9 x 1.3  · 6/19: MRI breast: Multicentric left breast carcinoma. Non-masslike enhancement extends from the nipple to the posterior third, involves all quadrants, and measures 106 x 44 x 79 mm. · 5/28/19: Biopsy breast- IDC % % HER 2 negative, skin biopsy benign , node biopsy mostly adipose tissue with atypical cells . BRCA1 and 2 negative. CT and bone scan negative for metastasis. Mammaprint with insufficient tissue for testing. Repeat biopsy of axillary node 6/20/19 positive for metastatic disease- repeat mammaprint - high risk  · 7/19/19: cycle 1 neoadjuvant of dd AC-T    History of Present Illness:   Patient is a 62 y.o. seen for L breast cancer. She felt a sensation in the shower about May 2019. She also noted a lump and  an inverted nipple. She had a physician friend look at this who directed her to mammogram and recommended she see Dr. Ever Del Toro. This led to imaging and diagnosis as above. She comes in today for cycle #3 of neoadjuvant ddAC-T. She felt better after cycle 2. She has some MULLER. She has a random dry cough, no swelling and no orthopnea. Denies diarrhea/constipation. She had a couple mouth sores. This has resolved. She takes compazine q 6 hours schedule. Breast does not hurt. Rare ETOH  She does not smoke.      Father had colon cancer    Past Medical History:   Diagnosis Date    Anxiety     Arthritis     At risk for sleep apnea     GAGAN 5     Basal cell carcinoma of skin     Breast cancer ([facility] [address] 2019    LEFT    History of seasonal allergies     Nausea & vomiting     Squamous cell skin cancer      [place]     Thyroid disease       Past Surgical History:   Procedure Laterality Date    HX COLONOSCOPY      HX KNEE ARTHROSCOPY Left     HX LUMBAR FUSION  1995    HX MOHS PROCEDURES      x 4 times     HX SHOULDER ARTHROSCOPY Right     HX THYROIDECTOMY  2014      Social History     Tobacco Use    Smoking status: Never Smoker    Smokeless tobacco: Never Used   Substance Use Topics    Alcohol use: Yes      Family History   Problem Relation Age of Onset    Arthritis-osteo Mother     Cancer Father         Colon Cancer     Current Outpatient Medications   Medication Sig    prochlorperazine (COMPAZINE) 5 mg tablet Take 1 Tab by mouth every six (6) hours as needed for Nausea for up to 30 days.  naloxone (NARCAN) 2 mg/actuation spry Use 1 spray intranasally, then discard. Repeat with new spray every 2 min as needed for opioid overdose symptoms, alternating nostrils.  dexAMETHasone (DECADRON) 4 mg tablet Take 2 tabs (8mg) on days 2 & 3 of chemotherapy    ondansetron (ZOFRAN ODT) 8 mg disintegrating tablet Take 1 Tab by mouth every eight (8) hours as needed for Nausea.  sertraline (ZOLOFT) 100 mg tablet Take 0.5 Tabs by mouth daily.  cholecalciferol (VITAMIN D3) 1,000 unit cap Take 5,000 Units by mouth daily.  liothyronine (CYTOMEL) 5 mcg tablet Take 5 mcg by mouth daily.  levothyroxine (SYNTHROID) 50 mcg tablet Take 50 mcg by mouth Daily (before breakfast).  lidocaine-prilocaine (EMLA) topical cream Apply  to affected area as needed for Pain.  ALPRAZolam (XANAX) 0.5 mg tablet Take 1 Tab by mouth two (2) times daily as needed for Anxiety. Max Daily Amount: 1 mg.  traZODone (DESYREL) 50 mg tablet Take 1 Tab by mouth nightly. (Patient taking differently: Take 50 mg by mouth as needed.)     No current facility-administered medications for this visit.        Allergies   Allergen Reactions    Augmentin [Amoxicillin-Pot Clavulanate] Unknown (comments)        Review of Systems: A complete review of systems was obtained, negative except as described above. Physical Exam:     Visit Vitals  /69 (BP 1 Location: Right arm, BP Patient Position: Sitting)   Pulse 78   Temp 98 °F (36.7 °C) (Oral)   Ht 5' 8\" (1.727 m)   Wt 201 lb 12.8 oz (91.5 kg)   SpO2 93%   BMI 30.68 kg/m²     ECOG PS: 1  General: No distress  Eyes: PERRLA, anicteric sclerae  HENT: Atraumatic, OP clear  Neck: Supple; PAC looks good  Lymphatic: No cervical, supraclavicular, or inguinal adenopathy  Breasts: L breast with inverted nipple, minimal redness, thick skin, no nodules or ulcers, LUOQ mass with indiscrete borders about 3x 4 cm ( exam done cycle 2)   Respiratory: CTAB, normal respiratory effort  CV: Normal rate, regular rhythm, no murmurs, no peripheral edema  GI: Soft, nontender, nondistended, no masses, no hepatomegaly, no splenomegaly  MS: Normal gait and station. Digits without clubbing or cyanosis. Skin: No rashes, ecchymoses, or petechiae. Normal temperature, turgor, and texture. Psych: Alert, oriented, appropriate affect, normal judgment/insight    Results:     Lab Results   Component Value Date/Time    WBC 7.0 08/16/2019 10:15 AM    HGB 11.8 08/16/2019 10:15 AM    HCT 35.4 08/16/2019 10:15 AM    PLATELET 388 09/62/5479 10:15 AM    .6 (H) 08/16/2019 10:15 AM    ABS. NEUTROPHILS PENDING 08/16/2019 10:15 AM     Lab Results   Component Value Date/Time    Sodium 138 07/19/2019 09:47 AM    Potassium 4.3 07/19/2019 09:47 AM    Chloride 104 07/19/2019 09:47 AM    CO2 29 07/19/2019 09:47 AM    Glucose 89 07/19/2019 09:47 AM    BUN 10 07/19/2019 09:47 AM    Creatinine 0.72 07/19/2019 09:47 AM    GFR est AA >60 07/19/2019 09:47 AM    GFR est non-AA >60 07/19/2019 09:47 AM    Calcium 9.0 07/19/2019 09:47 AM     Lab Results   Component Value Date/Time    Bilirubin, total 0.5 07/19/2019 09:47 AM    ALT (SGPT) 18 07/19/2019 09:47 AM    AST (SGOT) 13 (L) 07/19/2019 09:47 AM    Alk.  phosphatase 73 07/19/2019 09:47 AM Protein, total 6.6 07/19/2019 09:47 AM    Albumin 3.8 07/19/2019 09:47 AM    Globulin 2.8 07/19/2019 09:47 AM       Records reviewed and summarized above. Pathology report(s) reviewed above. 5/28/19    FINAL PATHOLOGIC DIAGNOSIS   1. Breast, left, core biopsy: In situ and invasive ductal carcinoma   Invasive carcinoma is nuclear grade 2 and measures up to 0.6 cm in greatest dimension on slide   Ductal carcinoma in situ is nuclear grade 3 with central necrosis   2. Soft tissue, left axilla, core biopsy: Adipose tissue with rare detached atypical cells   No lymph node tissue identified   See comment     Radiology report(s) reviewed above. Assessment:   1) Left breast Invasive ductal carcinoma    [id number]  GRADE 2  % [place] 20% HER2 gerg equivocal - FISH could not be done  Repeat biopsy of breast mass 6/24/19 - HER2 greg negative, mamma print indicates she has genomically high risk disease    Due to extensive surrounding breast changes a mastectomy is recommended by Dr. Ever Del Toro    Today is cycle #3 of neoadjuvant dd AC-T     Side effects and anti-emetics reviewed. She is high risk for grade IV neutropenia hence will add primary prophylaxis with Udenyca     Tolerated cycle 2 better with no nausea and grade 1 mucositis    We will discuss endocrine therapy/ post mastectomy RT at a later date    Baseline ECHO reviewed and shows EF 61-65%    2) Hypothyroidism    3) Obesity    4) Cough  Rare and dry  Exam reassuring  No evidence of fluid retention  Monitor    Plan:     · Proceed today with cycle #3 of neoadjuvant ddACT-T (doxorubicin 60mg/m2, cytoxan 600mg/m2)  Given every 2 weeks x 4 then weekly taxol at 80mg/m2 x 12  · Labs to include CBC with diff and CMP prior to each infusion  · Premeds to include zofran, dexamethasone, and emend   · Udenyca due to high risk for grade IV neutropenia.  Patient prefers onpro so will see if we can get this approved for cycle #2  · Dexamethasone 8mg daily on days 2 & 3  · Compazine q 6 hours prn      RTC in 2 weeks for cycle #4 on 8/2/19    I appreciate the opportunity to participate in Ms. Rachelle Garcia's care.     Signed By: Eladio Carr MD

## 2019-08-16 NOTE — PROGRESS NOTES
Antionette Montaño is a 62 y.o. female  Chief Complaint   Patient presents with    Follow-up     Breast Cancer     1. Have you been to the ER, urgent care clinic since your last visit? Hospitalized since your last visit? No.    2. Have you seen or consulted any other health care providers outside of the [address] [place] [address] since your last visit? Include any pap smears or colon screening.   No.

## 2019-08-16 NOTE — PROGRESS NOTES
[facility] Chemotherapy Progress Note    Date: 2019    Name: Ira Jacobs    MRN: [id number]         : 1965      1010 Pt admit to [facility] for DDAC ambulatory in stable condition. Assessment completed. No new concerns voiced. Port with positive blood return. Chemotherapy Flowsheet 2019   Cycle C3   Date 2019   Drug / Regimen DDAC   Pre Meds given   Notes given         Ms. Garcia's vitals were reviewed. Patient Vitals for the past 12 hrs:   Temp Pulse Resp BP   19 1450  70  105/72   19 1010 97.2 °F (36.2 °C) 84 18 107/74         Lab results were obtained and reviewed. Recent Results (from the past 12 hour(s))   TROPONIN I    Collection Time: 19 10:15 AM   Result Value Ref Range    Troponin-I, Qt. <0.05 <0.05 ng/mL   CBC WITH AUTOMATED DIFF    Collection Time: 19 10:15 AM   Result Value Ref Range    WBC 7.0 3.6 - 11.0 K/uL    RBC 3.52 (L) 3.80 - 5.20 M/uL    HGB 11.8 11.5 - 16.0 g/dL    HCT 35.4 35.0 - 47.0 %    .6 (H) 80.0 - 99.0 FL    MCH 33.5 26.0 - 34.0 PG    MCHC 33.3 30.0 - 36.5 g/dL    RDW 11.6 11.5 - 14.5 %    PLATELET 876 111 - 587 K/uL    MPV 9.6 8.9 - 12.9 FL    NRBC 0.3 (H) 0  WBC    ABSOLUTE NRBC 0.02 (H) 0.00 - 0.01 K/uL    NEUTROPHILS 65 32 - 75 %    BAND NEUTROPHILS 7 (H) 0 - 6 %    LYMPHOCYTES 16 12 - 49 %    MONOCYTES 6 5 - 13 %    EOSINOPHILS 0 0 - 7 %    BASOPHILS 0 0 - 1 %    METAMYELOCYTES 5 (H) 0 %    MYELOCYTES 1 (H) 0 %    IMMATURE GRANULOCYTES 0 %    ABS. NEUTROPHILS 5.0 1.8 - 8.0 K/UL    ABS. LYMPHOCYTES 1.1 0.8 - 3.5 K/UL    ABS. MONOCYTES 0.4 0.0 - 1.0 K/UL    ABS. EOSINOPHILS 0.0 0.0 - 0.4 K/UL    ABS. BASOPHILS 0.0 0.0 - 0.1 K/UL    ABS. IMM.  GRANS. 0.0 K/UL    DF MANUAL      PLATELET COMMENTS Large Platelets      RBC COMMENTS MACROCYTOSIS  1+        RBC COMMENTS POLYCHROMASIA  PRESENT       METABOLIC PANEL, COMPREHENSIVE    Collection Time: 19 10:15 AM   Result Value Ref Range    Sodium 139 136 - 145 mmol/L Potassium 4.3 3.5 - 5.1 mmol/L    Chloride 105 97 - 108 mmol/L    CO2 28 21 - 32 mmol/L    Anion gap 6 5 - 15 mmol/L    Glucose 95 65 - 100 mg/dL    BUN 11 6 - 20 MG/DL    Creatinine 0.66 0.55 - 1.02 MG/DL    BUN/Creatinine ratio 17 12 - 20      GFR est AA >60 >60 ml/min/1.73m2    GFR est non-AA >60 >60 ml/min/1.73m2    Calcium 8.9 8.5 - 10.1 MG/DL    Bilirubin, total 0.2 0.2 - 1.0 MG/DL    ALT (SGPT) 33 12 - 78 U/L    AST (SGOT) 16 15 - 37 U/L    Alk. phosphatase 120 (H) 45 - 117 U/L    Protein, total 6.6 6.4 - 8.2 g/dL    Albumin 3.8 3.5 - 5.0 g/dL    Globulin 2.8 2.0 - 4.0 g/dL    A-G Ratio 1.4 1.1 - 2.2         Pre-medications  were administered as ordered and chemotherapy was initiated.   Medications Administered     cyclophosphamide (CYTOXAN) 1,248 mg in 0.9% sodium chloride 250 mL, overfill volume 25 mL chemo infusion     Admin Date  08/16/2019 Action  New Bag Dose  1248 mg Rate  674.8 mL/hr Route  IntraVENous Administered By  Koffi Palma RN          dexamethasone (DECADRON) 12 mg in 0.9% sodium chloride 50 mL, overfill volume 5 mL IVPB     Admin Date  08/16/2019 Action  Given Dose  12 mg Route  IntraVENous Administered By  Koffi Palma RN          DOXOrubicin (ADRIAMYCIN) chemo syringe 62.4 mg Syringe #1 of 2      Admin Date  08/16/2019 Action  Given Dose  62.4 mg Route  IntraVENous Administered By  Koffi Palma RN          DOXOrubicin (ADRIAMYCIN) chemo syringe 62.4 mg Syringe #2 of 2      Admin Date  08/16/2019 Action  Given Dose  62.4 mg Route  IntraVENous Administered By  Koffi Palma RN          fosaprepitant (EMEND) 150 mg in 0.9% sodium chloride 150 mL IVPB     Admin Date  08/16/2019 Action  Given Dose  150 mg Rate  450 mL/hr Route  IntraVENous Administered By  Koffi Palma RN          palonosetron HCl (ALOXI) injection 0.25 mg     Admin Date  08/16/2019 Action  Given Dose  0.25 mg Route  IntraVENous Administered By  Koffi Palma, RN          pegfilgrastim (NEULASTA) wearable SQ injector 6 mg     Admin Date  08/16/2019 Action  Given Dose  6 mg Route  SubCUTAneous Administered By  Ian Hays, RN                1450 Pt tolerated treatment well. Port maintained positive blood return throughout treatment. Flushed, heparinized and de-accessed per protocol. D/c home ambulatory in no distress.      Future Appointments   Date Time Provider Preeti Joyce   8/30/2019 11:00 AM [facility] CHAIR 3 [facility]   8/30/2019 11:15 AM REG[facility] [address]   9/9/2019 11:00 AM Luis Angel Caba MD [facility]   9/13/2019 10:00 AM [facility] FT CHAIR 3 [facility]   9/20/2019 10:00 AM [facility] CHAIR 3 [facility]   9/27/2019 10:00 AM [facility] CHAIR 3 [facility]. [address], RN  August 16, 2019

## 2019-08-17 ENCOUNTER — APPOINTMENT (OUTPATIENT)
Dept: INFUSION THERAPY | Age: 54
End: 2019-08-17
Payer: COMMERCIAL

## 2019-08-18 ENCOUNTER — APPOINTMENT (OUTPATIENT)
Dept: INFUSION THERAPY | Age: 54
End: 2019-08-18
Payer: COMMERCIAL

## 2019-08-27 ENCOUNTER — HOSPITAL ENCOUNTER (OUTPATIENT)
Dept: INFUSION THERAPY | Age: 54
Discharge: HOME | End: 2019-08-27
Payer: COMMERCIAL

## 2019-08-27 VITALS
RESPIRATION RATE: 16 BRPM | SYSTOLIC BLOOD PRESSURE: 129 MMHG | OXYGEN SATURATION: 99 % | DIASTOLIC BLOOD PRESSURE: 84 MMHG | HEART RATE: 75 BPM | TEMPERATURE: 98.4 F

## 2019-08-27 PROCEDURE — 77030012965 HC NDL HUBR BBMI -A

## 2019-08-27 PROCEDURE — 96360 HYDRATION IV INFUSION INIT: CPT

## 2019-08-27 PROCEDURE — 74011250636 HC RX REV CODE- 250/636: Performed by: REGISTERED NURSE

## 2019-08-27 PROCEDURE — 74011000250 HC RX REV CODE- 250: Performed by: INTERNAL MEDICINE

## 2019-08-27 PROCEDURE — 74011250636 HC RX REV CODE- 250/636: Performed by: INTERNAL MEDICINE

## 2019-08-27 RX ORDER — SODIUM CHLORIDE 9 MG/ML
10 INJECTION INTRAMUSCULAR; INTRAVENOUS; SUBCUTANEOUS AS NEEDED
Status: DISCONTINUED | OUTPATIENT
Start: 2019-08-27 | End: 2019-08-28 | Stop reason: HOSPADM

## 2019-08-27 RX ORDER — SODIUM CHLORIDE 0.9 % (FLUSH) 0.9 %
5-10 SYRINGE (ML) INJECTION AS NEEDED
Status: DISCONTINUED | OUTPATIENT
Start: 2019-08-27 | End: 2019-08-28 | Stop reason: HOSPADM

## 2019-08-27 RX ORDER — HEPARIN 100 UNIT/ML
500 SYRINGE INTRAVENOUS AS NEEDED
Status: DISCONTINUED | OUTPATIENT
Start: 2019-08-27 | End: 2019-08-28 | Stop reason: HOSPADM

## 2019-08-27 RX ADMIN — SODIUM CHLORIDE 10 ML: 9 INJECTION INTRAMUSCULAR; INTRAVENOUS; SUBCUTANEOUS at 15:19

## 2019-08-27 RX ADMIN — SODIUM CHLORIDE 1000 ML: 900 INJECTION, SOLUTION INTRAVENOUS at 15:21

## 2019-08-27 RX ADMIN — HEPARIN 500 UNITS: 100 SYRINGE at 16:25

## 2019-08-27 RX ADMIN — Medication 10 ML: at 16:25

## 2019-08-27 NOTE — PROGRESS NOTES
[address] [facility] @ [facility] VISIT NOTE     2118 Patient arrives for IV Hydration therapy without acute problems. Please see connect care for complete assessment and education provided. Vital signs stable throughout and prior to discharge, Pt. Tolerated treatment well and discharged without incident. Patient is aware of next [facility] appointment on 08/30/2019. Appointment card given to patient. Medications Verified by Kip Kenney RN & Ava Lilly RN via [facility] [employer]:  1. NS 1L IV Bolus  2. NS 10 ml NS IV port flush  3.  Heparin 500 units port flush     VITAL SIGNS  Patient Vitals for the past 12 hrs:   Temp Pulse Resp BP SpO2   08/27/19 1625 98.4 °F (36.9 °C) 75 16 129/84    08/27/19 1514 97.7 °F (36.5 °C) 76 16 123/85 99 %

## 2019-08-29 RX ORDER — SODIUM CHLORIDE 0.9 % (FLUSH) 0.9 %
10 SYRINGE (ML) INJECTION AS NEEDED
Status: CANCELLED
Start: 2019-09-20

## 2019-08-29 RX ORDER — EPINEPHRINE 1 MG/ML
0.3 INJECTION, SOLUTION, CONCENTRATE INTRAVENOUS AS NEEDED
Status: CANCELLED | OUTPATIENT
Start: 2019-09-13

## 2019-08-29 RX ORDER — HEPARIN 100 UNIT/ML
300-500 SYRINGE INTRAVENOUS AS NEEDED
Status: CANCELLED
Start: 2019-09-13

## 2019-08-29 RX ORDER — ONDANSETRON 2 MG/ML
8 INJECTION INTRAMUSCULAR; INTRAVENOUS AS NEEDED
Status: CANCELLED | OUTPATIENT
Start: 2019-09-27

## 2019-08-29 RX ORDER — DIPHENHYDRAMINE HYDROCHLORIDE 50 MG/ML
50 INJECTION, SOLUTION INTRAMUSCULAR; INTRAVENOUS AS NEEDED
Status: CANCELLED
Start: 2019-09-13

## 2019-08-29 RX ORDER — DIPHENHYDRAMINE HYDROCHLORIDE 50 MG/ML
50 INJECTION, SOLUTION INTRAMUSCULAR; INTRAVENOUS AS NEEDED
Status: CANCELLED
Start: 2019-09-27

## 2019-08-29 RX ORDER — DEXAMETHASONE SODIUM PHOSPHATE 4 MG/ML
10 INJECTION, SOLUTION INTRA-ARTICULAR; INTRALESIONAL; INTRAMUSCULAR; INTRAVENOUS; SOFT TISSUE ONCE
Status: CANCELLED | OUTPATIENT
Start: 2019-09-13

## 2019-08-29 RX ORDER — SODIUM CHLORIDE 9 MG/ML
10 INJECTION INTRAMUSCULAR; INTRAVENOUS; SUBCUTANEOUS AS NEEDED
Status: CANCELLED | OUTPATIENT
Start: 2019-09-20

## 2019-08-29 RX ORDER — HEPARIN 100 UNIT/ML
300-500 SYRINGE INTRAVENOUS AS NEEDED
Status: CANCELLED
Start: 2019-09-20

## 2019-08-29 RX ORDER — ONDANSETRON 2 MG/ML
8 INJECTION INTRAMUSCULAR; INTRAVENOUS AS NEEDED
Status: CANCELLED | OUTPATIENT
Start: 2019-08-30

## 2019-08-29 RX ORDER — DIPHENHYDRAMINE HYDROCHLORIDE 50 MG/ML
50 INJECTION, SOLUTION INTRAMUSCULAR; INTRAVENOUS AS NEEDED
Status: CANCELLED
Start: 2019-08-30

## 2019-08-29 RX ORDER — HYDROCORTISONE SODIUM SUCCINATE 100 MG/2ML
100 INJECTION, POWDER, FOR SOLUTION INTRAMUSCULAR; INTRAVENOUS AS NEEDED
Status: CANCELLED | OUTPATIENT
Start: 2019-09-13

## 2019-08-29 RX ORDER — SODIUM CHLORIDE 9 MG/ML
10 INJECTION INTRAMUSCULAR; INTRAVENOUS; SUBCUTANEOUS AS NEEDED
Status: CANCELLED | OUTPATIENT
Start: 2019-08-30

## 2019-08-29 RX ORDER — SODIUM CHLORIDE 9 MG/ML
25 INJECTION, SOLUTION INTRAVENOUS CONTINUOUS
Status: CANCELLED | OUTPATIENT
Start: 2019-09-20

## 2019-08-29 RX ORDER — DIPHENHYDRAMINE HYDROCHLORIDE 50 MG/ML
50 INJECTION, SOLUTION INTRAMUSCULAR; INTRAVENOUS ONCE
Status: CANCELLED
Start: 2019-09-27

## 2019-08-29 RX ORDER — HEPARIN 100 UNIT/ML
300-500 SYRINGE INTRAVENOUS AS NEEDED
Status: CANCELLED
Start: 2019-08-30

## 2019-08-29 RX ORDER — DIPHENHYDRAMINE HYDROCHLORIDE 50 MG/ML
50 INJECTION, SOLUTION INTRAMUSCULAR; INTRAVENOUS AS NEEDED
Status: CANCELLED
Start: 2019-09-20

## 2019-08-29 RX ORDER — SODIUM CHLORIDE 0.9 % (FLUSH) 0.9 %
10 SYRINGE (ML) INJECTION AS NEEDED
Status: CANCELLED
Start: 2019-09-27

## 2019-08-29 RX ORDER — DIPHENHYDRAMINE HYDROCHLORIDE 50 MG/ML
50 INJECTION, SOLUTION INTRAMUSCULAR; INTRAVENOUS ONCE
Status: CANCELLED
Start: 2019-09-20

## 2019-08-29 RX ORDER — HYDROCORTISONE SODIUM SUCCINATE 100 MG/2ML
100 INJECTION, POWDER, FOR SOLUTION INTRAMUSCULAR; INTRAVENOUS AS NEEDED
Status: CANCELLED | OUTPATIENT
Start: 2019-08-30

## 2019-08-29 RX ORDER — ACETAMINOPHEN 325 MG/1
650 TABLET ORAL AS NEEDED
Status: CANCELLED
Start: 2019-09-27

## 2019-08-29 RX ORDER — ACETAMINOPHEN 325 MG/1
650 TABLET ORAL AS NEEDED
Status: CANCELLED
Start: 2019-08-30

## 2019-08-29 RX ORDER — DEXAMETHASONE SODIUM PHOSPHATE 4 MG/ML
10 INJECTION, SOLUTION INTRA-ARTICULAR; INTRALESIONAL; INTRAMUSCULAR; INTRAVENOUS; SOFT TISSUE ONCE
Status: CANCELLED | OUTPATIENT
Start: 2019-09-20

## 2019-08-29 RX ORDER — ACETAMINOPHEN 325 MG/1
650 TABLET ORAL AS NEEDED
Status: CANCELLED
Start: 2019-09-20

## 2019-08-29 RX ORDER — ALBUTEROL SULFATE 0.83 MG/ML
2.5 SOLUTION RESPIRATORY (INHALATION) AS NEEDED
Status: CANCELLED
Start: 2019-08-30

## 2019-08-29 RX ORDER — SODIUM CHLORIDE 0.9 % (FLUSH) 0.9 %
10 SYRINGE (ML) INJECTION AS NEEDED
Status: CANCELLED
Start: 2019-08-30

## 2019-08-29 RX ORDER — EPINEPHRINE 1 MG/ML
0.3 INJECTION, SOLUTION, CONCENTRATE INTRAVENOUS AS NEEDED
Status: CANCELLED | OUTPATIENT
Start: 2019-09-20

## 2019-08-29 RX ORDER — DOXORUBICIN HYDROCHLORIDE 2 MG/ML
30 INJECTION, SOLUTION INTRAVENOUS ONCE
Status: CANCELLED
Start: 2019-08-30 | End: 2019-08-30

## 2019-08-29 RX ORDER — SODIUM CHLORIDE 9 MG/ML
25 INJECTION, SOLUTION INTRAVENOUS CONTINUOUS
Status: CANCELLED | OUTPATIENT
Start: 2019-09-27

## 2019-08-29 RX ORDER — EPINEPHRINE 1 MG/ML
0.3 INJECTION, SOLUTION, CONCENTRATE INTRAVENOUS AS NEEDED
Status: CANCELLED | OUTPATIENT
Start: 2019-08-30

## 2019-08-29 RX ORDER — HEPARIN 100 UNIT/ML
300-500 SYRINGE INTRAVENOUS AS NEEDED
Status: CANCELLED
Start: 2019-09-27

## 2019-08-29 RX ORDER — EPINEPHRINE 1 MG/ML
0.3 INJECTION, SOLUTION, CONCENTRATE INTRAVENOUS AS NEEDED
Status: CANCELLED | OUTPATIENT
Start: 2019-09-27

## 2019-08-29 RX ORDER — HYDROCORTISONE SODIUM SUCCINATE 100 MG/2ML
100 INJECTION, POWDER, FOR SOLUTION INTRAMUSCULAR; INTRAVENOUS AS NEEDED
Status: CANCELLED | OUTPATIENT
Start: 2019-09-27

## 2019-08-29 RX ORDER — SODIUM CHLORIDE 9 MG/ML
10 INJECTION INTRAMUSCULAR; INTRAVENOUS; SUBCUTANEOUS AS NEEDED
Status: CANCELLED | OUTPATIENT
Start: 2019-09-27

## 2019-08-29 RX ORDER — ALBUTEROL SULFATE 0.83 MG/ML
2.5 SOLUTION RESPIRATORY (INHALATION) AS NEEDED
Status: CANCELLED
Start: 2019-09-13

## 2019-08-29 RX ORDER — HYDROCORTISONE SODIUM SUCCINATE 100 MG/2ML
100 INJECTION, POWDER, FOR SOLUTION INTRAMUSCULAR; INTRAVENOUS AS NEEDED
Status: CANCELLED | OUTPATIENT
Start: 2019-09-20

## 2019-08-29 RX ORDER — ONDANSETRON 2 MG/ML
8 INJECTION INTRAMUSCULAR; INTRAVENOUS AS NEEDED
Status: CANCELLED | OUTPATIENT
Start: 2019-09-13

## 2019-08-29 RX ORDER — SODIUM CHLORIDE 9 MG/ML
10 INJECTION INTRAMUSCULAR; INTRAVENOUS; SUBCUTANEOUS AS NEEDED
Status: CANCELLED | OUTPATIENT
Start: 2019-09-13

## 2019-08-29 RX ORDER — ACETAMINOPHEN 325 MG/1
650 TABLET ORAL AS NEEDED
Status: CANCELLED
Start: 2019-09-13

## 2019-08-29 RX ORDER — ALBUTEROL SULFATE 0.83 MG/ML
2.5 SOLUTION RESPIRATORY (INHALATION) AS NEEDED
Status: CANCELLED
Start: 2019-09-27

## 2019-08-29 RX ORDER — DEXAMETHASONE SODIUM PHOSPHATE 4 MG/ML
10 INJECTION, SOLUTION INTRA-ARTICULAR; INTRALESIONAL; INTRAMUSCULAR; INTRAVENOUS; SOFT TISSUE ONCE
Status: CANCELLED | OUTPATIENT
Start: 2019-09-27

## 2019-08-29 RX ORDER — SODIUM CHLORIDE 0.9 % (FLUSH) 0.9 %
10 SYRINGE (ML) INJECTION AS NEEDED
Status: CANCELLED
Start: 2019-09-13

## 2019-08-29 RX ORDER — ALBUTEROL SULFATE 0.83 MG/ML
2.5 SOLUTION RESPIRATORY (INHALATION) AS NEEDED
Status: CANCELLED
Start: 2019-09-20

## 2019-08-29 RX ORDER — SODIUM CHLORIDE 9 MG/ML
25 INJECTION, SOLUTION INTRAVENOUS CONTINUOUS
Status: CANCELLED | OUTPATIENT
Start: 2019-08-30

## 2019-08-29 RX ORDER — DIPHENHYDRAMINE HYDROCHLORIDE 50 MG/ML
50 INJECTION, SOLUTION INTRAMUSCULAR; INTRAVENOUS ONCE
Status: CANCELLED
Start: 2019-09-13

## 2019-08-29 RX ORDER — SODIUM CHLORIDE 9 MG/ML
25 INJECTION, SOLUTION INTRAVENOUS CONTINUOUS
Status: CANCELLED | OUTPATIENT
Start: 2019-09-13

## 2019-08-29 RX ORDER — ONDANSETRON 2 MG/ML
8 INJECTION INTRAMUSCULAR; INTRAVENOUS AS NEEDED
Status: CANCELLED | OUTPATIENT
Start: 2019-09-20

## 2019-08-29 RX ORDER — PALONOSETRON 0.05 MG/ML
0.25 INJECTION, SOLUTION INTRAVENOUS ONCE
Status: CANCELLED
Start: 2019-08-30

## 2019-08-30 ENCOUNTER — APPOINTMENT (OUTPATIENT)
Dept: INFUSION THERAPY | Age: 54
End: 2019-08-30
Payer: COMMERCIAL

## 2019-08-30 ENCOUNTER — HOSPITAL ENCOUNTER (OUTPATIENT)
Dept: INFUSION THERAPY | Age: 54
Discharge: HOME | End: 2019-08-30
Payer: COMMERCIAL

## 2019-08-30 VITALS
RESPIRATION RATE: 16 BRPM | SYSTOLIC BLOOD PRESSURE: 121 MMHG | HEIGHT: 68 IN | DIASTOLIC BLOOD PRESSURE: 69 MMHG | BODY MASS INDEX: 30.68 KG/M2 | TEMPERATURE: 98.5 F | HEART RATE: 89 BPM | WEIGHT: 202.4 LBS | OXYGEN SATURATION: 98 %

## 2019-08-30 DIAGNOSIS — Z17.0 MALIGNANT NEOPLASM OF UPPER-OUTER QUADRANT OF LEFT BREAST IN FEMALE, ESTROGEN RECEPTOR POSITIVE (HCC): Primary | ICD-10-CM

## 2019-08-30 DIAGNOSIS — C50.412 MALIGNANT NEOPLASM OF UPPER-OUTER QUADRANT OF LEFT BREAST IN FEMALE, ESTROGEN RECEPTOR POSITIVE (HCC): Primary | ICD-10-CM

## 2019-08-30 LAB
ALBUMIN SERPL-MCNC: 3.5 G/DL (ref 3.5–5)
ALBUMIN/GLOB SERPL: 1.2 {RATIO} (ref 1.1–2.2)
ALP SERPL-CCNC: 110 U/L (ref 45–117)
ALT SERPL-CCNC: 32 U/L (ref 12–78)
ANION GAP SERPL CALC-SCNC: 5 MMOL/L (ref 5–15)
AST SERPL-CCNC: 18 U/L (ref 15–37)
BASOPHILS # BLD: 0 K/UL (ref 0–0.1)
BASOPHILS NFR BLD: 0 % (ref 0–1)
BILIRUB SERPL-MCNC: 0.2 MG/DL (ref 0.2–1)
BUN SERPL-MCNC: 14 MG/DL (ref 6–20)
BUN/CREAT SERPL: 22 (ref 12–20)
CALCIUM SERPL-MCNC: 8.7 MG/DL (ref 8.5–10.1)
CHLORIDE SERPL-SCNC: 109 MMOL/L (ref 97–108)
CO2 SERPL-SCNC: 27 MMOL/L (ref 21–32)
CREAT SERPL-MCNC: 0.63 MG/DL (ref 0.55–1.02)
DIFFERENTIAL METHOD BLD: ABNORMAL
EOSINOPHIL # BLD: 0 K/UL (ref 0–0.4)
EOSINOPHIL NFR BLD: 0 % (ref 0–7)
ERYTHROCYTE [DISTWIDTH] IN BLOOD BY AUTOMATED COUNT: 12.6 % (ref 11.5–14.5)
GLOBULIN SER CALC-MCNC: 2.9 G/DL (ref 2–4)
GLUCOSE SERPL-MCNC: 90 MG/DL (ref 65–100)
HCT VFR BLD AUTO: 32.1 % (ref 35–47)
HGB BLD-MCNC: 10.9 G/DL (ref 11.5–16)
IMM GRANULOCYTES # BLD AUTO: 0 K/UL
IMM GRANULOCYTES NFR BLD AUTO: 0 %
LYMPHOCYTES # BLD: 2 K/UL (ref 0.8–3.5)
LYMPHOCYTES NFR BLD: 23 % (ref 12–49)
MCH RBC QN AUTO: 34.3 PG (ref 26–34)
MCHC RBC AUTO-ENTMCNC: 34 G/DL (ref 30–36.5)
MCV RBC AUTO: 100.9 FL (ref 80–99)
MONOCYTES # BLD: 0.6 K/UL (ref 0–1)
MONOCYTES NFR BLD: 7 % (ref 5–13)
NEUTS SEG # BLD: 6.2 K/UL (ref 1.8–8)
NEUTS SEG NFR BLD: 70 % (ref 32–75)
NRBC # BLD: 0.05 K/UL (ref 0–0.01)
NRBC BLD-RTO: 0.6 PER 100 WBC
PLATELET # BLD AUTO: 206 K/UL (ref 150–400)
PMV BLD AUTO: 9.3 FL (ref 8.9–12.9)
POTASSIUM SERPL-SCNC: 3.7 MMOL/L (ref 3.5–5.1)
PROT SERPL-MCNC: 6.4 G/DL (ref 6.4–8.2)
RBC # BLD AUTO: 3.18 M/UL (ref 3.8–5.2)
RBC MORPH BLD: ABNORMAL
SODIUM SERPL-SCNC: 141 MMOL/L (ref 136–145)
WBC # BLD AUTO: 8.8 K/UL (ref 3.6–11)

## 2019-08-30 PROCEDURE — 80053 COMPREHEN METABOLIC PANEL: CPT

## 2019-08-30 PROCEDURE — 77030012965 HC NDL HUBR BBMI -A

## 2019-08-30 PROCEDURE — 85025 COMPLETE CBC W/AUTO DIFF WBC: CPT

## 2019-08-30 PROCEDURE — 96411 CHEMO IV PUSH ADDL DRUG: CPT

## 2019-08-30 PROCEDURE — 74011250636 HC RX REV CODE- 250/636: Performed by: REGISTERED NURSE

## 2019-08-30 PROCEDURE — 36415 COLL VENOUS BLD VENIPUNCTURE: CPT

## 2019-08-30 PROCEDURE — 96375 TX/PRO/DX INJ NEW DRUG ADDON: CPT

## 2019-08-30 PROCEDURE — 96377 APPLICATON ON-BODY INJECTOR: CPT

## 2019-08-30 PROCEDURE — 74011000258 HC RX REV CODE- 258: Performed by: REGISTERED NURSE

## 2019-08-30 PROCEDURE — 74011250636 HC RX REV CODE- 250/636

## 2019-08-30 PROCEDURE — 96413 CHEMO IV INFUSION 1 HR: CPT

## 2019-08-30 PROCEDURE — 96367 TX/PROPH/DG ADDL SEQ IV INF: CPT

## 2019-08-30 RX ORDER — SODIUM CHLORIDE 0.9 % (FLUSH) 0.9 %
10 SYRINGE (ML) INJECTION AS NEEDED
Status: DISCONTINUED | OUTPATIENT
Start: 2019-08-30 | End: 2019-08-31 | Stop reason: HOSPADM

## 2019-08-30 RX ORDER — SODIUM CHLORIDE 9 MG/ML
25 INJECTION, SOLUTION INTRAVENOUS CONTINUOUS
Status: DISPENSED | OUTPATIENT
Start: 2019-08-30 | End: 2019-08-30

## 2019-08-30 RX ORDER — DOXORUBICIN HYDROCHLORIDE 2 MG/ML
30 INJECTION, SOLUTION INTRAVENOUS ONCE
Status: COMPLETED | OUTPATIENT
Start: 2019-08-30 | End: 2019-08-30

## 2019-08-30 RX ORDER — HEPARIN 100 UNIT/ML
300-500 SYRINGE INTRAVENOUS AS NEEDED
Status: DISCONTINUED | OUTPATIENT
Start: 2019-08-30 | End: 2019-08-31 | Stop reason: HOSPADM

## 2019-08-30 RX ORDER — SODIUM CHLORIDE 9 MG/ML
10 INJECTION INTRAMUSCULAR; INTRAVENOUS; SUBCUTANEOUS AS NEEDED
Status: DISCONTINUED | OUTPATIENT
Start: 2019-08-30 | End: 2019-08-31 | Stop reason: HOSPADM

## 2019-08-30 RX ORDER — PALONOSETRON 0.05 MG/ML
0.25 INJECTION, SOLUTION INTRAVENOUS ONCE
Status: COMPLETED | OUTPATIENT
Start: 2019-08-30 | End: 2019-08-30

## 2019-08-30 RX ADMIN — PALONOSETRON 0.25 MG: 0.05 INJECTION, SOLUTION INTRAVENOUS at 12:48

## 2019-08-30 RX ADMIN — DOXORUBICIN HYDROCHLORIDE 62.4 MG: 2 INJECTION, SOLUTION INTRAVENOUS at 14:28

## 2019-08-30 RX ADMIN — DEXAMETHASONE SODIUM PHOSPHATE 12 MG: 4 INJECTION, SOLUTION INTRAMUSCULAR; INTRAVENOUS at 13:48

## 2019-08-30 RX ADMIN — Medication 500 UNITS: at 15:39

## 2019-08-30 RX ADMIN — PEGFILGRASTIM 6 MG: KIT SUBCUTANEOUS at 15:39

## 2019-08-30 RX ADMIN — SODIUM CHLORIDE 150 MG: 900 INJECTION, SOLUTION INTRAVENOUS at 13:22

## 2019-08-30 RX ADMIN — SODIUM CHLORIDE 25 ML/HR: 900 INJECTION, SOLUTION INTRAVENOUS at 13:26

## 2019-08-30 RX ADMIN — CYCLOPHOSPHAMIDE 1248 MG: 1 INJECTION, POWDER, FOR SOLUTION INTRAVENOUS; ORAL at 14:51

## 2019-08-30 NOTE — PROGRESS NOTES
Cancer [place] at [facility]  [address], [address]  W: [phone]  F: [phone]    Reason for Visit:   Nahum Almeida is a 62 y.o. female who is seen for Left breast cancer- ER+MN+HER2 greg neg    Treatment History:   · 6/19: Mammogram:  Left breast mass with skin thickening, 2 suspicious nodes. Mass measures 1.4 x 0.9 x 1.3  · 6/19: MRI breast: Multicentric left breast carcinoma. Non-masslike enhancement extends from the nipple to the posterior third, involves all quadrants, and measures 106 x 44 x 79 mm. · 5/28/19: Biopsy breast- IDC % % HER 2 negative, skin biopsy benign , node biopsy mostly adipose tissue with atypical cells . BRCA1 and 2 negative. CT and bone scan negative for metastasis. Mammaprint with insufficient tissue for testing. Repeat biopsy of axillary node 6/20/19 positive for metastatic disease- repeat mammaprint - high risk  · 7/19/19: cycle 1 neoadjuvant of dd AC-T    History of Present Illness:   Patient is a 62 y.o. seen for L breast cancer. She felt a sensation in the shower about May 2019. She also noted a lump and  an inverted nipple. She had a physician friend look at this who directed her to mammogram and recommended she see Dr. Sierra Ruiz. This led to imaging and diagnosis as above. She comes in today for cycle #4 of neoadjuvant ddAC-T. She has some lightheadedness with standing. Had IV hydration and this did not help. She also is complaining of a headache and believes this may be due to allergies. She is taking Tylenol with relief. Denies diarrhea/constipation. She takes compazine q 6 hours schedule as zofran worsens headache. Rare ETOH  She does not smoke.      Father had colon cancer    Past Medical History:   Diagnosis Date    Anxiety     Arthritis     At risk for sleep apnea     GAGAN 5     Basal cell carcinoma of skin     Breast cancer ([facility] [address] 2019    LEFT    History of seasonal allergies     Nausea & vomiting     Squamous cell skin cancer     Dr. Smith Amel Thyroid disease       Past Surgical History:   Procedure Laterality Date    HX COLONOSCOPY      HX KNEE ARTHROSCOPY Left     HX LUMBAR FUSION  1995    HX MOHS PROCEDURES      x 4 times     HX SHOULDER ARTHROSCOPY Right     HX THYROIDECTOMY  2014      Social History     Tobacco Use    Smoking status: Never Smoker    Smokeless tobacco: Never Used   Substance Use Topics    Alcohol use: Yes      Family History   Problem Relation Age of Onset    Arthritis-osteo Mother     Cancer Father         Colon Cancer     Current Outpatient Medications   Medication Sig    prochlorperazine (COMPAZINE) 5 mg tablet Take 1 Tab by mouth every six (6) hours as needed for Nausea for up to 30 days.  lidocaine-prilocaine (EMLA) topical cream Apply  to affected area as needed for Pain.  naloxone (NARCAN) 2 mg/actuation spry Use 1 spray intranasally, then discard. Repeat with new spray every 2 min as needed for opioid overdose symptoms, alternating nostrils.  ALPRAZolam (XANAX) 0.5 mg tablet Take 1 Tab by mouth two (2) times daily as needed for Anxiety. Max Daily Amount: 1 mg.  dexAMETHasone (DECADRON) 4 mg tablet Take 2 tabs (8mg) on days 2 & 3 of chemotherapy    ondansetron (ZOFRAN ODT) 8 mg disintegrating tablet Take 1 Tab by mouth every eight (8) hours as needed for Nausea.  sertraline (ZOLOFT) 100 mg tablet Take 0.5 Tabs by mouth daily.  traZODone (DESYREL) 50 mg tablet Take 1 Tab by mouth nightly. (Patient taking differently: Take 50 mg by mouth as needed.)    cholecalciferol (VITAMIN D3) 1,000 unit cap Take 5,000 Units by mouth daily.  liothyronine (CYTOMEL) 5 mcg tablet Take 5 mcg by mouth daily.  levothyroxine (SYNTHROID) 50 mcg tablet Take 50 mcg by mouth Daily (before breakfast).      Current Facility-Administered Medications   Medication Dose Route Frequency    0.9% sodium chloride infusion  25 mL/hr IntraVENous CONTINUOUS    cyclophosphamide (CYTOXAN) 1,248 mg in 0.9% sodium chloride 250 mL, overfill volume 25 mL chemo infusion  600 mg/m2 (Treatment Plan Recorded) IntraVENous ONCE    pegfilgrastim (NEULASTA) wearable SQ injector 6 mg  6 mg SubCUTAneous ONCE    saline peripheral flush soln 10 mL  10 mL InterCATHeter PRN    sodium chloride 0.9% injection 10 mL  10 mL IntraVENous PRN    heparin (porcine) pf 300-500 Units  300-500 Units InterCATHeter PRN      Allergies   Allergen Reactions    Augmentin [Amoxicillin-Pot Clavulanate] Unknown (comments)        Review of Systems: A complete review of systems was obtained, negative except as described above. Physical Exam:     Visit Vitals  /62 (BP 1 Location: Left arm, BP Patient Position: Sitting)   Pulse 79   Temp 96.7 °F (35.9 °C)   Resp 16   Ht 5' 8\" (1.727 m)   Wt 202 lb 6.4 oz (91.8 kg)   SpO2 99%   BMI 30.77 kg/m²     ECOG PS: 1  General: No distress  Eyes: PERRLA, anicteric sclerae  HENT: Atraumatic, OP clear  Neck: Supple; PAC looks good  Lymphatic: No cervical, supraclavicular, or inguinal adenopathy  Breasts: deferred today  Respiratory: CTAB, normal respiratory effort  CV: Normal rate, regular rhythm, no murmurs, no peripheral edema  GI: Soft, nontender, nondistended, no masses, no hepatomegaly, no splenomegaly  MS: Normal gait and station. Digits without clubbing or cyanosis. Skin: No rashes, ecchymoses, or petechiae. Normal temperature, turgor, and texture. Psych: Alert, oriented, appropriate affect, normal judgment/insight    Results:     Lab Results   Component Value Date/Time    WBC 8.8 08/30/2019 11:17 AM    HGB 10.9 (L) 08/30/2019 11:17 AM    HCT 32.1 (L) 08/30/2019 11:17 AM    PLATELET 760 38/16/2001 11:17 AM    .9 (H) 08/30/2019 11:17 AM    ABS.  NEUTROPHILS 6.2 08/30/2019 11:17 AM     Lab Results   Component Value Date/Time    Sodium 141 08/30/2019 11:29 AM    Potassium 3.7 08/30/2019 11:29 AM    Chloride 109 (H) 08/30/2019 11:29 AM    CO2 27 08/30/2019 11:29 AM    Glucose 90 08/30/2019 11:29 AM    BUN 14 08/30/2019 11:29 AM    Creatinine 0.63 08/30/2019 11:29 AM    GFR est AA >60 08/30/2019 11:29 AM    GFR est non-AA >60 08/30/2019 11:29 AM    Calcium 8.7 08/30/2019 11:29 AM     Lab Results   Component Value Date/Time    Bilirubin, total 0.2 08/30/2019 11:29 AM    ALT (SGPT) 32 08/30/2019 11:29 AM    AST (SGOT) 18 08/30/2019 11:29 AM    Alk. phosphatase 110 08/30/2019 11:29 AM    Protein, total 6.4 08/30/2019 11:29 AM    Albumin 3.5 08/30/2019 11:29 AM    Globulin 2.9 08/30/2019 11:29 AM       Records reviewed and summarized above. Pathology report(s) reviewed above. 5/28/19    FINAL PATHOLOGIC DIAGNOSIS   1. Breast, left, core biopsy: In situ and invasive ductal carcinoma   Invasive carcinoma is nuclear grade 2 and measures up to 0.6 cm in greatest dimension on slide   Ductal carcinoma in situ is nuclear grade 3 with central necrosis   2. Soft tissue, left axilla, core biopsy: Adipose tissue with rare detached atypical cells   No lymph node tissue identified   See comment     Radiology report(s) reviewed above. Assessment:   1) Left breast Invasive ductal carcinoma    [id number]  GRADE 2  % SC 20% HER2 greg equivocal - FISH could not be done  Repeat biopsy of breast mass 6/24/19 - HER2 greg negative, mamma print indicates she has genomically high risk disease    Due to extensive surrounding breast changes a mastectomy is recommended by Dr. Zainab Farah    Today is cycle #4 of neoadjuvant dd AC-T     Side effects and anti-emetics reviewed.  She is high risk for grade IV neutropenia hence will add primary prophylaxis with Udenyca     We will discuss endocrine therapy/ post mastectomy RT at a later date    Baseline ECHO reviewed and shows EF 61-65%    2) Hypothyroidism    3) Obesity    Plan:     · Proceed today with cycle #4 of neoadjuvant ddACT-T (doxorubicin 60mg/m2, cytoxan 600mg/m2)  Given every 2 weeks x 4 then weekly taxol at 80mg/m2 x 12)  · Labs to include CBC with diff and CMP prior to each infusion  · Premeds to include zofran, dexamethasone, and emend   · Neulasta OP as she is at high risk for grade IV neutropenia   · Dexamethasone 8mg daily on days 2 & 3  · Compazine q 6 hours prn  · US of breast    RTC in 2 weeks     I appreciate the opportunity to participate in Ms. Rachelle Garcia's care.     Signed By: Emerson Lr, NP

## 2019-08-30 NOTE — PROGRESS NOTES
[facility] Progress Note    Date: 2019    Name: Ira Jacobs    MRN: [id number]         : 1965    Ms. Esequiel Fletcher Arrived ambulatory and in no distress for cycle 4 day 1 of Doxorubicin/Cyclophosphamide regimen and Neulasta On Pro. Assessment was completed. Patient continues to state she has a headache and dizziness. She was seen in [facility] earlier in the week for hydration, but that has not ameliorate her symptoms. Port accessed without difficulty, labs drawn and processed. Chemotherapy Flowsheet 2019   Cycle C4D1   Date 2019   Drug / Regimen DDAC   Dosage 124.8mg/1248mg   Time Up 1408   Time Down 1521   Pre Meds given   Notes given         Ms. Garcia's vitals were reviewed. Patient Vitals for the past 12 hrs:   Temp Pulse Resp BP SpO2   19 1538 98.5 °F (36.9 °C) 89 16 121/69 98 %   19 1200     99 %   19 1148 96.7 °F (35.9 °C) 79 16 107/62 99 %         Lab results were obtained and reviewed. Recent Results (from the past 12 hour(s))   CBC WITH AUTOMATED DIFF    Collection Time: 19 11:17 AM   Result Value Ref Range    WBC 8.8 3.6 - 11.0 K/uL    RBC 3.18 (L) 3.80 - 5.20 M/uL    HGB 10.9 (L) 11.5 - 16.0 g/dL    HCT 32.1 (L) 35.0 - 47.0 %    .9 (H) 80.0 - 99.0 FL    MCH 34.3 (H) 26.0 - 34.0 PG    MCHC 34.0 30.0 - 36.5 g/dL    RDW 12.6 11.5 - 14.5 %    PLATELET 682 267 - 162 K/uL    MPV 9.3 8.9 - 12.9 FL    NRBC 0.6 (H) 0  WBC    ABSOLUTE NRBC 0.05 (H) 0.00 - 0.01 K/uL    NEUTROPHILS 70 32 - 75 %    LYMPHOCYTES 23 12 - 49 %    MONOCYTES 7 5 - 13 %    EOSINOPHILS 0 0 - 7 %    BASOPHILS 0 0 - 1 %    IMMATURE GRANULOCYTES 0 %    ABS. NEUTROPHILS 6.2 1.8 - 8.0 K/UL    ABS. LYMPHOCYTES 2.0 0.8 - 3.5 K/UL    ABS. MONOCYTES 0.6 0.0 - 1.0 K/UL    ABS. EOSINOPHILS 0.0 0.0 - 0.4 K/UL    ABS. BASOPHILS 0.0 0.0 - 0.1 K/UL    ABS. IMM.  GRANS. 0.0 K/UL    DF MANUAL      RBC COMMENTS OVALOCYTES  PRESENT       METABOLIC PANEL, COMPREHENSIVE    Collection Time: 08/30/19 11:29 AM   Result Value Ref Range    Sodium 141 136 - 145 mmol/L    Potassium 3.7 3.5 - 5.1 mmol/L    Chloride 109 (H) 97 - 108 mmol/L    CO2 27 21 - 32 mmol/L    Anion gap 5 5 - 15 mmol/L    Glucose 90 65 - 100 mg/dL    BUN 14 6 - 20 MG/DL    Creatinine 0.63 0.55 - 1.02 MG/DL    BUN/Creatinine ratio 22 (H) 12 - 20      GFR est AA >60 >60 ml/min/1.73m2    GFR est non-AA >60 >60 ml/min/1.73m2    Calcium 8.7 8.5 - 10.1 MG/DL    Bilirubin, total 0.2 0.2 - 1.0 MG/DL    ALT (SGPT) 32 12 - 78 U/L    AST (SGOT) 18 15 - 37 U/L    Alk. phosphatase 110 45 - 117 U/L    Protein, total 6.4 6.4 - 8.2 g/dL    Albumin 3.5 3.5 - 5.0 g/dL    Globulin 2.9 2.0 - 4.0 g/dL    A-G Ratio 1.2 1.1 - 2.2         Pre-medications  were administered as ordered and chemotherapy was initiated.   Medications Administered     0.9% sodium chloride infusion     Admin Date  08/30/2019 Action  New Bag Dose  25 mL/hr Rate  25 mL/hr Route  IntraVENous Administered By  Merced Million          cyclophosphamide (CYTOXAN) 1,248 mg in 0.9% sodium chloride 250 mL, overfill volume 25 mL chemo infusion     Admin Date  08/30/2019 Action  New Bag Dose  1248 mg Rate  674.8 mL/hr Route  IntraVENous Administered By  Merced Million          dexamethasone (DECADRON) 12 mg in 0.9% sodium chloride 50 mL, overfill volume 5 mL IVPB     Admin Date  08/30/2019 Action  Given Dose  12 mg Rate  232 mL/hr Route  IntraVENous Administered By  Deanna Greene RN          DOXOrubicin (ADRIAMYCIN) chemo syringe 62.4 mg Syringe 1 of 2 (Total Dose 124.8 mg)     Admin Date  08/30/2019 Action  Given Dose  62.4 mg Route  IntraVENous Administered By  Merced Million          DOXOrubicin (ADRIAMYCIN) chemo syringe 62.4 mg Syringe 2 of 2 (Total Dose 124.8 mg)     Admin Date  08/30/2019 Action  Given Dose  62.4 mg Route  IntraVENous Administered By  Merced Zuluaga          fosaprepitant (EMEND) 150 mg in 0.9% sodium chloride 150 mL IVPB     Admin Date  08/30/2019 Action  Given Dose  150 mg Rate  450 mL/hr Route  IntraVENous Administered By  Alver Jumper          heparin (porcine) pf 300-500 Units     Admin Date  08/30/2019 Action  Given Dose  500 Units Route  InterCATHeter Administered By  Alver Jumper          palonosetron HCl (ALOXI) injection 0.25 mg     Admin Date  08/30/2019 Action  Given Dose  0.25 mg Route  IntraVENous Administered By  Alver Jumper          pegfilgrastim (NEULASTA) wearable SQ injector 6 mg     Admin Date  08/30/2019 Action  Given Dose  6 mg Route  SubCUTAneous Administered By  Alver Jumper                    Ms. Hill Standard tolerated treatment well, port flushed and de accessed, patient was discharged from [place] [id number] in stable condition at 1540.      Future Appointments   Date Time Provider Preeti Joyce   9/9/2019 11:00 AM El Barron MD [facility] SCHED   9/13/2019 10:00 AM [facility] FT CHAIR 3 [facility]    9/20/2019 10:00 AM [facility] FT CHAIR 3 [facility]    9/27/2019 10:00 AM [facility] FT CHAIR [address]  August 30, 2019

## 2019-09-09 ENCOUNTER — OFFICE VISIT (OUTPATIENT)
Dept: SURGERY | Age: 54
End: 2019-09-09

## 2019-09-09 VITALS
WEIGHT: 202 LBS | HEART RATE: 87 BPM | BODY MASS INDEX: 30.62 KG/M2 | HEIGHT: 68 IN | SYSTOLIC BLOOD PRESSURE: 112 MMHG | DIASTOLIC BLOOD PRESSURE: 64 MMHG

## 2019-09-09 DIAGNOSIS — C50.912 BREAST CANCER METASTASIZED TO AXILLARY LYMPH NODE, LEFT (HCC): Primary | ICD-10-CM

## 2019-09-09 DIAGNOSIS — C77.3 BREAST CANCER METASTASIZED TO AXILLARY LYMPH NODE, LEFT (HCC): Primary | ICD-10-CM

## 2019-09-09 RX ORDER — LORATADINE 10 MG/1
10 TABLET ORAL
COMMUNITY
End: 2019-10-16

## 2019-09-09 NOTE — PATIENT INSTRUCTIONS
Learning About Breast Cancer Treatments  Your Care Instructions    Breast cancer means abnormal cells grow out of control in one or both breasts. These cancer cells can spread from the breast to nearby lymph nodes and other tissues. They can also spread to other parts of the body. The type and stage of cancer you have is based on:  · Where in the breast the cancer started. · The genetics of those cancer cells. · How far cancer has spread within the breast, to nearby tissues, or to other organs. · What the cancer cells look like under a microscope. · Whether there is cancer in the nearby lymph nodes. Tests that help find the stage of your cancer can help choose the right treatment for you. These tests can include a breast biopsy, lymph node biopsy, blood tests, and X-rays. You may need other tests as well, such as a bone, CT, or MRI scan. Whether you have more tests depends on your symptoms and the stage of the cancer. When you find out that you have cancer, you may feel many emotions and may need some help coping. Seek out family, friends, and counselors for support. You also can do things at home to make yourself feel better while you go through treatment. Call the [employer] (1-361.718.8949) or visit its website at [employer] for more information. How is breast cancer treated? Your doctor may combine treatments. This is a common way to treat breast cancer. Treatment depends on what type and stage of cancer you have. You may have:  · Surgery to remove the cancer. · Radiation. This uses high-dose X-rays to kill cancer cells and shrink tumors. · Chemotherapy. This uses medicine to kill cancer cells. · Hormone therapy. This uses medicines such as tamoxifen. It limits the effect of the hormone estrogen. This hormone can help some types of breast cancer cells to grow. · Biological therapy.  This uses medicines such as trastuzumab (Herceptin) to help your immune system fight the cancer. What surgeries are done for breast cancer? Surgery is a key part of treatment for breast cancer. The main types of surgeries are:  · Breast-conserving surgery. This is surgery that does not remove the whole breast. This includes:  ? Lumpectomy. This surgery removes the cancer in the breast and some of the normal tissue around it. ? Partial mastectomy. This surgery removes part of the breast. The lining of the chest muscles below the cancer and some of the lymph nodes may also be removed. · Surgery to remove the breast. This includes:  ? Total mastectomy. This removes the whole breast.  ? Nipple-sparing mastectomy. This removes the whole breast but leaves the nipple. ? Modified radical mastectomy. This removes the whole breast and the lymph nodes under the arm (axillary lymph nodes). ? Radical mastectomy. This removes the whole breast, the chest muscles, and all the lymph nodes under the arm. If lymph nodes under the arm are removed, they are looked at under the microscope to check for cancer. There are two types of lymph node removal:  · [place] lymph node biopsy removes the lymph node that is the first to receive lymph fluid from the tumor. If cancer has spread from the breast to the lymph nodes, cancer cells most often show up in the sentinel node first.  · Axillary lymph node dissection removes most of the lymph nodes in the armpit. The type of surgery you have depends on the size, location, and type of the cancer. It also depends on your overall health and personal preferences. Even if your doctor removes all the cancer that can be seen at the time of your surgery, you may still need more treatment. You may get radiation, chemotherapy, or hormone therapy after surgery to try to kill any cancer cells that may be left. No matter what kind of surgery you have, you will get information about why you are having it, what its risks are, how to prepare, and what to do after surgery.   Follow-up care is a key part of your treatment and safety. Be sure to make and go to all appointments, and call your doctor if you are having problems. It's also a good idea to know your test results and keep a list of the medicines you take. Where can you learn more? Go to http://louis-daniel.info/. Enter X810 in the search box to learn more about \"Learning About Breast Cancer Treatments. \"  Current as of: December 19, 2018  Content Version: 12.1  © 1847-7313 Healthwise, Incorporated. Care instructions adapted under license by [employer] (which disclaims liability or warranty for this information). If you have questions about a medical condition or this instruction, always ask your healthcare professional. [address] any warranty or liability for your use of this information.

## 2019-09-09 NOTE — PROGRESS NOTES
HISTORY OF PRESENT ILLNESS  Pat Maldonado is a 62 y.o. female. HPI ESTABLISHED patient here for follow up during neoadjuvant chemotherapy for LEFT breast cancer. She completed 4 cycles of AC and she is getting ready to start Taxol this Friday. She is due to complete Taxol the first week of December. Overall she feels lousy. In addition to side effects from chemo, she has been battling a cough/cold. Has concern with area of port site which is a little red and feels like it has a suture sticking out. Breast cancer-  5/28/19 - 51 yo female with left breast IDC and DCIS,T2 N1, potential skin involvement, Er/Pr + Her 2 greg equivocal, Not enough invasive to send Her 2 greg for fish  6/3/19 - LEFT breast skin punch biopsy, benign. 6/20/19 - biopsies of LEFT breast and LEFT axilla. I called patient to touch base with her. Biopsy Dr. Casandra Campos did node +, Er/pr+ her 2 greg negative, specimen went to [facility] due to medicaid,  Mammaprint high risk. 7/18/19 - port insertion  7/19/19 - started [facility]  9/13/19 - due to start Taxol     Family history-   Possibly maternal grandmother with breast cancer but not sure? Father - colon cancer    Breast imaging-  6/11/19 - breast MRI  5/2019 - mammogram    Review of Systems   All other systems reviewed and are negative. Physical Exam   Pulmonary/Chest:       Port site intact  Left breast skin normal  Breast soft   Nursing note and vitals reviewed. BREAST ULTRASOUND, Preop planning  Indication:preop planning  left Breast 3:00    Technique: The area was scanned using a high-frequency linear-array near-field transducer  Findings: blip seen no mass  Impression: Biopsy site visible with ultrasound  Disposition:  Will continue chemo  ASSESSMENT and PLAN    ICD-10-CM ICD-9-CM    1.  Breast cancer metastasized to axillary lymph node, left (HCC) C50.912 174.9     C77.3 196.3      - tumor responding to chemo  - will see her back in 6 weeks

## 2019-09-12 ENCOUNTER — HOSPITAL ENCOUNTER (OUTPATIENT)
Dept: MAMMOGRAPHY | Age: 54
Discharge: HOME | End: 2019-09-12
Attending: REGISTERED NURSE
Payer: COMMERCIAL

## 2019-09-12 DIAGNOSIS — C50.412 MALIGNANT NEOPLASM OF UPPER-OUTER QUADRANT OF LEFT BREAST IN FEMALE, ESTROGEN RECEPTOR POSITIVE (HCC): ICD-10-CM

## 2019-09-12 DIAGNOSIS — Z17.0 MALIGNANT NEOPLASM OF UPPER-OUTER QUADRANT OF LEFT BREAST IN FEMALE, ESTROGEN RECEPTOR POSITIVE (HCC): ICD-10-CM

## 2019-09-12 PROCEDURE — 77065 DX MAMMO INCL CAD UNI: CPT

## 2019-09-12 PROCEDURE — 76642 ULTRASOUND BREAST LIMITED: CPT

## 2019-09-13 ENCOUNTER — HOSPITAL ENCOUNTER (OUTPATIENT)
Dept: INFUSION THERAPY | Age: 54
Discharge: HOME | End: 2019-09-13
Payer: COMMERCIAL

## 2019-09-13 VITALS
HEIGHT: 68 IN | WEIGHT: 201.4 LBS | RESPIRATION RATE: 18 BRPM | TEMPERATURE: 96.8 F | BODY MASS INDEX: 30.52 KG/M2 | HEART RATE: 80 BPM | SYSTOLIC BLOOD PRESSURE: 131 MMHG | DIASTOLIC BLOOD PRESSURE: 83 MMHG

## 2019-09-13 DIAGNOSIS — C50.412 MALIGNANT NEOPLASM OF UPPER-OUTER QUADRANT OF LEFT BREAST IN FEMALE, ESTROGEN RECEPTOR POSITIVE (HCC): Primary | ICD-10-CM

## 2019-09-13 DIAGNOSIS — Z17.0 MALIGNANT NEOPLASM OF UPPER-OUTER QUADRANT OF LEFT BREAST IN FEMALE, ESTROGEN RECEPTOR POSITIVE (HCC): Primary | ICD-10-CM

## 2019-09-13 LAB
ALBUMIN SERPL-MCNC: 3.5 G/DL (ref 3.5–5)
ALBUMIN/GLOB SERPL: 1.1 {RATIO} (ref 1.1–2.2)
ALP SERPL-CCNC: 114 U/L (ref 45–117)
ALT SERPL-CCNC: 21 U/L (ref 12–78)
ANION GAP SERPL CALC-SCNC: 6 MMOL/L (ref 5–15)
AST SERPL-CCNC: 12 U/L (ref 15–37)
BASOPHILS # BLD: 0.1 K/UL (ref 0–0.1)
BASOPHILS NFR BLD: 1 % (ref 0–1)
BILIRUB SERPL-MCNC: 0.2 MG/DL (ref 0.2–1)
BUN SERPL-MCNC: 9 MG/DL (ref 6–20)
BUN/CREAT SERPL: 14 (ref 12–20)
CALCIUM SERPL-MCNC: 8.6 MG/DL (ref 8.5–10.1)
CHLORIDE SERPL-SCNC: 105 MMOL/L (ref 97–108)
CO2 SERPL-SCNC: 29 MMOL/L (ref 21–32)
CREAT SERPL-MCNC: 0.63 MG/DL (ref 0.55–1.02)
DIFFERENTIAL METHOD BLD: ABNORMAL
EOSINOPHIL # BLD: 0.1 K/UL (ref 0–0.4)
EOSINOPHIL NFR BLD: 1 % (ref 0–7)
ERYTHROCYTE [DISTWIDTH] IN BLOOD BY AUTOMATED COUNT: 13.9 % (ref 11.5–14.5)
GLOBULIN SER CALC-MCNC: 3.2 G/DL (ref 2–4)
GLUCOSE SERPL-MCNC: 86 MG/DL (ref 65–100)
HCT VFR BLD AUTO: 32.2 % (ref 35–47)
HGB BLD-MCNC: 10.7 G/DL (ref 11.5–16)
IMM GRANULOCYTES # BLD AUTO: 0.5 K/UL (ref 0–0.04)
IMM GRANULOCYTES NFR BLD AUTO: 9 % (ref 0–0.5)
LYMPHOCYTES # BLD: 1.2 K/UL (ref 0.8–3.5)
LYMPHOCYTES NFR BLD: 20 % (ref 12–49)
MCH RBC QN AUTO: 33.9 PG (ref 26–34)
MCHC RBC AUTO-ENTMCNC: 33.2 G/DL (ref 30–36.5)
MCV RBC AUTO: 101.9 FL (ref 80–99)
MONOCYTES # BLD: 0.6 K/UL (ref 0–1)
MONOCYTES NFR BLD: 10 % (ref 5–13)
NEUTS SEG # BLD: 3.5 K/UL (ref 1.8–8)
NEUTS SEG NFR BLD: 59 % (ref 32–75)
NRBC # BLD: 0.04 K/UL (ref 0–0.01)
NRBC BLD-RTO: 0.7 PER 100 WBC
PLATELET # BLD AUTO: 204 K/UL (ref 150–400)
PLATELET COMMENTS,PCOM: ABNORMAL
PMV BLD AUTO: 9.1 FL (ref 8.9–12.9)
POTASSIUM SERPL-SCNC: 4 MMOL/L (ref 3.5–5.1)
PROT SERPL-MCNC: 6.7 G/DL (ref 6.4–8.2)
RBC # BLD AUTO: 3.16 M/UL (ref 3.8–5.2)
RBC MORPH BLD: ABNORMAL
RBC MORPH BLD: ABNORMAL
SODIUM SERPL-SCNC: 140 MMOL/L (ref 136–145)
WBC # BLD AUTO: 6 K/UL (ref 3.6–11)

## 2019-09-13 PROCEDURE — 74011250637 HC RX REV CODE- 250/637: Performed by: REGISTERED NURSE

## 2019-09-13 PROCEDURE — 85025 COMPLETE CBC W/AUTO DIFF WBC: CPT

## 2019-09-13 PROCEDURE — 77030012965 HC NDL HUBR BBMI -A

## 2019-09-13 PROCEDURE — 74011000250 HC RX REV CODE- 250: Performed by: REGISTERED NURSE

## 2019-09-13 PROCEDURE — 80053 COMPREHEN METABOLIC PANEL: CPT

## 2019-09-13 PROCEDURE — 74011250636 HC RX REV CODE- 250/636: Performed by: REGISTERED NURSE

## 2019-09-13 PROCEDURE — 96413 CHEMO IV INFUSION 1 HR: CPT

## 2019-09-13 PROCEDURE — 96375 TX/PRO/DX INJ NEW DRUG ADDON: CPT

## 2019-09-13 PROCEDURE — 36415 COLL VENOUS BLD VENIPUNCTURE: CPT

## 2019-09-13 RX ORDER — DIPHENHYDRAMINE HCL 25 MG
50 CAPSULE ORAL ONCE
Status: COMPLETED | OUTPATIENT
Start: 2019-09-13 | End: 2019-09-13

## 2019-09-13 RX ORDER — DIPHENHYDRAMINE HYDROCHLORIDE 50 MG/ML
50 INJECTION, SOLUTION INTRAMUSCULAR; INTRAVENOUS ONCE
Status: DISCONTINUED | OUTPATIENT
Start: 2019-09-13 | End: 2019-09-13

## 2019-09-13 RX ORDER — SODIUM CHLORIDE 0.9 % (FLUSH) 0.9 %
10 SYRINGE (ML) INJECTION AS NEEDED
Status: ACTIVE | OUTPATIENT
Start: 2019-09-13 | End: 2019-09-13

## 2019-09-13 RX ORDER — SODIUM CHLORIDE 9 MG/ML
10 INJECTION INTRAMUSCULAR; INTRAVENOUS; SUBCUTANEOUS AS NEEDED
Status: ACTIVE | OUTPATIENT
Start: 2019-09-13 | End: 2019-09-13

## 2019-09-13 RX ORDER — DEXAMETHASONE SODIUM PHOSPHATE 4 MG/ML
10 INJECTION, SOLUTION INTRA-ARTICULAR; INTRALESIONAL; INTRAMUSCULAR; INTRAVENOUS; SOFT TISSUE ONCE
Status: COMPLETED | OUTPATIENT
Start: 2019-09-13 | End: 2019-09-13

## 2019-09-13 RX ORDER — SODIUM CHLORIDE 9 MG/ML
25 INJECTION, SOLUTION INTRAVENOUS CONTINUOUS
Status: DISPENSED | OUTPATIENT
Start: 2019-09-13 | End: 2019-09-13

## 2019-09-13 RX ORDER — HEPARIN 100 UNIT/ML
300-500 SYRINGE INTRAVENOUS AS NEEDED
Status: ACTIVE | OUTPATIENT
Start: 2019-09-13 | End: 2019-09-13

## 2019-09-13 RX ADMIN — FAMOTIDINE 20 MG: 10 INJECTION INTRAVENOUS at 11:58

## 2019-09-13 RX ADMIN — Medication 500 UNITS: at 14:29

## 2019-09-13 RX ADMIN — Medication 10 ML: at 14:28

## 2019-09-13 RX ADMIN — PACLITAXEL 166 MG: 6 INJECTION, SOLUTION INTRAVENOUS at 13:22

## 2019-09-13 RX ADMIN — SODIUM CHLORIDE 25 ML/HR: 900 INJECTION, SOLUTION INTRAVENOUS at 11:50

## 2019-09-13 RX ADMIN — SODIUM CHLORIDE 10 ML: 9 INJECTION INTRAMUSCULAR; INTRAVENOUS; SUBCUTANEOUS at 10:10

## 2019-09-13 RX ADMIN — Medication 10 ML: at 10:15

## 2019-09-13 RX ADMIN — DEXAMETHASONE SODIUM PHOSPHATE 10 MG: 4 INJECTION, SOLUTION INTRAMUSCULAR; INTRAVENOUS at 12:01

## 2019-09-13 RX ADMIN — DIPHENHYDRAMINE HYDROCHLORIDE 50 MG: 25 CAPSULE ORAL at 11:55

## 2019-09-13 NOTE — PROGRESS NOTES
[facility] - Chemotherapy Progress Note    1000 Pt admit to [facility] for Taxol ambulatory in stable condition. Assessment completed. No new concerns voiced. Port accessed per protocol with positive blood return, labs drawn and sent for processing.     Chemotherapy Flowsheet 9/13/2019   Cycle C5   Date 9/13/2019   Drug / Regimen Taxol   Dosage -   Time Up -   Time Down -   Pre Meds given   Notes given         Visit Vitals  /83   Pulse 80   Temp 96.8 °F (36 °C)   Resp 18   Ht 5' 8\" (1.727 m)   Wt 91.4 kg (201 lb 6.4 oz)   BMI 30.62 kg/m²       Medications:  Medications Administered     0.9% sodium chloride infusion     Admin Date  09/13/2019 Action  New Bag Dose  25 mL/hr Rate  25 mL/hr Route  IntraVENous Administered By  Valentino Jeffery A          dexamethasone (DECADRON) 4 mg/mL injection 10 mg     Admin Date  09/13/2019 Action  Given Dose  10 mg Route  IntraVENous Administered By  Valentino Gil A          diphenhydrAMINE (BENADRYL) capsule 50 mg     Admin Date  09/13/2019 Action  Given Dose  50 mg Route  Oral Administered By  Valentino Jeffery A          famotidine (PF) (PEPCID) 20 mg in sodium chloride 0.9% 10 mL injection     Admin Date  09/13/2019 Action  Given Dose  20 mg Route  IntraVENous Administered By  Valentino Cannon A          heparin (porcine) pf 300-500 Units     Admin Date  09/13/2019 Action  Given Dose  500 Units Route  InterCATHeter Administered By  Valentino Jeffery A          PACLitaxel (TAXOL) 166 mg in 0.9% sodium chloride 250 mL, overfill volume 25 mL chemo infusion     Admin Date  09/13/2019 Action  New Bag Dose  166 mg Rate  302.7 mL/hr Route  IntraVENous Administered By  Valentino Cannon A          saline peripheral flush soln 10 mL     Admin Date  09/13/2019 Action  Given Dose  10 mL Route  InterCATHeter Administered By  Ivette Prior           Admin Date  09/13/2019 Action  Given Dose  10 mL Route  InterCATHeter Administered By  Valentino Cannon A          sodium chloride 0.9% injection 10 mL     Admin Date  09/13/2019 Action  Given Dose  10 mL Route  IntraVENous Administered By  Chichi eDlacruz                  3243 Pt tolerated treatment well. Port maintained positive blood return throughout treatment. Flushed, heparinized and de-accessed per protocol. D/c home ambulatory in no distress. Pt aware of next appointment scheduled for 9/20/19.

## 2019-09-13 NOTE — PROGRESS NOTES
Pharmacy Note- Chemotherapy Education    Marye Spurling is a  62 y. o.female  diagnosed with breast cancer here today for Cycle 5, Day 1 chemotherapy counseling. Ms. Dolores Ramirez is being treated with Weekly PACLItaxel. Provided education on PACLItaxel and premedications - dexamethasone, diphenhydramine and famotidine. Side effects of chemotherapy reviewed included s/s infection, anemia, appetite changes, thromboyctopenia, fatigue, hair loss/alopecia, bone pain, skin and nail changes, diarrhea/constipation, infusion reactions and peripheral neuropathy. Patient given ways to manage these side effects and when to contact office. [address] Handout of medications provided to patient. Ms. Dolores Ramirez verbalized understanding of the information presented and all of the patient's questions were answered.     Kenzie Do, PharmD, BCPS, BCOP

## 2019-09-13 NOTE — PROGRESS NOTES
[facility] at [facility]  [address], [address]  W: [phone]  F: [phone]    Reason for Visit:   Nahum Almeida is a 62 y.o. female who is seen for Left breast cancer- ER+ID+HER2 greg neg    Treatment History:   · 6/19: Mammogram:  Left breast mass with skin thickening, 2 suspicious nodes. Mass measures 1.4 x 0.9 x 1.3  · 6/19: MRI breast: Multicentric left breast carcinoma. Non-masslike enhancement extends from the nipple to the posterior third, involves all quadrants, and measures 106 x 44 x 79 mm. · 5/28/19: Biopsy breast- IDC % % HER 2 negative, skin biopsy benign , node biopsy mostly adipose tissue with atypical cells . BRCA1 and 2 negative. CT and bone scan negative for metastasis. Mammaprint with insufficient tissue for testing. Repeat biopsy of axillary node 6/20/19 positive for metastatic disease- repeat mammaprint - high risk  · 7/19/19: cycle 1 neoadjuvant of dd AC-T  · 9/12/19: US of breast shows increased malignant microcalcification in the left breast, etiology included tx related necrosis vs extension of disease. Decreased size in left axillary LN. History of Present Illness:   Patient is a 62 y.o. seen for L breast cancer. She felt a sensation in the shower about May 2019. She also noted a lump and  an inverted nipple. She had a physician friend look at this who directed her to mammogram and recommended she see Dr. Sierra Ruiz. This led to imaging and diagnosis as above. She comes in today for cycle #1 of neoadjuvant Taxol. She feels well today. She is worried about breast ultrasound she had a few days ago. Rare ETOH  She does not smoke.      Father had colon cancer    Past Medical History:   Diagnosis Date    Anxiety     Arthritis     At risk for sleep apnea     GAGAN 5     Basal cell carcinoma of skin     Breast cancer ([facility] [address] 2019    LEFT    History of seasonal allergies     Nausea & vomiting     Squamous cell skin cancer     Dr. Carla Carl Thyroid disease       Past Surgical History:   Procedure Laterality Date    HX COLONOSCOPY      HX KNEE ARTHROSCOPY Left     HX LUMBAR FUSION  1995    HX MOHS PROCEDURES      x 4 times     HX SHOULDER ARTHROSCOPY Right     HX THYROIDECTOMY  2014      Social History     Tobacco Use    Smoking status: Never Smoker    Smokeless tobacco: Never Used   Substance Use Topics    Alcohol use: Yes      Family History   Problem Relation Age of Onset    Arthritis-osteo Mother     Cancer Father         Colon Cancer     Current Outpatient Medications   Medication Sig    loratadine (CLARITIN) 10 mg tablet Take 10 mg by mouth.  lidocaine-prilocaine (EMLA) topical cream Apply  to affected area as needed for Pain.  naloxone (NARCAN) 2 mg/actuation spry Use 1 spray intranasally, then discard. Repeat with new spray every 2 min as needed for opioid overdose symptoms, alternating nostrils.  ALPRAZolam (XANAX) 0.5 mg tablet Take 1 Tab by mouth two (2) times daily as needed for Anxiety. Max Daily Amount: 1 mg.  dexAMETHasone (DECADRON) 4 mg tablet Take 2 tabs (8mg) on days 2 & 3 of chemotherapy    sertraline (ZOLOFT) 100 mg tablet Take 0.5 Tabs by mouth daily.  traZODone (DESYREL) 50 mg tablet Take 1 Tab by mouth nightly. (Patient taking differently: Take 50 mg by mouth as needed.)    cholecalciferol (VITAMIN D3) 1,000 unit cap Take 5,000 Units by mouth daily.  liothyronine (CYTOMEL) 5 mcg tablet Take 5 mcg by mouth daily.  levothyroxine (SYNTHROID) 50 mcg tablet Take 50 mcg by mouth Daily (before breakfast).      Current Facility-Administered Medications   Medication Dose Route Frequency    saline peripheral flush soln 10 mL  10 mL InterCATHeter PRN    sodium chloride 0.9% injection 10 mL  10 mL IntraVENous PRN    heparin (porcine) pf 300-500 Units  300-500 Units InterCATHeter PRN    0.9% sodium chloride infusion  25 mL/hr IntraVENous CONTINUOUS      Allergies Allergen Reactions    Augmentin [Amoxicillin-Pot Clavulanate] Unknown (comments)        Review of Systems: A complete review of systems was obtained, negative except as described above. Physical Exam:     Visit Vitals  /76   Pulse 80   Temp 96.8 °F (36 °C)   Resp 18   Ht 5' 8\" (1.727 m)   Wt 201 lb 6.4 oz (91.4 kg)   BMI 30.62 kg/m²     ECOG PS: 1  General: No distress  Eyes: PERRLA, anicteric sclerae  HENT: Atraumatic, OP clear  Neck: Supple; PAC looks good  Lymphatic: No cervical, supraclavicular, or inguinal adenopathy  Breasts: deferred today  Respiratory: CTAB, normal respiratory effort  CV: Normal rate, regular rhythm, no murmurs, no peripheral edema  GI: Soft, nontender, nondistended, no masses, no hepatomegaly, no splenomegaly  MS: Normal gait and station. Digits without clubbing or cyanosis. Skin: No rashes, ecchymoses, or petechiae. Normal temperature, turgor, and texture. Psych: Alert, oriented, appropriate affect, normal judgment/insight    Results:     Lab Results   Component Value Date/Time    WBC 6.0 09/13/2019 10:15 AM    HGB 10.7 (L) 09/13/2019 10:15 AM    HCT 32.2 (L) 09/13/2019 10:15 AM    PLATELET 485 38/46/3286 10:15 AM    .9 (H) 09/13/2019 10:15 AM    ABS. NEUTROPHILS 3.5 09/13/2019 10:15 AM     Lab Results   Component Value Date/Time    Sodium 140 09/13/2019 10:15 AM    Potassium 4.0 09/13/2019 10:15 AM    Chloride 105 09/13/2019 10:15 AM    CO2 29 09/13/2019 10:15 AM    Glucose 86 09/13/2019 10:15 AM    BUN 9 09/13/2019 10:15 AM    Creatinine 0.63 09/13/2019 10:15 AM    GFR est AA >60 09/13/2019 10:15 AM    GFR est non-AA >60 09/13/2019 10:15 AM    Calcium 8.6 09/13/2019 10:15 AM     Lab Results   Component Value Date/Time    Bilirubin, total 0.2 09/13/2019 10:15 AM    ALT (SGPT) 21 09/13/2019 10:15 AM    AST (SGOT) 12 (L) 09/13/2019 10:15 AM    Alk.  phosphatase 114 09/13/2019 10:15 AM    Protein, total 6.7 09/13/2019 10:15 AM    Albumin 3.5 09/13/2019 10:15 AM Globulin 3.2 09/13/2019 10:15 AM       Records reviewed and summarized above. Pathology report(s) reviewed above. 5/28/19    FINAL PATHOLOGIC DIAGNOSIS   1. Breast, left, core biopsy: In situ and invasive ductal carcinoma   Invasive carcinoma is nuclear grade 2 and measures up to 0.6 cm in greatest dimension on slide   Ductal carcinoma in situ is nuclear grade 3 with central necrosis   2. Soft tissue, left axilla, core biopsy: Adipose tissue with rare detached atypical cells   No lymph node tissue identified   See comment     Radiology report(s) reviewed above. US breast 9/12/19:  IMPRESSION:  1. Increased malignant microcalcifications in the left breast. This could  represent treatment-related necrosis or extension of disease. 2. Extensive carcinoma seen on prior MRI is largely mammographically occult. 3. Decreased size of a metastatic left axillary lymph node with  microcalcifications. 4. BI-RADS Assessment Category 6: Known biopsy proven malignancy. Assessment:   1) Left breast Invasive ductal carcinoma    [id number]  GRADE 2  % [place] 20% HER2 greg equivocal - FISH could not be done  Repeat biopsy of breast mass 6/24/19 - HER2 gerg negative, mamma print indicates she has genomically high risk disease    Due to extensive surrounding breast changes a mastectomy is recommended by Dr. Monie Lam    Today is cycle #1 of weekly Taxol. She had a breast ultrasound after completion of dd-AC which was reviewed and shows increase in size of microcalcifications in the left breast which could represent treatment related necrosis vs extension of disease. Will review this with Dr. Monie Lam and plan to repeat US after week #6 of Taxol.      We will discuss endocrine therapy/ post mastectomy RT at a later date    Baseline ECHO reviewed and shows EF 61-65%    2) Hypothyroidism    3) Obesity    Plan:     · Proceed today with weekly #1 of weekly Taxol at 80mg/m2 x 12   · Labs to include CBC with diff and CMP prior to each infusion  · Premeds to include  dexamethasone, benadryl, pepsid   · Compazine q 6 hours prn  · US of breast after week #6    RTC in 1 week    I appreciate the opportunity to participate in Ms. Rachelle Garcia's care.     Signed By: Sabiha Darling NP

## 2019-09-20 ENCOUNTER — HOSPITAL ENCOUNTER (OUTPATIENT)
Dept: INFUSION THERAPY | Age: 54
Discharge: HOME | End: 2019-09-20
Payer: COMMERCIAL

## 2019-09-20 ENCOUNTER — OFFICE VISIT (OUTPATIENT)
Dept: ONCOLOGY | Age: 54
End: 2019-09-20

## 2019-09-20 VITALS
OXYGEN SATURATION: 99 % | DIASTOLIC BLOOD PRESSURE: 67 MMHG | TEMPERATURE: 97.3 F | BODY MASS INDEX: 31.07 KG/M2 | WEIGHT: 205 LBS | SYSTOLIC BLOOD PRESSURE: 108 MMHG | HEIGHT: 68 IN | HEART RATE: 74 BPM

## 2019-09-20 VITALS
WEIGHT: 204.8 LBS | DIASTOLIC BLOOD PRESSURE: 68 MMHG | HEIGHT: 68 IN | SYSTOLIC BLOOD PRESSURE: 100 MMHG | HEART RATE: 70 BPM | TEMPERATURE: 97.8 F | RESPIRATION RATE: 16 BRPM | BODY MASS INDEX: 31.04 KG/M2

## 2019-09-20 DIAGNOSIS — C50.412 MALIGNANT NEOPLASM OF UPPER-OUTER QUADRANT OF LEFT BREAST IN FEMALE, ESTROGEN RECEPTOR POSITIVE (HCC): Primary | ICD-10-CM

## 2019-09-20 DIAGNOSIS — Z17.0 MALIGNANT NEOPLASM OF UPPER-OUTER QUADRANT OF LEFT BREAST IN FEMALE, ESTROGEN RECEPTOR POSITIVE (HCC): Primary | ICD-10-CM

## 2019-09-20 DIAGNOSIS — E66.09 CLASS 1 OBESITY DUE TO EXCESS CALORIES WITHOUT SERIOUS COMORBIDITY WITH BODY MASS INDEX (BMI) OF 30.0 TO 30.9 IN ADULT: ICD-10-CM

## 2019-09-20 LAB
BASOPHILS # BLD: 0 K/UL (ref 0–0.1)
BASOPHILS NFR BLD: 0 % (ref 0–1)
DIFFERENTIAL METHOD BLD: ABNORMAL
EOSINOPHIL # BLD: 0.1 K/UL (ref 0–0.4)
EOSINOPHIL NFR BLD: 2 % (ref 0–7)
ERYTHROCYTE [DISTWIDTH] IN BLOOD BY AUTOMATED COUNT: 14.3 % (ref 11.5–14.5)
HCT VFR BLD AUTO: 28.4 % (ref 35–47)
HGB BLD-MCNC: 9.6 G/DL (ref 11.5–16)
IMM GRANULOCYTES # BLD AUTO: 0 K/UL
IMM GRANULOCYTES NFR BLD AUTO: 0 %
LYMPHOCYTES # BLD: 1.3 K/UL (ref 0.8–3.5)
LYMPHOCYTES NFR BLD: 29 % (ref 12–49)
MCH RBC QN AUTO: 34.7 PG (ref 26–34)
MCHC RBC AUTO-ENTMCNC: 33.8 G/DL (ref 30–36.5)
MCV RBC AUTO: 102.5 FL (ref 80–99)
MONOCYTES # BLD: 0.4 K/UL (ref 0–1)
MONOCYTES NFR BLD: 8 % (ref 5–13)
NEUTS SEG # BLD: 2.6 K/UL (ref 1.8–8)
NEUTS SEG NFR BLD: 61 % (ref 32–75)
NRBC # BLD: 0 K/UL (ref 0–0.01)
NRBC BLD-RTO: 0 PER 100 WBC
PLATELET # BLD AUTO: 282 K/UL (ref 150–400)
PMV BLD AUTO: 9.6 FL (ref 8.9–12.9)
RBC # BLD AUTO: 2.77 M/UL (ref 3.8–5.2)
RBC MORPH BLD: ABNORMAL
RBC MORPH BLD: ABNORMAL
WBC # BLD AUTO: 4.4 K/UL (ref 3.6–11)

## 2019-09-20 PROCEDURE — 36415 COLL VENOUS BLD VENIPUNCTURE: CPT

## 2019-09-20 PROCEDURE — 74011000250 HC RX REV CODE- 250: Performed by: REGISTERED NURSE

## 2019-09-20 PROCEDURE — 74011250636 HC RX REV CODE- 250/636: Performed by: REGISTERED NURSE

## 2019-09-20 PROCEDURE — 96413 CHEMO IV INFUSION 1 HR: CPT

## 2019-09-20 PROCEDURE — 96375 TX/PRO/DX INJ NEW DRUG ADDON: CPT

## 2019-09-20 PROCEDURE — 77030012965 HC NDL HUBR BBMI -A

## 2019-09-20 PROCEDURE — 85025 COMPLETE CBC W/AUTO DIFF WBC: CPT

## 2019-09-20 PROCEDURE — 74011250637 HC RX REV CODE- 250/637: Performed by: REGISTERED NURSE

## 2019-09-20 RX ORDER — DIPHENHYDRAMINE HYDROCHLORIDE 50 MG/ML
50 INJECTION, SOLUTION INTRAMUSCULAR; INTRAVENOUS ONCE
Status: DISCONTINUED | OUTPATIENT
Start: 2019-09-20 | End: 2019-09-20

## 2019-09-20 RX ORDER — SODIUM CHLORIDE 9 MG/ML
10 INJECTION INTRAMUSCULAR; INTRAVENOUS; SUBCUTANEOUS AS NEEDED
Status: ACTIVE | OUTPATIENT
Start: 2019-09-20 | End: 2019-09-20

## 2019-09-20 RX ORDER — DIPHENHYDRAMINE HCL 25 MG
50 CAPSULE ORAL ONCE
Status: COMPLETED | OUTPATIENT
Start: 2019-09-20 | End: 2019-09-20

## 2019-09-20 RX ORDER — SODIUM CHLORIDE 0.9 % (FLUSH) 0.9 %
10 SYRINGE (ML) INJECTION AS NEEDED
Status: ACTIVE | OUTPATIENT
Start: 2019-09-20 | End: 2019-09-20

## 2019-09-20 RX ORDER — HEPARIN 100 UNIT/ML
300-500 SYRINGE INTRAVENOUS AS NEEDED
Status: ACTIVE | OUTPATIENT
Start: 2019-09-20 | End: 2019-09-20

## 2019-09-20 RX ORDER — SODIUM CHLORIDE 9 MG/ML
25 INJECTION, SOLUTION INTRAVENOUS CONTINUOUS
Status: DISPENSED | OUTPATIENT
Start: 2019-09-20 | End: 2019-09-20

## 2019-09-20 RX ORDER — DEXAMETHASONE SODIUM PHOSPHATE 4 MG/ML
10 INJECTION, SOLUTION INTRA-ARTICULAR; INTRALESIONAL; INTRAMUSCULAR; INTRAVENOUS; SOFT TISSUE ONCE
Status: COMPLETED | OUTPATIENT
Start: 2019-09-20 | End: 2019-09-20

## 2019-09-20 RX ADMIN — Medication 10 ML: at 14:49

## 2019-09-20 RX ADMIN — SODIUM CHLORIDE 25 ML/HR: 900 INJECTION, SOLUTION INTRAVENOUS at 12:13

## 2019-09-20 RX ADMIN — SODIUM CHLORIDE 10 ML: 9 INJECTION INTRAMUSCULAR; INTRAVENOUS; SUBCUTANEOUS at 12:11

## 2019-09-20 RX ADMIN — PACLITAXEL 166 MG: 6 INJECTION, SOLUTION INTRAVENOUS at 13:10

## 2019-09-20 RX ADMIN — Medication 10 ML: at 12:13

## 2019-09-20 RX ADMIN — DEXAMETHASONE SODIUM PHOSPHATE 10 MG: 4 INJECTION, SOLUTION INTRAMUSCULAR; INTRAVENOUS at 12:15

## 2019-09-20 RX ADMIN — SODIUM CHLORIDE 20 MG: 9 INJECTION INTRAMUSCULAR; INTRAVENOUS; SUBCUTANEOUS at 12:11

## 2019-09-20 RX ADMIN — Medication 500 UNITS: at 14:50

## 2019-09-20 RX ADMIN — DIPHENHYDRAMINE HYDROCHLORIDE 50 MG: 25 CAPSULE ORAL at 12:11

## 2019-09-20 NOTE — PROGRESS NOTES
Ramos Larkin is a 62 y.o. female  Chief Complaint   Patient presents with    Breast Cancer    Follow-up     1. Have you been to the ER, urgent care clinic since your last visit? Hospitalized since your last visit?no     2. Have you seen or consulted any other health care providers outside of the [address] [place] [address] since your last visit?   Theron     Rash on chest for over a week

## 2019-09-20 NOTE — PROGRESS NOTES
[facility] Note:  1000Pt arrived at [facility] ambulatory and in no distress for *C2   Assessment stable, no new complaints voiced. Again, has rash on chest.  Went to MD appt. Medications received:  Benadryl  Pepcid  Decadron  Taxol     Tolerated treatment well, no adverse reaction noted. D/Cd from [facility] ambulatory and in no distress accompanied by family and friends. .  Next appt 9/27  1000  Visit Vitals  /71   Pulse 85   Temp 98.2 °F (36.8 °C)   Resp 18   Ht 5' 8\" (1.727 m)   Wt 92.9 kg (204 lb 12.8 oz)   BMI 31.14 kg/m²     Recent Results (from the past 12 hour(s))   CBC WITH AUTOMATED DIFF    Collection Time: 09/20/19 10:16 AM   Result Value Ref Range    WBC 4.4 3.6 - 11.0 K/uL    RBC 2.77 (L) 3.80 - 5.20 M/uL    HGB 9.6 (L) 11.5 - 16.0 g/dL    HCT 28.4 (L) 35.0 - 47.0 %    .5 (H) 80.0 - 99.0 FL    MCH 34.7 (H) 26.0 - 34.0 PG    MCHC 33.8 30.0 - 36.5 g/dL    RDW 14.3 11.5 - 14.5 %    PLATELET 471 657 - 382 K/uL    MPV 9.6 8.9 - 12.9 FL    NRBC 0.0 0  WBC    ABSOLUTE NRBC 0.00 0.00 - 0.01 K/uL    NEUTROPHILS 61 32 - 75 %    LYMPHOCYTES 29 12 - 49 %    MONOCYTES 8 5 - 13 %    EOSINOPHILS 2 0 - 7 %    BASOPHILS 0 0 - 1 %    IMMATURE GRANULOCYTES 0 %    ABS. NEUTROPHILS 2.6 1.8 - 8.0 K/UL    ABS. LYMPHOCYTES 1.3 0.8 - 3.5 K/UL    ABS. MONOCYTES 0.4 0.0 - 1.0 K/UL    ABS. EOSINOPHILS 0.1 0.0 - 0.4 K/UL    ABS. BASOPHILS 0.0 0.0 - 0.1 K/UL    ABS. IMM.  GRANS. 0.0 K/UL    DF MANUAL      RBC COMMENTS ANISOCYTOSIS  1+        RBC COMMENTS MACROCYTOSIS  1+

## 2019-09-20 NOTE — PROGRESS NOTES
[facility] at [place] [address] [address], [address]  W: [phone]  F: [phone]    Reason for Visit:   Pat Joseph is a 62 y.o. female who is seen for Left breast cancer- ER+KY+HER2 greg neg    Treatment History:   · 6/19: Mammogram:  Left breast mass with skin thickening, 2 suspicious nodes. Mass measures 1.4 x 0.9 x 1.3  · 6/19: MRI breast: Multicentric left breast carcinoma. Non-masslike enhancement extends from the nipple to the posterior third, involves all quadrants, and measures 106 x 44 x 79 mm. · 5/28/19: Biopsy breast- IDC % % HER 2 negative, skin biopsy benign , node biopsy mostly adipose tissue with atypical cells . BRCA1 and 2 negative. CT and bone scan negative for metastasis. Mammaprint with insufficient tissue for testing. Repeat biopsy of axillary node 6/20/19 positive for metastatic disease- repeat mammaprint - high risk  · 7/19/19: cycle 1 neoadjuvant of dd AC-T  · 9/12/19: US of breast shows increased malignant microcalcification in the left breast, etiology included tx related necrosis vs extension of disease. Decreased size in left axillary LN. History of Present Illness:   Patient is a 62 y.o. seen for L breast cancer. She felt a sensation in the shower about May 2019. She also noted a lump and  an inverted nipple. She had a physician friend look at this who directed her to mammogram and recommended she see Dr. Nathalie Cohen. This led to imaging and diagnosis as above. She comes in today for cycle #2 of neoadjuvant Taxol. She feels well today. She denies numbness/tingling in fingers or toes. Denies nausea/vomiting or diarrhea/constipation. Rare ETOH  She does not smoke.      Father had colon cancer    Past Medical History:   Diagnosis Date    Anxiety     Arthritis     At risk for sleep apnea     GAGAN 5     Basal cell carcinoma of skin     Breast cancer ([facility] [address] 2019    LEFT    History of seasonal allergies  Nausea & vomiting     Squamous cell skin cancer     Dr. Bg Pierre Thyroid disease       Past Surgical History:   Procedure Laterality Date    HX COLONOSCOPY      HX KNEE ARTHROSCOPY Left     HX LUMBAR FUSION  1995    HX MOHS PROCEDURES      x 4 times     HX SHOULDER ARTHROSCOPY Right     HX THYROIDECTOMY  2014      Social History     Tobacco Use    Smoking status: Never Smoker    Smokeless tobacco: Never Used   Substance Use Topics    Alcohol use: Yes      Family History   Problem Relation Age of Onset    Arthritis-osteo Mother     Cancer Father         Colon Cancer     Current Outpatient Medications   Medication Sig    loratadine (CLARITIN) 10 mg tablet Take 10 mg by mouth.  lidocaine-prilocaine (EMLA) topical cream Apply  to affected area as needed for Pain.  naloxone (NARCAN) 2 mg/actuation spry Use 1 spray intranasally, then discard. Repeat with new spray every 2 min as needed for opioid overdose symptoms, alternating nostrils.  ALPRAZolam (XANAX) 0.5 mg tablet Take 1 Tab by mouth two (2) times daily as needed for Anxiety. Max Daily Amount: 1 mg.  dexAMETHasone (DECADRON) 4 mg tablet Take 2 tabs (8mg) on days 2 & 3 of chemotherapy    sertraline (ZOLOFT) 100 mg tablet Take 0.5 Tabs by mouth daily.  traZODone (DESYREL) 50 mg tablet Take 1 Tab by mouth nightly. (Patient taking differently: Take 50 mg by mouth as needed.)    cholecalciferol (VITAMIN D3) 1,000 unit cap Take 5,000 Units by mouth daily.  liothyronine (CYTOMEL) 5 mcg tablet Take 5 mcg by mouth daily.  levothyroxine (SYNTHROID) 50 mcg tablet Take 50 mcg by mouth Daily (before breakfast). No current facility-administered medications for this visit. Allergies   Allergen Reactions    Augmentin [Amoxicillin-Pot Clavulanate] Unknown (comments)        Review of Systems: A complete review of systems was obtained, negative except as described above.     Physical Exam:     Visit Vitals  /67   Pulse 74 Temp 97.3 °F (36.3 °C)   Ht 5' 8\" (1.727 m)   Wt 205 lb (93 kg)   SpO2 99%   BMI 31.17 kg/m²     ECOG PS: 1  General: No distress  Eyes: PERRLA, anicteric sclerae  HENT: Atraumatic, OP clear  Neck: Supple; PAC looks good  Lymphatic: No cervical, sLeft breast no longer erythematous, skin normal texture, nipple is not inverted, LUOQ norbert difficult to palpate but there- upraclavicular, or inguinal adenopathy  Breasts:   Respiratory: CTAB, normal respiratory effort  CV: Normal rate, regular rhythm, no murmurs, no peripheral edema  GI: Soft, nontender, nondistended, no masses, no hepatomegaly, no splenomegaly  MS: Normal gait and station. Digits without clubbing or cyanosis. Skin: No rashes, ecchymoses, or petechiae. Normal temperature, turgor, and texture. Psych: Alert, oriented, appropriate affect, normal judgment/insight    Results:     Lab Results   Component Value Date/Time    WBC 4.4 09/20/2019 10:16 AM    HGB 9.6 (L) 09/20/2019 10:16 AM    HCT 28.4 (L) 09/20/2019 10:16 AM    PLATELET 746 86/25/7307 10:16 AM    .5 (H) 09/20/2019 10:16 AM    ABS. NEUTROPHILS 2.6 09/20/2019 10:16 AM     Lab Results   Component Value Date/Time    Sodium 140 09/13/2019 10:15 AM    Potassium 4.0 09/13/2019 10:15 AM    Chloride 105 09/13/2019 10:15 AM    CO2 29 09/13/2019 10:15 AM    Glucose 86 09/13/2019 10:15 AM    BUN 9 09/13/2019 10:15 AM    Creatinine 0.63 09/13/2019 10:15 AM    GFR est AA >60 09/13/2019 10:15 AM    GFR est non-AA >60 09/13/2019 10:15 AM    Calcium 8.6 09/13/2019 10:15 AM     Lab Results   Component Value Date/Time    Bilirubin, total 0.2 09/13/2019 10:15 AM    ALT (SGPT) 21 09/13/2019 10:15 AM    AST (SGOT) 12 (L) 09/13/2019 10:15 AM    Alk. phosphatase 114 09/13/2019 10:15 AM    Protein, total 6.7 09/13/2019 10:15 AM    Albumin 3.5 09/13/2019 10:15 AM    Globulin 3.2 09/13/2019 10:15 AM       Records reviewed and summarized above. Pathology report(s) reviewed above.     5/28/19    FINAL PATHOLOGIC DIAGNOSIS   1. Breast, left, core biopsy: In situ and invasive ductal carcinoma   Invasive carcinoma is nuclear grade 2 and measures up to 0.6 cm in greatest dimension on slide   Ductal carcinoma in situ is nuclear grade 3 with central necrosis   2. Soft tissue, left axilla, core biopsy: Adipose tissue with rare detached atypical cells   No lymph node tissue identified   See comment     Radiology report(s) reviewed above. US breast 9/12/19:  IMPRESSION:  1. Increased malignant microcalcifications in the left breast. This could  represent treatment-related necrosis or extension of disease. 2. Extensive carcinoma seen on prior MRI is largely mammographically occult. 3. Decreased size of a metastatic left axillary lymph node with  microcalcifications. 4. BI-RADS Assessment Category 6: Known biopsy proven malignancy. Assessment:   1) Left breast Invasive ductal carcinoma    [id number]  GRADE 2  % [place] 20% HER2 greg equivocal - FISH could not be done  Repeat biopsy of breast mass 6/24/19 - HER2 greg negative, mamma print indicates she has genomically high risk disease    Due to extensive surrounding breast changes a mastectomy is recommended by Dr. Krishna Zhu    Today is cycle #2 of weekly Taxol. She had a breast ultrasound after completion of dd-AC which was reviewed and shows increase in size of microcalcifications in the left breast which could represent treatment related necrosis vs extension of disease. This was reviewed with Dr. Krishna Zhu. On exam there is clear response with resolution of skin thickening, slow eversion of nipple, and smaller mass.  Will  plan to repeat US after week #6 of Taxol but will continue with NAC      We will discuss endocrine therapy/ post mastectomy RT at a later date    Baseline ECHO reviewed and shows EF 61-65%    2) Hypothyroidism    3) Obesity    Plan:     · Proceed today with weekly #2 of weekly Taxol at 80mg/m2 x 12   · Labs to include CBC with diff and CMP prior to each infusion  · Premeds to include  dexamethasone, benadryl, pepsid   · Compazine q 6 hours prn  · US of breast after week #6    RTC in 2 weeks     I appreciate the opportunity to participate in MsWendy Rachelle DUKES Jose's care.     Signed By: Leyla Gonzalez MD

## 2019-09-20 NOTE — LETTER
NOTIFICATION 
 
9/20/2019 10:46 AM 
 
Ms. Angeles Urias [address] [place] [id number] To Whom It May Concern: 
 
Angeles Urias is currently under the care of [address] [place] [address]. Patient is currently receiving treatment weekly and will be unable to attend jury duty due to treatment schedule and monitoring/treating symptom exacerbation. If there are questions or concerns please have the patient contact our office. Sincerely, Fanta Shi NP

## 2019-09-27 ENCOUNTER — HOSPITAL ENCOUNTER (OUTPATIENT)
Dept: INFUSION THERAPY | Age: 54
Discharge: HOME | End: 2019-09-27
Payer: COMMERCIAL

## 2019-09-27 VITALS
DIASTOLIC BLOOD PRESSURE: 79 MMHG | RESPIRATION RATE: 18 BRPM | TEMPERATURE: 97.9 F | HEART RATE: 79 BPM | HEIGHT: 68 IN | BODY MASS INDEX: 30.58 KG/M2 | SYSTOLIC BLOOD PRESSURE: 124 MMHG | WEIGHT: 201.8 LBS

## 2019-09-27 DIAGNOSIS — C50.412 MALIGNANT NEOPLASM OF UPPER-OUTER QUADRANT OF LEFT BREAST IN FEMALE, ESTROGEN RECEPTOR POSITIVE (HCC): Primary | ICD-10-CM

## 2019-09-27 DIAGNOSIS — Z17.0 MALIGNANT NEOPLASM OF UPPER-OUTER QUADRANT OF LEFT BREAST IN FEMALE, ESTROGEN RECEPTOR POSITIVE (HCC): Primary | ICD-10-CM

## 2019-09-27 LAB
BASOPHILS # BLD: 0 K/UL (ref 0–0.1)
BASOPHILS NFR BLD: 1 % (ref 0–1)
DIFFERENTIAL METHOD BLD: ABNORMAL
EOSINOPHIL # BLD: 0.1 K/UL (ref 0–0.4)
EOSINOPHIL NFR BLD: 2 % (ref 0–7)
ERYTHROCYTE [DISTWIDTH] IN BLOOD BY AUTOMATED COUNT: 14.3 % (ref 11.5–14.5)
HCT VFR BLD AUTO: 30.1 % (ref 35–47)
HGB BLD-MCNC: 10 G/DL (ref 11.5–16)
IMM GRANULOCYTES # BLD AUTO: 0 K/UL
IMM GRANULOCYTES NFR BLD AUTO: 0 %
LYMPHOCYTES # BLD: 0.9 K/UL (ref 0.8–3.5)
LYMPHOCYTES NFR BLD: 22 % (ref 12–49)
MCH RBC QN AUTO: 34.2 PG (ref 26–34)
MCHC RBC AUTO-ENTMCNC: 33.2 G/DL (ref 30–36.5)
MCV RBC AUTO: 103.1 FL (ref 80–99)
MONOCYTES # BLD: 0.5 K/UL (ref 0–1)
MONOCYTES NFR BLD: 12 % (ref 5–13)
NEUTS SEG # BLD: 2.5 K/UL (ref 1.8–8)
NEUTS SEG NFR BLD: 63 % (ref 32–75)
NRBC # BLD: 0 K/UL (ref 0–0.01)
NRBC BLD-RTO: 0 PER 100 WBC
PLATELET # BLD AUTO: 270 K/UL (ref 150–400)
PMV BLD AUTO: 9.3 FL (ref 8.9–12.9)
RBC # BLD AUTO: 2.92 M/UL (ref 3.8–5.2)
RBC MORPH BLD: ABNORMAL
WBC # BLD AUTO: 4 K/UL (ref 3.6–11)

## 2019-09-27 PROCEDURE — 85025 COMPLETE CBC W/AUTO DIFF WBC: CPT

## 2019-09-27 PROCEDURE — 36415 COLL VENOUS BLD VENIPUNCTURE: CPT

## 2019-09-27 PROCEDURE — 96413 CHEMO IV INFUSION 1 HR: CPT

## 2019-09-27 PROCEDURE — 74011250636 HC RX REV CODE- 250/636: Performed by: REGISTERED NURSE

## 2019-09-27 PROCEDURE — 96375 TX/PRO/DX INJ NEW DRUG ADDON: CPT

## 2019-09-27 PROCEDURE — 74011250637 HC RX REV CODE- 250/637: Performed by: REGISTERED NURSE

## 2019-09-27 PROCEDURE — 74011000250 HC RX REV CODE- 250: Performed by: REGISTERED NURSE

## 2019-09-27 PROCEDURE — 77030012965 HC NDL HUBR BBMI -A

## 2019-09-27 RX ORDER — HEPARIN 100 UNIT/ML
300-500 SYRINGE INTRAVENOUS AS NEEDED
Status: DISCONTINUED | OUTPATIENT
Start: 2019-09-27 | End: 2019-09-28 | Stop reason: HOSPADM

## 2019-09-27 RX ORDER — SODIUM CHLORIDE 9 MG/ML
10 INJECTION INTRAMUSCULAR; INTRAVENOUS; SUBCUTANEOUS AS NEEDED
Status: DISCONTINUED | OUTPATIENT
Start: 2019-09-27 | End: 2019-09-28 | Stop reason: HOSPADM

## 2019-09-27 RX ORDER — SODIUM CHLORIDE 9 MG/ML
25 INJECTION, SOLUTION INTRAVENOUS CONTINUOUS
Status: DISCONTINUED | OUTPATIENT
Start: 2019-09-27 | End: 2019-09-28 | Stop reason: HOSPADM

## 2019-09-27 RX ORDER — DIPHENHYDRAMINE HCL 25 MG
50 CAPSULE ORAL ONCE
Status: COMPLETED | OUTPATIENT
Start: 2019-09-27 | End: 2019-09-27

## 2019-09-27 RX ORDER — SODIUM CHLORIDE 0.9 % (FLUSH) 0.9 %
10 SYRINGE (ML) INJECTION AS NEEDED
Status: DISCONTINUED | OUTPATIENT
Start: 2019-09-27 | End: 2019-09-28 | Stop reason: HOSPADM

## 2019-09-27 RX ORDER — DIPHENHYDRAMINE HYDROCHLORIDE 50 MG/ML
50 INJECTION, SOLUTION INTRAMUSCULAR; INTRAVENOUS ONCE
Status: DISCONTINUED | OUTPATIENT
Start: 2019-09-27 | End: 2019-09-27

## 2019-09-27 RX ORDER — DEXAMETHASONE SODIUM PHOSPHATE 4 MG/ML
10 INJECTION, SOLUTION INTRA-ARTICULAR; INTRALESIONAL; INTRAMUSCULAR; INTRAVENOUS; SOFT TISSUE ONCE
Status: COMPLETED | OUTPATIENT
Start: 2019-09-27 | End: 2019-09-27

## 2019-09-27 RX ADMIN — DIPHENHYDRAMINE HYDROCHLORIDE 50 MG: 25 CAPSULE ORAL at 12:23

## 2019-09-27 RX ADMIN — PACLITAXEL 166 MG: 6 INJECTION, SOLUTION INTRAVENOUS at 13:23

## 2019-09-27 RX ADMIN — Medication 500 UNITS: at 14:33

## 2019-09-27 RX ADMIN — Medication 10 ML: at 14:33

## 2019-09-27 RX ADMIN — SODIUM CHLORIDE 25 ML/HR: 900 INJECTION, SOLUTION INTRAVENOUS at 13:22

## 2019-09-27 RX ADMIN — SODIUM CHLORIDE 20 MG: 9 INJECTION INTRAMUSCULAR; INTRAVENOUS; SUBCUTANEOUS at 12:24

## 2019-09-27 RX ADMIN — DEXAMETHASONE SODIUM PHOSPHATE 10 MG: 4 INJECTION, SOLUTION INTRAMUSCULAR; INTRAVENOUS at 12:23

## 2019-09-27 NOTE — PROGRESS NOTES
[facility] Progress Note    Date: 2019    Name: Inder Hardy    MRN: [id number]         : 1965    Ms. Cha Warner Arrived 1000 and in no distress for cycle 5 WEEKLY PACLITAXEL . Assessment was completed, no acute issues at this time, no new complaints voiced. Port accessed with positive blood return. Labs drawn and sent for processing. Port flushed and [place] accessed at the end of treatment. Chemotherapy Flowsheet 2019   Cycle C5   Date 2019   Drug / Regimen TAXOL   Dosage -   Time Up -   Time Down -   Pre Meds -   Notes -     Medications Administered     0.9% sodium chloride infusion     Admin Date  2019 Action  New Bag Dose  25 mL/hr Rate  25 mL/hr Route  IntraVENous Administered By  Jeff Le RN          dexamethasone (DECADRON) 4 mg/mL injection 10 mg     Admin Date  2019 Action  Given Dose  10 mg Route  IntraVENous Administered By  Jeff Le RN          diphenhydrAMINE (BENADRYL) capsule 50 mg     Admin Date  2019 Action  Given Dose  50 mg Route  Oral Administered By  Jeff Le RN          famotidine (PF) (PEPCID) 20 mg in sodium chloride 0.9% 10 mL injection     Admin Date  2019 Action  Given Dose  20 mg Route  IntraVENous Administered By  Jeff Le RN          heparin (porcine) pf 300-500 Units     Admin Date  2019 Action  Given Dose  500 Units Route  InterCATHeter Administered By  Jeff Le RN          PACLitaxel (TAXOL) 166 mg in 0.9% sodium chloride 250 mL, overfill volume 25 mL chemo infusion     Admin Date  2019 Action  New Bag Dose  166 mg Rate  302.7 mL/hr Route  IntraVENous Administered By  Jeff Le RN          saline peripheral flush soln 10 mL     Admin Date  2019 Action  Given Dose  10 mL Route  InterCATHeter Administered By  Jeff Le RN                Ms. Garcia's vitals were reviewed.   Patient Vitals for the past 12 hrs:   Temp Pulse Resp BP   19 1426  79  124/79   09/27/19 1010 97.9 °F (36.6 °C) 77 18 112/71         Lab results were obtained and reviewed. Recent Results (from the past 12 hour(s))   CBC WITH AUTOMATED DIFF    Collection Time: 09/27/19 10:23 AM   Result Value Ref Range    WBC 4.0 3.6 - 11.0 K/uL    RBC 2.92 (L) 3.80 - 5.20 M/uL    HGB 10.0 (L) 11.5 - 16.0 g/dL    HCT 30.1 (L) 35.0 - 47.0 %    .1 (H) 80.0 - 99.0 FL    MCH 34.2 (H) 26.0 - 34.0 PG    MCHC 33.2 30.0 - 36.5 g/dL    RDW 14.3 11.5 - 14.5 %    PLATELET 783 209 - 646 K/uL    MPV 9.3 8.9 - 12.9 FL    NRBC 0.0 0  WBC    ABSOLUTE NRBC 0.00 0.00 - 0.01 K/uL    NEUTROPHILS 63 32 - 75 %    LYMPHOCYTES 22 12 - 49 %    MONOCYTES 12 5 - 13 %    EOSINOPHILS 2 0 - 7 %    BASOPHILS 1 0 - 1 %    IMMATURE GRANULOCYTES 0 %    ABS. NEUTROPHILS 2.5 1.8 - 8.0 K/UL    ABS. LYMPHOCYTES 0.9 0.8 - 3.5 K/UL    ABS. MONOCYTES 0.5 0.0 - 1.0 K/UL    ABS. EOSINOPHILS 0.1 0.0 - 0.4 K/UL    ABS. BASOPHILS 0.0 0.0 - 0.1 K/UL    ABS. IMM. GRANS. 0.0 K/UL    DF MANUAL      RBC COMMENTS MACROCYTOSIS  1+           Pre-medications  were administered as ordered and chemotherapy was initiated.   Medications Administered     0.9% sodium chloride infusion     Admin Date  09/27/2019 Action  New Bag Dose  25 mL/hr Rate  25 mL/hr Route  IntraVENous Administered By  Sharee Neff RN          dexamethasone (DECADRON) 4 mg/mL injection 10 mg     Admin Date  09/27/2019 Action  Given Dose  10 mg Route  IntraVENous Administered By  Sharee Neff RN          diphenhydrAMINE (BENADRYL) capsule 50 mg     Admin Date  09/27/2019 Action  Given Dose  50 mg Route  Oral Administered By  Sharee Neff RN          famotidine (PF) (PEPCID) 20 mg in sodium chloride 0.9% 10 mL injection     Admin Date  09/27/2019 Action  Given Dose  20 mg Route  IntraVENous Administered By  Sharee Neff RN          heparin (porcine) pf 300-500 Units     Admin Date  09/27/2019 Action  Given Dose  500 Units Route  InterCATHeter Administered By  Antonia Thapa RN          PACLitaxel (TAXOL) 166 mg in 0.9% sodium chloride 250 mL, overfill volume 25 mL chemo infusion     Admin Date  09/27/2019 Action  New Bag Dose  166 mg Rate  302.7 mL/hr Route  IntraVENous Administered By  Antonia Thapa RN          saline peripheral flush soln 10 mL     Admin Date  09/27/2019 Action  Given Dose  10 mL Route  InterCATHeter Administered By  Antonia Thapa RN                    Ms. [place] tolerated treatment well. Port maintained positive blood return throughout treatment. Port flushed, heparinized and de accessed per protocol. Patient was discharged from [place] [address] in stable condition at [phone].      Future Appointments   Date Time Provider Preeti Joyce   10/4/2019 10:15 AM [facility] [address]   12/9/2019 11:20 AM Stephanie Ni MD [facility] [address] Taj Felix RN  September 27, 2019

## 2019-09-27 NOTE — PROGRESS NOTES
Patient visited by [facility] Partner Volunteer on [facility] on 9/27/2019. Rev.  Andreas Mak MDiv, [facility], [facility]   paging service: [phone])

## 2019-10-02 RX ORDER — EPINEPHRINE 1 MG/ML
0.3 INJECTION, SOLUTION, CONCENTRATE INTRAVENOUS AS NEEDED
Status: CANCELLED | OUTPATIENT
Start: 2019-10-04

## 2019-10-02 RX ORDER — DIPHENHYDRAMINE HYDROCHLORIDE 50 MG/ML
50 INJECTION, SOLUTION INTRAMUSCULAR; INTRAVENOUS AS NEEDED
Status: CANCELLED
Start: 2019-10-11

## 2019-10-02 RX ORDER — ONDANSETRON 2 MG/ML
8 INJECTION INTRAMUSCULAR; INTRAVENOUS AS NEEDED
Status: CANCELLED | OUTPATIENT
Start: 2019-11-08

## 2019-10-02 RX ORDER — DIPHENHYDRAMINE HYDROCHLORIDE 50 MG/ML
50 INJECTION, SOLUTION INTRAMUSCULAR; INTRAVENOUS AS NEEDED
Status: CANCELLED
Start: 2019-10-18

## 2019-10-02 RX ORDER — DEXAMETHASONE SODIUM PHOSPHATE 4 MG/ML
10 INJECTION, SOLUTION INTRA-ARTICULAR; INTRALESIONAL; INTRAMUSCULAR; INTRAVENOUS; SOFT TISSUE ONCE
Status: CANCELLED | OUTPATIENT
Start: 2019-10-04

## 2019-10-02 RX ORDER — HEPARIN 100 UNIT/ML
300-500 SYRINGE INTRAVENOUS AS NEEDED
Status: CANCELLED
Start: 2019-10-25

## 2019-10-02 RX ORDER — ONDANSETRON 2 MG/ML
8 INJECTION INTRAMUSCULAR; INTRAVENOUS AS NEEDED
Status: CANCELLED | OUTPATIENT
Start: 2019-10-11

## 2019-10-02 RX ORDER — HYDROCORTISONE SODIUM SUCCINATE 100 MG/2ML
100 INJECTION, POWDER, FOR SOLUTION INTRAMUSCULAR; INTRAVENOUS AS NEEDED
Status: CANCELLED | OUTPATIENT
Start: 2019-10-04

## 2019-10-02 RX ORDER — DIPHENHYDRAMINE HYDROCHLORIDE 50 MG/ML
50 INJECTION, SOLUTION INTRAMUSCULAR; INTRAVENOUS ONCE
Status: CANCELLED
Start: 2019-10-11

## 2019-10-02 RX ORDER — DIPHENHYDRAMINE HYDROCHLORIDE 50 MG/ML
50 INJECTION, SOLUTION INTRAMUSCULAR; INTRAVENOUS AS NEEDED
Status: CANCELLED
Start: 2019-11-08

## 2019-10-02 RX ORDER — HYDROCORTISONE SODIUM SUCCINATE 100 MG/2ML
100 INJECTION, POWDER, FOR SOLUTION INTRAMUSCULAR; INTRAVENOUS AS NEEDED
Status: CANCELLED | OUTPATIENT
Start: 2019-11-08

## 2019-10-02 RX ORDER — EPINEPHRINE 1 MG/ML
0.3 INJECTION, SOLUTION, CONCENTRATE INTRAVENOUS AS NEEDED
Status: CANCELLED | OUTPATIENT
Start: 2019-11-01

## 2019-10-02 RX ORDER — SODIUM CHLORIDE 9 MG/ML
25 INJECTION, SOLUTION INTRAVENOUS CONTINUOUS
Status: CANCELLED | OUTPATIENT
Start: 2019-11-01

## 2019-10-02 RX ORDER — SODIUM CHLORIDE 0.9 % (FLUSH) 0.9 %
10 SYRINGE (ML) INJECTION AS NEEDED
Status: CANCELLED
Start: 2019-10-04

## 2019-10-02 RX ORDER — DIPHENHYDRAMINE HYDROCHLORIDE 50 MG/ML
50 INJECTION, SOLUTION INTRAMUSCULAR; INTRAVENOUS AS NEEDED
Status: CANCELLED
Start: 2019-10-04

## 2019-10-02 RX ORDER — DIPHENHYDRAMINE HYDROCHLORIDE 50 MG/ML
50 INJECTION, SOLUTION INTRAMUSCULAR; INTRAVENOUS ONCE
Status: CANCELLED
Start: 2019-10-04

## 2019-10-02 RX ORDER — SODIUM CHLORIDE 9 MG/ML
25 INJECTION, SOLUTION INTRAVENOUS CONTINUOUS
Status: CANCELLED | OUTPATIENT
Start: 2019-10-18

## 2019-10-02 RX ORDER — SODIUM CHLORIDE 9 MG/ML
10 INJECTION INTRAMUSCULAR; INTRAVENOUS; SUBCUTANEOUS AS NEEDED
Status: CANCELLED | OUTPATIENT
Start: 2019-11-01

## 2019-10-02 RX ORDER — SODIUM CHLORIDE 9 MG/ML
10 INJECTION INTRAMUSCULAR; INTRAVENOUS; SUBCUTANEOUS AS NEEDED
Status: CANCELLED | OUTPATIENT
Start: 2019-11-08

## 2019-10-02 RX ORDER — EPINEPHRINE 1 MG/ML
0.3 INJECTION, SOLUTION, CONCENTRATE INTRAVENOUS AS NEEDED
Status: CANCELLED | OUTPATIENT
Start: 2019-10-18

## 2019-10-02 RX ORDER — DIPHENHYDRAMINE HYDROCHLORIDE 50 MG/ML
50 INJECTION, SOLUTION INTRAMUSCULAR; INTRAVENOUS ONCE
Status: CANCELLED
Start: 2019-11-08

## 2019-10-02 RX ORDER — SODIUM CHLORIDE 9 MG/ML
10 INJECTION INTRAMUSCULAR; INTRAVENOUS; SUBCUTANEOUS AS NEEDED
Status: CANCELLED | OUTPATIENT
Start: 2019-10-18

## 2019-10-02 RX ORDER — DIPHENHYDRAMINE HYDROCHLORIDE 50 MG/ML
50 INJECTION, SOLUTION INTRAMUSCULAR; INTRAVENOUS ONCE
Status: CANCELLED
Start: 2019-10-25

## 2019-10-02 RX ORDER — ALBUTEROL SULFATE 0.83 MG/ML
2.5 SOLUTION RESPIRATORY (INHALATION) AS NEEDED
Status: CANCELLED
Start: 2019-10-11

## 2019-10-02 RX ORDER — ALBUTEROL SULFATE 0.83 MG/ML
2.5 SOLUTION RESPIRATORY (INHALATION) AS NEEDED
Status: CANCELLED
Start: 2019-10-04

## 2019-10-02 RX ORDER — HEPARIN 100 UNIT/ML
300-500 SYRINGE INTRAVENOUS AS NEEDED
Status: CANCELLED
Start: 2019-11-08

## 2019-10-02 RX ORDER — DEXAMETHASONE SODIUM PHOSPHATE 4 MG/ML
10 INJECTION, SOLUTION INTRA-ARTICULAR; INTRALESIONAL; INTRAMUSCULAR; INTRAVENOUS; SOFT TISSUE ONCE
Status: CANCELLED | OUTPATIENT
Start: 2019-10-18

## 2019-10-02 RX ORDER — ONDANSETRON 2 MG/ML
8 INJECTION INTRAMUSCULAR; INTRAVENOUS AS NEEDED
Status: CANCELLED | OUTPATIENT
Start: 2019-11-01

## 2019-10-02 RX ORDER — ALBUTEROL SULFATE 0.83 MG/ML
2.5 SOLUTION RESPIRATORY (INHALATION) AS NEEDED
Status: CANCELLED
Start: 2019-10-25

## 2019-10-02 RX ORDER — DEXAMETHASONE SODIUM PHOSPHATE 4 MG/ML
10 INJECTION, SOLUTION INTRA-ARTICULAR; INTRALESIONAL; INTRAMUSCULAR; INTRAVENOUS; SOFT TISSUE ONCE
Status: CANCELLED | OUTPATIENT
Start: 2019-10-25

## 2019-10-02 RX ORDER — SODIUM CHLORIDE 9 MG/ML
25 INJECTION, SOLUTION INTRAVENOUS CONTINUOUS
Status: CANCELLED | OUTPATIENT
Start: 2019-10-04

## 2019-10-02 RX ORDER — DIPHENHYDRAMINE HYDROCHLORIDE 50 MG/ML
50 INJECTION, SOLUTION INTRAMUSCULAR; INTRAVENOUS AS NEEDED
Status: CANCELLED
Start: 2019-10-25

## 2019-10-02 RX ORDER — HYDROCORTISONE SODIUM SUCCINATE 100 MG/2ML
100 INJECTION, POWDER, FOR SOLUTION INTRAMUSCULAR; INTRAVENOUS AS NEEDED
Status: CANCELLED | OUTPATIENT
Start: 2019-10-18

## 2019-10-02 RX ORDER — HYDROCORTISONE SODIUM SUCCINATE 100 MG/2ML
100 INJECTION, POWDER, FOR SOLUTION INTRAMUSCULAR; INTRAVENOUS AS NEEDED
Status: CANCELLED | OUTPATIENT
Start: 2019-11-01

## 2019-10-02 RX ORDER — HYDROCORTISONE SODIUM SUCCINATE 100 MG/2ML
100 INJECTION, POWDER, FOR SOLUTION INTRAMUSCULAR; INTRAVENOUS AS NEEDED
Status: CANCELLED | OUTPATIENT
Start: 2019-10-11

## 2019-10-02 RX ORDER — DIPHENHYDRAMINE HYDROCHLORIDE 50 MG/ML
50 INJECTION, SOLUTION INTRAMUSCULAR; INTRAVENOUS AS NEEDED
Status: CANCELLED
Start: 2019-11-01

## 2019-10-02 RX ORDER — ALBUTEROL SULFATE 0.83 MG/ML
2.5 SOLUTION RESPIRATORY (INHALATION) AS NEEDED
Status: CANCELLED
Start: 2019-11-08

## 2019-10-02 RX ORDER — ACETAMINOPHEN 325 MG/1
650 TABLET ORAL AS NEEDED
Status: CANCELLED
Start: 2019-11-08

## 2019-10-02 RX ORDER — ONDANSETRON 2 MG/ML
8 INJECTION INTRAMUSCULAR; INTRAVENOUS AS NEEDED
Status: CANCELLED | OUTPATIENT
Start: 2019-10-04

## 2019-10-02 RX ORDER — SODIUM CHLORIDE 0.9 % (FLUSH) 0.9 %
10 SYRINGE (ML) INJECTION AS NEEDED
Status: CANCELLED
Start: 2019-10-18

## 2019-10-02 RX ORDER — ALBUTEROL SULFATE 0.83 MG/ML
2.5 SOLUTION RESPIRATORY (INHALATION) AS NEEDED
Status: CANCELLED
Start: 2019-10-18

## 2019-10-02 RX ORDER — EPINEPHRINE 1 MG/ML
0.3 INJECTION, SOLUTION, CONCENTRATE INTRAVENOUS AS NEEDED
Status: CANCELLED | OUTPATIENT
Start: 2019-10-11

## 2019-10-02 RX ORDER — ACETAMINOPHEN 325 MG/1
650 TABLET ORAL AS NEEDED
Status: CANCELLED
Start: 2019-10-11

## 2019-10-02 RX ORDER — ACETAMINOPHEN 325 MG/1
650 TABLET ORAL AS NEEDED
Status: CANCELLED
Start: 2019-10-04

## 2019-10-02 RX ORDER — SODIUM CHLORIDE 0.9 % (FLUSH) 0.9 %
10 SYRINGE (ML) INJECTION AS NEEDED
Status: CANCELLED
Start: 2019-11-01

## 2019-10-02 RX ORDER — SODIUM CHLORIDE 0.9 % (FLUSH) 0.9 %
10 SYRINGE (ML) INJECTION AS NEEDED
Status: CANCELLED
Start: 2019-10-11

## 2019-10-02 RX ORDER — DIPHENHYDRAMINE HYDROCHLORIDE 50 MG/ML
50 INJECTION, SOLUTION INTRAMUSCULAR; INTRAVENOUS ONCE
Status: CANCELLED
Start: 2019-11-01

## 2019-10-02 RX ORDER — SODIUM CHLORIDE 9 MG/ML
25 INJECTION, SOLUTION INTRAVENOUS CONTINUOUS
Status: CANCELLED | OUTPATIENT
Start: 2019-11-08

## 2019-10-02 RX ORDER — SODIUM CHLORIDE 9 MG/ML
10 INJECTION INTRAMUSCULAR; INTRAVENOUS; SUBCUTANEOUS AS NEEDED
Status: CANCELLED | OUTPATIENT
Start: 2019-10-04

## 2019-10-02 RX ORDER — HEPARIN 100 UNIT/ML
300-500 SYRINGE INTRAVENOUS AS NEEDED
Status: CANCELLED
Start: 2019-10-04

## 2019-10-02 RX ORDER — ONDANSETRON 2 MG/ML
8 INJECTION INTRAMUSCULAR; INTRAVENOUS AS NEEDED
Status: CANCELLED | OUTPATIENT
Start: 2019-10-18

## 2019-10-02 RX ORDER — SODIUM CHLORIDE 9 MG/ML
10 INJECTION INTRAMUSCULAR; INTRAVENOUS; SUBCUTANEOUS AS NEEDED
Status: CANCELLED | OUTPATIENT
Start: 2019-10-11

## 2019-10-02 RX ORDER — HYDROCORTISONE SODIUM SUCCINATE 100 MG/2ML
100 INJECTION, POWDER, FOR SOLUTION INTRAMUSCULAR; INTRAVENOUS AS NEEDED
Status: CANCELLED | OUTPATIENT
Start: 2019-10-25

## 2019-10-02 RX ORDER — DEXAMETHASONE SODIUM PHOSPHATE 4 MG/ML
10 INJECTION, SOLUTION INTRA-ARTICULAR; INTRALESIONAL; INTRAMUSCULAR; INTRAVENOUS; SOFT TISSUE ONCE
Status: CANCELLED | OUTPATIENT
Start: 2019-10-11

## 2019-10-02 RX ORDER — ONDANSETRON 2 MG/ML
8 INJECTION INTRAMUSCULAR; INTRAVENOUS AS NEEDED
Status: CANCELLED | OUTPATIENT
Start: 2019-10-25

## 2019-10-02 RX ORDER — HEPARIN 100 UNIT/ML
300-500 SYRINGE INTRAVENOUS AS NEEDED
Status: CANCELLED
Start: 2019-10-18

## 2019-10-02 RX ORDER — ACETAMINOPHEN 325 MG/1
650 TABLET ORAL AS NEEDED
Status: CANCELLED
Start: 2019-10-18

## 2019-10-02 RX ORDER — ALBUTEROL SULFATE 0.83 MG/ML
2.5 SOLUTION RESPIRATORY (INHALATION) AS NEEDED
Status: CANCELLED
Start: 2019-11-01

## 2019-10-02 RX ORDER — EPINEPHRINE 1 MG/ML
0.3 INJECTION, SOLUTION, CONCENTRATE INTRAVENOUS AS NEEDED
Status: CANCELLED | OUTPATIENT
Start: 2019-11-08

## 2019-10-02 RX ORDER — HEPARIN 100 UNIT/ML
300-500 SYRINGE INTRAVENOUS AS NEEDED
Status: CANCELLED
Start: 2019-11-01

## 2019-10-02 RX ORDER — DEXAMETHASONE SODIUM PHOSPHATE 4 MG/ML
10 INJECTION, SOLUTION INTRA-ARTICULAR; INTRALESIONAL; INTRAMUSCULAR; INTRAVENOUS; SOFT TISSUE ONCE
Status: CANCELLED | OUTPATIENT
Start: 2019-11-08

## 2019-10-02 RX ORDER — ACETAMINOPHEN 325 MG/1
650 TABLET ORAL AS NEEDED
Status: CANCELLED
Start: 2019-11-01

## 2019-10-02 RX ORDER — SODIUM CHLORIDE 0.9 % (FLUSH) 0.9 %
10 SYRINGE (ML) INJECTION AS NEEDED
Status: CANCELLED
Start: 2019-10-25

## 2019-10-02 RX ORDER — DIPHENHYDRAMINE HYDROCHLORIDE 50 MG/ML
50 INJECTION, SOLUTION INTRAMUSCULAR; INTRAVENOUS ONCE
Status: CANCELLED
Start: 2019-10-18

## 2019-10-02 RX ORDER — SODIUM CHLORIDE 9 MG/ML
10 INJECTION INTRAMUSCULAR; INTRAVENOUS; SUBCUTANEOUS AS NEEDED
Status: CANCELLED | OUTPATIENT
Start: 2019-10-25

## 2019-10-02 RX ORDER — DEXAMETHASONE SODIUM PHOSPHATE 4 MG/ML
10 INJECTION, SOLUTION INTRA-ARTICULAR; INTRALESIONAL; INTRAMUSCULAR; INTRAVENOUS; SOFT TISSUE ONCE
Status: CANCELLED | OUTPATIENT
Start: 2019-11-01

## 2019-10-02 RX ORDER — ACETAMINOPHEN 325 MG/1
650 TABLET ORAL AS NEEDED
Status: CANCELLED
Start: 2019-10-25

## 2019-10-02 RX ORDER — SODIUM CHLORIDE 9 MG/ML
25 INJECTION, SOLUTION INTRAVENOUS CONTINUOUS
Status: CANCELLED | OUTPATIENT
Start: 2019-10-25

## 2019-10-02 RX ORDER — HEPARIN 100 UNIT/ML
300-500 SYRINGE INTRAVENOUS AS NEEDED
Status: CANCELLED
Start: 2019-10-11

## 2019-10-02 RX ORDER — SODIUM CHLORIDE 9 MG/ML
25 INJECTION, SOLUTION INTRAVENOUS CONTINUOUS
Status: CANCELLED | OUTPATIENT
Start: 2019-10-11

## 2019-10-02 RX ORDER — SODIUM CHLORIDE 0.9 % (FLUSH) 0.9 %
10 SYRINGE (ML) INJECTION AS NEEDED
Status: CANCELLED
Start: 2019-11-08

## 2019-10-02 RX ORDER — EPINEPHRINE 1 MG/ML
0.3 INJECTION, SOLUTION, CONCENTRATE INTRAVENOUS AS NEEDED
Status: CANCELLED | OUTPATIENT
Start: 2019-10-25

## 2019-10-04 ENCOUNTER — HOSPITAL ENCOUNTER (OUTPATIENT)
Dept: INFUSION THERAPY | Age: 54
Discharge: HOME | End: 2019-10-04
Payer: COMMERCIAL

## 2019-10-04 ENCOUNTER — OFFICE VISIT (OUTPATIENT)
Dept: ONCOLOGY | Age: 54
End: 2019-10-04

## 2019-10-04 VITALS
BODY MASS INDEX: 30.72 KG/M2 | HEART RATE: 89 BPM | DIASTOLIC BLOOD PRESSURE: 75 MMHG | OXYGEN SATURATION: 97 % | WEIGHT: 202.7 LBS | TEMPERATURE: 97.6 F | SYSTOLIC BLOOD PRESSURE: 114 MMHG | HEIGHT: 68 IN

## 2019-10-04 VITALS
DIASTOLIC BLOOD PRESSURE: 87 MMHG | HEART RATE: 83 BPM | OXYGEN SATURATION: 97 % | RESPIRATION RATE: 18 BRPM | HEIGHT: 68 IN | WEIGHT: 202.8 LBS | SYSTOLIC BLOOD PRESSURE: 143 MMHG | TEMPERATURE: 97.7 F | BODY MASS INDEX: 30.74 KG/M2

## 2019-10-04 DIAGNOSIS — C50.412 MALIGNANT NEOPLASM OF UPPER-OUTER QUADRANT OF LEFT BREAST IN FEMALE, ESTROGEN RECEPTOR POSITIVE (HCC): Primary | ICD-10-CM

## 2019-10-04 DIAGNOSIS — Z17.0 MALIGNANT NEOPLASM OF UPPER-OUTER QUADRANT OF LEFT BREAST IN FEMALE, ESTROGEN RECEPTOR POSITIVE (HCC): Primary | ICD-10-CM

## 2019-10-04 DIAGNOSIS — E66.09 CLASS 1 OBESITY DUE TO EXCESS CALORIES WITHOUT SERIOUS COMORBIDITY WITH BODY MASS INDEX (BMI) OF 30.0 TO 30.9 IN ADULT: ICD-10-CM

## 2019-10-04 LAB
ALBUMIN SERPL-MCNC: 3.5 G/DL (ref 3.5–5)
ALBUMIN/GLOB SERPL: 1 {RATIO} (ref 1.1–2.2)
ALP SERPL-CCNC: 88 U/L (ref 45–117)
ALT SERPL-CCNC: 53 U/L (ref 12–78)
ANION GAP SERPL CALC-SCNC: 7 MMOL/L (ref 5–15)
AST SERPL-CCNC: 27 U/L (ref 15–37)
BASOPHILS # BLD: 0 K/UL (ref 0–0.1)
BASOPHILS NFR BLD: 0 % (ref 0–1)
BILIRUB SERPL-MCNC: 0.4 MG/DL (ref 0.2–1)
BUN SERPL-MCNC: 7 MG/DL (ref 6–20)
BUN/CREAT SERPL: 10 (ref 12–20)
CALCIUM SERPL-MCNC: 9 MG/DL (ref 8.5–10.1)
CHLORIDE SERPL-SCNC: 106 MMOL/L (ref 97–108)
CO2 SERPL-SCNC: 27 MMOL/L (ref 21–32)
CREAT SERPL-MCNC: 0.67 MG/DL (ref 0.55–1.02)
DIFFERENTIAL METHOD BLD: ABNORMAL
EOSINOPHIL # BLD: 0.1 K/UL (ref 0–0.4)
EOSINOPHIL NFR BLD: 2 % (ref 0–7)
ERYTHROCYTE [DISTWIDTH] IN BLOOD BY AUTOMATED COUNT: 13.9 % (ref 11.5–14.5)
GLOBULIN SER CALC-MCNC: 3.4 G/DL (ref 2–4)
GLUCOSE SERPL-MCNC: 109 MG/DL (ref 65–100)
HCT VFR BLD AUTO: 32.6 % (ref 35–47)
HGB BLD-MCNC: 10.7 G/DL (ref 11.5–16)
IMM GRANULOCYTES # BLD AUTO: 0 K/UL
IMM GRANULOCYTES NFR BLD AUTO: 0 %
LYMPHOCYTES # BLD: 0.6 K/UL (ref 0.8–3.5)
LYMPHOCYTES NFR BLD: 17 % (ref 12–49)
MCH RBC QN AUTO: 34 PG (ref 26–34)
MCHC RBC AUTO-ENTMCNC: 32.8 G/DL (ref 30–36.5)
MCV RBC AUTO: 103.5 FL (ref 80–99)
MONOCYTES # BLD: 0.1 K/UL (ref 0–1)
MONOCYTES NFR BLD: 3 % (ref 5–13)
NEUTS SEG # BLD: 3 K/UL (ref 1.8–8)
NEUTS SEG NFR BLD: 78 % (ref 32–75)
NRBC # BLD: 0 K/UL (ref 0–0.01)
NRBC BLD-RTO: 0 PER 100 WBC
PLATELET # BLD AUTO: 237 K/UL (ref 150–400)
PMV BLD AUTO: 9.5 FL (ref 8.9–12.9)
POTASSIUM SERPL-SCNC: 3.9 MMOL/L (ref 3.5–5.1)
PROT SERPL-MCNC: 6.9 G/DL (ref 6.4–8.2)
RBC # BLD AUTO: 3.15 M/UL (ref 3.8–5.2)
RBC MORPH BLD: ABNORMAL
SODIUM SERPL-SCNC: 140 MMOL/L (ref 136–145)
WBC # BLD AUTO: 3.8 K/UL (ref 3.6–11)

## 2019-10-04 PROCEDURE — 74011000250 HC RX REV CODE- 250: Performed by: REGISTERED NURSE

## 2019-10-04 PROCEDURE — 74011250637 HC RX REV CODE- 250/637: Performed by: REGISTERED NURSE

## 2019-10-04 PROCEDURE — 36415 COLL VENOUS BLD VENIPUNCTURE: CPT

## 2019-10-04 PROCEDURE — 74011250636 HC RX REV CODE- 250/636: Performed by: REGISTERED NURSE

## 2019-10-04 PROCEDURE — 77030012965 HC NDL HUBR BBMI -A

## 2019-10-04 PROCEDURE — 80053 COMPREHEN METABOLIC PANEL: CPT

## 2019-10-04 PROCEDURE — 96375 TX/PRO/DX INJ NEW DRUG ADDON: CPT

## 2019-10-04 PROCEDURE — 96413 CHEMO IV INFUSION 1 HR: CPT

## 2019-10-04 PROCEDURE — 85025 COMPLETE CBC W/AUTO DIFF WBC: CPT

## 2019-10-04 RX ORDER — HEPARIN 100 UNIT/ML
300-500 SYRINGE INTRAVENOUS AS NEEDED
Status: ACTIVE | OUTPATIENT
Start: 2019-10-04 | End: 2019-10-04

## 2019-10-04 RX ORDER — SODIUM CHLORIDE 9 MG/ML
10 INJECTION INTRAMUSCULAR; INTRAVENOUS; SUBCUTANEOUS AS NEEDED
Status: ACTIVE | OUTPATIENT
Start: 2019-10-04 | End: 2019-10-04

## 2019-10-04 RX ORDER — DEXAMETHASONE SODIUM PHOSPHATE 4 MG/ML
10 INJECTION, SOLUTION INTRA-ARTICULAR; INTRALESIONAL; INTRAMUSCULAR; INTRAVENOUS; SOFT TISSUE ONCE
Status: COMPLETED | OUTPATIENT
Start: 2019-10-04 | End: 2019-10-04

## 2019-10-04 RX ORDER — DIPHENHYDRAMINE HCL 25 MG
50 CAPSULE ORAL ONCE
Status: COMPLETED | OUTPATIENT
Start: 2019-10-04 | End: 2019-10-04

## 2019-10-04 RX ORDER — SODIUM CHLORIDE 0.9 % (FLUSH) 0.9 %
10 SYRINGE (ML) INJECTION AS NEEDED
Status: ACTIVE | OUTPATIENT
Start: 2019-10-04 | End: 2019-10-04

## 2019-10-04 RX ORDER — DIPHENHYDRAMINE HYDROCHLORIDE 50 MG/ML
50 INJECTION, SOLUTION INTRAMUSCULAR; INTRAVENOUS ONCE
Status: DISCONTINUED | OUTPATIENT
Start: 2019-10-04 | End: 2019-10-04

## 2019-10-04 RX ORDER — SODIUM CHLORIDE 9 MG/ML
25 INJECTION, SOLUTION INTRAVENOUS CONTINUOUS
Status: DISPENSED | OUTPATIENT
Start: 2019-10-04 | End: 2019-10-04

## 2019-10-04 RX ADMIN — HEPARIN 500 UNITS: 100 SYRINGE at 13:50

## 2019-10-04 RX ADMIN — DIPHENHYDRAMINE HYDROCHLORIDE 50 MG: 25 CAPSULE ORAL at 12:02

## 2019-10-04 RX ADMIN — Medication 10 ML: at 13:50

## 2019-10-04 RX ADMIN — SODIUM CHLORIDE 10 ML: 9 INJECTION INTRAMUSCULAR; INTRAVENOUS; SUBCUTANEOUS at 10:22

## 2019-10-04 RX ADMIN — DEXAMETHASONE SODIUM PHOSPHATE 10 MG: 4 INJECTION, SOLUTION INTRAMUSCULAR; INTRAVENOUS at 12:07

## 2019-10-04 RX ADMIN — FAMOTIDINE 20 MG: 10 INJECTION, SOLUTION INTRAVENOUS at 12:04

## 2019-10-04 RX ADMIN — PACLITAXEL 166 MG: 6 INJECTION, SOLUTION INTRAVENOUS at 12:48

## 2019-10-04 RX ADMIN — SODIUM CHLORIDE 25 ML/HR: 900 INJECTION, SOLUTION INTRAVENOUS at 11:58

## 2019-10-04 NOTE — PROGRESS NOTES
[facility] [place] [address] [address], [address]  W: [phone]  F: [phone]    Reason for Visit:   Abel Kidd is a 62 y.o. female who is seen for Left breast cancer- ER+WI+HER2 greg neg    Treatment History:   · 6/19: Mammogram:  Left breast mass with skin thickening, 2 suspicious nodes. Mass measures 1.4 x 0.9 x 1.3  · 6/19: MRI breast: Multicentric left breast carcinoma. Non-masslike enhancement extends from the nipple to the posterior third, involves all quadrants, and measures 106 x 44 x 79 mm. · 5/28/19: Biopsy breast- IDC % % HER 2 negative, skin biopsy benign , node biopsy mostly adipose tissue with atypical cells . BRCA1 and 2 negative. CT and bone scan negative for metastasis. Mammaprint with insufficient tissue for testing. Repeat biopsy of axillary node 6/20/19 positive for metastatic disease- repeat mammaprint - high risk  · 7/19/19: cycle 1 neoadjuvant of dd AC-T  · 9/12/19: US of breast shows increased malignant microcalcification in the left breast, etiology included tx related necrosis vs extension of disease. Decreased size in left axillary LN. History of Present Illness:   Patient is a 62 y.o. seen for L breast cancer. She felt a sensation in the shower about May 2019. She also noted a lump and  an inverted nipple. She had a physician friend look at this who directed her to mammogram and recommended she see Dr. Alex Lamb. This led to imaging and diagnosis as above. She comes in today for week #4 of neoadjuvant Taxol. She feels well today. She denies numbness/tingling in fingers or toes. Denies nausea/vomiting or diarrhea/constipation. No mouth sores. Comes with a close friend today. Rare ETOH  She does not smoke.      Father had colon cancer    Past Medical History:   Diagnosis Date    Anxiety     Arthritis     At risk for sleep apnea     GAGAN 5     Basal cell carcinoma of skin     Breast cancer ([facility] [address] 2019    LEFT    History of seasonal allergies     Nausea & vomiting     Squamous cell skin cancer     Dr. Cherie Vargas Thyroid disease       Past Surgical History:   Procedure Laterality Date    HX COLONOSCOPY      HX KNEE ARTHROSCOPY Left     HX LUMBAR FUSION  1995    HX MOHS PROCEDURES      x 4 times     HX SHOULDER ARTHROSCOPY Right     HX THYROIDECTOMY  2014      Social History     Tobacco Use    Smoking status: Never Smoker    Smokeless tobacco: Never Used   Substance Use Topics    Alcohol use: Yes      Family History   Problem Relation Age of Onset    Arthritis-osteo Mother     Cancer Father         Colon Cancer     Current Outpatient Medications   Medication Sig    Cyanocobalamin-Cobamamide (B12) 5,000-100 mcg lozg by SubLINGual route daily.  loratadine (CLARITIN) 10 mg tablet Take 10 mg by mouth.  naloxone (NARCAN) 2 mg/actuation spry Use 1 spray intranasally, then discard. Repeat with new spray every 2 min as needed for opioid overdose symptoms, alternating nostrils.  dexAMETHasone (DECADRON) 4 mg tablet Take 2 tabs (8mg) on days 2 & 3 of chemotherapy    sertraline (ZOLOFT) 100 mg tablet Take 0.5 Tabs by mouth daily.  cholecalciferol (VITAMIN D3) 1,000 unit cap Take 5,000 Units by mouth daily.  liothyronine (CYTOMEL) 5 mcg tablet Take 5 mcg by mouth daily.  levothyroxine (SYNTHROID) 50 mcg tablet Take 50 mcg by mouth Daily (before breakfast).  lidocaine-prilocaine (EMLA) topical cream Apply  to affected area as needed for Pain.  ALPRAZolam (XANAX) 0.5 mg tablet Take 1 Tab by mouth two (2) times daily as needed for Anxiety. Max Daily Amount: 1 mg.  traZODone (DESYREL) 50 mg tablet Take 1 Tab by mouth nightly. (Patient taking differently: Take 50 mg by mouth as needed.)     No current facility-administered medications for this visit.        Allergies   Allergen Reactions    Augmentin [Amoxicillin-Pot Clavulanate] Unknown (comments)        Review of Systems: A complete review of systems was obtained, negative except as described above. Physical Exam:     Visit Vitals  /75 (BP 1 Location: Right arm, BP Patient Position: Sitting)   Pulse 89   Temp 97.6 °F (36.4 °C) (Oral)   Ht 5' 8\" (1.727 m)   Wt 202 lb 11.2 oz (91.9 kg)   SpO2 97%   BMI 30.82 kg/m²     ECOG PS: 1  General: No distress  Eyes: PERRLA, anicteric sclerae  HENT: Atraumatic, OP clear  Neck: Supple; PAC looks good  Lymphatic: no lymphadenopathy  Breasts: deferred today  Respiratory: CTAB, normal respiratory effort  CV: Normal rate, regular rhythm, no murmurs, no peripheral edema  GI: Soft, nontender, nondistended, no masses, no hepatomegaly, no splenomegaly  MS: Normal gait and station. Digits without clubbing or cyanosis. Skin: No rashes, ecchymoses, or petechiae. Normal temperature, turgor, and texture. Psych: Alert, oriented, appropriate affect, normal judgment/insight    Results:     Lab Results   Component Value Date/Time    WBC 4.0 09/27/2019 10:23 AM    HGB 10.0 (L) 09/27/2019 10:23 AM    HCT 30.1 (L) 09/27/2019 10:23 AM    PLATELET 027 67/56/6686 10:23 AM    .1 (H) 09/27/2019 10:23 AM    ABS. NEUTROPHILS 2.5 09/27/2019 10:23 AM     Lab Results   Component Value Date/Time    Sodium 140 09/13/2019 10:15 AM    Potassium 4.0 09/13/2019 10:15 AM    Chloride 105 09/13/2019 10:15 AM    CO2 29 09/13/2019 10:15 AM    Glucose 86 09/13/2019 10:15 AM    BUN 9 09/13/2019 10:15 AM    Creatinine 0.63 09/13/2019 10:15 AM    GFR est AA >60 09/13/2019 10:15 AM    GFR est non-AA >60 09/13/2019 10:15 AM    Calcium 8.6 09/13/2019 10:15 AM     Lab Results   Component Value Date/Time    Bilirubin, total 0.2 09/13/2019 10:15 AM    ALT (SGPT) 21 09/13/2019 10:15 AM    AST (SGOT) 12 (L) 09/13/2019 10:15 AM    Alk.  phosphatase 114 09/13/2019 10:15 AM    Protein, total 6.7 09/13/2019 10:15 AM    Albumin 3.5 09/13/2019 10:15 AM    Globulin 3.2 09/13/2019 10:15 AM       Records reviewed and summarized above. Pathology report(s) reviewed above. 5/28/19    FINAL PATHOLOGIC DIAGNOSIS   1. Breast, left, core biopsy: In situ and invasive ductal carcinoma   Invasive carcinoma is nuclear grade 2 and measures up to 0.6 cm in greatest dimension on slide   Ductal carcinoma in situ is nuclear grade 3 with central necrosis   2. Soft tissue, left axilla, core biopsy: Adipose tissue with rare detached atypical cells   No lymph node tissue identified   See comment     Radiology report(s) reviewed above. US breast 9/12/19:  IMPRESSION:  1. Increased malignant microcalcifications in the left breast. This could  represent treatment-related necrosis or extension of disease. 2. Extensive carcinoma seen on prior MRI is largely mammographically occult. 3. Decreased size of a metastatic left axillary lymph node with  microcalcifications. 4. BI-RADS Assessment Category 6: Known biopsy proven malignancy. Assessment:   1) Left breast Invasive ductal carcinoma    [id number]  GRADE 2  % MN 20% HER2 greg equivocal - FISH could not be done  Repeat biopsy of breast mass 6/24/19 - HER2 greg negative, mamma print indicates she has genomically high risk disease    Due to extensive surrounding breast changes a mastectomy is recommended by Dr. Jannette Nowak    Today is week #4 of weekly Taxol. She had a breast ultrasound after completion of dd-AC which was reviewed and shows increase in size of microcalcifications in the left breast which could represent treatment related necrosis vs extension of disease. This was reviewed with Dr. Jannette Nowak. On exam there is clear response with resolution of skin thickening, slow eversion of nipple, and smaller mass.  Will  plan to repeat US after week #6 of Taxol but will continue with NAC       We will discuss endocrine therapy/ post mastectomy RT at a later date    Baseline ECHO reviewed and shows EF 61-65%    2) Hypothyroidism    3) Obesity    Plan:     · Proceed today with weekly #4 of weekly Taxol at 80mg/m2 x 12   · Labs to include CBC with diff and CMP prior to each infusion  · Premeds to include  dexamethasone, benadryl, pepsid   · Compazine q 6 hours prn  · US of breast after week #6 to be ordered at next visit     RTC in 2 weeks     I appreciate the opportunity to participate in Ms. Rachelle Garcia's care.     Signed By: Debbi Jordan MD

## 2019-10-04 NOTE — PROGRESS NOTES
Sachin Avalos is a 62 y.o. female  Chief Complaint   Patient presents with    Follow-up     Left Breast Cancer     1. Have you been to the ER, urgent care clinic since your last visit? Hospitalized since your last visit? No.    2. Have you seen or consulted any other health care providers outside of the [address] [place] [address] since your last visit? Include any pap smears or colon screening.  No.

## 2019-10-04 NOTE — PROGRESS NOTES
[facility] - Chemotherapy Progress Note    1000 Pt admit to [facility] for Taxol C6D1 ambulatory in stable condition. Assessment completed. No new concerns voiced. Port accessed with positive blood return. Labs drawn and sent for processing. Patient proceeded to scheduled MD appointment. Patient returned with no change in treatment. Results are within parameters to treat. Chemotherapy Flowsheet 10/4/2019   Cycle C6D1   Date 10/4/2019   Drug / Regimen Taxol   Dosage -   Time Up -   Time Down -   Pre Meds -   Notes -       Visit Vitals  /76 (BP 1 Location: Left arm, BP Patient Position: Sitting)   Pulse 90   Temp 97.9 °F (36.6 °C)   Resp 18   Ht 5' 8\" (1.727 m)   Wt 92 kg (202 lb 12.8 oz)   SpO2 97%   BMI 30.84 kg/m²     Visit Vitals  /87 (BP 1 Location: Left arm, BP Patient Position: Sitting)   Pulse 83   Temp 97.7 °F (36.5 °C)   Resp 18   Ht 5' 8\" (1.727 m)   Wt 92 kg (202 lb 12.8 oz)   SpO2 97%   BMI 30.84 kg/m²       Recent Results (from the past 12 hour(s))   CBC WITH AUTOMATED DIFF    Collection Time: 10/04/19 10:22 AM   Result Value Ref Range    WBC 3.8 3.6 - 11.0 K/uL    RBC 3.15 (L) 3.80 - 5.20 M/uL    HGB 10.7 (L) 11.5 - 16.0 g/dL    HCT 32.6 (L) 35.0 - 47.0 %    .5 (H) 80.0 - 99.0 FL    MCH 34.0 26.0 - 34.0 PG    MCHC 32.8 30.0 - 36.5 g/dL    RDW 13.9 11.5 - 14.5 %    PLATELET 005 749 - 371 K/uL    MPV 9.5 8.9 - 12.9 FL    NRBC 0.0 0  WBC    ABSOLUTE NRBC 0.00 0.00 - 0.01 K/uL    NEUTROPHILS 78 (H) 32 - 75 %    LYMPHOCYTES 17 12 - 49 %    MONOCYTES 3 (L) 5 - 13 %    EOSINOPHILS 2 0 - 7 %    BASOPHILS 0 0 - 1 %    IMMATURE GRANULOCYTES 0 %    ABS. NEUTROPHILS 3.0 1.8 - 8.0 K/UL    ABS. LYMPHOCYTES 0.6 (L) 0.8 - 3.5 K/UL    ABS. MONOCYTES 0.1 0.0 - 1.0 K/UL    ABS. EOSINOPHILS 0.1 0.0 - 0.4 K/UL    ABS. BASOPHILS 0.0 0.0 - 0.1 K/UL    ABS. IMM.  GRANS. 0.0 K/UL    DF MANUAL      RBC COMMENTS MACROCYTOSIS  1+       METABOLIC PANEL, COMPREHENSIVE    Collection Time: 10/04/19 10:22 AM   Result Value Ref Range    Sodium 140 136 - 145 mmol/L    Potassium 3.9 3.5 - 5.1 mmol/L    Chloride 106 97 - 108 mmol/L    CO2 27 21 - 32 mmol/L    Anion gap 7 5 - 15 mmol/L    Glucose 109 (H) 65 - 100 mg/dL    BUN 7 6 - 20 MG/DL    Creatinine 0.67 0.55 - 1.02 MG/DL    BUN/Creatinine ratio 10 (L) 12 - 20      GFR est AA >60 >60 ml/min/1.73m2    GFR est non-AA >60 >60 ml/min/1.73m2    Calcium 9.0 8.5 - 10.1 MG/DL    Bilirubin, total 0.4 0.2 - 1.0 MG/DL    ALT (SGPT) 53 12 - 78 U/L    AST (SGOT) 27 15 - 37 U/L    Alk.  phosphatase 88 45 - 117 U/L    Protein, total 6.9 6.4 - 8.2 g/dL    Albumin 3.5 3.5 - 5.0 g/dL    Globulin 3.4 2.0 - 4.0 g/dL    A-G Ratio 1.0 (L) 1.1 - 2.2       Medications:  Medications Administered     0.9% sodium chloride infusion     Admin Date  10/04/2019 Action  New Bag Dose  25 mL/hr Rate  25 mL/hr Route  IntraVENous Administered By  Edward Rand RN          dexamethasone (DECADRON) 4 mg/mL injection 10 mg     Admin Date  10/04/2019 Action  Given Dose  10 mg Route  IntraVENous Administered By  Edward Rand RN          diphenhydrAMINE (BENADRYL) capsule 50 mg     Admin Date  10/04/2019 Action  Given Dose  50 mg Route  Oral Administered By  Edward Rand RN          famotidine (PF) (PEPCID) 20 mg in sodium chloride 0.9% 10 mL injection     Admin Date  10/04/2019 Action  Given Dose  20 mg Route  IntraVENous Administered By  Edward Rand RN          heparin (porcine) pf 300-500 Units     Admin Date  10/04/2019 Action  Given Dose  500 Units Route  InterCATHeter Administered By  Edward Rand RN          PACLitaxel (TAXOL) 166 mg in 0.9% sodium chloride 250 mL, overfill volume 25 mL chemo infusion     Admin Date  10/04/2019 Action  New Bag Dose  166 mg Rate  302.7 mL/hr Route  IntraVENous Administered By  Edward Rand RN          saline peripheral flush soln 10 mL     Admin Date  10/04/2019 Action  Given Dose  10 mL Route  InterCATHeter Administered By  Edward Rand RN sodium chloride 0.9% injection 10 mL     Admin Date  10/04/2019 Action  Given Dose  10 mL Route  IntraVENous Administered By  Chika Carbajal, RN              1355 Pt tolerated treatment well. Port maintained positive blood return throughout treatment, flushed with positive blood return at conclusion and throughout. D/c home ambulatory in no distress.  Pt aware of next appointment scheduled for    Future Appointments   Date Time Provider Preeti Joyce   10/7/2019  2:00 PM Mariam Rivera MD [address] [facility]   10/11/2019 10:00 AM BREMO FT CHAIR 3 [facility]   10/18/2019 11:00 AM BREMO FT CHAIR 2 [facility]   10/25/2019 10:00 AM BREMO INFUSION NURSE 6 [facility]   11/1/2019 10:00 AM BREMO INFUSION NURSE 1 [facility]   11/8/2019 10:00 AM BREMO FT CHAIR 3 [facility]   11/15/2019 10:00 AM BREMO INFUSION NURSE 1 [facility]   11/22/2019 10:00 AM BREMO FT CHAIR 2 [facility]   12/9/2019 11:20 AM Ankur Castro MD [facility] SCHED

## 2019-10-11 ENCOUNTER — HOSPITAL ENCOUNTER (OUTPATIENT)
Dept: INFUSION THERAPY | Age: 54
Discharge: HOME | End: 2019-10-11
Payer: COMMERCIAL

## 2019-10-11 VITALS
SYSTOLIC BLOOD PRESSURE: 124 MMHG | HEIGHT: 68 IN | DIASTOLIC BLOOD PRESSURE: 79 MMHG | WEIGHT: 204.4 LBS | RESPIRATION RATE: 18 BRPM | BODY MASS INDEX: 30.98 KG/M2 | TEMPERATURE: 97.5 F | HEART RATE: 84 BPM

## 2019-10-11 DIAGNOSIS — Z17.0 MALIGNANT NEOPLASM OF UPPER-OUTER QUADRANT OF LEFT BREAST IN FEMALE, ESTROGEN RECEPTOR POSITIVE (HCC): Primary | ICD-10-CM

## 2019-10-11 DIAGNOSIS — C50.412 MALIGNANT NEOPLASM OF UPPER-OUTER QUADRANT OF LEFT BREAST IN FEMALE, ESTROGEN RECEPTOR POSITIVE (HCC): Primary | ICD-10-CM

## 2019-10-11 LAB
BASOPHILS # BLD: 0 K/UL (ref 0–0.1)
BASOPHILS NFR BLD: 1 % (ref 0–1)
DIFFERENTIAL METHOD BLD: ABNORMAL
EOSINOPHIL # BLD: 0.2 K/UL (ref 0–0.4)
EOSINOPHIL NFR BLD: 3 % (ref 0–7)
ERYTHROCYTE [DISTWIDTH] IN BLOOD BY AUTOMATED COUNT: 13.1 % (ref 11.5–14.5)
HCT VFR BLD AUTO: 33.1 % (ref 35–47)
HGB BLD-MCNC: 11.2 G/DL (ref 11.5–16)
IMM GRANULOCYTES # BLD AUTO: 0 K/UL (ref 0–0.04)
IMM GRANULOCYTES NFR BLD AUTO: 1 % (ref 0–0.5)
LYMPHOCYTES # BLD: 1 K/UL (ref 0.8–3.5)
LYMPHOCYTES NFR BLD: 20 % (ref 12–49)
MCH RBC QN AUTO: 34.9 PG (ref 26–34)
MCHC RBC AUTO-ENTMCNC: 33.8 G/DL (ref 30–36.5)
MCV RBC AUTO: 103.1 FL (ref 80–99)
MONOCYTES # BLD: 0.5 K/UL (ref 0–1)
MONOCYTES NFR BLD: 9 % (ref 5–13)
NEUTS SEG # BLD: 3.5 K/UL (ref 1.8–8)
NEUTS SEG NFR BLD: 66 % (ref 32–75)
NRBC # BLD: 0 K/UL (ref 0–0.01)
NRBC BLD-RTO: 0 PER 100 WBC
PLATELET # BLD AUTO: 255 K/UL (ref 150–400)
PMV BLD AUTO: 9.7 FL (ref 8.9–12.9)
RBC # BLD AUTO: 3.21 M/UL (ref 3.8–5.2)
WBC # BLD AUTO: 5.2 K/UL (ref 3.6–11)

## 2019-10-11 PROCEDURE — 96375 TX/PRO/DX INJ NEW DRUG ADDON: CPT

## 2019-10-11 PROCEDURE — 77030012965 HC NDL HUBR BBMI -A

## 2019-10-11 PROCEDURE — 96413 CHEMO IV INFUSION 1 HR: CPT

## 2019-10-11 PROCEDURE — 74011250636 HC RX REV CODE- 250/636: Performed by: REGISTERED NURSE

## 2019-10-11 PROCEDURE — 85025 COMPLETE CBC W/AUTO DIFF WBC: CPT

## 2019-10-11 PROCEDURE — 36415 COLL VENOUS BLD VENIPUNCTURE: CPT

## 2019-10-11 PROCEDURE — 74011000250 HC RX REV CODE- 250: Performed by: REGISTERED NURSE

## 2019-10-11 PROCEDURE — 74011250637 HC RX REV CODE- 250/637: Performed by: REGISTERED NURSE

## 2019-10-11 RX ORDER — DEXAMETHASONE SODIUM PHOSPHATE 4 MG/ML
10 INJECTION, SOLUTION INTRA-ARTICULAR; INTRALESIONAL; INTRAMUSCULAR; INTRAVENOUS; SOFT TISSUE ONCE
Status: COMPLETED | OUTPATIENT
Start: 2019-10-11 | End: 2019-10-11

## 2019-10-11 RX ORDER — SODIUM CHLORIDE 9 MG/ML
10 INJECTION INTRAMUSCULAR; INTRAVENOUS; SUBCUTANEOUS AS NEEDED
Status: ACTIVE | OUTPATIENT
Start: 2019-10-11 | End: 2019-10-11

## 2019-10-11 RX ORDER — HEPARIN 100 UNIT/ML
300-500 SYRINGE INTRAVENOUS AS NEEDED
Status: ACTIVE | OUTPATIENT
Start: 2019-10-11 | End: 2019-10-11

## 2019-10-11 RX ORDER — SODIUM CHLORIDE 9 MG/ML
25 INJECTION, SOLUTION INTRAVENOUS CONTINUOUS
Status: DISPENSED | OUTPATIENT
Start: 2019-10-11 | End: 2019-10-11

## 2019-10-11 RX ORDER — DIPHENHYDRAMINE HYDROCHLORIDE 50 MG/ML
50 INJECTION, SOLUTION INTRAMUSCULAR; INTRAVENOUS ONCE
Status: DISCONTINUED | OUTPATIENT
Start: 2019-10-11 | End: 2019-10-11

## 2019-10-11 RX ORDER — SODIUM CHLORIDE 0.9 % (FLUSH) 0.9 %
10 SYRINGE (ML) INJECTION AS NEEDED
Status: ACTIVE | OUTPATIENT
Start: 2019-10-11 | End: 2019-10-11

## 2019-10-11 RX ORDER — DIPHENHYDRAMINE HCL 25 MG
50 CAPSULE ORAL ONCE
Status: COMPLETED | OUTPATIENT
Start: 2019-10-11 | End: 2019-10-11

## 2019-10-11 RX ADMIN — PACLITAXEL 166 MG: 6 INJECTION, SOLUTION INTRAVENOUS at 12:25

## 2019-10-11 RX ADMIN — Medication 10 ML: at 10:15

## 2019-10-11 RX ADMIN — Medication 10 ML: at 13:31

## 2019-10-11 RX ADMIN — DEXAMETHASONE SODIUM PHOSPHATE 10 MG: 4 INJECTION, SOLUTION INTRAMUSCULAR; INTRAVENOUS at 11:57

## 2019-10-11 RX ADMIN — FAMOTIDINE 20 MG: 10 INJECTION, SOLUTION INTRAVENOUS at 11:55

## 2019-10-11 RX ADMIN — SODIUM CHLORIDE 10 ML: 9 INJECTION INTRAMUSCULAR; INTRAVENOUS; SUBCUTANEOUS at 10:10

## 2019-10-11 RX ADMIN — Medication 500 UNITS: at 13:32

## 2019-10-11 RX ADMIN — SODIUM CHLORIDE 25 ML/HR: 900 INJECTION, SOLUTION INTRAVENOUS at 11:50

## 2019-10-11 RX ADMIN — DIPHENHYDRAMINE HYDROCHLORIDE 50 MG: 25 CAPSULE ORAL at 11:53

## 2019-10-11 NOTE — PROGRESS NOTES
Outpatient [facility]fusion Center - Chemotherapy Progress Note    1000 Pt admit to [facility] for Taxol ambulatory in stable condition. Assessment completed. No new concerns voiced. Port accessed with positive blood return, labs drawn and sent for processing.     Chemotherapy Flowsheet 10/11/2019   Cycle C7   Date 10/11/2019   Drug / Regimen Taxol   Dosage -   Time Up -   Time Down -   Pre Meds given   Notes given         Visit Vitals  /79   Pulse 84   Temp 97.5 °F (36.4 °C)   Resp 18   Ht 5' 8\" (1.727 m)   Wt 92.7 kg (204 lb 6.4 oz)   BMI 31.08 kg/m²       Medications:  Medications Administered     0.9% sodium chloride infusion     Admin Date  10/11/2019 Action  New Bag Dose  25 mL/hr Rate  25 mL/hr Route  IntraVENous Administered By  [email or web address] (Dynasec) A          dexamethasone (DECADRON) 4 mg/mL injection 10 mg     Admin Date  10/11/2019 Action  Given Dose  10 mg Route  IntraVENous Administered By  [email or web address] (Dynasec) A          diphenhydrAMINE (BENADRYL) capsule 50 mg     Admin Date  10/11/2019 Action  Given Dose  50 mg Route  Oral Administered By  [email or web address] (Dynasec) A          famotidine (PF) (PEPCID) 20 mg in sodium chloride 0.9% 10 mL injection     Admin Date  10/11/2019 Action  Given Dose  20 mg Route  IntraVENous Administered By  [email or web address] (Dynasec) A          heparin (porcine) pf 300-500 Units     Admin Date  10/11/2019 Action  Given Dose  500 Units Route  InterCATHeter Administered By  Cielo Akimbo A          PACLitaxel (TAXOL) 166 mg in 0.9% sodium chloride 250 mL, overfill volume 25 mL chemo infusion     Admin Date  10/11/2019 Action  New Bag Dose  166 mg Rate  302.7 mL/hr Route  IntraVENous Administered By  [email or web address] (Dynasec) A          saline peripheral flush soln 10 mL     Admin Date  10/11/2019 Action  Given Dose  10 mL Route  InterCATHeter Administered By  Nithya Peace Date  10/11/2019 Action  Given Dose  10 mL Route  InterCATHeter Administered By  [email or web address] (Dynasec) A          sodium chloride 0.9% injection 10 mL Admin Date  10/11/2019 Action  Given Dose  10 mL Route  IntraVENous Administered By  Jimbo PONCE                  9618 Pt tolerated treatment well. Port maintained positive blood return throughout treatment. Flushed, heparinized and de-accessed per protocol. D/c home ambulatory in no distress. Pt aware of next appointment scheduled for 10/18/19.

## 2019-10-16 ENCOUNTER — OFFICE VISIT (OUTPATIENT)
Dept: INTERNAL MEDICINE CLINIC | Age: 54
End: 2019-10-16

## 2019-10-16 VITALS
SYSTOLIC BLOOD PRESSURE: 114 MMHG | WEIGHT: 205.25 LBS | HEIGHT: 68 IN | DIASTOLIC BLOOD PRESSURE: 79 MMHG | BODY MASS INDEX: 31.11 KG/M2 | RESPIRATION RATE: 16 BRPM | OXYGEN SATURATION: 99 % | TEMPERATURE: 97.8 F | HEART RATE: 87 BPM

## 2019-10-16 DIAGNOSIS — F32.A ANXIETY AND DEPRESSION: ICD-10-CM

## 2019-10-16 DIAGNOSIS — G47.00 INSOMNIA, UNSPECIFIED TYPE: ICD-10-CM

## 2019-10-16 DIAGNOSIS — F41.9 ANXIETY AND DEPRESSION: ICD-10-CM

## 2019-10-16 DIAGNOSIS — Z23 ENCOUNTER FOR IMMUNIZATION: ICD-10-CM

## 2019-10-16 DIAGNOSIS — F32.A DEPRESSION, UNSPECIFIED DEPRESSION TYPE: Primary | ICD-10-CM

## 2019-10-16 RX ORDER — SERTRALINE HYDROCHLORIDE 100 MG/1
50 TABLET, FILM COATED ORAL DAILY
Qty: 30 TAB | Refills: 5 | Status: SHIPPED | OUTPATIENT
Start: 2019-10-16 | End: 2020-08-07 | Stop reason: ALTCHOICE

## 2019-10-16 RX ORDER — TRAZODONE HYDROCHLORIDE 50 MG/1
100 TABLET ORAL
Qty: 60 TAB | Refills: 5 | Status: SHIPPED | OUTPATIENT
Start: 2019-10-16 | End: 2020-08-07 | Stop reason: ALTCHOICE

## 2019-10-16 NOTE — PROGRESS NOTES
HISTORY OF PRESENT ILLNESS  Jose Dimas is a 62 y.o. female presents for anxiety follow up  HPI  Trazodone effective takes 1 before bed and 1/2 tablet in morning. Needed short course of Xanax when she was first diagnosed with left breast cancer    Currently receives chemo. Will have left mastectomy and radiation. Eating healthier, believes this has improved lab values    Thyroid disorder. Follow up with leigh ann in November       Past Medical History:   Diagnosis Date    Anxiety     Arthritis     At risk for sleep apnea     GAGAN 5     Basal cell carcinoma of skin     Breast cancer ([facility] [address] 2019    LEFT    History of seasonal allergies     Nausea & vomiting     Squamous cell skin cancer     Dr. Fela Guzman Thyroid disease        Current Outpatient Medications   Medication Sig    FOLIC ACID PO Take  by mouth.  Lactobacillus acidophilus (PROBIOTIC PO) Take  by mouth.  sertraline (ZOLOFT) 100 mg tablet Take 0.5 Tabs by mouth daily.  traZODone (DESYREL) 50 mg tablet Take 2 Tabs by mouth nightly.  Cyanocobalamin-Cobamamide (B12) 5,000-100 mcg lozg by SubLINGual route daily.  lidocaine-prilocaine (EMLA) topical cream Apply  to affected area as needed for Pain.  dexAMETHasone (DECADRON) 4 mg tablet Take 2 tabs (8mg) on days 2 & 3 of chemotherapy    cholecalciferol (VITAMIN D3) 1,000 unit cap Take 5,000 Units by mouth daily.  liothyronine (CYTOMEL) 5 mcg tablet Take 5 mcg by mouth daily.  levothyroxine (SYNTHROID) 50 mcg tablet Take 50 mcg by mouth Daily (before breakfast). No current facility-administered medications for this visit. Allergies   Allergen Reactions    Augmentin [Amoxicillin-Pot Clavulanate] Unknown (comments)                   Review of Systems   Constitutional: Positive for malaise/fatigue. Eyes: Negative. Respiratory: Negative. Cardiovascular: Negative. Gastrointestinal: Negative. Negative for diarrhea, nausea and vomiting.    Genitourinary: Negative. Musculoskeletal: Negative. Neurological: Negative for tingling and sensory change. Psychiatric/Behavioral: The patient is nervous/anxious. Visit Vitals  /79 (BP 1 Location: Left arm, BP Patient Position: Sitting)   Pulse 87   Temp 97.8 °F (36.6 °C) (Oral)   Resp 16   Ht 5' 8\" (1.727 m)   Wt 205 lb 4 oz (93.1 kg)   SpO2 99%   BMI 31.21 kg/m²     Physical Exam   Constitutional: She is oriented to person, place, and time. She appears well-developed and well-nourished. No distress. Cardiovascular: Normal rate. Pulmonary/Chest: Effort normal and breath sounds normal.   Musculoskeletal: She exhibits no edema or tenderness. Neurological: She is alert and oriented to person, place, and time. Skin: Skin is warm and dry. She is not diaphoretic. ASSESSMENT and PLAN    ICD-10-CM ICD-9-CM    1. Depression, unspecified depression type F32.9 311 sertraline (ZOLOFT) 100 mg tablet   2. Anxiety and depression F41.9 300.00 sertraline (ZOLOFT) 100 mg tablet    F32.9 311    3. Insomnia, unspecified type G47.00 780.52 traZODone (DESYREL) 50 mg tablet   4. Encounter for immunization Z23 V03.89 INFLUENZA VIRUS VAC QUAD,SPLIT,PRESV FREE SYRINGE IM      LA IMMUNIZ ADMIN,1 SINGLE/COMB VAC/TOXOID     Follow-up and Dispositions    · Return in about 6 months (around 4/16/2020) for anxiety, depression. Current medications effective   reviewed diet, exercise and weight control  reviewed medications and side effects in detail    Patient voices understanding and acceptance of this advice and will call back if any further questions or concerns. An After Visit Summary was printed and given to the patient.

## 2019-10-16 NOTE — PATIENT INSTRUCTIONS
Anxiety Disorder: Care Instructions  Your Care Instructions    Anxiety is a normal reaction to stress. Difficult situations can cause you to have symptoms such as sweaty palms and a nervous feeling. In an anxiety disorder, the symptoms are far more severe. Constant worry, muscle tension, trouble sleeping, nausea and diarrhea, and other symptoms can make normal daily activities difficult or impossible. These symptoms may occur for no reason, and they can affect your work, school, or social life. Medicines, counseling, and self-care can all help. Follow-up care is a key part of your treatment and safety. Be sure to make and go to all appointments, and call your doctor if you are having problems. It's also a good idea to know your test results and keep a list of the medicines you take. How can you care for yourself at home? · Take medicines exactly as directed. Call your doctor if you think you are having a problem with your medicine. · Go to your counseling sessions and follow-up appointments. · Recognize and accept your anxiety. Then, when you are in a situation that makes you anxious, say to yourself, \"This is not an emergency. I feel uncomfortable, but I am not in danger. I can keep going even if I feel anxious. \"  · Be kind to your body:  ? Relieve tension with exercise or a massage. ? Get enough rest.  ? Avoid alcohol, caffeine, nicotine, and illegal drugs. They can increase your anxiety level and cause sleep problems. ? Learn and do relaxation techniques. See below for more about these techniques. · Engage your mind. Get out and do something you enjoy. Go to a funny movie, or take a walk or hike. Plan your day. Having too much or too little to do can make you anxious. · Keep a record of your symptoms. Discuss your fears with a good friend or family member, or join a support group for people with similar problems. Talking to others sometimes relieves stress.   · Get involved in social groups, or volunteer to help others. Being alone sometimes makes things seem worse than they are. · Get at least 30 minutes of exercise on most days of the week to relieve stress. Walking is a good choice. You also may want to do other activities, such as running, swimming, cycling, or playing tennis or team sports. Relaxation techniques  Do relaxation exercises 10 to 20 minutes a day. You can play soothing, relaxing music while you do them, if you wish. · Tell others in your house that you are going to do your relaxation exercises. Ask them not to disturb you. · Find a comfortable place, away from all distractions and noise. · Lie down on your back, or sit with your back straight. · Focus on your breathing. Make it slow and steady. · Breathe in through your nose. Breathe out through either your nose or mouth. · Breathe deeply, filling up the area between your navel and your rib cage. Breathe so that your belly goes up and down. · Do not hold your breath. · Breathe like this for 5 to 10 minutes. Notice the feeling of calmness throughout your whole body. As you continue to breathe slowly and deeply, relax by doing the following for another 5 to 10 minutes:  · Tighten and relax each muscle group in your body. You can begin at your toes and work your way up to your head. · Imagine your muscle groups relaxing and becoming heavy. · Empty your mind of all thoughts. · Let yourself relax more and more deeply. · Become aware of the state of calmness that surrounds you. · When your relaxation time is over, you can bring yourself back to alertness by moving your fingers and toes and then your hands and feet and then stretching and moving your entire body. Sometimes people fall asleep during relaxation, but they usually wake up shortly afterward. · Always give yourself time to return to full alertness before you drive a car or do anything that might cause an accident if you are not fully alert.  Never play a relaxation tape while you drive a car. When should you call for help? Call 911 anytime you think you may need emergency care. For example, call if:    · You feel you cannot stop from hurting yourself or someone else.   Melquiades Caro the numbers for these national suicide hotlines: 5-445-435-TALK (5-654.439.4418) and 3-157-RTIBIVG (5-840.396.9273). If you or someone you know talks about suicide or feeling hopeless, get help right away.   Watch closely for changes in your health, and be sure to contact your doctor if:    · You have anxiety or fear that affects your life.     · You have symptoms of anxiety that are new or different from those you had before. Where can you learn more? Go to http://louis-daniel.info/. Enter P754 in the search box to learn more about \"Anxiety Disorder: Care Instructions. \"  Current as of: May 28, 2019  Content Version: 12.2  © 1747-1831 [employer]. Care instructions adapted under license by [employer] (which disclaims liability or warranty for this information). If you have questions about a medical condition or this instruction, always ask your healthcare professional. [address] any warranty or liability for your use of this information. Recovering From Depression: Care Instructions  Your Care Instructions    Taking good care of yourself is important as you recover from depression. In time, your symptoms will fade as your treatment takes hold. Do not give up. Instead, focus your energy on getting better. Your mood will improve. It just takes some time. Focus on things that can help you feel better, such as being with friends and family, eating well, and getting enough rest. But take things slowly. Do not do too much too soon. You will begin to feel better gradually. Follow-up care is a key part of your treatment and safety. Be sure to make and go to all appointments, and call your doctor if you are having problems.  It's also a good idea to know your test results and keep a list of the medicines you take. How can you care for yourself at home? Be realistic  · If you have a large task to do, break it up into smaller steps you can handle, and just do what you can. · You may want to put off important decisions until your depression has lifted. If you have plans that will have a major impact on your life, such as marriage, divorce, or a job change, try to wait a bit. Talk it over with friends and loved ones who can help you look at the overall picture first.  · Reaching out to people for help is important. Do not isolate yourself. Let your family and friends help you. Find someone you can trust and confide in, and talk to that person. · Be patient, and be kind to yourself. Remember that depression is not your fault and is not something you can overcome with willpower alone. Treatment is necessary for depression, just like for any other illness. Feeling better takes time, and your mood will improve little by little. Stay active  · Stay busy and get outside. Take a walk, or try some other light exercise. · Talk with your doctor about an exercise program. Exercise can help with mild depression. · Go to a movie or concert. Take part in a [employer] activity or other social gathering. Go to a ball game. · Ask a friend to have dinner with you. Take care of yourself  · Eat a balanced diet with plenty of fresh fruits and vegetables, whole grains, and lean protein. If you have lost your appetite, eat small snacks rather than large meals. · Avoid drinking alcohol or using illegal drugs. Do not take medicines that have not been prescribed for you. They may interfere with medicines you may be taking for depression, or they may make your depression worse. · Take your medicines exactly as they are prescribed. You may start to feel better within 1 to 3 weeks of taking antidepressant medicine.  But it can take as many as 6 to 8 weeks to see more improvement. If you have questions or concerns about your medicines, or if you do not notice any improvement by 3 weeks, talk to your doctor. · If you have any side effects from your medicine, tell your doctor. Antidepressants can make you feel tired, dizzy, or nervous. Some people have dry mouth, constipation, headaches, sexual problems, or diarrhea. Many of these side effects are mild and will go away on their own after you have been taking the medicine for a few weeks. Some may last longer. Talk to your doctor if side effects are bothering you too much. You might be able to try a different medicine. · Get enough sleep. If you have problems sleeping:  ? Go to bed at the same time every night, and get up at the same time every morning. ? Keep your bedroom dark and quiet. ? Do not exercise after 5:00 p.m.  ? Avoid drinks with caffeine after 5:00 p.m. · Avoid sleeping pills unless they are prescribed by the doctor treating your depression. Sleeping pills may make you groggy during the day, and they may interact with other medicine you are taking. · If you have any other illnesses, such as diabetes, heart disease, or high blood pressure, make sure to continue with your treatment. Tell your doctor about all of the medicines you take, including those with or without a prescription. · Keep the numbers for these national suicide hotlines: 7-480-476-TALK (2-869.629.2077) and 3-545-PRULTBH (5-741.309.4895). If you or someone you know talks about suicide or feeling hopeless, get help right away. When should you call for help? Call 911 anytime you think you may need emergency care. For example, call if:    · You feel like hurting yourself or someone else.     · Someone you know has depression and is about to attempt or is attempting suicide.   [facility] your doctor now or seek immediate medical care if:    · You hear voices.     · Someone you know has depression and:  ? Starts to give away his or her possessions. ?  Uses illegal drugs or drinks alcohol heavily. ? Talks or writes about death, including writing suicide notes or talking about guns, knives, or pills. ? Starts to spend a lot of time alone. ? Acts very aggressively or suddenly appears calm.    Watch closely for changes in your health, and be sure to contact your doctor if:    · You do not get better as expected. Where can you learn more? Go to http://louis-daniel.info/. Enter N330 in the search box to learn more about \"Recovering From Depression: Care Instructions. \"  Current as of: May 28, 2019  Content Version: 12.2  © 7132-2308 [employer] properties and services. Care instructions adapted under license by [employer] (which disclaims liability or warranty for this information). If you have questions about a medical condition or this instruction, always ask your healthcare professional. [address] any warranty or liability for your use of this information.

## 2019-10-16 NOTE — PROGRESS NOTES
Rm 7    Chief Complaint   Patient presents with    Medication Refill     1. Have you been to the ER, urgent care clinic since your last visit? Hospitalized since your last visit? No    2. Have you seen or consulted any other health care providers outside of the [address] [place] [address] since your last visit? Include any pap smears or colon screening.  No    Health Maintenance Due   Topic Date Due    Hepatitis C Screening  1965    Pneumococcal 0-64 years (1 of 3 - PCV13) 08/07/1969    PAP AKA CERVICAL CYTOLOGY  08/07/1984    Shingrix Vaccine Age 50> (1 of 2) 08/07/2013    FOBT Q 1 YEAR AGE 50-75  08/07/2013    Influenza Age 5 to Adult  08/01/2019       3 most recent PHQ Screens 9/20/2019   Little interest or pleasure in doing things Not at all   Feeling down, depressed, irritable, or hopeless Not at all   Total Score PHQ 2 0     Learning Assessment 8/5/2019   PRIMARY LEARNER Patient   HIGHEST LEVEL OF EDUCATION - PRIMARY LEARNER  -   BARRIERS PRIMARY LEARNER -   PRIMARY LANGUAGE ENGLISH   LEARNER PREFERENCE PRIMARY DEMONSTRATION     -     -     -     -   ANSWERED BY patient   RELATIONSHIP SELF

## 2019-10-18 ENCOUNTER — HOSPITAL ENCOUNTER (OUTPATIENT)
Dept: INFUSION THERAPY | Age: 54
Discharge: HOME | End: 2019-10-18
Payer: COMMERCIAL

## 2019-10-18 ENCOUNTER — OFFICE VISIT (OUTPATIENT)
Dept: ONCOLOGY | Age: 54
End: 2019-10-18

## 2019-10-18 VITALS
WEIGHT: 207.6 LBS | DIASTOLIC BLOOD PRESSURE: 85 MMHG | BODY MASS INDEX: 31.46 KG/M2 | HEIGHT: 68 IN | TEMPERATURE: 97.7 F | RESPIRATION RATE: 18 BRPM | HEART RATE: 82 BPM | SYSTOLIC BLOOD PRESSURE: 135 MMHG

## 2019-10-18 VITALS
TEMPERATURE: 97.5 F | RESPIRATION RATE: 18 BRPM | HEIGHT: 68 IN | OXYGEN SATURATION: 97 % | BODY MASS INDEX: 31.07 KG/M2 | HEART RATE: 95 BPM | DIASTOLIC BLOOD PRESSURE: 75 MMHG | SYSTOLIC BLOOD PRESSURE: 114 MMHG | WEIGHT: 205 LBS

## 2019-10-18 DIAGNOSIS — E66.09 CLASS 1 OBESITY DUE TO EXCESS CALORIES WITHOUT SERIOUS COMORBIDITY WITH BODY MASS INDEX (BMI) OF 30.0 TO 30.9 IN ADULT: ICD-10-CM

## 2019-10-18 DIAGNOSIS — C50.412 MALIGNANT NEOPLASM OF UPPER-OUTER QUADRANT OF LEFT BREAST IN FEMALE, ESTROGEN RECEPTOR POSITIVE (HCC): Primary | ICD-10-CM

## 2019-10-18 DIAGNOSIS — Z17.0 MALIGNANT NEOPLASM OF UPPER-OUTER QUADRANT OF LEFT BREAST IN FEMALE, ESTROGEN RECEPTOR POSITIVE (HCC): Primary | ICD-10-CM

## 2019-10-18 LAB
BASOPHILS # BLD: 0 K/UL (ref 0–0.1)
BASOPHILS NFR BLD: 1 % (ref 0–1)
DIFFERENTIAL METHOD BLD: ABNORMAL
EOSINOPHIL # BLD: 0.1 K/UL (ref 0–0.4)
EOSINOPHIL NFR BLD: 2 % (ref 0–7)
ERYTHROCYTE [DISTWIDTH] IN BLOOD BY AUTOMATED COUNT: 12.9 % (ref 11.5–14.5)
HCT VFR BLD AUTO: 32.6 % (ref 35–47)
HGB BLD-MCNC: 10.8 G/DL (ref 11.5–16)
IMM GRANULOCYTES # BLD AUTO: 0 K/UL (ref 0–0.04)
IMM GRANULOCYTES NFR BLD AUTO: 1 % (ref 0–0.5)
LYMPHOCYTES # BLD: 1 K/UL (ref 0.8–3.5)
LYMPHOCYTES NFR BLD: 22 % (ref 12–49)
MCH RBC QN AUTO: 34.5 PG (ref 26–34)
MCHC RBC AUTO-ENTMCNC: 33.1 G/DL (ref 30–36.5)
MCV RBC AUTO: 104.2 FL (ref 80–99)
MONOCYTES # BLD: 0.3 K/UL (ref 0–1)
MONOCYTES NFR BLD: 7 % (ref 5–13)
NEUTS SEG # BLD: 3.1 K/UL (ref 1.8–8)
NEUTS SEG NFR BLD: 67 % (ref 32–75)
NRBC # BLD: 0 K/UL (ref 0–0.01)
NRBC BLD-RTO: 0 PER 100 WBC
PLATELET # BLD AUTO: 253 K/UL (ref 150–400)
PMV BLD AUTO: 9.9 FL (ref 8.9–12.9)
RBC # BLD AUTO: 3.13 M/UL (ref 3.8–5.2)
WBC # BLD AUTO: 4.5 K/UL (ref 3.6–11)

## 2019-10-18 PROCEDURE — 85025 COMPLETE CBC W/AUTO DIFF WBC: CPT

## 2019-10-18 PROCEDURE — 36415 COLL VENOUS BLD VENIPUNCTURE: CPT

## 2019-10-18 PROCEDURE — 74011250636 HC RX REV CODE- 250/636: Performed by: REGISTERED NURSE

## 2019-10-18 PROCEDURE — 77030012965 HC NDL HUBR BBMI -A

## 2019-10-18 PROCEDURE — 96375 TX/PRO/DX INJ NEW DRUG ADDON: CPT

## 2019-10-18 PROCEDURE — 74011000250 HC RX REV CODE- 250: Performed by: REGISTERED NURSE

## 2019-10-18 PROCEDURE — 96413 CHEMO IV INFUSION 1 HR: CPT

## 2019-10-18 PROCEDURE — 74011250637 HC RX REV CODE- 250/637: Performed by: REGISTERED NURSE

## 2019-10-18 RX ORDER — SODIUM CHLORIDE 9 MG/ML
10 INJECTION INTRAMUSCULAR; INTRAVENOUS; SUBCUTANEOUS AS NEEDED
Status: DISCONTINUED | OUTPATIENT
Start: 2019-10-18 | End: 2019-10-19 | Stop reason: HOSPADM

## 2019-10-18 RX ORDER — SODIUM CHLORIDE 9 MG/ML
25 INJECTION, SOLUTION INTRAVENOUS CONTINUOUS
Status: DISCONTINUED | OUTPATIENT
Start: 2019-10-18 | End: 2019-10-19 | Stop reason: HOSPADM

## 2019-10-18 RX ORDER — HEPARIN 100 UNIT/ML
300-500 SYRINGE INTRAVENOUS AS NEEDED
Status: DISCONTINUED | OUTPATIENT
Start: 2019-10-18 | End: 2019-10-19 | Stop reason: HOSPADM

## 2019-10-18 RX ORDER — DEXAMETHASONE SODIUM PHOSPHATE 4 MG/ML
10 INJECTION, SOLUTION INTRA-ARTICULAR; INTRALESIONAL; INTRAMUSCULAR; INTRAVENOUS; SOFT TISSUE ONCE
Status: COMPLETED | OUTPATIENT
Start: 2019-10-18 | End: 2019-10-18

## 2019-10-18 RX ORDER — DIPHENHYDRAMINE HCL 25 MG
50 CAPSULE ORAL ONCE
Status: COMPLETED | OUTPATIENT
Start: 2019-10-18 | End: 2019-10-18

## 2019-10-18 RX ORDER — SODIUM CHLORIDE 0.9 % (FLUSH) 0.9 %
10 SYRINGE (ML) INJECTION AS NEEDED
Status: DISCONTINUED | OUTPATIENT
Start: 2019-10-18 | End: 2019-10-19 | Stop reason: HOSPADM

## 2019-10-18 RX ADMIN — SODIUM CHLORIDE 25 ML/HR: 900 INJECTION, SOLUTION INTRAVENOUS at 13:16

## 2019-10-18 RX ADMIN — FAMOTIDINE 20 MG: 10 INJECTION, SOLUTION INTRAVENOUS at 13:15

## 2019-10-18 RX ADMIN — Medication 500 UNITS: at 15:11

## 2019-10-18 RX ADMIN — PACLITAXEL 166 MG: 6 INJECTION, SOLUTION INTRAVENOUS at 14:08

## 2019-10-18 RX ADMIN — DIPHENHYDRAMINE HYDROCHLORIDE 50 MG: 25 CAPSULE ORAL at 13:14

## 2019-10-18 RX ADMIN — Medication 10 ML: at 15:11

## 2019-10-18 RX ADMIN — DEXAMETHASONE SODIUM PHOSPHATE 10 MG: 4 INJECTION, SOLUTION INTRAMUSCULAR; INTRAVENOUS at 13:16

## 2019-10-18 NOTE — PROGRESS NOTES
[facility] [place] [facility]  [address], [address]  W: [phone]  F: [phone]    Reason for Visit:   Rojas Kahn is a 62 y.o. female who is seen for Left breast cancer- ER+NV+HER2 greg neg    Treatment History:   · 6/19: Mammogram:  Left breast mass with skin thickening, 2 suspicious nodes. Mass measures 1.4 x 0.9 x 1.3  · 6/19: MRI breast: Multicentric left breast carcinoma. Non-masslike enhancement extends from the nipple to the posterior third, involves all quadrants, and measures 106 x 44 x 79 mm. · 5/28/19: Biopsy breast- IDC % % HER 2 negative, skin biopsy benign , node biopsy mostly adipose tissue with atypical cells . BRCA1 and 2 negative. CT and bone scan negative for metastasis. Mammaprint with insufficient tissue for testing. Repeat biopsy of axillary node 6/20/19 positive for metastatic disease- repeat mammaprint - high risk  · 7/19/19: cycle 1 neoadjuvant of dd AC-T  · 9/12/19: US of breast shows increased malignant microcalcification in the left breast, etiology included tx related necrosis vs extension of disease. Decreased size in left axillary LN. History of Present Illness:   Patient is a 62 y.o. seen for L breast cancer. She felt a sensation in the shower about May 2019. She also noted a lump and  an inverted nipple. She had a physician friend look at this who directed her to mammogram and recommended she see Dr. Alcides Pearl. This led to imaging and diagnosis as above. She comes in today for week #6 of neoadjuvant Taxol. She feels well today. She denies numbness/tingling in fingers or toes. Denies nausea/vomiting or diarrhea/constipation. No mouth sores. Has some intermittent sharp pains in her left breast.     Comes with her mom today. Rare ETOH  She does not smoke.      Father had colon cancer    Past Medical History:   Diagnosis Date    Anxiety     Arthritis     At risk for sleep apnea     GAGAN 5     Basal cell carcinoma of skin     Breast cancer ([facility] [address] 2019    LEFT    History of seasonal allergies     Nausea & vomiting     Squamous cell skin cancer     Dr. Thais Gosselin Thyroid disease       Past Surgical History:   Procedure Laterality Date    HX COLONOSCOPY      HX KNEE ARTHROSCOPY Left     HX LUMBAR FUSION  1995    HX MOHS PROCEDURES      x 4 times     HX SHOULDER ARTHROSCOPY Right     HX THYROIDECTOMY  2014      Social History     Tobacco Use    Smoking status: Never Smoker    Smokeless tobacco: Never Used   Substance Use Topics    Alcohol use: Yes      Family History   Problem Relation Age of Onset    Arthritis-osteo Mother     Cancer Father         Colon Cancer     Current Outpatient Medications   Medication Sig    FOLIC ACID PO Take  by mouth.  Lactobacillus acidophilus (PROBIOTIC PO) Take  by mouth.  sertraline (ZOLOFT) 100 mg tablet Take 0.5 Tabs by mouth daily.  traZODone (DESYREL) 50 mg tablet Take 2 Tabs by mouth nightly.  Cyanocobalamin-Cobamamide (B12) 5,000-100 mcg lozg by SubLINGual route daily.  lidocaine-prilocaine (EMLA) topical cream Apply  to affected area as needed for Pain.  dexAMETHasone (DECADRON) 4 mg tablet Take 2 tabs (8mg) on days 2 & 3 of chemotherapy    cholecalciferol (VITAMIN D3) 1,000 unit cap Take 5,000 Units by mouth daily.  liothyronine (CYTOMEL) 5 mcg tablet Take 5 mcg by mouth daily.  levothyroxine (SYNTHROID) 50 mcg tablet Take 50 mcg by mouth Daily (before breakfast). No current facility-administered medications for this visit. Allergies   Allergen Reactions    Augmentin [Amoxicillin-Pot Clavulanate] Unknown (comments)        Review of Systems: A complete review of systems was obtained, negative except as described above.     Physical Exam:     Visit Vitals  /75   Pulse 95   Temp 97.5 °F (36.4 °C) (Oral)   Resp 18   Ht 5' 8\" (1.727 m)   Wt 205 lb (93 kg)   SpO2 97%   BMI 31.17 kg/m²     ECOG PS: 1  General: No distress  Eyes: PERRLA, anicteric sclerae  HENT: Atraumatic, OP clear  Neck: Supple; PAC looks good  Lymphatic: no lymphadenopathy  Breasts: Left breast no longer erythematous, skin normal texture, nipple is not inverted, LUOQ norbert stable and about 3 cm , some tenderness to palpation, no adenopathy is palpable  MS: Normal gait and station. Digits without clubbing or cyanosis. Skin: No rashes, ecchymoses, or petechiae. Normal temperature, turgor, and texture. Psych: Alert, oriented, appropriate affect, normal judgment/insight    Results:     Lab Results   Component Value Date/Time    WBC 5.2 10/11/2019 10:09 AM    HGB 11.2 (L) 10/11/2019 10:09 AM    HCT 33.1 (L) 10/11/2019 10:09 AM    PLATELET 695 84/58/9699 10:09 AM    .1 (H) 10/11/2019 10:09 AM    ABS. NEUTROPHILS 3.5 10/11/2019 10:09 AM     Lab Results   Component Value Date/Time    Sodium 140 10/04/2019 10:22 AM    Potassium 3.9 10/04/2019 10:22 AM    Chloride 106 10/04/2019 10:22 AM    CO2 27 10/04/2019 10:22 AM    Glucose 109 (H) 10/04/2019 10:22 AM    BUN 7 10/04/2019 10:22 AM    Creatinine 0.67 10/04/2019 10:22 AM    GFR est AA >60 10/04/2019 10:22 AM    GFR est non-AA >60 10/04/2019 10:22 AM    Calcium 9.0 10/04/2019 10:22 AM     Lab Results   Component Value Date/Time    Bilirubin, total 0.4 10/04/2019 10:22 AM    ALT (SGPT) 53 10/04/2019 10:22 AM    AST (SGOT) 27 10/04/2019 10:22 AM    Alk. phosphatase 88 10/04/2019 10:22 AM    Protein, total 6.9 10/04/2019 10:22 AM    Albumin 3.5 10/04/2019 10:22 AM    Globulin 3.4 10/04/2019 10:22 AM       Records reviewed and summarized above. Pathology report(s) reviewed above. 5/28/19  FINAL PATHOLOGIC DIAGNOSIS  1. Breast, left, core biopsy: In situ and invasive ductal carcinoma   Invasive carcinoma is nuclear grade 2 and measures up to 0.6 cm in greatest dimension on slide   Ductal carcinoma in situ is nuclear grade 3 with central necrosis   2. Soft tissue, left axilla, core biopsy:    Adipose tissue with rare detached atypical cells   No lymph node tissue identified   See comment     Radiology report(s) reviewed above. US breast 9/12/19:  IMPRESSION:  1. Increased malignant microcalcifications in the left breast. This could  represent treatment-related necrosis or extension of disease. 2. Extensive carcinoma seen on prior MRI is largely mammographically occult. 3. Decreased size of a metastatic left axillary lymph node with  microcalcifications. 4. BI-RADS Assessment Category 6: Known biopsy proven malignancy. Assessment:   1) Left breast Invasive ductal carcinoma    [id number]  GRADE 2  % AR 20% HER2 greg equivocal - FISH could not be done  Repeat biopsy of breast mass 6/24/19 - HER2 greg negative, mamma print indicates she has genomically high risk disease    Due to extensive surrounding breast changes a mastectomy is recommended by Dr. Flora Bloom    Today is week #6 of weekly Taxol. She had a breast ultrasound after completion of dd-AC which was reviewed and shows increase in size of microcalcifications in the left breast which could represent treatment related necrosis vs extension of disease. This was reviewed with Dr. Flora Bloom. On exam there is clear response with resolution of skin thickening, slow eversion of nipple, and smaller mass. Will  plan to repeat US after week #6 of Taxol but will continue with NAC which has been ordered. We will discuss endocrine therapy/ post mastectomy RT at a later date    Baseline ECHO reviewed and shows EF 61-65%    2) Hypothyroidism    3) Obesity    Plan:     · Proceed today with weekly #6 of weekly Taxol at 80mg/m2 x 12   · Labs to include CBC with diff and CMP prior to each infusion  · Premeds to include  dexamethasone, benadryl, pepsid   · Compazine q 6 hours prn  · US of breast prior to week 7 ordered     RTC in 1 week     I appreciate the opportunity to participate in Ms. Rachelle Garcia's care.     Signed By: Stephanie Gregory MD

## 2019-10-18 NOTE — PROGRESS NOTES
[facility] Progress Note    Date: 2019    Name: Liborio Monroe    MRN: [id number]         : 1965    Ms. Reba Lau Arrived [phone] and in no distress for cycle 6, day15 . Assessment was completed, no acute issues at this time, no new complaints voiced. Port accessed with positive blood return. Labs drawn and sent for processing. Port flushed and de accessed at the end of treatment. Medications Administered     0.9% sodium chloride infusion     Admin Date  10/18/2019 Action  New Bag Dose  25 mL/hr Rate  25 mL/hr Route  IntraVENous Administered By  Milad Gibson RN          dexamethasone (DECADRON) 4 mg/mL injection 10 mg     Admin Date  10/18/2019 Action  Given Dose  10 mg Route  IntraVENous Administered By  Milad Gibson RN          diphenhydrAMINE (BENADRYL) capsule 50 mg     Admin Date  10/18/2019 Action  Given Dose  50 mg Route  Oral Administered By  Milad Gibson RN          famotidine (PF) (PEPCID) 20 mg in sodium chloride 0.9% 10 mL injection     Admin Date  10/18/2019 Action  Given Dose  20 mg Route  IntraVENous Administered By  Milad Gibson RN          heparin (porcine) pf 300-500 Units     Admin Date  10/18/2019 Action  Given Dose  500 Units Route  InterCATHeter Administered By  Milad Gibson RN          PACLitaxel (TAXOL) 166 mg in 0.9% sodium chloride 250 mL, overfill volume 25 mL chemo infusion     Admin Date  10/18/2019 Action  New Bag Dose  166 mg Rate  302.7 mL/hr Route  IntraVENous Administered By  Milad Gibson RN          saline peripheral flush soln 10 mL     Admin Date  10/18/2019 Action  Given Dose  10 mL Route  InterCATHeter Administered By  Milad Gibson RN              Chemotherapy Flowsheet 10/18/2019   Cycle [id number]   Date 10/18/2019   Drug / Regimen Taxol   Dosage -   Time Up -   Time Down -   Pre Meds given   Notes given         Ms. Garcia's vitals were reviewed.   Patient Vitals for the past 12 hrs:   Temp Pulse Resp BP   10/18/19 1511 -- 82 -- 135/85   10/18/19 1116 97.7 °F (36.5 °C) 94 18 132/77         Lab results were obtained and reviewed. Recent Results (from the past 12 hour(s))   CBC WITH AUTOMATED DIFF    Collection Time: 10/18/19 11:29 AM   Result Value Ref Range    WBC 4.5 3.6 - 11.0 K/uL    RBC 3.13 (L) 3.80 - 5.20 M/uL    HGB 10.8 (L) 11.5 - 16.0 g/dL    HCT 32.6 (L) 35.0 - 47.0 %    .2 (H) 80.0 - 99.0 FL    MCH 34.5 (H) 26.0 - 34.0 PG    MCHC 33.1 30.0 - 36.5 g/dL    RDW 12.9 11.5 - 14.5 %    PLATELET 014 910 - 225 K/uL    MPV 9.9 8.9 - 12.9 FL    NRBC 0.0 0  WBC    ABSOLUTE NRBC 0.00 0.00 - 0.01 K/uL    NEUTROPHILS 67 32 - 75 %    LYMPHOCYTES 22 12 - 49 %    MONOCYTES 7 5 - 13 %    EOSINOPHILS 2 0 - 7 %    BASOPHILS 1 0 - 1 %    IMMATURE GRANULOCYTES 1 (H) 0.0 - 0.5 %    ABS. NEUTROPHILS 3.1 1.8 - 8.0 K/UL    ABS. LYMPHOCYTES 1.0 0.8 - 3.5 K/UL    ABS. MONOCYTES 0.3 0.0 - 1.0 K/UL    ABS. EOSINOPHILS 0.1 0.0 - 0.4 K/UL    ABS. BASOPHILS 0.0 0.0 - 0.1 K/UL    ABS. IMM. GRANS. 0.0 0.00 - 0.04 K/UL    DF AUTOMATED         Pre-medications  were administered as ordered and chemotherapy was initiated.   Medications Administered     0.9% sodium chloride infusion     Admin Date  10/18/2019 Action  New Bag Dose  25 mL/hr Rate  25 mL/hr Route  IntraVENous Administered By  Keon Larose RN          dexamethasone (DECADRON) 4 mg/mL injection 10 mg     Admin Date  10/18/2019 Action  Given Dose  10 mg Route  IntraVENous Administered By  Keon Larose RN          diphenhydrAMINE (BENADRYL) capsule 50 mg     Admin Date  10/18/2019 Action  Given Dose  50 mg Route  Oral Administered By  Keon Larose RN          famotidine (PF) (PEPCID) 20 mg in sodium chloride 0.9% 10 mL injection     Admin Date  10/18/2019 Action  Given Dose  20 mg Route  IntraVENous Administered By  Keon Larose RN          heparin (porcine) pf 300-500 Units     Admin Date  10/18/2019 Action  Given Dose  500 Units Route  InterCATHeter Administered By  Edilia Ruiz RN          PACLitaxel (TAXOL) 166 mg in 0.9% sodium chloride 250 mL, overfill volume 25 mL chemo infusion     Admin Date  10/18/2019 Action  New Bag Dose  166 mg Rate  302.7 mL/hr Route  IntraVENous Administered By  Edilia Ruiz RN          saline peripheral flush soln 10 mL     Admin Date  10/18/2019 Action  Given Dose  10 mL Route  InterCATHeter Administered By  Edilia Ruiz RN                    Ms. Carlos Scruggs tolerated treatment well. Port maintained positive blood return throughout treatment. Port flushed, heparinized and de accessed per protocol. Patient was discharged from [place] [address] in stable condition at 1515.      Future Appointments   Date Time Provider Preeti Joyce   10/25/2019 10:00 AM CHRISTALMO INFUSION NURSE 6 [facility]   10/25/2019 10:15 AM REGULO [facility] 6199   11/1/2019 10:00 AM BREMO INFUSION NURSE 1 [facility]   11/8/2019 10:00 AM BREMO FT CHAIR 3 [facility]   11/15/2019 10:00 AM BREMO INFUSION NURSE 1 [facility]   11/22/2019 10:00 AM BREMO FT CHAIR 2 [facility]   12/9/2019 11:20 AM Kristopher Young MD [facility] [address] Kayla Nick RN  October 18, 2019

## 2019-10-18 NOTE — PROGRESS NOTES
Bebe Francois is a 62 y.o. female  Chief Complaint   Patient presents with    Follow-up    Breast Cancer     1. Have you been to the ER, urgent care clinic since your last visit? Hospitalized since your last visit? No    2. Have you seen or consulted any other health care providers outside of the [address] [place] [address] since your last visit? Include any pap smears or colon screening.  No

## 2019-10-22 ENCOUNTER — HOSPITAL ENCOUNTER (OUTPATIENT)
Dept: MAMMOGRAPHY | Age: 54
Discharge: HOME | End: 2019-10-22
Attending: REGISTERED NURSE
Payer: COMMERCIAL

## 2019-10-22 DIAGNOSIS — Z17.0 MALIGNANT NEOPLASM OF UPPER-OUTER QUADRANT OF LEFT BREAST IN FEMALE, ESTROGEN RECEPTOR POSITIVE (HCC): ICD-10-CM

## 2019-10-22 DIAGNOSIS — C50.412 MALIGNANT NEOPLASM OF UPPER-OUTER QUADRANT OF LEFT BREAST IN FEMALE, ESTROGEN RECEPTOR POSITIVE (HCC): ICD-10-CM

## 2019-10-22 PROCEDURE — 77065 DX MAMMO INCL CAD UNI: CPT

## 2019-10-25 ENCOUNTER — HOSPITAL ENCOUNTER (OUTPATIENT)
Dept: INFUSION THERAPY | Age: 54
Discharge: HOME | End: 2019-10-25
Payer: COMMERCIAL

## 2019-10-25 ENCOUNTER — OFFICE VISIT (OUTPATIENT)
Dept: ONCOLOGY | Age: 54
End: 2019-10-25

## 2019-10-25 VITALS
RESPIRATION RATE: 18 BRPM | TEMPERATURE: 98 F | OXYGEN SATURATION: 94 % | SYSTOLIC BLOOD PRESSURE: 122 MMHG | BODY MASS INDEX: 31.11 KG/M2 | WEIGHT: 204.6 LBS | DIASTOLIC BLOOD PRESSURE: 84 MMHG | HEART RATE: 85 BPM

## 2019-10-25 VITALS
DIASTOLIC BLOOD PRESSURE: 80 MMHG | HEIGHT: 68 IN | BODY MASS INDEX: 31.01 KG/M2 | RESPIRATION RATE: 18 BRPM | SYSTOLIC BLOOD PRESSURE: 127 MMHG | WEIGHT: 204.6 LBS | TEMPERATURE: 98 F | HEART RATE: 80 BPM

## 2019-10-25 DIAGNOSIS — Z17.0 MALIGNANT NEOPLASM OF UPPER-OUTER QUADRANT OF LEFT BREAST IN FEMALE, ESTROGEN RECEPTOR POSITIVE (HCC): Primary | ICD-10-CM

## 2019-10-25 DIAGNOSIS — E66.09 CLASS 1 OBESITY DUE TO EXCESS CALORIES WITHOUT SERIOUS COMORBIDITY WITH BODY MASS INDEX (BMI) OF 30.0 TO 30.9 IN ADULT: ICD-10-CM

## 2019-10-25 DIAGNOSIS — C50.412 MALIGNANT NEOPLASM OF UPPER-OUTER QUADRANT OF LEFT BREAST IN FEMALE, ESTROGEN RECEPTOR POSITIVE (HCC): Primary | ICD-10-CM

## 2019-10-25 LAB
ALBUMIN SERPL-MCNC: 3.6 G/DL (ref 3.5–5)
ALBUMIN/GLOB SERPL: 1.3 {RATIO} (ref 1.1–2.2)
ALP SERPL-CCNC: 81 U/L (ref 45–117)
ALT SERPL-CCNC: 39 U/L (ref 12–78)
ANION GAP SERPL CALC-SCNC: 6 MMOL/L (ref 5–15)
AST SERPL-CCNC: 20 U/L (ref 15–37)
BASOPHILS # BLD: 0 K/UL (ref 0–0.1)
BASOPHILS NFR BLD: 0 % (ref 0–1)
BILIRUB SERPL-MCNC: 0.4 MG/DL (ref 0.2–1)
BUN SERPL-MCNC: 10 MG/DL (ref 6–20)
BUN/CREAT SERPL: 17 (ref 12–20)
CALCIUM SERPL-MCNC: 8.8 MG/DL (ref 8.5–10.1)
CHLORIDE SERPL-SCNC: 106 MMOL/L (ref 97–108)
CO2 SERPL-SCNC: 28 MMOL/L (ref 21–32)
CREAT SERPL-MCNC: 0.58 MG/DL (ref 0.55–1.02)
DIFFERENTIAL METHOD BLD: ABNORMAL
EOSINOPHIL # BLD: 0.1 K/UL (ref 0–0.4)
EOSINOPHIL NFR BLD: 3 % (ref 0–7)
ERYTHROCYTE [DISTWIDTH] IN BLOOD BY AUTOMATED COUNT: 12.5 % (ref 11.5–14.5)
GLOBULIN SER CALC-MCNC: 2.8 G/DL (ref 2–4)
GLUCOSE SERPL-MCNC: 100 MG/DL (ref 65–100)
HCT VFR BLD AUTO: 32.7 % (ref 35–47)
HGB BLD-MCNC: 10.9 G/DL (ref 11.5–16)
IMM GRANULOCYTES # BLD AUTO: 0 K/UL (ref 0–0.04)
IMM GRANULOCYTES NFR BLD AUTO: 0 % (ref 0–0.5)
LYMPHOCYTES # BLD: 0.9 K/UL (ref 0.8–3.5)
LYMPHOCYTES NFR BLD: 21 % (ref 12–49)
MCH RBC QN AUTO: 34.9 PG (ref 26–34)
MCHC RBC AUTO-ENTMCNC: 33.3 G/DL (ref 30–36.5)
MCV RBC AUTO: 104.8 FL (ref 80–99)
MONOCYTES # BLD: 0.4 K/UL (ref 0–1)
MONOCYTES NFR BLD: 9 % (ref 5–13)
NEUTS SEG # BLD: 2.9 K/UL (ref 1.8–8)
NEUTS SEG NFR BLD: 67 % (ref 32–75)
NRBC # BLD: 0 K/UL (ref 0–0.01)
NRBC BLD-RTO: 0 PER 100 WBC
PLATELET # BLD AUTO: 254 K/UL (ref 150–400)
PMV BLD AUTO: 9.5 FL (ref 8.9–12.9)
POTASSIUM SERPL-SCNC: 4.1 MMOL/L (ref 3.5–5.1)
PROT SERPL-MCNC: 6.4 G/DL (ref 6.4–8.2)
RBC # BLD AUTO: 3.12 M/UL (ref 3.8–5.2)
SODIUM SERPL-SCNC: 140 MMOL/L (ref 136–145)
WBC # BLD AUTO: 4.3 K/UL (ref 3.6–11)

## 2019-10-25 PROCEDURE — 74011250636 HC RX REV CODE- 250/636: Performed by: REGISTERED NURSE

## 2019-10-25 PROCEDURE — 80053 COMPREHEN METABOLIC PANEL: CPT

## 2019-10-25 PROCEDURE — 74011000250 HC RX REV CODE- 250: Performed by: REGISTERED NURSE

## 2019-10-25 PROCEDURE — 96413 CHEMO IV INFUSION 1 HR: CPT

## 2019-10-25 PROCEDURE — 36415 COLL VENOUS BLD VENIPUNCTURE: CPT

## 2019-10-25 PROCEDURE — 96375 TX/PRO/DX INJ NEW DRUG ADDON: CPT

## 2019-10-25 PROCEDURE — 85025 COMPLETE CBC W/AUTO DIFF WBC: CPT

## 2019-10-25 PROCEDURE — 74011250637 HC RX REV CODE- 250/637: Performed by: REGISTERED NURSE

## 2019-10-25 PROCEDURE — 77030012965 HC NDL HUBR BBMI -A

## 2019-10-25 RX ORDER — DEXAMETHASONE SODIUM PHOSPHATE 4 MG/ML
10 INJECTION, SOLUTION INTRA-ARTICULAR; INTRALESIONAL; INTRAMUSCULAR; INTRAVENOUS; SOFT TISSUE ONCE
Status: COMPLETED | OUTPATIENT
Start: 2019-10-25 | End: 2019-10-25

## 2019-10-25 RX ORDER — DIPHENHYDRAMINE HYDROCHLORIDE 50 MG/ML
50 INJECTION, SOLUTION INTRAMUSCULAR; INTRAVENOUS ONCE
Status: DISCONTINUED | OUTPATIENT
Start: 2019-10-25 | End: 2019-10-25

## 2019-10-25 RX ORDER — DIPHENHYDRAMINE HCL 25 MG
50 CAPSULE ORAL ONCE
Status: COMPLETED | OUTPATIENT
Start: 2019-10-25 | End: 2019-10-25

## 2019-10-25 RX ORDER — SODIUM CHLORIDE 9 MG/ML
25 INJECTION, SOLUTION INTRAVENOUS CONTINUOUS
Status: DISPENSED | OUTPATIENT
Start: 2019-10-25 | End: 2019-10-25

## 2019-10-25 RX ADMIN — DIPHENHYDRAMINE HYDROCHLORIDE 50 MG: 25 CAPSULE ORAL at 12:25

## 2019-10-25 RX ADMIN — FAMOTIDINE 20 MG: 10 INJECTION, SOLUTION INTRAVENOUS at 12:25

## 2019-10-25 RX ADMIN — SODIUM CHLORIDE 25 ML/HR: 900 INJECTION, SOLUTION INTRAVENOUS at 12:25

## 2019-10-25 RX ADMIN — DEXAMETHASONE SODIUM PHOSPHATE 10 MG: 4 INJECTION, SOLUTION INTRAMUSCULAR; INTRAVENOUS at 12:25

## 2019-10-25 RX ADMIN — PACLITAXEL 166 MG: 6 INJECTION, SOLUTION INTRAVENOUS at 13:15

## 2019-10-25 NOTE — PROGRESS NOTES
[facility]  [address]  W: [phone]  F: [phone]    Reason for Visit:   Abel Kidd is a 62 y.o. female who is seen for Left breast cancer- ER+UT+HER2 greg neg    Treatment History:   · 6/19: Mammogram:  Left breast mass with skin thickening, 2 suspicious nodes. Mass measures 1.4 x 0.9 x 1.3  · 6/19: MRI breast: Multicentric left breast carcinoma. Non-masslike enhancement extends from the nipple to the posterior third, involves all quadrants, and measures 106 x 44 x 79 mm. · 5/28/19: Biopsy breast- IDC % % HER 2 negative, skin biopsy benign , node biopsy mostly adipose tissue with atypical cells . BRCA1 and 2 negative. CT and bone scan negative for metastasis. Mammaprint with insufficient tissue for testing. Repeat biopsy of axillary node 6/20/19 positive for metastatic disease- repeat mammaprint - high risk  · 7/19/19: cycle 1 neoadjuvant of dd AC-T  · 9/12/19: US of breast shows increased malignant microcalcification in the left breast, etiology included tx related necrosis vs extension of disease. Decreased size in left axillary LN. · 10/22/19: calcifications in the left upper outer quadrant are stable, calcifications in left axillary LN are not changed     History of Present Illness:   Patient is a 62 y.o. seen for L breast cancer. She felt a sensation in the shower about May 2019. She also noted a lump and  an inverted nipple. She had a physician friend look at this who directed her to mammogram and recommended she see Dr. Alex Lamb. This led to imaging and diagnosis as above. She comes in today for week #7 of neoadjuvant Taxol. She feels well today. She denies numbness/tingling in fingers or toes. Denies nausea/vomiting or diarrhea/constipation. No mouth sores. Reports normal BMs. Comes with her friend today. Rare ETOH  She does not smoke.      Father had colon cancer    Past Medical History: Diagnosis Date    Anxiety     Arthritis     At risk for sleep apnea     GAGAN 5     Basal cell carcinoma of skin     Breast cancer ([facility] [address] 2019    LEFT    History of seasonal allergies     Nausea & vomiting     Squamous cell skin cancer     Dr. Zuniga Barefoot Thyroid disease       Past Surgical History:   Procedure Laterality Date    HX COLONOSCOPY      HX KNEE ARTHROSCOPY Left     HX LUMBAR FUSION  1995    HX MOHS PROCEDURES      x 4 times     HX SHOULDER ARTHROSCOPY Right     HX THYROIDECTOMY  2014      Social History     Tobacco Use    Smoking status: Never Smoker    Smokeless tobacco: Never Used   Substance Use Topics    Alcohol use: Yes      Family History   Problem Relation Age of Onset    Arthritis-osteo Mother     Cancer Father         Colon Cancer     Current Outpatient Medications   Medication Sig    FOLIC ACID PO Take  by mouth.  Lactobacillus acidophilus (PROBIOTIC PO) Take  by mouth.  sertraline (ZOLOFT) 100 mg tablet Take 0.5 Tabs by mouth daily.  traZODone (DESYREL) 50 mg tablet Take 2 Tabs by mouth nightly.  Cyanocobalamin-Cobamamide (B12) 5,000-100 mcg lozg by SubLINGual route daily.  lidocaine-prilocaine (EMLA) topical cream Apply  to affected area as needed for Pain.  cholecalciferol (VITAMIN D3) 1,000 unit cap Take 5,000 Units by mouth daily.  liothyronine (CYTOMEL) 5 mcg tablet Take 5 mcg by mouth daily.  levothyroxine (SYNTHROID) 50 mcg tablet Take 50 mcg by mouth Daily (before breakfast).  dexAMETHasone (DECADRON) 4 mg tablet Take 2 tabs (8mg) on days 2 & 3 of chemotherapy     No current facility-administered medications for this visit. Allergies   Allergen Reactions    Augmentin [Amoxicillin-Pot Clavulanate] Unknown (comments)        Review of Systems: A complete review of systems was obtained, negative except as described above.     Physical Exam:     Visit Vitals  /84   Pulse 85   Temp 98 °F (36.7 °C)   Resp 18   Wt 204 lb 9.6 oz (92.8 kg)   SpO2 94%   BMI (P) 31.11 kg/m²     ECOG PS: 1  General: No distress  Eyes: PERRLA, anicteric sclerae  HENT: Atraumatic, OP clear  Neck: Supple; PAC looks good  Lymphatic: no lymphadenopathy  Breasts: deferred today  MS: Normal gait and station. Digits without clubbing or cyanosis. Skin: No rashes, ecchymoses, or petechiae. Normal temperature, turgor, and texture. Psych: Alert, oriented, appropriate affect, normal judgment/insight    Results:     Lab Results   Component Value Date/Time    WBC 4.3 10/25/2019 10:14 AM    HGB 10.9 (L) 10/25/2019 10:14 AM    HCT 32.7 (L) 10/25/2019 10:14 AM    PLATELET 736 75/37/4737 10:14 AM    .8 (H) 10/25/2019 10:14 AM    ABS. NEUTROPHILS 2.9 10/25/2019 10:14 AM     Lab Results   Component Value Date/Time    Sodium 140 10/04/2019 10:22 AM    Potassium 3.9 10/04/2019 10:22 AM    Chloride 106 10/04/2019 10:22 AM    CO2 27 10/04/2019 10:22 AM    Glucose 109 (H) 10/04/2019 10:22 AM    BUN 7 10/04/2019 10:22 AM    Creatinine 0.67 10/04/2019 10:22 AM    GFR est AA >60 10/04/2019 10:22 AM    GFR est non-AA >60 10/04/2019 10:22 AM    Calcium 9.0 10/04/2019 10:22 AM     Lab Results   Component Value Date/Time    Bilirubin, total 0.4 10/04/2019 10:22 AM    ALT (SGPT) 53 10/04/2019 10:22 AM    AST (SGOT) 27 10/04/2019 10:22 AM    Alk. phosphatase 88 10/04/2019 10:22 AM    Protein, total 6.9 10/04/2019 10:22 AM    Albumin 3.5 10/04/2019 10:22 AM    Globulin 3.4 10/04/2019 10:22 AM       Records reviewed and summarized above. Pathology report(s) reviewed above. 5/28/19  FINAL PATHOLOGIC DIAGNOSIS  1. Breast, left, core biopsy: In situ and invasive ductal carcinoma   Invasive carcinoma is nuclear grade 2 and measures up to 0.6 cm in greatest dimension on slide   Ductal carcinoma in situ is nuclear grade 3 with central necrosis   2. Soft tissue, left axilla, core biopsy:    Adipose tissue with rare detached atypical cells   No lymph node tissue identified   See comment Radiology report(s) reviewed above. US breast 9/12/19:  IMPRESSION:  1. Increased malignant microcalcifications in the left breast. This could  represent treatment-related necrosis or extension of disease. 2. Extensive carcinoma seen on prior MRI is largely mammographically occult. 3. Decreased size of a metastatic left axillary lymph node with  microcalcifications. 4. BI-RADS Assessment Category 6: Known biopsy proven malignancy. Mammogram 10/22/19:  IMPRESSION:  1. BI-RADS 6: known left breast cancer. 2. Segmental pleomorphic calcifications in the left upper outer quadrant are  stable. Calcifications left axillary lymph node have not changed significantly. Patient was informed of the results. Assessment:   1) Left breast Invasive ductal carcinoma    [id number]  GRADE 2  % OR 20% HER2 greg equivocal - FISH could not be done  Repeat biopsy of breast mass 6/24/19 - HER2 greg negative, mamma print indicates she has genomically high risk disease    Due to extensive surrounding breast changes a mastectomy is recommended by Dr. Flora Bloom    Today is week #7 of weekly Taxol. She had a breast ultrasound after completion of dd-AC which was reviewed and shows increase in size of microcalcifications in the left breast which could represent treatment related necrosis vs extension of disease. This was reviewed with Dr. Flora Bloom. On exam there is clear response with resolution of skin thickening, slow eversion of nipple, and smaller mass. Repeat mammogram obtained after 6 weeks of Taxol and show stable calcifications. Her breast exam continues to be re-assuring. I reviewed this with Dr. Flora Bloom who also agrees . I will have scans reviewed in tumor board.  Dr. Flora Bloom does recommend a pre op MRI     We will discuss endocrine therapy/ post mastectomy RT at a later date    Baseline ECHO reviewed and shows EF 61-65%    2) Hypothyroidism    3) Obesity    Plan:     · Proceed today with weekly #7 of weekly Taxol at 80mg/m2 x 12   · Labs to include CBC with diff and CMP prior to each infusion  · Premeds to include  dexamethasone, benadryl, pepsid   · Compazine q 6 hours prn    RTC in 2 weeks for week #9    I appreciate the opportunity to participate in MsWendy Rachelle DUKES Jose's care.     Signed By: Oral Delgadillo MD

## 2019-10-25 NOTE — PROGRESS NOTES
[facility] Chemotherapy Progress Note    Date: 2019    Name: Zen Dias    MRN: [id number]         : 1965      1015 Pt admit to [facility] for Taxol ambulatory in stable condition. Assessment completed. No new concerns voiced. Port with positive blood return. Chemotherapy Flowsheet 10/25/2019   Cycle C7D1   Date 10/25/2019   Drug / Regimen Taxol   Dosage -   Time Up -   Time Down -   Pre Meds given   Notes given         Ms. Garcia's vitals were reviewed. Patient Vitals for the past 12 hrs:   Temp Pulse Resp BP   10/25/19 1014 98 °F (36.7 °C) 80 18 127/80         Lab results were obtained and reviewed. Recent Results (from the past 12 hour(s))   CBC WITH AUTOMATED DIFF    Collection Time: 10/25/19 10:14 AM   Result Value Ref Range    WBC 4.3 3.6 - 11.0 K/uL    RBC 3.12 (L) 3.80 - 5.20 M/uL    HGB 10.9 (L) 11.5 - 16.0 g/dL    HCT 32.7 (L) 35.0 - 47.0 %    .8 (H) 80.0 - 99.0 FL    MCH 34.9 (H) 26.0 - 34.0 PG    MCHC 33.3 30.0 - 36.5 g/dL    RDW 12.5 11.5 - 14.5 %    PLATELET 726 960 - 594 K/uL    MPV 9.5 8.9 - 12.9 FL    NRBC 0.0 0  WBC    ABSOLUTE NRBC 0.00 0.00 - 0.01 K/uL    NEUTROPHILS 67 32 - 75 %    LYMPHOCYTES 21 12 - 49 %    MONOCYTES 9 5 - 13 %    EOSINOPHILS 3 0 - 7 %    BASOPHILS 0 0 - 1 %    IMMATURE GRANULOCYTES 0 0.0 - 0.5 %    ABS. NEUTROPHILS 2.9 1.8 - 8.0 K/UL    ABS. LYMPHOCYTES 0.9 0.8 - 3.5 K/UL    ABS. MONOCYTES 0.4 0.0 - 1.0 K/UL    ABS. EOSINOPHILS 0.1 0.0 - 0.4 K/UL    ABS. BASOPHILS 0.0 0.0 - 0.1 K/UL    ABS. IMM.  GRANS. 0.0 0.00 - 0.04 K/UL    DF AUTOMATED     METABOLIC PANEL, COMPREHENSIVE    Collection Time: 10/25/19 10:14 AM   Result Value Ref Range    Sodium 140 136 - 145 mmol/L    Potassium 4.1 3.5 - 5.1 mmol/L    Chloride 106 97 - 108 mmol/L    CO2 28 21 - 32 mmol/L    Anion gap 6 5 - 15 mmol/L    Glucose 100 65 - 100 mg/dL    BUN 10 6 - 20 MG/DL    Creatinine 0.58 0.55 - 1.02 MG/DL    BUN/Creatinine ratio 17 12 - 20      GFR est AA >60 >60 ml/min/1.73m2    GFR est non-AA >60 >60 ml/min/1.73m2    Calcium 8.8 8.5 - 10.1 MG/DL    Bilirubin, total 0.4 0.2 - 1.0 MG/DL    ALT (SGPT) 39 12 - 78 U/L    AST (SGOT) 20 15 - 37 U/L    Alk. phosphatase 81 45 - 117 U/L    Protein, total 6.4 6.4 - 8.2 g/dL    Albumin 3.6 3.5 - 5.0 g/dL    Globulin 2.8 2.0 - 4.0 g/dL    A-G Ratio 1.3 1.1 - 2.2         Pre-medications  were administered as ordered and chemotherapy was initiated. Medications Administered     0.9% sodium chloride infusion     Admin Date  10/25/2019 Action  New Bag Dose  25 mL/hr Rate  25 mL/hr Route  IntraVENous Administered By  Ardyce Severance, RN          dexamethasone (DECADRON) 4 mg/mL injection 10 mg     Admin Date  10/25/2019 Action  Given Dose  10 mg Route  IntraVENous Administered By  Ardyce Severance, DONALD          diphenhydrAMINE (BENADRYL) capsule 50 mg     Admin Date  10/25/2019 Action  Given Dose  50 mg Route  Oral Administered By  Ardyce Severance, DONALD          famotidine (PF) (PEPCID) 20 mg in sodium chloride 0.9% 10 mL injection     Admin Date  10/25/2019 Action  Given Dose  20 mg Route  IntraVENous Administered By  Ardyce Severance, DONALD          PACLitaxel (TAXOL) 166 mg in 0.9% sodium chloride 250 mL, overfill volume 25 mL chemo infusion     Admin Date  10/25/2019 Action  New Bag Dose  166 mg Rate  302.7 mL/hr Route  IntraVENous Administered By  Ardyce Severance, DONALD                1430 Pt tolerated treatment well. Port maintained positive blood return throughout treatment. Flushed, heparinized and de-accessed per protocol. D/c home ambulatory in no distress.      Future Appointments   Date Time Provider Preeti Joyce   11/1/2019 10:00 AM CHRISTALMO INFUSION NURSE 1 [facility]   11/1/2019 10:45 AM Bc Presley MD [facility] 6199   11/8/2019 10:00 AM LIZETH FT CHAIR 3 [facility]   11/15/2019 10:00 AM CHRISTALMO INFUSION NURSE 1 [facility]   11/22/2019 10:00 AM LIZETH LOWERY CHAIR 2 [facility]   12/9/2019 11:20 AM Amira Oneill Eulalia Bray MD [facility], RN  October 25, 2019

## 2019-10-25 NOTE — PROGRESS NOTES
Fernando Quevedo is a 62 y.o. female  1. Have you been to the ER, urgent care clinic since your last visit? Hospitalized since your last visit? No    2. Have you seen or consulted any other health care providers outside of the [address] [place] [address] since your last visit? Include any pap smears or colon screening.  No     Chief Complaint   Patient presents with    Follow-up    Breast Cancer

## 2019-10-31 ENCOUNTER — HOSPITAL ENCOUNTER (OUTPATIENT)
Dept: INFUSION THERAPY | Age: 54
Discharge: HOME | End: 2019-10-31
Payer: COMMERCIAL

## 2019-10-31 VITALS
TEMPERATURE: 97.5 F | DIASTOLIC BLOOD PRESSURE: 77 MMHG | HEART RATE: 85 BPM | RESPIRATION RATE: 18 BRPM | SYSTOLIC BLOOD PRESSURE: 108 MMHG

## 2019-10-31 LAB
BASOPHILS # BLD: 0 K/UL (ref 0–0.1)
BASOPHILS NFR BLD: 0 % (ref 0–1)
DIFFERENTIAL METHOD BLD: ABNORMAL
EOSINOPHIL # BLD: 0.1 K/UL (ref 0–0.4)
EOSINOPHIL NFR BLD: 2 % (ref 0–7)
ERYTHROCYTE [DISTWIDTH] IN BLOOD BY AUTOMATED COUNT: 12.5 % (ref 11.5–14.5)
HCT VFR BLD AUTO: 32.6 % (ref 35–47)
HGB BLD-MCNC: 10.9 G/DL (ref 11.5–16)
IMM GRANULOCYTES # BLD AUTO: 0 K/UL (ref 0–0.04)
IMM GRANULOCYTES NFR BLD AUTO: 1 % (ref 0–0.5)
LYMPHOCYTES # BLD: 1 K/UL (ref 0.8–3.5)
LYMPHOCYTES NFR BLD: 26 % (ref 12–49)
MCH RBC QN AUTO: 35 PG (ref 26–34)
MCHC RBC AUTO-ENTMCNC: 33.4 G/DL (ref 30–36.5)
MCV RBC AUTO: 104.8 FL (ref 80–99)
MONOCYTES # BLD: 0.3 K/UL (ref 0–1)
MONOCYTES NFR BLD: 9 % (ref 5–13)
NEUTS SEG # BLD: 2.5 K/UL (ref 1.8–8)
NEUTS SEG NFR BLD: 62 % (ref 32–75)
NRBC # BLD: 0 K/UL (ref 0–0.01)
NRBC BLD-RTO: 0 PER 100 WBC
PLATELET # BLD AUTO: 263 K/UL (ref 150–400)
PMV BLD AUTO: 9.7 FL (ref 8.9–12.9)
RBC # BLD AUTO: 3.11 M/UL (ref 3.8–5.2)
WBC # BLD AUTO: 3.9 K/UL (ref 3.6–11)

## 2019-10-31 PROCEDURE — 36415 COLL VENOUS BLD VENIPUNCTURE: CPT

## 2019-10-31 PROCEDURE — 85025 COMPLETE CBC W/AUTO DIFF WBC: CPT

## 2019-10-31 NOTE — PROGRESS NOTES
[facility] Lab Visit:    1600:  Pt arrived ambulatory and in no distress, CBC drawn peripherally and sent for processing. Departed [facility] ambulatory and in no distress. Visit Vitals  /77 (BP 1 Location: Left arm, BP Patient Position: At rest)   Pulse 85   Temp 97.5 °F (36.4 °C)   Resp 18     Recent Results (from the past 12 hour(s))   CBC WITH AUTOMATED DIFF    Collection Time: 10/31/19  3:57 PM   Result Value Ref Range    WBC 3.9 3.6 - 11.0 K/uL    RBC 3.11 (L) 3.80 - 5.20 M/uL    HGB 10.9 (L) 11.5 - 16.0 g/dL    HCT 32.6 (L) 35.0 - 47.0 %    .8 (H) 80.0 - 99.0 FL    MCH 35.0 (H) 26.0 - 34.0 PG    MCHC 33.4 30.0 - 36.5 g/dL    RDW 12.5 11.5 - 14.5 %    PLATELET 480 042 - 381 K/uL    MPV 9.7 8.9 - 12.9 FL    NRBC 0.0 0  WBC    ABSOLUTE NRBC 0.00 0.00 - 0.01 K/uL    NEUTROPHILS 62 32 - 75 %    LYMPHOCYTES 26 12 - 49 %    MONOCYTES 9 5 - 13 %    EOSINOPHILS 2 0 - 7 %    BASOPHILS 0 0 - 1 %    IMMATURE GRANULOCYTES 1 (H) 0.0 - 0.5 %    ABS. NEUTROPHILS 2.5 1.8 - 8.0 K/UL    ABS. LYMPHOCYTES 1.0 0.8 - 3.5 K/UL    ABS. MONOCYTES 0.3 0.0 - 1.0 K/UL    ABS. EOSINOPHILS 0.1 0.0 - 0.4 K/UL    ABS. BASOPHILS 0.0 0.0 - 0.1 K/UL    ABS. IMM.  GRANS. 0.0 0.00 - 0.04 K/UL    DF AUTOMATED

## 2019-11-01 ENCOUNTER — HOSPITAL ENCOUNTER (OUTPATIENT)
Dept: INFUSION THERAPY | Age: 54
Discharge: HOME | End: 2019-11-01
Payer: COMMERCIAL

## 2019-11-01 VITALS
SYSTOLIC BLOOD PRESSURE: 129 MMHG | HEIGHT: 68 IN | RESPIRATION RATE: 16 BRPM | BODY MASS INDEX: 31.01 KG/M2 | TEMPERATURE: 97 F | HEART RATE: 87 BPM | DIASTOLIC BLOOD PRESSURE: 73 MMHG | WEIGHT: 204.6 LBS

## 2019-11-01 DIAGNOSIS — Z17.0 MALIGNANT NEOPLASM OF UPPER-OUTER QUADRANT OF LEFT BREAST IN FEMALE, ESTROGEN RECEPTOR POSITIVE (HCC): Primary | ICD-10-CM

## 2019-11-01 DIAGNOSIS — C50.412 MALIGNANT NEOPLASM OF UPPER-OUTER QUADRANT OF LEFT BREAST IN FEMALE, ESTROGEN RECEPTOR POSITIVE (HCC): Primary | ICD-10-CM

## 2019-11-01 PROCEDURE — 77030012965 HC NDL HUBR BBMI -A

## 2019-11-01 PROCEDURE — 74011250636 HC RX REV CODE- 250/636: Performed by: REGISTERED NURSE

## 2019-11-01 PROCEDURE — 96375 TX/PRO/DX INJ NEW DRUG ADDON: CPT

## 2019-11-01 PROCEDURE — 74011250637 HC RX REV CODE- 250/637: Performed by: REGISTERED NURSE

## 2019-11-01 PROCEDURE — 96413 CHEMO IV INFUSION 1 HR: CPT

## 2019-11-01 PROCEDURE — 74011000250 HC RX REV CODE- 250: Performed by: REGISTERED NURSE

## 2019-11-01 RX ORDER — DIPHENHYDRAMINE HCL 25 MG
50 CAPSULE ORAL ONCE
Status: COMPLETED | OUTPATIENT
Start: 2019-11-01 | End: 2019-11-01

## 2019-11-01 RX ORDER — SODIUM CHLORIDE 9 MG/ML
10 INJECTION INTRAMUSCULAR; INTRAVENOUS; SUBCUTANEOUS AS NEEDED
Status: DISPENSED | OUTPATIENT
Start: 2019-11-01 | End: 2019-11-01

## 2019-11-01 RX ORDER — DIPHENHYDRAMINE HYDROCHLORIDE 50 MG/ML
50 INJECTION, SOLUTION INTRAMUSCULAR; INTRAVENOUS ONCE
Status: DISCONTINUED | OUTPATIENT
Start: 2019-11-01 | End: 2019-11-01

## 2019-11-01 RX ORDER — DEXAMETHASONE SODIUM PHOSPHATE 4 MG/ML
10 INJECTION, SOLUTION INTRA-ARTICULAR; INTRALESIONAL; INTRAMUSCULAR; INTRAVENOUS; SOFT TISSUE ONCE
Status: COMPLETED | OUTPATIENT
Start: 2019-11-01 | End: 2019-11-01

## 2019-11-01 RX ORDER — SODIUM CHLORIDE 0.9 % (FLUSH) 0.9 %
10 SYRINGE (ML) INJECTION AS NEEDED
Status: ACTIVE | OUTPATIENT
Start: 2019-11-01 | End: 2019-11-01

## 2019-11-01 RX ORDER — HEPARIN 100 UNIT/ML
300-500 SYRINGE INTRAVENOUS AS NEEDED
Status: ACTIVE | OUTPATIENT
Start: 2019-11-01 | End: 2019-11-01

## 2019-11-01 RX ORDER — SODIUM CHLORIDE 9 MG/ML
25 INJECTION, SOLUTION INTRAVENOUS CONTINUOUS
Status: DISPENSED | OUTPATIENT
Start: 2019-11-01 | End: 2019-11-01

## 2019-11-01 RX ADMIN — SODIUM CHLORIDE 25 ML/HR: 900 INJECTION, SOLUTION INTRAVENOUS at 10:57

## 2019-11-01 RX ADMIN — FAMOTIDINE 20 MG: 10 INJECTION, SOLUTION INTRAVENOUS at 11:01

## 2019-11-01 RX ADMIN — SODIUM CHLORIDE 10 ML: 9 INJECTION INTRAMUSCULAR; INTRAVENOUS; SUBCUTANEOUS at 10:56

## 2019-11-01 RX ADMIN — DEXAMETHASONE SODIUM PHOSPHATE 10 MG: 4 INJECTION, SOLUTION INTRAMUSCULAR; INTRAVENOUS at 11:03

## 2019-11-01 RX ADMIN — DIPHENHYDRAMINE HYDROCHLORIDE 50 MG: 25 CAPSULE ORAL at 10:58

## 2019-11-01 RX ADMIN — Medication 500 UNITS: at 12:39

## 2019-11-01 RX ADMIN — Medication 10 ML: at 12:39

## 2019-11-01 RX ADMIN — PACLITAXEL 166 MG: 6 INJECTION, SOLUTION INTRAVENOUS at 11:35

## 2019-11-01 NOTE — PROGRESS NOTES
[facility] Progress Note    Date: 2019    Name: Carolina Vaz    MRN: [id number]         : 1965    Ms. Esa Ayoub Arrived ambulatory and in no distress for C7D8 Taxol. Assessment was completed, no acute issues at this time, no new complaints voiced. Port accessed with positive blood return. Labs drawn at previous appointment and reviewed. Port flushed and connected to NS at [facility]. Chemotherapy Flowsheet 2019   Cycle C7D8   Date 2019   Drug / Regimen Taxol   Dosage -   Time Up -   Time Down -   Pre Meds given   Notes given     Ms. Cooper vitals were reviewed. Patient Vitals for the past 12 hrs:   Temp Pulse Resp BP   19 1239 -- 87 16 129/73   19 1025 97 °F (36.1 °C) 76 18 122/72     Pre-medications  were administered as ordered and chemotherapy was initiated.   Medications Administered     0.9% sodium chloride infusion     Admin Date  2019 Action  New Bag Dose  25 mL/hr Rate  25 mL/hr Route  IntraVENous Administered By  Wong You RN          dexamethasone (DECADRON) 4 mg/mL injection 10 mg     Admin Date  2019 Action  Given Dose  10 mg Route  IntraVENous Administered By  Wong You RN          diphenhydrAMINE (BENADRYL) capsule 50 mg     Admin Date  2019 Action  Given Dose  50 mg Route  Oral Administered By  Wong You RN          famotidine (PF) (PEPCID) 20 mg in sodium chloride 0.9% 10 mL injection     Admin Date  2019 Action  Given Dose  20 mg Route  IntraVENous Administered By  Wong You RN          heparin (porcine) pf 300-500 Units     Admin Date  2019 Action  Given Dose  500 Units Route  InterCATHeter Administered By  Wong You RN          PACLitaxel (TAXOL) 166 mg in 0.9% sodium chloride 250 mL, overfill volume 25 mL chemo infusion     Admin Date  2019 Action  New Bag Dose  166 mg Rate  302.7 mL/hr Route  IntraVENous Administered By  Wong You RN          saline peripheral flush soln 10 mL     Admin Date  11/01/2019 Action  Given Dose  10 mL Route  InterCATHeter Administered By  Lisseth Verduzco RN          sodium chloride 0.9% injection 10 mL     Admin Date  11/01/2019 Action  Given Dose  10 mL Route  IntraVENous Administered By  Lisseth Verduzco RN              Ms. Lexx Chaney tolerated treatment well. Port maintained positive blood return throughout treatment. Port flushed, heparinized and de accessed per protocol. Patient was discharged from [place] [address] in stable condition at 1235.      Future Appointments   Date Time Provider Preeti Joyce   11/8/2019 10:00 AM [facility]    11/8/2019 10:45 AM REGULO [address]   11/15/2019 10:00 AM LIZETH INFUSION NURSE 1 [facility]    11/22/2019 10:00 AM LIZETH FT CHAIR 2 [facility]    12/9/2019 11:20 AM Teodoro Lebron MD [address], RN  November 1, 2019

## 2019-11-08 ENCOUNTER — HOSPITAL ENCOUNTER (OUTPATIENT)
Dept: INFUSION THERAPY | Age: 54
Discharge: HOME | End: 2019-11-08
Payer: COMMERCIAL

## 2019-11-08 ENCOUNTER — OFFICE VISIT (OUTPATIENT)
Dept: ONCOLOGY | Age: 54
End: 2019-11-08

## 2019-11-08 VITALS
BODY MASS INDEX: 31.8 KG/M2 | HEART RATE: 77 BPM | SYSTOLIC BLOOD PRESSURE: 126 MMHG | WEIGHT: 209.8 LBS | DIASTOLIC BLOOD PRESSURE: 85 MMHG | HEIGHT: 68 IN | RESPIRATION RATE: 16 BRPM | TEMPERATURE: 97.8 F | OXYGEN SATURATION: 98 %

## 2019-11-08 VITALS
WEIGHT: 209.6 LBS | HEART RATE: 66 BPM | OXYGEN SATURATION: 95 % | SYSTOLIC BLOOD PRESSURE: 118 MMHG | TEMPERATURE: 98 F | RESPIRATION RATE: 16 BRPM | DIASTOLIC BLOOD PRESSURE: 80 MMHG | HEIGHT: 68 IN | BODY MASS INDEX: 31.77 KG/M2

## 2019-11-08 DIAGNOSIS — C50.412 MALIGNANT NEOPLASM OF UPPER-OUTER QUADRANT OF LEFT BREAST IN FEMALE, ESTROGEN RECEPTOR POSITIVE (HCC): Primary | ICD-10-CM

## 2019-11-08 DIAGNOSIS — E66.09 CLASS 1 OBESITY DUE TO EXCESS CALORIES WITHOUT SERIOUS COMORBIDITY WITH BODY MASS INDEX (BMI) OF 30.0 TO 30.9 IN ADULT: ICD-10-CM

## 2019-11-08 DIAGNOSIS — Z17.0 MALIGNANT NEOPLASM OF UPPER-OUTER QUADRANT OF LEFT BREAST IN FEMALE, ESTROGEN RECEPTOR POSITIVE (HCC): Primary | ICD-10-CM

## 2019-11-08 LAB
BASOPHILS # BLD: 0 K/UL (ref 0–0.1)
BASOPHILS NFR BLD: 0 % (ref 0–1)
DIFFERENTIAL METHOD BLD: ABNORMAL
EOSINOPHIL # BLD: 0.1 K/UL (ref 0–0.4)
EOSINOPHIL NFR BLD: 1 % (ref 0–7)
ERYTHROCYTE [DISTWIDTH] IN BLOOD BY AUTOMATED COUNT: 12.1 % (ref 11.5–14.5)
HCT VFR BLD AUTO: 33.8 % (ref 35–47)
HGB BLD-MCNC: 11.1 G/DL (ref 11.5–16)
IMM GRANULOCYTES # BLD AUTO: 0 K/UL (ref 0–0.04)
IMM GRANULOCYTES NFR BLD AUTO: 1 % (ref 0–0.5)
LYMPHOCYTES # BLD: 1.1 K/UL (ref 0.8–3.5)
LYMPHOCYTES NFR BLD: 18 % (ref 12–49)
MCH RBC QN AUTO: 34.6 PG (ref 26–34)
MCHC RBC AUTO-ENTMCNC: 32.8 G/DL (ref 30–36.5)
MCV RBC AUTO: 105.3 FL (ref 80–99)
MONOCYTES # BLD: 0.4 K/UL (ref 0–1)
MONOCYTES NFR BLD: 7 % (ref 5–13)
NEUTS SEG # BLD: 4.2 K/UL (ref 1.8–8)
NEUTS SEG NFR BLD: 73 % (ref 32–75)
NRBC # BLD: 0 K/UL (ref 0–0.01)
NRBC BLD-RTO: 0 PER 100 WBC
PLATELET # BLD AUTO: 237 K/UL (ref 150–400)
PMV BLD AUTO: 9.8 FL (ref 8.9–12.9)
RBC # BLD AUTO: 3.21 M/UL (ref 3.8–5.2)
WBC # BLD AUTO: 5.8 K/UL (ref 3.6–11)

## 2019-11-08 PROCEDURE — 77030012965 HC NDL HUBR BBMI -A

## 2019-11-08 PROCEDURE — 74011250636 HC RX REV CODE- 250/636: Performed by: REGISTERED NURSE

## 2019-11-08 PROCEDURE — 85025 COMPLETE CBC W/AUTO DIFF WBC: CPT

## 2019-11-08 PROCEDURE — 96375 TX/PRO/DX INJ NEW DRUG ADDON: CPT

## 2019-11-08 PROCEDURE — 36415 COLL VENOUS BLD VENIPUNCTURE: CPT

## 2019-11-08 PROCEDURE — 96413 CHEMO IV INFUSION 1 HR: CPT

## 2019-11-08 PROCEDURE — 74011000250 HC RX REV CODE- 250: Performed by: REGISTERED NURSE

## 2019-11-08 PROCEDURE — 74011250637 HC RX REV CODE- 250/637: Performed by: REGISTERED NURSE

## 2019-11-08 RX ORDER — SODIUM CHLORIDE 0.9 % (FLUSH) 0.9 %
10 SYRINGE (ML) INJECTION AS NEEDED
Status: DISCONTINUED | OUTPATIENT
Start: 2019-11-08 | End: 2019-11-09 | Stop reason: HOSPADM

## 2019-11-08 RX ORDER — ALPRAZOLAM 0.5 MG/1
0.5 TABLET ORAL
Qty: 30 TAB | Refills: 0 | Status: SHIPPED | OUTPATIENT
Start: 2019-11-08 | End: 2021-02-03

## 2019-11-08 RX ORDER — DIPHENHYDRAMINE HYDROCHLORIDE 50 MG/ML
50 INJECTION, SOLUTION INTRAMUSCULAR; INTRAVENOUS ONCE
Status: DISCONTINUED | OUTPATIENT
Start: 2019-11-08 | End: 2019-11-08

## 2019-11-08 RX ORDER — DEXAMETHASONE SODIUM PHOSPHATE 4 MG/ML
10 INJECTION, SOLUTION INTRA-ARTICULAR; INTRALESIONAL; INTRAMUSCULAR; INTRAVENOUS; SOFT TISSUE ONCE
Status: COMPLETED | OUTPATIENT
Start: 2019-11-08 | End: 2019-11-08

## 2019-11-08 RX ORDER — DIPHENHYDRAMINE HCL 25 MG
50 CAPSULE ORAL ONCE
Status: COMPLETED | OUTPATIENT
Start: 2019-11-08 | End: 2019-11-08

## 2019-11-08 RX ORDER — HEPARIN 100 UNIT/ML
300-500 SYRINGE INTRAVENOUS AS NEEDED
Status: DISCONTINUED | OUTPATIENT
Start: 2019-11-08 | End: 2019-11-09 | Stop reason: HOSPADM

## 2019-11-08 RX ORDER — SODIUM CHLORIDE 9 MG/ML
10 INJECTION INTRAMUSCULAR; INTRAVENOUS; SUBCUTANEOUS AS NEEDED
Status: DISCONTINUED | OUTPATIENT
Start: 2019-11-08 | End: 2019-11-09 | Stop reason: HOSPADM

## 2019-11-08 RX ORDER — SODIUM CHLORIDE 9 MG/ML
25 INJECTION, SOLUTION INTRAVENOUS CONTINUOUS
Status: DISCONTINUED | OUTPATIENT
Start: 2019-11-08 | End: 2019-11-09 | Stop reason: HOSPADM

## 2019-11-08 RX ADMIN — DEXAMETHASONE SODIUM PHOSPHATE 10 MG: 4 INJECTION, SOLUTION INTRA-ARTICULAR; INTRALESIONAL; INTRAMUSCULAR; INTRAVENOUS; SOFT TISSUE at 12:49

## 2019-11-08 RX ADMIN — SODIUM CHLORIDE 10 ML: 9 INJECTION INTRAMUSCULAR; INTRAVENOUS; SUBCUTANEOUS at 12:48

## 2019-11-08 RX ADMIN — FAMOTIDINE 20 MG: 10 INJECTION, SOLUTION INTRAVENOUS at 12:48

## 2019-11-08 RX ADMIN — Medication 500 UNITS: at 14:51

## 2019-11-08 RX ADMIN — PACLITAXEL 166 MG: 6 INJECTION, SOLUTION INTRAVENOUS at 13:39

## 2019-11-08 RX ADMIN — DIPHENHYDRAMINE HYDROCHLORIDE 50 MG: 25 CAPSULE ORAL at 12:47

## 2019-11-08 RX ADMIN — SODIUM CHLORIDE 25 ML/HR: 900 INJECTION, SOLUTION INTRAVENOUS at 12:47

## 2019-11-08 NOTE — PROGRESS NOTES
[facility] - Chemotherapy Progress Note    1000 Pt admit to [facility] for Taxol ambulatory in stable condition. Assessment completed. No new concerns voiced. PAC with positive blood return. Chemotherapy Flowsheet 11/8/2019   Cycle C 7 D 15   Date 11/8/2019   Drug / Regimen Taxol   Dosage -   Time Up -   Time Down -   Pre Meds -   Notes -       Visit Vitals  /80   Pulse 66   Temp 98 °F (36.7 °C)   Resp 16   Ht 5' 8\" (1.727 m)   Wt 95.1 kg (209 lb 9.6 oz)   SpO2 95%   BMI 31.87 kg/m²     Pt declines pregnancy  Labs obtained and patient proceeded to scheduled MD appointment    Medications:  Benadryl po  Dexamethasone ivp  pepcid ivp    Taxol iv    1500 Pt tolerated treatment well. PAC maintained positive blood return throughout treatment, flushed with positive blood return at conclusion and throughout treatment. D/c home ambulatory in no distress. Pt aware of next appointment scheduled for 11/15/19 @ 1000    Recent Results (from the past 12 hour(s))   CBC WITH AUTOMATED DIFF    Collection Time: 11/08/19 10:34 AM   Result Value Ref Range    WBC 5.8 3.6 - 11.0 K/uL    RBC 3.21 (L) 3.80 - 5.20 M/uL    HGB 11.1 (L) 11.5 - 16.0 g/dL    HCT 33.8 (L) 35.0 - 47.0 %    .3 (H) 80.0 - 99.0 FL    MCH 34.6 (H) 26.0 - 34.0 PG    MCHC 32.8 30.0 - 36.5 g/dL    RDW 12.1 11.5 - 14.5 %    PLATELET 550 949 - 866 K/uL    MPV 9.8 8.9 - 12.9 FL    NRBC 0.0 0  WBC    ABSOLUTE NRBC 0.00 0.00 - 0.01 K/uL    NEUTROPHILS 73 32 - 75 %    LYMPHOCYTES 18 12 - 49 %    MONOCYTES 7 5 - 13 %    EOSINOPHILS 1 0 - 7 %    BASOPHILS 0 0 - 1 %    IMMATURE GRANULOCYTES 1 (H) 0.0 - 0.5 %    ABS. NEUTROPHILS 4.2 1.8 - 8.0 K/UL    ABS. LYMPHOCYTES 1.1 0.8 - 3.5 K/UL    ABS. MONOCYTES 0.4 0.0 - 1.0 K/UL    ABS. EOSINOPHILS 0.1 0.0 - 0.4 K/UL    ABS. BASOPHILS 0.0 0.0 - 0.1 K/UL    ABS. IMM.  GRANS. 0.0 0.00 - 0.04 K/UL    DF AUTOMATED

## 2019-11-08 NOTE — PROGRESS NOTES
[facility] [place] [address] [address], [address]  W: [phone]  F: [phone]    Reason for Visit:   Inder Hardy is a 62 y.o. female who is seen for Left breast cancer- ER+MT+HER2 greg neg    Treatment History:   · 6/19: Mammogram:  Left breast mass with skin thickening, 2 suspicious nodes. Mass measures 1.4 x 0.9 x 1.3  · 6/19: MRI breast: Multicentric left breast carcinoma. Non-masslike enhancement extends from the nipple to the posterior third, involves all quadrants, and measures 106 x 44 x 79 mm. · 5/28/19: Biopsy breast- IDC % % HER 2 negative, skin biopsy benign , node biopsy mostly adipose tissue with atypical cells . BRCA1 and 2 negative. CT and bone scan negative for metastasis. Mammaprint with insufficient tissue for testing. Repeat biopsy of axillary node 6/20/19 positive for metastatic disease- repeat mammaprint - high risk  · 7/19/19: cycle 1 neoadjuvant of dd AC-T  · 9/12/19: US of breast shows increased malignant microcalcification in the left breast, etiology included tx related necrosis vs extension of disease. Decreased size in left axillary LN. · 10/22/19: calcifications in the left upper outer quadrant are stable, calcifications in left axillary LN are not changed     History of Present Illness:   Patient is a 62 y.o. seen for L breast cancer. She felt a sensation in the shower about May 2019. She also noted a lump and  an inverted nipple. She had a physician friend look at this who directed her to mammogram and recommended she see Dr. Oscar Galvez. This led to imaging and diagnosis as above. She comes in today for week #9 of neoadjuvant Taxol. She feels well today. She denies numbness/tingling in fingers or toes. Denies nausea/vomiting or diarrhea/constipation. No mouth sores. Reports normal BMs. At times feels a twinge in her left breast.     Comes with her friend and sister today. Rare ETOH  She does not smoke. Father had colon cancer    Past Medical History:   Diagnosis Date    Anxiety     Arthritis     At risk for sleep apnea     GAGAN 5     Basal cell carcinoma of skin     Breast cancer ([facility] [address] 2019    LEFT    History of seasonal allergies     Nausea & vomiting     Squamous cell skin cancer     Dr. Dee Spring Thyroid disease       Past Surgical History:   Procedure Laterality Date    HX COLONOSCOPY      HX KNEE ARTHROSCOPY Left     HX LUMBAR FUSION  1995    HX MOHS PROCEDURES      x 4 times     HX SHOULDER ARTHROSCOPY Right     HX THYROIDECTOMY  2014      Social History     Tobacco Use    Smoking status: Never Smoker    Smokeless tobacco: Never Used   Substance Use Topics    Alcohol use: Yes      Family History   Problem Relation Age of Onset    Arthritis-osteo Mother     Cancer Father         Colon Cancer     Current Outpatient Medications   Medication Sig    FOLIC ACID PO Take  by mouth.  Lactobacillus acidophilus (PROBIOTIC PO) Take  by mouth.  sertraline (ZOLOFT) 100 mg tablet Take 0.5 Tabs by mouth daily.  traZODone (DESYREL) 50 mg tablet Take 2 Tabs by mouth nightly.  Cyanocobalamin-Cobamamide (B12) 5,000-100 mcg lozg by SubLINGual route daily.  lidocaine-prilocaine (EMLA) topical cream Apply  to affected area as needed for Pain.  cholecalciferol (VITAMIN D3) 1,000 unit cap Take 5,000 Units by mouth daily.  liothyronine (CYTOMEL) 5 mcg tablet Take 5 mcg by mouth daily.  levothyroxine (SYNTHROID) 50 mcg tablet Take 50 mcg by mouth Daily (before breakfast). No current facility-administered medications for this visit. Allergies   Allergen Reactions    Augmentin [Amoxicillin-Pot Clavulanate] Unknown (comments)        Review of Systems: A complete review of systems was obtained, negative except as described above.     Physical Exam:     Visit Vitals  /85   Pulse 77   Temp 97.8 °F (36.6 °C) (Oral)   Resp 16   Ht 5' 8\" (1.727 m)   Wt 209 lb 12.8 oz (95.2 kg) SpO2 98%   BMI 31.90 kg/m²     ECOG PS: 1  General: No distress  Eyes: PERRLA, anicteric sclerae  HENT: Atraumatic, OP clear  Neck: Supple; PAC looks good  Lymphatic: no lymphadenopathy  Breasts: Left breast no longer erythematous, skin normal texture, nipple is not inverted, LUOQ mass unable to palpate, some tenderness to palpation, no adenopathy is palpable  MS: Normal gait and station. Digits without clubbing or cyanosis. Skin: No rashes, ecchymoses, or petechiae. Normal temperature, turgor, and texture. Psych: Alert, oriented, appropriate affect, normal judgment/insight    Results:     Lab Results   Component Value Date/Time    WBC 3.9 10/31/2019 03:57 PM    HGB 10.9 (L) 10/31/2019 03:57 PM    HCT 32.6 (L) 10/31/2019 03:57 PM    PLATELET 839 37/99/3771 03:57 PM    .8 (H) 10/31/2019 03:57 PM    ABS. NEUTROPHILS 2.5 10/31/2019 03:57 PM     Lab Results   Component Value Date/Time    Sodium 140 10/25/2019 10:14 AM    Potassium 4.1 10/25/2019 10:14 AM    Chloride 106 10/25/2019 10:14 AM    CO2 28 10/25/2019 10:14 AM    Glucose 100 10/25/2019 10:14 AM    BUN 10 10/25/2019 10:14 AM    Creatinine 0.58 10/25/2019 10:14 AM    GFR est AA >60 10/25/2019 10:14 AM    GFR est non-AA >60 10/25/2019 10:14 AM    Calcium 8.8 10/25/2019 10:14 AM     Lab Results   Component Value Date/Time    Bilirubin, total 0.4 10/25/2019 10:14 AM    ALT (SGPT) 39 10/25/2019 10:14 AM    AST (SGOT) 20 10/25/2019 10:14 AM    Alk. phosphatase 81 10/25/2019 10:14 AM    Protein, total 6.4 10/25/2019 10:14 AM    Albumin 3.6 10/25/2019 10:14 AM    Globulin 2.8 10/25/2019 10:14 AM       Records reviewed and summarized above. Pathology report(s) reviewed above. 5/28/19  FINAL PATHOLOGIC DIAGNOSIS  1. Breast, left, core biopsy: In situ and invasive ductal carcinoma   Invasive carcinoma is nuclear grade 2 and measures up to 0.6 cm in greatest dimension on slide   Ductal carcinoma in situ is nuclear grade 3 with central necrosis   2.  Soft tissue, left axilla, core biopsy: Adipose tissue with rare detached atypical cells   No lymph node tissue identified   See comment     Radiology report(s) reviewed above. US breast 9/12/19:  IMPRESSION:  1. Increased malignant microcalcifications in the left breast. This could  represent treatment-related necrosis or extension of disease. 2. Extensive carcinoma seen on prior MRI is largely mammographically occult. 3. Decreased size of a metastatic left axillary lymph node with  microcalcifications. 4. BI-RADS Assessment Category 6: Known biopsy proven malignancy. Mammogram 10/22/19:  IMPRESSION:  1. BI-RADS 6: known left breast cancer. 2. Segmental pleomorphic calcifications in the left upper outer quadrant are  stable. Calcifications left axillary lymph node have not changed significantly. Patient was informed of the results. Assessment:   1) Left breast Invasive ductal carcinoma    [id number]  GRADE 2  % ME 20% HER2 greg equivocal - FISH could not be done  Repeat biopsy of breast mass 6/24/19 - HER2 greg negative, mamma print indicates she has genomically high risk disease    Due to extensive surrounding breast changes a mastectomy is recommended by Dr. Shelley De Oliveira    Today is week #9 of weekly Taxol. She had a breast ultrasound after completion of dd-AC which was reviewed and shows increase in size of microcalcifications in the left breast which could represent treatment related necrosis vs extension of disease. This was reviewed with Dr. Shelley De Oliveira. On exam there is clear response with resolution of skin thickening, slow eversion of nipple, and smaller mass. Repeat mammogram obtained after 6 weeks of Taxol and show stable calcifications. Her breast exam continues to be re-assuring. I reviewed this with Dr. Shelley De Oliveira who also agrees.  Dr. Shelley De Oliveira does recommend a pre op MRI     We will discuss endocrine therapy/ post mastectomy RT at a later date    Baseline ECHO reviewed and shows EF 61-65%    2) Hypothyroidism    3) Obesity    Plan:     · Proceed today with weekly #9 of weekly Taxol at 80mg/m2 x 12   · Labs to include CBC with diff and CMP prior to each infusion  · Premeds to include  dexamethasone, benadryl, pepsid   · Compazine q 6 hours prn    RTC in 2 weeks for week #11    I appreciate the opportunity to participate in Ms. Rachelle Garcia's care.     Signed By: Elliot Barnhart MD

## 2019-11-08 NOTE — PROGRESS NOTES
Keshia Coffey is a 62 y.o. female  Chief Complaint   Patient presents with    Follow-up    Breast Cancer     1. Have you been to the ER, urgent care clinic since your last visit? Hospitalized since your last visit? No    2. Have you seen or consulted any other health care providers outside of the [address] [place] [address] since your last visit? Include any pap smears or colon screening.  No

## 2019-11-12 RX ORDER — DIPHENHYDRAMINE HYDROCHLORIDE 50 MG/ML
50 INJECTION, SOLUTION INTRAMUSCULAR; INTRAVENOUS AS NEEDED
Status: CANCELLED
Start: 2019-11-15

## 2019-11-12 RX ORDER — SODIUM CHLORIDE 9 MG/ML
10 INJECTION INTRAMUSCULAR; INTRAVENOUS; SUBCUTANEOUS AS NEEDED
Status: CANCELLED | OUTPATIENT
Start: 2019-11-15

## 2019-11-12 RX ORDER — SODIUM CHLORIDE 9 MG/ML
10 INJECTION INTRAMUSCULAR; INTRAVENOUS; SUBCUTANEOUS AS NEEDED
Status: CANCELLED | OUTPATIENT
Start: 2019-11-22

## 2019-11-12 RX ORDER — HYDROCORTISONE SODIUM SUCCINATE 100 MG/2ML
100 INJECTION, POWDER, FOR SOLUTION INTRAMUSCULAR; INTRAVENOUS AS NEEDED
Status: CANCELLED | OUTPATIENT
Start: 2019-12-03

## 2019-11-12 RX ORDER — DIPHENHYDRAMINE HYDROCHLORIDE 50 MG/ML
50 INJECTION, SOLUTION INTRAMUSCULAR; INTRAVENOUS ONCE
Status: CANCELLED
Start: 2019-12-03

## 2019-11-12 RX ORDER — ALBUTEROL SULFATE 0.83 MG/ML
2.5 SOLUTION RESPIRATORY (INHALATION) AS NEEDED
Status: CANCELLED
Start: 2019-12-03

## 2019-11-12 RX ORDER — ACETAMINOPHEN 325 MG/1
650 TABLET ORAL AS NEEDED
Status: CANCELLED
Start: 2019-11-22

## 2019-11-12 RX ORDER — ALBUTEROL SULFATE 0.83 MG/ML
2.5 SOLUTION RESPIRATORY (INHALATION) AS NEEDED
Status: CANCELLED
Start: 2019-11-15

## 2019-11-12 RX ORDER — DIPHENHYDRAMINE HYDROCHLORIDE 50 MG/ML
50 INJECTION, SOLUTION INTRAMUSCULAR; INTRAVENOUS ONCE
Status: CANCELLED
Start: 2019-11-22

## 2019-11-12 RX ORDER — SODIUM CHLORIDE 0.9 % (FLUSH) 0.9 %
10 SYRINGE (ML) INJECTION AS NEEDED
Status: CANCELLED
Start: 2019-11-22

## 2019-11-12 RX ORDER — DEXAMETHASONE SODIUM PHOSPHATE 4 MG/ML
10 INJECTION, SOLUTION INTRA-ARTICULAR; INTRALESIONAL; INTRAMUSCULAR; INTRAVENOUS; SOFT TISSUE ONCE
Status: CANCELLED | OUTPATIENT
Start: 2019-11-15

## 2019-11-12 RX ORDER — ACETAMINOPHEN 325 MG/1
650 TABLET ORAL AS NEEDED
Status: CANCELLED
Start: 2019-11-15

## 2019-11-12 RX ORDER — HEPARIN 100 UNIT/ML
300-500 SYRINGE INTRAVENOUS AS NEEDED
Status: CANCELLED
Start: 2019-11-15

## 2019-11-12 RX ORDER — HYDROCORTISONE SODIUM SUCCINATE 100 MG/2ML
100 INJECTION, POWDER, FOR SOLUTION INTRAMUSCULAR; INTRAVENOUS AS NEEDED
Status: CANCELLED | OUTPATIENT
Start: 2019-11-15

## 2019-11-12 RX ORDER — ONDANSETRON 2 MG/ML
8 INJECTION INTRAMUSCULAR; INTRAVENOUS AS NEEDED
Status: CANCELLED | OUTPATIENT
Start: 2019-11-15

## 2019-11-12 RX ORDER — EPINEPHRINE 1 MG/ML
0.3 INJECTION, SOLUTION, CONCENTRATE INTRAVENOUS AS NEEDED
Status: CANCELLED | OUTPATIENT
Start: 2019-11-22

## 2019-11-12 RX ORDER — SODIUM CHLORIDE 9 MG/ML
25 INJECTION, SOLUTION INTRAVENOUS CONTINUOUS
Status: CANCELLED | OUTPATIENT
Start: 2019-12-03

## 2019-11-12 RX ORDER — SODIUM CHLORIDE 9 MG/ML
25 INJECTION, SOLUTION INTRAVENOUS CONTINUOUS
Status: CANCELLED | OUTPATIENT
Start: 2019-11-22

## 2019-11-12 RX ORDER — ONDANSETRON 2 MG/ML
8 INJECTION INTRAMUSCULAR; INTRAVENOUS AS NEEDED
Status: CANCELLED | OUTPATIENT
Start: 2019-11-22

## 2019-11-12 RX ORDER — DIPHENHYDRAMINE HYDROCHLORIDE 50 MG/ML
50 INJECTION, SOLUTION INTRAMUSCULAR; INTRAVENOUS AS NEEDED
Status: CANCELLED
Start: 2019-12-03

## 2019-11-12 RX ORDER — SODIUM CHLORIDE 0.9 % (FLUSH) 0.9 %
10 SYRINGE (ML) INJECTION AS NEEDED
Status: CANCELLED
Start: 2019-12-03

## 2019-11-12 RX ORDER — SODIUM CHLORIDE 9 MG/ML
25 INJECTION, SOLUTION INTRAVENOUS CONTINUOUS
Status: CANCELLED | OUTPATIENT
Start: 2019-11-15

## 2019-11-12 RX ORDER — ACETAMINOPHEN 325 MG/1
650 TABLET ORAL AS NEEDED
Status: CANCELLED
Start: 2019-12-03

## 2019-11-12 RX ORDER — HYDROCORTISONE SODIUM SUCCINATE 100 MG/2ML
100 INJECTION, POWDER, FOR SOLUTION INTRAMUSCULAR; INTRAVENOUS AS NEEDED
Status: CANCELLED | OUTPATIENT
Start: 2019-11-22

## 2019-11-12 RX ORDER — DEXAMETHASONE SODIUM PHOSPHATE 4 MG/ML
10 INJECTION, SOLUTION INTRA-ARTICULAR; INTRALESIONAL; INTRAMUSCULAR; INTRAVENOUS; SOFT TISSUE ONCE
Status: CANCELLED | OUTPATIENT
Start: 2019-11-22

## 2019-11-12 RX ORDER — SODIUM CHLORIDE 9 MG/ML
10 INJECTION INTRAMUSCULAR; INTRAVENOUS; SUBCUTANEOUS AS NEEDED
Status: CANCELLED | OUTPATIENT
Start: 2019-12-03

## 2019-11-12 RX ORDER — HEPARIN 100 UNIT/ML
300-500 SYRINGE INTRAVENOUS AS NEEDED
Status: CANCELLED
Start: 2019-12-03

## 2019-11-12 RX ORDER — EPINEPHRINE 1 MG/ML
0.3 INJECTION, SOLUTION, CONCENTRATE INTRAVENOUS AS NEEDED
Status: CANCELLED | OUTPATIENT
Start: 2019-11-15

## 2019-11-12 RX ORDER — DIPHENHYDRAMINE HYDROCHLORIDE 50 MG/ML
50 INJECTION, SOLUTION INTRAMUSCULAR; INTRAVENOUS AS NEEDED
Status: CANCELLED
Start: 2019-11-22

## 2019-11-12 RX ORDER — EPINEPHRINE 1 MG/ML
0.3 INJECTION, SOLUTION, CONCENTRATE INTRAVENOUS AS NEEDED
Status: CANCELLED | OUTPATIENT
Start: 2019-12-03

## 2019-11-12 RX ORDER — DEXAMETHASONE SODIUM PHOSPHATE 4 MG/ML
10 INJECTION, SOLUTION INTRA-ARTICULAR; INTRALESIONAL; INTRAMUSCULAR; INTRAVENOUS; SOFT TISSUE ONCE
Status: CANCELLED | OUTPATIENT
Start: 2019-12-03

## 2019-11-12 RX ORDER — ONDANSETRON 2 MG/ML
8 INJECTION INTRAMUSCULAR; INTRAVENOUS AS NEEDED
Status: CANCELLED | OUTPATIENT
Start: 2019-12-03

## 2019-11-12 RX ORDER — SODIUM CHLORIDE 0.9 % (FLUSH) 0.9 %
10 SYRINGE (ML) INJECTION AS NEEDED
Status: CANCELLED
Start: 2019-11-15

## 2019-11-12 RX ORDER — DIPHENHYDRAMINE HYDROCHLORIDE 50 MG/ML
50 INJECTION, SOLUTION INTRAMUSCULAR; INTRAVENOUS ONCE
Status: CANCELLED
Start: 2019-11-15

## 2019-11-12 RX ORDER — HEPARIN 100 UNIT/ML
300-500 SYRINGE INTRAVENOUS AS NEEDED
Status: CANCELLED
Start: 2019-11-22

## 2019-11-12 RX ORDER — ALBUTEROL SULFATE 0.83 MG/ML
2.5 SOLUTION RESPIRATORY (INHALATION) AS NEEDED
Status: CANCELLED
Start: 2019-11-22

## 2019-11-15 ENCOUNTER — HOSPITAL ENCOUNTER (OUTPATIENT)
Dept: INFUSION THERAPY | Age: 54
Discharge: HOME | End: 2019-11-15
Payer: COMMERCIAL

## 2019-11-15 VITALS
RESPIRATION RATE: 18 BRPM | HEART RATE: 89 BPM | HEIGHT: 68 IN | TEMPERATURE: 98 F | BODY MASS INDEX: 31.67 KG/M2 | SYSTOLIC BLOOD PRESSURE: 137 MMHG | WEIGHT: 209 LBS | DIASTOLIC BLOOD PRESSURE: 88 MMHG

## 2019-11-15 DIAGNOSIS — Z17.0 MALIGNANT NEOPLASM OF UPPER-OUTER QUADRANT OF LEFT BREAST IN FEMALE, ESTROGEN RECEPTOR POSITIVE (HCC): Primary | ICD-10-CM

## 2019-11-15 DIAGNOSIS — C50.412 MALIGNANT NEOPLASM OF UPPER-OUTER QUADRANT OF LEFT BREAST IN FEMALE, ESTROGEN RECEPTOR POSITIVE (HCC): Primary | ICD-10-CM

## 2019-11-15 LAB
ALBUMIN SERPL-MCNC: 3.5 G/DL (ref 3.5–5)
ALBUMIN/GLOB SERPL: 1.4 {RATIO} (ref 1.1–2.2)
ALP SERPL-CCNC: 71 U/L (ref 45–117)
ALT SERPL-CCNC: 40 U/L (ref 12–78)
ANION GAP SERPL CALC-SCNC: 3 MMOL/L (ref 5–15)
AST SERPL-CCNC: 25 U/L (ref 15–37)
BASOPHILS # BLD: 0 K/UL (ref 0–0.1)
BASOPHILS NFR BLD: 1 % (ref 0–1)
BILIRUB SERPL-MCNC: 0.3 MG/DL (ref 0.2–1)
BUN SERPL-MCNC: 8 MG/DL (ref 6–20)
BUN/CREAT SERPL: 15 (ref 12–20)
CALCIUM SERPL-MCNC: 8.3 MG/DL (ref 8.5–10.1)
CHLORIDE SERPL-SCNC: 109 MMOL/L (ref 97–108)
CO2 SERPL-SCNC: 29 MMOL/L (ref 21–32)
CREAT SERPL-MCNC: 0.54 MG/DL (ref 0.55–1.02)
DIFFERENTIAL METHOD BLD: ABNORMAL
EOSINOPHIL # BLD: 0.1 K/UL (ref 0–0.4)
EOSINOPHIL NFR BLD: 2 % (ref 0–7)
ERYTHROCYTE [DISTWIDTH] IN BLOOD BY AUTOMATED COUNT: 11.9 % (ref 11.5–14.5)
GLOBULIN SER CALC-MCNC: 2.5 G/DL (ref 2–4)
GLUCOSE SERPL-MCNC: 97 MG/DL (ref 65–100)
HCT VFR BLD AUTO: 34.9 % (ref 35–47)
HGB BLD-MCNC: 11.6 G/DL (ref 11.5–16)
IMM GRANULOCYTES # BLD AUTO: 0 K/UL (ref 0–0.04)
IMM GRANULOCYTES NFR BLD AUTO: 1 % (ref 0–0.5)
LYMPHOCYTES # BLD: 1.1 K/UL (ref 0.8–3.5)
LYMPHOCYTES NFR BLD: 32 % (ref 12–49)
MCH RBC QN AUTO: 34.8 PG (ref 26–34)
MCHC RBC AUTO-ENTMCNC: 33.2 G/DL (ref 30–36.5)
MCV RBC AUTO: 104.8 FL (ref 80–99)
MONOCYTES # BLD: 0.3 K/UL (ref 0–1)
MONOCYTES NFR BLD: 9 % (ref 5–13)
NEUTS SEG # BLD: 2 K/UL (ref 1.8–8)
NEUTS SEG NFR BLD: 55 % (ref 32–75)
NRBC # BLD: 0 K/UL (ref 0–0.01)
NRBC BLD-RTO: 0 PER 100 WBC
PLATELET # BLD AUTO: 237 K/UL (ref 150–400)
PMV BLD AUTO: 9.8 FL (ref 8.9–12.9)
POTASSIUM SERPL-SCNC: 4 MMOL/L (ref 3.5–5.1)
PROT SERPL-MCNC: 6 G/DL (ref 6.4–8.2)
RBC # BLD AUTO: 3.33 M/UL (ref 3.8–5.2)
SODIUM SERPL-SCNC: 141 MMOL/L (ref 136–145)
WBC # BLD AUTO: 3.5 K/UL (ref 3.6–11)

## 2019-11-15 PROCEDURE — 74011250636 HC RX REV CODE- 250/636: Performed by: REGISTERED NURSE

## 2019-11-15 PROCEDURE — 96375 TX/PRO/DX INJ NEW DRUG ADDON: CPT

## 2019-11-15 PROCEDURE — 96413 CHEMO IV INFUSION 1 HR: CPT

## 2019-11-15 PROCEDURE — 80053 COMPREHEN METABOLIC PANEL: CPT

## 2019-11-15 PROCEDURE — 74011250637 HC RX REV CODE- 250/637: Performed by: REGISTERED NURSE

## 2019-11-15 PROCEDURE — 85025 COMPLETE CBC W/AUTO DIFF WBC: CPT

## 2019-11-15 PROCEDURE — 36415 COLL VENOUS BLD VENIPUNCTURE: CPT

## 2019-11-15 PROCEDURE — 77030012965 HC NDL HUBR BBMI -A

## 2019-11-15 PROCEDURE — 74011000250 HC RX REV CODE- 250: Performed by: REGISTERED NURSE

## 2019-11-15 RX ORDER — SODIUM CHLORIDE 9 MG/ML
10 INJECTION INTRAMUSCULAR; INTRAVENOUS; SUBCUTANEOUS AS NEEDED
Status: ACTIVE | OUTPATIENT
Start: 2019-11-15 | End: 2019-11-15

## 2019-11-15 RX ORDER — DIPHENHYDRAMINE HYDROCHLORIDE 50 MG/ML
50 INJECTION, SOLUTION INTRAMUSCULAR; INTRAVENOUS ONCE
Status: DISCONTINUED | OUTPATIENT
Start: 2019-11-15 | End: 2019-11-15

## 2019-11-15 RX ORDER — SODIUM CHLORIDE 0.9 % (FLUSH) 0.9 %
10 SYRINGE (ML) INJECTION AS NEEDED
Status: ACTIVE | OUTPATIENT
Start: 2019-11-15 | End: 2019-11-15

## 2019-11-15 RX ORDER — DEXAMETHASONE SODIUM PHOSPHATE 4 MG/ML
10 INJECTION, SOLUTION INTRA-ARTICULAR; INTRALESIONAL; INTRAMUSCULAR; INTRAVENOUS; SOFT TISSUE ONCE
Status: COMPLETED | OUTPATIENT
Start: 2019-11-15 | End: 2019-11-15

## 2019-11-15 RX ORDER — DIPHENHYDRAMINE HCL 25 MG
50 CAPSULE ORAL ONCE
Status: COMPLETED | OUTPATIENT
Start: 2019-11-15 | End: 2019-11-15

## 2019-11-15 RX ORDER — SODIUM CHLORIDE 9 MG/ML
25 INJECTION, SOLUTION INTRAVENOUS CONTINUOUS
Status: DISPENSED | OUTPATIENT
Start: 2019-11-15 | End: 2019-11-15

## 2019-11-15 RX ORDER — HEPARIN 100 UNIT/ML
300-500 SYRINGE INTRAVENOUS AS NEEDED
Status: ACTIVE | OUTPATIENT
Start: 2019-11-15 | End: 2019-11-15

## 2019-11-15 RX ADMIN — Medication 500 UNITS: at 14:33

## 2019-11-15 RX ADMIN — SODIUM CHLORIDE 25 ML/HR: 900 INJECTION, SOLUTION INTRAVENOUS at 10:27

## 2019-11-15 RX ADMIN — SODIUM CHLORIDE 10 ML: 9 INJECTION INTRAMUSCULAR; INTRAVENOUS; SUBCUTANEOUS at 10:25

## 2019-11-15 RX ADMIN — DEXAMETHASONE SODIUM PHOSPHATE 10 MG: 4 INJECTION, SOLUTION INTRA-ARTICULAR; INTRALESIONAL; INTRAMUSCULAR; INTRAVENOUS; SOFT TISSUE at 12:57

## 2019-11-15 RX ADMIN — FAMOTIDINE 20 MG: 10 INJECTION, SOLUTION INTRAVENOUS at 12:56

## 2019-11-15 RX ADMIN — DIPHENHYDRAMINE HYDROCHLORIDE 50 MG: 25 CAPSULE ORAL at 12:53

## 2019-11-15 RX ADMIN — PACLITAXEL 166 MG: 6 INJECTION, SOLUTION INTRAVENOUS at 13:31

## 2019-11-15 RX ADMIN — Medication 10 ML: at 10:26

## 2019-11-15 RX ADMIN — Medication 10 ML: at 14:33

## 2019-11-15 NOTE — PROGRESS NOTES
[facility] Progress Note    Date: November 15, 2019    Name: Edilia Correa    MRN: [id number]         : 1965    Ms. Nathaniel Mathews Arrived ambulatory and in no distress for C8D1 Taxol. Assessment was completed, no acute issues at this time, no new complaints voiced. Port accessed with positive blood return. There is a small flushed area at port insertion site. Needle point sized material coming out of end of stitching site. Wiped away with alcohol w/o difficulty. Possibly old stitch coming through? Pt states she feels pain when she's laying down and upon palpitation. There is no s/s of infection. Port is accessed without difficulty or pain. Advised pt to continue to monitor for worsening symptoms. Labs drawn and sent for processing. Port flushed and attached to [facility] at [facility]. Chemotherapy Flowsheet 11/15/2019   Cycle C8D1   Date 11/15/2019   Drug / Regimen Taxol   Dosage -   Time Up -   Time Down -   Pre Meds given   Notes given     Ms. Garcia's vitals were reviewed. Patient Vitals for the past 12 hrs:   Temp Pulse Resp BP   11/15/19 1433 -- (P) 81 (P) 18 (P) 142/89   11/15/19 1011 98 °F (36.7 °C) 89 18 137/88     Lab results were obtained and reviewed. Recent Results (from the past 12 hour(s))   CBC WITH AUTOMATED DIFF    Collection Time: 11/15/19 10:26 AM   Result Value Ref Range    WBC 3.5 (L) 3.6 - 11.0 K/uL    RBC 3.33 (L) 3.80 - 5.20 M/uL    HGB 11.6 11.5 - 16.0 g/dL    HCT 34.9 (L) 35.0 - 47.0 %    .8 (H) 80.0 - 99.0 FL    MCH 34.8 (H) 26.0 - 34.0 PG    MCHC 33.2 30.0 - 36.5 g/dL    RDW 11.9 11.5 - 14.5 %    PLATELET 560 843 - 280 K/uL    MPV 9.8 8.9 - 12.9 FL    NRBC 0.0 0  WBC    ABSOLUTE NRBC 0.00 0.00 - 0.01 K/uL    NEUTROPHILS 55 32 - 75 %    LYMPHOCYTES 32 12 - 49 %    MONOCYTES 9 5 - 13 %    EOSINOPHILS 2 0 - 7 %    BASOPHILS 1 0 - 1 %    IMMATURE GRANULOCYTES 1 (H) 0.0 - 0.5 %    ABS. NEUTROPHILS 2.0 1.8 - 8.0 K/UL    ABS. LYMPHOCYTES 1.1 0.8 - 3.5 K/UL    ABS.  MONOCYTES 0.3 0.0 - 1.0 K/UL    ABS. EOSINOPHILS 0.1 0.0 - 0.4 K/UL    ABS. BASOPHILS 0.0 0.0 - 0.1 K/UL    ABS. IMM. GRANS. 0.0 0.00 - 0.04 K/UL    DF AUTOMATED     METABOLIC PANEL, COMPREHENSIVE    Collection Time: 11/15/19 10:26 AM   Result Value Ref Range    Sodium 141 136 - 145 mmol/L    Potassium 4.0 3.5 - 5.1 mmol/L    Chloride 109 (H) 97 - 108 mmol/L    CO2 29 21 - 32 mmol/L    Anion gap 3 (L) 5 - 15 mmol/L    Glucose 97 65 - 100 mg/dL    BUN 8 6 - 20 MG/DL    Creatinine 0.54 (L) 0.55 - 1.02 MG/DL    BUN/Creatinine ratio 15 12 - 20      GFR est AA >60 >60 ml/min/1.73m2    GFR est non-AA >60 >60 ml/min/1.73m2    Calcium 8.3 (L) 8.5 - 10.1 MG/DL    Bilirubin, total 0.3 0.2 - 1.0 MG/DL    ALT (SGPT) 40 12 - 78 U/L    AST (SGOT) 25 15 - 37 U/L    Alk. phosphatase 71 45 - 117 U/L    Protein, total 6.0 (L) 6.4 - 8.2 g/dL    Albumin 3.5 3.5 - 5.0 g/dL    Globulin 2.5 2.0 - 4.0 g/dL    A-G Ratio 1.4 1.1 - 2.2       Pre-medications  were administered as ordered and chemotherapy was initiated.   Medications Administered     0.9% sodium chloride infusion     Admin Date  11/15/2019 Action  New Bag Dose  25 mL/hr Rate  25 mL/hr Route  IntraVENous Administered By  Ondina Sosa RN          dexamethasone (DECADRON) 4 mg/mL injection 10 mg     Admin Date  11/15/2019 Action  Given Dose  10 mg Route  IntraVENous Administered By  Ondina Sosa RN          diphenhydrAMINE (BENADRYL) capsule 50 mg     Admin Date  11/15/2019 Action  Given Dose  50 mg Route  Oral Administered By  Ondina Sosa RN          famotidine (PF) (PEPCID) 20 mg in sodium chloride 0.9% 10 mL injection     Admin Date  11/15/2019 Action  Given Dose  20 mg Route  IntraVENous Administered By  Ondina Sosa RN          heparin (porcine) pf 300-500 Units     Admin Date  11/15/2019 Action  Given Dose  500 Units Route  InterCATHeter Administered By  Ondina Sosa, RN          PACLitaxel (TAXOL) 166 mg in 0.9% sodium chloride 250 mL, overfill volume 25 mL chemo infusion     Admin Date  11/15/2019 Action  New Bag Dose  166 mg Rate  302.7 mL/hr Route  IntraVENous Administered By  Shawna Harkins RN          saline peripheral flush soln 10 mL     Admin Date  11/15/2019 Action  Given Dose  10 mL Route  InterCATHeter Administered By  Shawna Harkins RN           Admin Date  11/15/2019 Action  Given Dose  10 mL Route  InterCATHeter Administered By  Shawna Harkins RN          sodium chloride 0.9% injection 10 mL     Admin Date  11/15/2019 Action  Given Dose  10 mL Route  IntraVENous Administered By  Shawna Harkins RN                Ms. Lynnell Nyhan tolerated treatment well. Port maintained positive blood return throughout treatment. Port flushed, heparinized and de accessed per protocol. Patient was discharged from [place] [address] in stable condition at 1435.      Future Appointments   Date Time Provider Preeti Joyce   11/22/2019 10:00 AM [facility]    11/22/2019 10:45 AM Efra Blank  N [facility]   [facility]2019  [facility]    12/9/2019 11:20 AM Murray Dela Cruz MD [facility] [address] [facility], RN  November 15, 2019

## 2019-11-22 ENCOUNTER — HOSPITAL ENCOUNTER (OUTPATIENT)
Dept: INFUSION THERAPY | Age: 54
Discharge: HOME | End: 2019-11-22
Payer: COMMERCIAL

## 2019-11-22 ENCOUNTER — OFFICE VISIT (OUTPATIENT)
Dept: ONCOLOGY | Age: 54
End: 2019-11-22

## 2019-11-22 VITALS
HEIGHT: 68 IN | BODY MASS INDEX: 31.52 KG/M2 | TEMPERATURE: 97.2 F | RESPIRATION RATE: 18 BRPM | DIASTOLIC BLOOD PRESSURE: 80 MMHG | HEART RATE: 74 BPM | WEIGHT: 208 LBS | SYSTOLIC BLOOD PRESSURE: 141 MMHG

## 2019-11-22 VITALS
OXYGEN SATURATION: 95 % | HEIGHT: 68 IN | RESPIRATION RATE: 16 BRPM | WEIGHT: 208.7 LBS | BODY MASS INDEX: 31.63 KG/M2 | TEMPERATURE: 97.9 F | SYSTOLIC BLOOD PRESSURE: 111 MMHG | HEART RATE: 98 BPM | DIASTOLIC BLOOD PRESSURE: 78 MMHG

## 2019-11-22 DIAGNOSIS — C50.412 MALIGNANT NEOPLASM OF UPPER-OUTER QUADRANT OF LEFT BREAST IN FEMALE, ESTROGEN RECEPTOR POSITIVE (HCC): Primary | ICD-10-CM

## 2019-11-22 DIAGNOSIS — E66.09 CLASS 1 OBESITY DUE TO EXCESS CALORIES WITHOUT SERIOUS COMORBIDITY WITH BODY MASS INDEX (BMI) OF 30.0 TO 30.9 IN ADULT: ICD-10-CM

## 2019-11-22 DIAGNOSIS — Z17.0 MALIGNANT NEOPLASM OF UPPER-OUTER QUADRANT OF LEFT BREAST IN FEMALE, ESTROGEN RECEPTOR POSITIVE (HCC): Primary | ICD-10-CM

## 2019-11-22 LAB
ALBUMIN SERPL-MCNC: 3 G/DL (ref 3.5–5)
ALBUMIN/GLOB SERPL: 1.2 {RATIO} (ref 1.1–2.2)
ALP SERPL-CCNC: 56 U/L (ref 45–117)
ALT SERPL-CCNC: 30 U/L (ref 12–78)
ANION GAP SERPL CALC-SCNC: 4 MMOL/L (ref 5–15)
AST SERPL-CCNC: 17 U/L (ref 15–37)
BASOPHILS # BLD: 0 K/UL (ref 0–0.1)
BASOPHILS NFR BLD: 1 % (ref 0–1)
BILIRUB SERPL-MCNC: 0.3 MG/DL (ref 0.2–1)
BUN SERPL-MCNC: 9 MG/DL (ref 6–20)
BUN/CREAT SERPL: 15 (ref 12–20)
CALCIUM SERPL-MCNC: 8.1 MG/DL (ref 8.5–10.1)
CHLORIDE SERPL-SCNC: 110 MMOL/L (ref 97–108)
CO2 SERPL-SCNC: 27 MMOL/L (ref 21–32)
CREAT SERPL-MCNC: 0.59 MG/DL (ref 0.55–1.02)
DIFFERENTIAL METHOD BLD: ABNORMAL
EOSINOPHIL # BLD: 0.1 K/UL (ref 0–0.4)
EOSINOPHIL NFR BLD: 2 % (ref 0–7)
ERYTHROCYTE [DISTWIDTH] IN BLOOD BY AUTOMATED COUNT: 11.9 % (ref 11.5–14.5)
GLOBULIN SER CALC-MCNC: 2.6 G/DL (ref 2–4)
GLUCOSE SERPL-MCNC: 109 MG/DL (ref 65–100)
HCT VFR BLD AUTO: 35.2 % (ref 35–47)
HGB BLD-MCNC: 11.8 G/DL (ref 11.5–16)
IMM GRANULOCYTES # BLD AUTO: 0 K/UL (ref 0–0.04)
IMM GRANULOCYTES NFR BLD AUTO: 1 % (ref 0–0.5)
LYMPHOCYTES # BLD: 1.1 K/UL (ref 0.8–3.5)
LYMPHOCYTES NFR BLD: 30 % (ref 12–49)
MAGNESIUM SERPL-MCNC: 1.9 MG/DL (ref 1.6–2.4)
MCH RBC QN AUTO: 35 PG (ref 26–34)
MCHC RBC AUTO-ENTMCNC: 33.5 G/DL (ref 30–36.5)
MCV RBC AUTO: 104.5 FL (ref 80–99)
MONOCYTES # BLD: 0.3 K/UL (ref 0–1)
MONOCYTES NFR BLD: 8 % (ref 5–13)
NEUTS SEG # BLD: 2.2 K/UL (ref 1.8–8)
NEUTS SEG NFR BLD: 58 % (ref 32–75)
NRBC # BLD: 0 K/UL (ref 0–0.01)
NRBC BLD-RTO: 0 PER 100 WBC
PLATELET # BLD AUTO: 233 K/UL (ref 150–400)
PMV BLD AUTO: 9.8 FL (ref 8.9–12.9)
POTASSIUM SERPL-SCNC: 3.8 MMOL/L (ref 3.5–5.1)
PROT SERPL-MCNC: 5.6 G/DL (ref 6.4–8.2)
RBC # BLD AUTO: 3.37 M/UL (ref 3.8–5.2)
SODIUM SERPL-SCNC: 141 MMOL/L (ref 136–145)
WBC # BLD AUTO: 3.7 K/UL (ref 3.6–11)

## 2019-11-22 PROCEDURE — 77030012965 HC NDL HUBR BBMI -A

## 2019-11-22 PROCEDURE — 96375 TX/PRO/DX INJ NEW DRUG ADDON: CPT

## 2019-11-22 PROCEDURE — 85025 COMPLETE CBC W/AUTO DIFF WBC: CPT

## 2019-11-22 PROCEDURE — 74011000250 HC RX REV CODE- 250: Performed by: REGISTERED NURSE

## 2019-11-22 PROCEDURE — 74011250636 HC RX REV CODE- 250/636: Performed by: REGISTERED NURSE

## 2019-11-22 PROCEDURE — 80053 COMPREHEN METABOLIC PANEL: CPT

## 2019-11-22 PROCEDURE — 83735 ASSAY OF MAGNESIUM: CPT

## 2019-11-22 PROCEDURE — 36415 COLL VENOUS BLD VENIPUNCTURE: CPT

## 2019-11-22 PROCEDURE — 74011250637 HC RX REV CODE- 250/637: Performed by: REGISTERED NURSE

## 2019-11-22 PROCEDURE — 96413 CHEMO IV INFUSION 1 HR: CPT

## 2019-11-22 RX ORDER — TRAMADOL HYDROCHLORIDE 50 MG/1
50 TABLET ORAL
Qty: 30 TAB | Refills: 0 | Status: SHIPPED | OUTPATIENT
Start: 2019-11-22 | End: 2019-12-22

## 2019-11-22 RX ORDER — DIPHENHYDRAMINE HCL 25 MG
50 CAPSULE ORAL ONCE
Status: COMPLETED | OUTPATIENT
Start: 2019-11-22 | End: 2019-11-22

## 2019-11-22 RX ORDER — SODIUM CHLORIDE 9 MG/ML
25 INJECTION, SOLUTION INTRAVENOUS CONTINUOUS
Status: DISCONTINUED | OUTPATIENT
Start: 2019-11-22 | End: 2019-11-23 | Stop reason: HOSPADM

## 2019-11-22 RX ORDER — DEXAMETHASONE SODIUM PHOSPHATE 4 MG/ML
10 INJECTION, SOLUTION INTRA-ARTICULAR; INTRALESIONAL; INTRAMUSCULAR; INTRAVENOUS; SOFT TISSUE ONCE
Status: COMPLETED | OUTPATIENT
Start: 2019-11-22 | End: 2019-11-22

## 2019-11-22 RX ADMIN — SODIUM CHLORIDE 25 ML/HR: 900 INJECTION, SOLUTION INTRAVENOUS at 13:17

## 2019-11-22 RX ADMIN — DIPHENHYDRAMINE HYDROCHLORIDE 50 MG: 25 CAPSULE ORAL at 13:19

## 2019-11-22 RX ADMIN — PACLITAXEL 166 MG: 6 INJECTION, SOLUTION INTRAVENOUS at 13:45

## 2019-11-22 RX ADMIN — FAMOTIDINE 20 MG: 10 INJECTION, SOLUTION INTRAVENOUS at 13:20

## 2019-11-22 RX ADMIN — DEXAMETHASONE SODIUM PHOSPHATE 10 MG: 4 INJECTION, SOLUTION INTRA-ARTICULAR; INTRALESIONAL; INTRAMUSCULAR; INTRAVENOUS; SOFT TISSUE at 13:21

## 2019-11-22 NOTE — PROGRESS NOTES
[facility] [place] [facility] [address], [id number] [place] [address] Road, [address] [place] [address] [place]  W: [phone]  F: [phone]    Reason for Visit:   Escobar Chaney is a 62 y.o. female who is seen for Left breast cancer- ER+NH+HER2 greg neg    Treatment History:   · 6/19: Mammogram:  Left breast mass with skin thickening, 2 suspicious nodes. Mass measures 1.4 x 0.9 x 1.3  · 6/19: MRI breast: Multicentric left breast carcinoma. Non-masslike enhancement extends from the nipple to the posterior third, involves all quadrants, and measures 106 x 44 x 79 mm. · 5/28/19: Biopsy breast- IDC % % HER 2 negative, skin biopsy benign , node biopsy mostly adipose tissue with atypical cells . BRCA1 and 2 negative. CT and bone scan negative for metastasis. Mammaprint with insufficient tissue for testing. Repeat biopsy of axillary node 6/20/19 positive for metastatic disease- repeat mammaprint - high risk  · 7/19/19: cycle 1 neoadjuvant of dd AC-T  · 9/12/19: US of breast shows increased malignant microcalcification in the left breast, etiology included tx related necrosis vs extension of disease. Decreased size in left axillary LN. · 10/22/19: calcifications in the left upper outer quadrant are stable, calcifications in left axillary LN are not changed     History of Present Illness:   Patient is a 62 y.o. seen for L breast cancer. She felt a sensation in the shower about May 2019. She also noted a lump and  an inverted nipple. She had a physician friend look at this who directed her to mammogram and recommended she see Dr. Amira Oneill. This led to imaging and diagnosis as above. She comes in today for week #11 of neoadjuvant Taxol. She is complaining of some right shoulder and right leg pain with associated numbness/tingling that is intermittent. Legs feel like they are cramping. This is bothersome to her and keeps her up at night. She has lost her eyelashes.  Denies nausea/vomiting or diarrhea/constipation. No mouth sores. Reports normal BMs. Comes with her friend and mother today. Rare ETOH  She does not smoke. Father had colon cancer    Past Medical History:   Diagnosis Date    Anxiety     Arthritis     At risk for sleep apnea     GAGAN 5     Basal cell carcinoma of skin     Breast cancer ([facility] [address] 2019    LEFT    History of seasonal allergies     Nausea & vomiting     Squamous cell skin cancer     Dr. Decker Litter Thyroid disease       Past Surgical History:   Procedure Laterality Date    HX COLONOSCOPY      HX KNEE ARTHROSCOPY Left     HX LUMBAR FUSION  1995    HX MOHS PROCEDURES      x 4 times     HX SHOULDER ARTHROSCOPY Right     HX THYROIDECTOMY  2014      Social History     Tobacco Use    Smoking status: Never Smoker    Smokeless tobacco: Never Used   Substance Use Topics    Alcohol use: Yes      Family History   Problem Relation Age of Onset    Arthritis-osteo Mother     Cancer Father         Colon Cancer     Current Outpatient Medications   Medication Sig    ALPRAZolam (XANAX) 0.5 mg tablet Take 1 Tab by mouth two (2) times daily as needed for Anxiety. Max Daily Amount: 1 mg.  FOLIC ACID PO Take  by mouth.  Lactobacillus acidophilus (PROBIOTIC PO) Take  by mouth.  sertraline (ZOLOFT) 100 mg tablet Take 0.5 Tabs by mouth daily.  traZODone (DESYREL) 50 mg tablet Take 2 Tabs by mouth nightly.  Cyanocobalamin-Cobamamide (B12) 5,000-100 mcg lozg by SubLINGual route daily.  lidocaine-prilocaine (EMLA) topical cream Apply  to affected area as needed for Pain.  cholecalciferol (VITAMIN D3) 1,000 unit cap Take 5,000 Units by mouth daily.  liothyronine (CYTOMEL) 5 mcg tablet Take 5 mcg by mouth daily.  levothyroxine (SYNTHROID) 50 mcg tablet Take 50 mcg by mouth Daily (before breakfast). No current facility-administered medications for this visit.        Allergies   Allergen Reactions    Augmentin [Amoxicillin-Pot Clavulanate] Unknown (comments)        Review of Systems: A complete review of systems was obtained, negative except as described above. Physical Exam:     Visit Vitals  /78 (BP 1 Location: Left arm)   Pulse 98   Temp 97.9 °F (36.6 °C)   Resp 16   Ht 5' 8\" (1.727 m)   Wt 208 lb 11.2 oz (94.7 kg)   SpO2 95%   BMI 31.73 kg/m²     ECOG PS: 1  General: No distress  Eyes: PERRLA, anicteric sclerae  HENT: Atraumatic, OP clear  Neck: Supple; PAC looks good  Lymphatic: no lymphadenopathy  Breasts: deferred today  MS: Normal gait and station. Digits without clubbing or cyanosis. Skin: No rashes, ecchymoses, or petechiae. Normal temperature, turgor, and texture. Psych: Alert, oriented, appropriate affect, normal judgment/insight    Results:     Lab Results   Component Value Date/Time    WBC 3.7 11/22/2019 10:18 AM    HGB 11.8 11/22/2019 10:18 AM    HCT 35.2 11/22/2019 10:18 AM    PLATELET 927 19/23/6936 10:18 AM    .5 (H) 11/22/2019 10:18 AM    ABS. NEUTROPHILS 2.2 11/22/2019 10:18 AM     Lab Results   Component Value Date/Time    Sodium 141 11/15/2019 10:26 AM    Potassium 4.0 11/15/2019 10:26 AM    Chloride 109 (H) 11/15/2019 10:26 AM    CO2 29 11/15/2019 10:26 AM    Glucose 97 11/15/2019 10:26 AM    BUN 8 11/15/2019 10:26 AM    Creatinine 0.54 (L) 11/15/2019 10:26 AM    GFR est AA >60 11/15/2019 10:26 AM    GFR est non-AA >60 11/15/2019 10:26 AM    Calcium 8.3 (L) 11/15/2019 10:26 AM     Lab Results   Component Value Date/Time    Bilirubin, total 0.3 11/15/2019 10:26 AM    ALT (SGPT) 40 11/15/2019 10:26 AM    AST (SGOT) 25 11/15/2019 10:26 AM    Alk. phosphatase 71 11/15/2019 10:26 AM    Protein, total 6.0 (L) 11/15/2019 10:26 AM    Albumin 3.5 11/15/2019 10:26 AM    Globulin 2.5 11/15/2019 10:26 AM       Records reviewed and summarized above. Pathology report(s) reviewed above. 5/28/19  FINAL PATHOLOGIC DIAGNOSIS  1. Breast, left, core biopsy:    In situ and invasive ductal carcinoma   Invasive carcinoma is nuclear grade 2 and measures up to 0.6 cm in greatest dimension on slide   Ductal carcinoma in situ is nuclear grade 3 with central necrosis   2. Soft tissue, left axilla, core biopsy: Adipose tissue with rare detached atypical cells   No lymph node tissue identified   See comment     Radiology report(s) reviewed above. US breast 9/12/19:  IMPRESSION:  1. Increased malignant microcalcifications in the left breast. This could  represent treatment-related necrosis or extension of disease. 2. Extensive carcinoma seen on prior MRI is largely mammographically occult. 3. Decreased size of a metastatic left axillary lymph node with  microcalcifications. 4. BI-RADS Assessment Category 6: Known biopsy proven malignancy. Mammogram 10/22/19:  IMPRESSION:  1. BI-RADS 6: known left breast cancer. 2. Segmental pleomorphic calcifications in the left upper outer quadrant are  stable. Calcifications left axillary lymph node have not changed significantly. Patient was informed of the results. Assessment:   1) Left breast Invasive ductal carcinoma    [id number]  GRADE 2  % VA 20% HER2 greg equivocal - FISH could not be done  Repeat biopsy of breast mass 6/24/19 - HER2 greg negative, mamma print indicates she has genomically high risk disease    Due to extensive surrounding breast changes a mastectomy is recommended by Dr. Yazan Skelton    Today is week #11 of weekly Taxol. She had a breast ultrasound after completion of dd-AC which was reviewed and shows increase in size of microcalcifications in the left breast which could represent treatment related necrosis vs extension of disease. This was reviewed with Dr. Yazan Skelton. On exam there is clear response with resolution of skin thickening, slow eversion of nipple, and smaller mass. Repeat mammogram obtained after 6 weeks of Taxol and show stable calcifications. Her breast exam continues to be re-assuring. I reviewed this with Dr. Yazan Skelton who also agrees.  Dr. Yazan Skelton does recommend a pre op MRI which I have ordered so she can get scheduled. Today she has grade 1 neuropathy and grade 1 arthralgias secondary to taxol. She is hesitant to reduce doses at this time. We will proceed today with current dose of Taxol and have her follow-up prior to week 12 to ensure she does not need additional DR. Will also add on mag and CMP today to ensure electrolytes are WNL. We will discuss endocrine therapy/ post mastectomy RT at a later date    Baseline ECHO reviewed and shows EF 61-65%    2) Hypothyroidism    3) Obesity    4) Neuropathy/arthalgias   See #1    Plan:     · Proceed today with weekly #11 of weekly Taxol at 80mg/m2 x 12   · Labs to include CBC with diff and CMP prior to each infusion  · Premeds to include dexamethasone, benadryl, pepsid   · Compazine q 6 hours prn  · Tramadol PRN for arthralgias   · Breast MRI for pre-op ordered  · CMP and mag today, will replete if indicated     RTC 12/3 for week 12    Discussed with Dr. Jeff Garcia    I appreciate the opportunity to participate in MsWendy Rachelle ROSELINE Garcia's care.     Signed By: Maia Llanos MD

## 2019-11-22 NOTE — PROGRESS NOTES
Carolina Vaz is a 62 y.o. female  Chief Complaint   Patient presents with    Follow-up    Breast Cancer     1. Have you been to the ER, urgent care clinic since your last visit? Hospitalized since your last visit? No  2. Have you seen or consulted any other health care providers outside of the [address] [place] [address] since your last visit? Include any pap smears or colon screening.  No

## 2019-11-22 NOTE — PROGRESS NOTES
[facility] Chemotherapy Progress Note    Date: 2019    Name: Janina Rashid    MRN: [id number]         : 1965      1000 Pt admit to [facility] for Taxol ambulatory in stable condition. Assessment completed. No new concerns voiced. Port with positive blood return. Chemotherapy Flowsheet 2019   Cycle C8D8   Date 2019   Drug / Regimen Taxol   Dosage -   Time Up -   Time Down -   Pre Meds given   Notes given         Ms. Garcia's vitals were reviewed. Patient Vitals for the past 12 hrs:   Temp Pulse Resp BP   19 1458 -- 74 -- 141/80   19 1017 97.2 °F (36.2 °C) 90 18 117/73         Lab results were obtained and reviewed. Recent Results (from the past 12 hour(s))   CBC WITH AUTOMATED DIFF    Collection Time: 19 10:18 AM   Result Value Ref Range    WBC 3.7 3.6 - 11.0 K/uL    RBC 3.37 (L) 3.80 - 5.20 M/uL    HGB 11.8 11.5 - 16.0 g/dL    HCT 35.2 35.0 - 47.0 %    .5 (H) 80.0 - 99.0 FL    MCH 35.0 (H) 26.0 - 34.0 PG    MCHC 33.5 30.0 - 36.5 g/dL    RDW 11.9 11.5 - 14.5 %    PLATELET 788 102 - 011 K/uL    MPV 9.8 8.9 - 12.9 FL    NRBC 0.0 0  WBC    ABSOLUTE NRBC 0.00 0.00 - 0.01 K/uL    NEUTROPHILS 58 32 - 75 %    LYMPHOCYTES 30 12 - 49 %    MONOCYTES 8 5 - 13 %    EOSINOPHILS 2 0 - 7 %    BASOPHILS 1 0 - 1 %    IMMATURE GRANULOCYTES 1 (H) 0.0 - 0.5 %    ABS. NEUTROPHILS 2.2 1.8 - 8.0 K/UL    ABS. LYMPHOCYTES 1.1 0.8 - 3.5 K/UL    ABS. MONOCYTES 0.3 0.0 - 1.0 K/UL    ABS. EOSINOPHILS 0.1 0.0 - 0.4 K/UL    ABS. BASOPHILS 0.0 0.0 - 0.1 K/UL    ABS. IMM.  GRANS. 0.0 0.00 - 0.04 K/UL    DF AUTOMATED     METABOLIC PANEL, COMPREHENSIVE    Collection Time: 19  2:48 PM   Result Value Ref Range    Sodium 141 136 - 145 mmol/L    Potassium 3.8 3.5 - 5.1 mmol/L    Chloride 110 (H) 97 - 108 mmol/L    CO2 27 21 - 32 mmol/L    Anion gap 4 (L) 5 - 15 mmol/L    Glucose 109 (H) 65 - 100 mg/dL    BUN 9 6 - 20 MG/DL    Creatinine 0.59 0.55 - 1.02 MG/DL    BUN/Creatinine ratio 15 12 - 20      GFR est AA >60 >60 ml/min/1.73m2    GFR est non-AA >60 >60 ml/min/1.73m2    Calcium 8.1 (L) 8.5 - 10.1 MG/DL    Bilirubin, total 0.3 0.2 - 1.0 MG/DL    ALT (SGPT) 30 12 - 78 U/L    AST (SGOT) 17 15 - 37 U/L    Alk. phosphatase 56 45 - 117 U/L    Protein, total 5.6 (L) 6.4 - 8.2 g/dL    Albumin 3.0 (L) 3.5 - 5.0 g/dL    Globulin 2.6 2.0 - 4.0 g/dL    A-G Ratio 1.2 1.1 - 2.2     MAGNESIUM    Collection Time: 11/22/19  2:48 PM   Result Value Ref Range    Magnesium 1.9 1.6 - 2.4 mg/dL       Pre-medications  were administered as ordered and chemotherapy was initiated. Medications Administered     0.9% sodium chloride infusion     Admin Date  11/22/2019 Action  New Bag Dose  25 mL/hr Rate  25 mL/hr Route  IntraVENous Administered By  Tani Orlando RN          dexamethasone (DECADRON) 4 mg/mL injection 10 mg     Admin Date  11/22/2019 Action  Given Dose  10 mg Route  IntraVENous Administered By  Tani Orlando RN          diphenhydrAMINE (BENADRYL) capsule 50 mg     Admin Date  11/22/2019 Action  Given Dose  50 mg Route  Oral Administered By  Tani Orlando RN          famotidine (PF) (PEPCID) 20 mg in sodium chloride 0.9% 10 mL injection     Admin Date  11/22/2019 Action  Given Dose  20 mg Route  IntraVENous Administered By  Tani Orlando RN          PACLitaxel (TAXOL) 166 mg in 0.9% sodium chloride 250 mL, overfill volume 25 mL chemo infusion     Admin Date  11/22/2019 Action  New Bag Dose  166 mg Rate  302.7 mL/hr Route  IntraVENous Administered By  Jeannine Prader, RN                1500 Pt tolerated treatment well. Port maintained positive blood return throughout treatment. Flushed, heparinized and de-accessed per protocol. D/c home ambulatory in no distress.      Future Appointments   Date Time Provider Preeti Joyce   12/3/2019  9:00 AM LIZETH LOWERY CHAIR [address]   12/3/2019  9:15 AM Bruno Morris  [address]   12/9/2019 11:20 AM Teodoro Lebron MD [facility] 10. Cam Santiago RN  November 22, 2019

## 2019-12-03 ENCOUNTER — HOSPITAL ENCOUNTER (OUTPATIENT)
Dept: INFUSION THERAPY | Age: 54
Discharge: HOME | End: 2019-12-03
Payer: COMMERCIAL

## 2019-12-03 ENCOUNTER — OFFICE VISIT (OUTPATIENT)
Dept: ONCOLOGY | Age: 54
End: 2019-12-03

## 2019-12-03 VITALS
RESPIRATION RATE: 18 BRPM | WEIGHT: 207.2 LBS | SYSTOLIC BLOOD PRESSURE: 124 MMHG | OXYGEN SATURATION: 95 % | HEART RATE: 77 BPM | BODY MASS INDEX: 31.4 KG/M2 | HEIGHT: 68 IN | DIASTOLIC BLOOD PRESSURE: 79 MMHG | TEMPERATURE: 98 F

## 2019-12-03 VITALS
BODY MASS INDEX: 31.4 KG/M2 | WEIGHT: 207.2 LBS | SYSTOLIC BLOOD PRESSURE: 155 MMHG | DIASTOLIC BLOOD PRESSURE: 85 MMHG | HEART RATE: 85 BPM | RESPIRATION RATE: 18 BRPM | TEMPERATURE: 97.6 F | HEIGHT: 68 IN

## 2019-12-03 DIAGNOSIS — C50.412 MALIGNANT NEOPLASM OF UPPER-OUTER QUADRANT OF LEFT BREAST IN FEMALE, ESTROGEN RECEPTOR POSITIVE (HCC): Primary | ICD-10-CM

## 2019-12-03 DIAGNOSIS — G62.9 NEUROPATHY: ICD-10-CM

## 2019-12-03 DIAGNOSIS — Z17.0 MALIGNANT NEOPLASM OF UPPER-OUTER QUADRANT OF LEFT BREAST IN FEMALE, ESTROGEN RECEPTOR POSITIVE (HCC): Primary | ICD-10-CM

## 2019-12-03 DIAGNOSIS — E66.09 CLASS 1 OBESITY DUE TO EXCESS CALORIES WITHOUT SERIOUS COMORBIDITY WITH BODY MASS INDEX (BMI) OF 30.0 TO 30.9 IN ADULT: ICD-10-CM

## 2019-12-03 LAB
BASOPHILS # BLD: 0 K/UL (ref 0–0.1)
BASOPHILS NFR BLD: 1 % (ref 0–1)
DIFFERENTIAL METHOD BLD: ABNORMAL
EOSINOPHIL # BLD: 0.1 K/UL (ref 0–0.4)
EOSINOPHIL NFR BLD: 1 % (ref 0–7)
ERYTHROCYTE [DISTWIDTH] IN BLOOD BY AUTOMATED COUNT: 12.6 % (ref 11.5–14.5)
HCT VFR BLD AUTO: 37.9 % (ref 35–47)
HGB BLD-MCNC: 12.6 G/DL (ref 11.5–16)
IMM GRANULOCYTES # BLD AUTO: 0 K/UL (ref 0–0.04)
IMM GRANULOCYTES NFR BLD AUTO: 1 % (ref 0–0.5)
LYMPHOCYTES # BLD: 1.2 K/UL (ref 0.8–3.5)
LYMPHOCYTES NFR BLD: 26 % (ref 12–49)
MCH RBC QN AUTO: 34.8 PG (ref 26–34)
MCHC RBC AUTO-ENTMCNC: 33.2 G/DL (ref 30–36.5)
MCV RBC AUTO: 104.7 FL (ref 80–99)
MONOCYTES # BLD: 0.6 K/UL (ref 0–1)
MONOCYTES NFR BLD: 13 % (ref 5–13)
NEUTS SEG # BLD: 2.6 K/UL (ref 1.8–8)
NEUTS SEG NFR BLD: 58 % (ref 32–75)
NRBC # BLD: 0 K/UL (ref 0–0.01)
NRBC BLD-RTO: 0 PER 100 WBC
PLATELET # BLD AUTO: 233 K/UL (ref 150–400)
PMV BLD AUTO: 9.8 FL (ref 8.9–12.9)
RBC # BLD AUTO: 3.62 M/UL (ref 3.8–5.2)
WBC # BLD AUTO: 4.4 K/UL (ref 3.6–11)

## 2019-12-03 PROCEDURE — 77030012965 HC NDL HUBR BBMI -A

## 2019-12-03 PROCEDURE — 74011250637 HC RX REV CODE- 250/637: Performed by: REGISTERED NURSE

## 2019-12-03 PROCEDURE — 96375 TX/PRO/DX INJ NEW DRUG ADDON: CPT

## 2019-12-03 PROCEDURE — 36415 COLL VENOUS BLD VENIPUNCTURE: CPT

## 2019-12-03 PROCEDURE — 85025 COMPLETE CBC W/AUTO DIFF WBC: CPT

## 2019-12-03 PROCEDURE — 96413 CHEMO IV INFUSION 1 HR: CPT

## 2019-12-03 PROCEDURE — 74011250636 HC RX REV CODE- 250/636: Performed by: REGISTERED NURSE

## 2019-12-03 PROCEDURE — 74011250636 HC RX REV CODE- 250/636: Performed by: INTERNAL MEDICINE

## 2019-12-03 PROCEDURE — 74011000250 HC RX REV CODE- 250: Performed by: REGISTERED NURSE

## 2019-12-03 RX ORDER — DIPHENHYDRAMINE HCL 25 MG
50 CAPSULE ORAL ONCE
Status: COMPLETED | OUTPATIENT
Start: 2019-12-03 | End: 2019-12-03

## 2019-12-03 RX ORDER — DIPHENHYDRAMINE HYDROCHLORIDE 50 MG/ML
50 INJECTION, SOLUTION INTRAMUSCULAR; INTRAVENOUS ONCE
Status: DISCONTINUED | OUTPATIENT
Start: 2019-12-03 | End: 2019-12-03

## 2019-12-03 RX ORDER — SODIUM CHLORIDE 9 MG/ML
25 INJECTION, SOLUTION INTRAVENOUS CONTINUOUS
Status: DISPENSED | OUTPATIENT
Start: 2019-12-03 | End: 2019-12-03

## 2019-12-03 RX ORDER — DEXAMETHASONE SODIUM PHOSPHATE 4 MG/ML
10 INJECTION, SOLUTION INTRA-ARTICULAR; INTRALESIONAL; INTRAMUSCULAR; INTRAVENOUS; SOFT TISSUE ONCE
Status: COMPLETED | OUTPATIENT
Start: 2019-12-03 | End: 2019-12-03

## 2019-12-03 RX ORDER — GABAPENTIN 100 MG/1
100 CAPSULE ORAL 3 TIMES DAILY
Qty: 90 CAP | Refills: 1 | Status: SHIPPED | OUTPATIENT
Start: 2019-12-03 | End: 2021-02-03

## 2019-12-03 RX ORDER — HEPARIN 100 UNIT/ML
300-500 SYRINGE INTRAVENOUS AS NEEDED
Status: ACTIVE | OUTPATIENT
Start: 2019-12-03 | End: 2019-12-03

## 2019-12-03 RX ORDER — LORAZEPAM 1 MG/1
1 TABLET ORAL
Qty: 1 TAB | Refills: 0 | Status: SHIPPED | OUTPATIENT
Start: 2019-12-03 | End: 2021-02-03

## 2019-12-03 RX ORDER — SODIUM CHLORIDE 9 MG/ML
10 INJECTION INTRAMUSCULAR; INTRAVENOUS; SUBCUTANEOUS AS NEEDED
Status: ACTIVE | OUTPATIENT
Start: 2019-12-03 | End: 2019-12-03

## 2019-12-03 RX ORDER — SODIUM CHLORIDE 0.9 % (FLUSH) 0.9 %
10 SYRINGE (ML) INJECTION AS NEEDED
Status: ACTIVE | OUTPATIENT
Start: 2019-12-03 | End: 2019-12-03

## 2019-12-03 RX ADMIN — PACLITAXEL 133 MG: 6 INJECTION, SOLUTION INTRAVENOUS at 11:50

## 2019-12-03 RX ADMIN — FAMOTIDINE 20 MG: 10 INJECTION, SOLUTION INTRAVENOUS at 10:59

## 2019-12-03 RX ADMIN — Medication 500 UNITS: at 12:55

## 2019-12-03 RX ADMIN — Medication 10 ML: at 12:55

## 2019-12-03 RX ADMIN — DEXAMETHASONE SODIUM PHOSPHATE 10 MG: 4 INJECTION, SOLUTION INTRA-ARTICULAR; INTRALESIONAL; INTRAMUSCULAR; INTRAVENOUS; SOFT TISSUE at 10:59

## 2019-12-03 RX ADMIN — DIPHENHYDRAMINE HYDROCHLORIDE 50 MG: 25 CAPSULE ORAL at 11:00

## 2019-12-03 RX ADMIN — SODIUM CHLORIDE 25 ML/HR: 900 INJECTION, SOLUTION INTRAVENOUS at 11:01

## 2019-12-03 NOTE — PROGRESS NOTES
Rojas Kahn is a 62 y.o. female   Chief Complaint   Patient presents with    Follow-up    Breast Cancer       1. Have you been to the ER, urgent care clinic since your last visit? Hospitalized since your last visit? No  2. Have you seen or consulted any other health care providers outside of the [address] [place] [address] since your last visit? Include any pap smears or colon screening.  No

## 2019-12-03 NOTE — PROGRESS NOTES
[facility] [place] at [place] [address], [address] [place]  W: [phone]  F: [phone]    Reason for Visit:   Boone Mcardle is a 62 y.o. female who is seen for Left breast cancer- ER+OR+HER2 greg neg    Treatment History:   · 6/19: Mammogram:  Left breast mass with skin thickening, 2 suspicious nodes. Mass measures 1.4 x 0.9 x 1.3  · 6/19: MRI breast: Multicentric left breast carcinoma. Non-masslike enhancement extends from the nipple to the posterior third, involves all quadrants, and measures 106 x 44 x 79 mm. · 5/28/19: Biopsy breast- IDC % % HER 2 negative, skin biopsy benign , node biopsy mostly adipose tissue with atypical cells . BRCA1 and 2 negative. CT and bone scan negative for metastasis. Mammaprint with insufficient tissue for testing. Repeat biopsy of axillary node 6/20/19 positive for metastatic disease- repeat mammaprint - high risk  · 7/19/19: cycle 1 neoadjuvant of dd AC-T  · 9/12/19: US of breast shows increased malignant microcalcification in the left breast, etiology included tx related necrosis vs extension of disease. Decreased size in left axillary LN. · 10/22/19: calcifications in the left upper outer quadrant are stable, calcifications in left axillary LN are not changed     History of Present Illness:   Patient is a 62 y.o. seen for L breast cancer. She felt a sensation in the shower about May 2019. She also noted a lump and  an inverted nipple. She had a physician friend look at this who directed her to mammogram and recommended she see Dr. Xenia Watters. This led to imaging and diagnosis as above. She comes in today for week #12 of neoadjuvant Taxol. She has R toe and leg pain. No unsteadiness. She has rare tingling in her fingers. She has no falls. No nausea  Denies nausea/vomiting or diarrhea/constipation. No mouth sores. Reports normal BMs. Comes with her friend and mother today. Rare ETOH  She does not smoke. Father had colon cancer    Past Medical History:   Diagnosis Date    Anxiety     Arthritis     At risk for sleep apnea     GAGAN 5     Basal cell carcinoma of skin     Breast cancer ([facility] [address] 2019    LEFT    History of seasonal allergies     Nausea & vomiting     Squamous cell skin cancer     Dr. Sony Gray Thyroid disease       Past Surgical History:   Procedure Laterality Date    HX COLONOSCOPY      HX KNEE ARTHROSCOPY Left     HX LUMBAR FUSION  1995    HX MOHS PROCEDURES      x 4 times     HX SHOULDER ARTHROSCOPY Right     HX THYROIDECTOMY  2014      Social History     Tobacco Use    Smoking status: Never Smoker    Smokeless tobacco: Never Used   Substance Use Topics    Alcohol use: Yes      Family History   Problem Relation Age of Onset    Arthritis-osteo Mother     Cancer Father         Colon Cancer     Current Outpatient Medications   Medication Sig    traMADol (ULTRAM) 50 mg tablet Take 1 Tab by mouth every six (6) hours as needed for Pain for up to 30 days. Max Daily Amount: 200 mg.    ALPRAZolam (XANAX) 0.5 mg tablet Take 1 Tab by mouth two (2) times daily as needed for Anxiety. Max Daily Amount: 1 mg.  FOLIC ACID PO Take  by mouth.  Lactobacillus acidophilus (PROBIOTIC PO) Take  by mouth.  sertraline (ZOLOFT) 100 mg tablet Take 0.5 Tabs by mouth daily.  traZODone (DESYREL) 50 mg tablet Take 2 Tabs by mouth nightly.  Cyanocobalamin-Cobamamide (B12) 5,000-100 mcg lozg by SubLINGual route daily.  lidocaine-prilocaine (EMLA) topical cream Apply  to affected area as needed for Pain.  cholecalciferol (VITAMIN D3) 1,000 unit cap Take 5,000 Units by mouth daily.  liothyronine (CYTOMEL) 5 mcg tablet Take 5 mcg by mouth daily.  levothyroxine (SYNTHROID) 50 mcg tablet Take 50 mcg by mouth Daily (before breakfast). No current facility-administered medications for this visit.        Allergies   Allergen Reactions    Augmentin [Amoxicillin-Pot Clavulanate] Unknown (comments)        Review of Systems: A complete review of systems was obtained, negative except as described above. Physical Exam:     There were no vitals taken for this visit. ECOG PS: 1  General: No distress  Eyes: PERRLA, anicteric sclerae  HENT: Atraumatic, OP clear  Neck: Supple; PAC looks good  Lymphatic: no lymphadenopathy  Breasts: deferred today  MS: Normal gait and station. Digits without clubbing or cyanosis. Skin: No rashes, ecchymoses, or petechiae. Normal temperature, turgor, and texture. Psych: Alert, oriented, appropriate affect, normal judgment/insight    Results:     Lab Results   Component Value Date/Time    WBC 3.7 11/22/2019 10:18 AM    HGB 11.8 11/22/2019 10:18 AM    HCT 35.2 11/22/2019 10:18 AM    PLATELET 524 43/17/0443 10:18 AM    .5 (H) 11/22/2019 10:18 AM    ABS. NEUTROPHILS 2.2 11/22/2019 10:18 AM     Lab Results   Component Value Date/Time    Sodium 141 11/22/2019 02:48 PM    Potassium 3.8 11/22/2019 02:48 PM    Chloride 110 (H) 11/22/2019 02:48 PM    CO2 27 11/22/2019 02:48 PM    Glucose 109 (H) 11/22/2019 02:48 PM    BUN 9 11/22/2019 02:48 PM    Creatinine 0.59 11/22/2019 02:48 PM    GFR est AA >60 11/22/2019 02:48 PM    GFR est non-AA >60 11/22/2019 02:48 PM    Calcium 8.1 (L) 11/22/2019 02:48 PM     Lab Results   Component Value Date/Time    Bilirubin, total 0.3 11/22/2019 02:48 PM    ALT (SGPT) 30 11/22/2019 02:48 PM    AST (SGOT) 17 11/22/2019 02:48 PM    Alk. phosphatase 56 11/22/2019 02:48 PM    Protein, total 5.6 (L) 11/22/2019 02:48 PM    Albumin 3.0 (L) 11/22/2019 02:48 PM    Globulin 2.6 11/22/2019 02:48 PM       Records reviewed and summarized above. Pathology report(s) reviewed above. 5/28/19  FINAL PATHOLOGIC DIAGNOSIS  1. Breast, left, core biopsy: In situ and invasive ductal carcinoma   Invasive carcinoma is nuclear grade 2 and measures up to 0.6 cm in greatest dimension on slide   Ductal carcinoma in situ is nuclear grade 3 with central necrosis   2. Soft tissue, left axilla, core biopsy: Adipose tissue with rare detached atypical cells   No lymph node tissue identified   See comment     Radiology report(s) reviewed above. US breast 9/12/19:  IMPRESSION:  1. Increased malignant microcalcifications in the left breast. This could  represent treatment-related necrosis or extension of disease. 2. Extensive carcinoma seen on prior MRI is largely mammographically occult. 3. Decreased size of a metastatic left axillary lymph node with  microcalcifications. 4. BI-RADS Assessment Category 6: Known biopsy proven malignancy. Mammogram 10/22/19:  IMPRESSION:  1. BI-RADS 6: known left breast cancer. 2. Segmental pleomorphic calcifications in the left upper outer quadrant are  stable. Calcifications left axillary lymph node have not changed significantly. Patient was informed of the results. Assessment:   1) Left breast Invasive ductal carcinoma    [id number]  GRADE 2  % IN 20% HER2 greg equivocal - FISH could not be done  Repeat biopsy of breast mass 6/24/19 - HER2 greg negative, mamma print indicates she has genomically high risk disease    Due to extensive surrounding breast changes a mastectomy is recommended by Dr. Iglesia Solares    Today is week #12 of weekly Taxol. She has grade 3 neuropathy. Since its her last Taxol will proceed with a 20% dose reduction today  She has an MRI scheduled next week and then will proceed with surgery  Will discuss endocrine therapy post op    Will set up for port flush if has not had surgery within 6 weeks    2) Hypothyroidism    3) Obesity    4) Neuropathy/arthalgias   Secondary to taxol  DR as above   Start gabapentin    Plan:     · Proceed today with weekly #12 of weekly Taxol at 64 mg/m2 .  This is her last cycle  · Premeds to include dexamethasone, benadryl, pepsid   · Compazine q 6 hours prn  · Tramadol PRN for arthralgias   · Breast MRI for pre-op as scheduled  · RTC 3 weeks post op          I appreciate the opportunity to participate in Ms. Rachelle ROSELINE Garcia's care.     Signed By: Catarina Umanzor MD

## 2019-12-06 ENCOUNTER — HOSPITAL ENCOUNTER (OUTPATIENT)
Dept: MRI IMAGING | Age: 54
Discharge: HOME | End: 2019-12-06
Attending: REGISTERED NURSE
Payer: COMMERCIAL

## 2019-12-06 DIAGNOSIS — Z17.0 MALIGNANT NEOPLASM OF UPPER-OUTER QUADRANT OF LEFT BREAST IN FEMALE, ESTROGEN RECEPTOR POSITIVE (HCC): ICD-10-CM

## 2019-12-06 DIAGNOSIS — C50.412 MALIGNANT NEOPLASM OF UPPER-OUTER QUADRANT OF LEFT BREAST IN FEMALE, ESTROGEN RECEPTOR POSITIVE (HCC): ICD-10-CM

## 2019-12-06 PROCEDURE — A9585 GADOBUTROL INJECTION: HCPCS | Performed by: REGISTERED NURSE

## 2019-12-06 PROCEDURE — 74011250636 HC RX REV CODE- 250/636: Performed by: REGISTERED NURSE

## 2019-12-06 PROCEDURE — 74011000258 HC RX REV CODE- 258: Performed by: REGISTERED NURSE

## 2019-12-06 PROCEDURE — 77049 MRI BREAST C-+ W/CAD BI: CPT

## 2019-12-06 RX ORDER — SODIUM CHLORIDE 0.9 % (FLUSH) 0.9 %
10 SYRINGE (ML) INJECTION
Status: COMPLETED | OUTPATIENT
Start: 2019-12-06 | End: 2019-12-06

## 2019-12-06 RX ADMIN — Medication 10 ML: at 08:00

## 2019-12-06 RX ADMIN — SODIUM CHLORIDE 100 ML: 900 INJECTION, SOLUTION INTRAVENOUS at 08:00

## 2019-12-06 RX ADMIN — GADOBUTROL 9 ML: 604.72 INJECTION INTRAVENOUS at 08:00

## 2019-12-09 ENCOUNTER — OFFICE VISIT (OUTPATIENT)
Dept: SURGERY | Age: 54
End: 2019-12-09

## 2019-12-09 VITALS
WEIGHT: 206 LBS | HEIGHT: 68 IN | SYSTOLIC BLOOD PRESSURE: 136 MMHG | DIASTOLIC BLOOD PRESSURE: 80 MMHG | BODY MASS INDEX: 31.22 KG/M2 | HEART RATE: 81 BPM

## 2019-12-09 DIAGNOSIS — C77.3 BREAST CANCER METASTASIZED TO AXILLARY LYMPH NODE, LEFT (HCC): Primary | ICD-10-CM

## 2019-12-09 DIAGNOSIS — C50.912 BREAST CANCER METASTASIZED TO AXILLARY LYMPH NODE, LEFT (HCC): Primary | ICD-10-CM

## 2019-12-09 NOTE — PROGRESS NOTES
HISTORY OF PRESENT ILLNESS  Shelley Sheppard is a 62 y.o. female. HPI ESTABLISHED patient for follow-up of LEFT breast cancer, here to discuss surgery after having last Taxol infusion on 12/3/19. She states she feels well other than bone aches at times. Breast cancer-  5/28/19 - 51 yo female with left breast IDC and DCIS,T2 N1, potential skin involvement, Er/Pr + Her 2 greg equivocal, Not enough invasive to send Her 2 greg for fish  6/3/19 - LEFT breast skin punch biopsy, benign. 6/20/19 - biopsies of LEFT breast and LEFT axilla. I called patient to touch base with her. Biopsy Dr. Maru Shaw did node +, Er/pr+ her 2 greg negative, specimen went to [facility] due to medicaid,  Mammaprint high risk. 7/18/19 - port insertion  7/19/19 - started [facility]  9/13/19 - 12/3/19 - Taxol infusions    MRI Results (most recent):  Results from Hospital Encounter encounter on 12/06/19   MRI BREAST BI W WO CONT    Narrative Examination: Bilateral breast MRI with contrast    HISTORY: History of left breast cancer with known left axillary metastases. Normal left skin biopsy. COMPARISON: Mammograms 2019, 2016, MRI June 2019    TECHNIQUE:  Bilateral breast MRI was performed using a dedicated breast coil without  compression with the patient in the prone position. Precontrast T1-weighted  images with fat suppression were obtained followed by bolus injection of 9 mL  Gadavist. Postcontrast dynamic and high-resolution images were acquired. T2-weighted axial imaging with fat suppression was also performed. The images  were analyzed using CAD analysis, enhancement curves, digital subtraction, and 2  and 3 dimensional reconstructions. FINDINGS:  There is minimal background parenchymal enhancement. The breast tissue is heterogeneously dense, which could obscure detection of  small masses (approximately 51-75% glandular). Right breast:  No suspicious enhancement.  Susceptibility artifact in the right superior breast  from the patient's chest port. There is no suspicious axillary or internal mammary chain lymphadenopathy. Left breast:  In the left breast upper outer quadrant there is decreased clumped segmental  nonmass enhancement centered at 9-10 o'clock middle depth which has decreased  both in extent and the degree of enhancement. It measures approximately 4.9 x  2.2 x 1.8 cm (AP x TV x cc), previously 10.6 x 4.4 x 7.9 cm. It begins  approximately 5.5 cm from the nipple. No evidence of chest wall or skin  involvement. The previously enlarged left axillary nodes have normalized. No axillary or  internal mammary adenopathy. Impression IMPRESSION:    Right Breast:  1. BI-RADS Assessment Category 1: Negative. Left Breast:  1. BI-RADS Assessment Category 6: Known biopsy proven malignancy- Appropriate  action should be taken. Known left breast malignancy has decreased  significantly. 2. Normalization of left axillary lymph nodes. RECOMMENDATIONS:  Continued surgical and oncologic management. A negative breast MRI examination speaks strongly against invasive cancer down  to a detection threshold of 3 to 5 mm but may not detect some lower grade or in  situ carcinomas. Therefore, routine clinical and mammographic followup are  recommended. A summary portfolio has been created in PACS. Review of Systems   All other systems reviewed and are negative. Physical Exam  Vitals signs and nursing note reviewed. Chest:      Breasts: Breasts are symmetrical.         Right: No inverted nipple, mass, nipple discharge, skin change or tenderness. Left: No inverted nipple, mass, nipple discharge, skin change or tenderness. Lymphadenopathy:      Cervical: No cervical adenopathy. ASSESSMENT and PLAN    ICD-10-CM ICD-9-CM    1. Breast cancer metastasized to axillary lymph node, left ([facility]) C50.912 174.9     C77.3 196.3      61 yo female Stage 3 Breast ca.    She has responded well on mri and exam to chemo  Plan is bilateral mastectomies, left alnd recon by Dr. Valeri Banerjee  She will need post mastectomy xrt   Will refer to Dr. Nikunj Messina and back to Dr. Valeri Banerjee.   The risk and procedure were discussed. Risks include bleeding, bruising, scar, infection, need for more surgery and lymphedema. She understood wished to proceed.

## 2019-12-09 NOTE — PATIENT INSTRUCTIONS
Learning About Breast Cancer Surgery  What is breast cancer surgery? Surgery is a key part of treatment for breast cancer. The type of surgery you have depends on the size, location, and type of the cancer. It also depends on your health and what is important to you. Your doctor may combine treatments. This is a common way to treat breast cancer. You may have surgery to remove all the cancer that can be seen. After surgery you may also need radiation, chemotherapy, or hormone therapy. These treatments get rid of any cancer cells that may be left. During the surgery, the doctor may remove lymph nodes from the armpit. The lymph nodes will be looked at under a microscope. This is used to check if cancer has spread from the breast into the lymph nodes. What is a lumpectomy? Lumpectomy    Lumpectomy removes the tumor in the breast. The incision is made close to the tumor. This shows common places where the cut is made. Simple mastectomy    Simple mastectomy removes the whole breast, including nipple and skin. This shows where the cut is often made. Nipple-sparing mastectomy with inframammary cut    Nipple-sparing mastectomy removes the whole breast but leaves the skin and nipple. This shows the cut in the curve under the breast (inframammary). Nipple-sparing mastectomy with lateral cut    Nipple-sparing mastectomy removes the whole breast but leaves the skin and nipple. This shows the cut from the nipple along the side of the breast toward the armpit (lateral). Nipple-sparing mastectomy with periareolar cut    Nipple-sparing mastectomy removes the whole breast but leaves the skin and nipple. This shows the cut around the top of the nipple and along the side of the breast toward the armpit (periareolar).   Skin-sparing mastectomy with periareolar cut    Skin-sparing mastectomy removes the whole breast and the nipple, but it keeps the skin that covers the breast. This shows the cut around the nipple (periareloar). Skin-sparing mastectomy with teardrop cut    Skin-sparing mastectomy removes the whole breast and the nipple, but it keeps the skin that covers the breast. This shows the cut around the nipple in a teardrop shape. Skin-sparing mastectomy with tennis racket cut    Skin-sparing mastectomy removes the whole breast and the nipple, but it keeps the skin that covers the breast. This shows the cut around the nipple and along the side of the breast toward the armpit (tennis racket shape). What can you expect after surgery? Your doctor will send the breast tissue to a lab for testing. This will help the doctor know more about the type of cancer you have. It may take up to a week or more to get the results back. Your doctor will discuss the results with you. You may meet with a doctor who specializes in cancer treatment (an oncologist). This doctor can help you decide about any other treatment you may need. Your needs and values are important when choosing a treatment that is right for you. The amount of time you will need to recover depends on the type of surgery you had. It also depends on whether you need any more treatment. If you had a lumpectomy, you will probably look the same in a bra. But your breasts may not match in size or shape after surgery. This depends on the size of your breasts and how much tissue was removed. Surgery to create a new breast is called reconstruction. This may be done at the same time as a mastectomy. Or it may be done later. Some women choose not to do reconstruction at all. You choose what feels right for you. No matter what kind of surgery you have, you will get information about your treatment. This includes how to prepare, what to expect, and what to do afterward. Follow-up care is a key part of your treatment and safety. Be sure to make and go to all appointments, and call your doctor if you are having problems.  It's also a good idea to know your test results and keep a list of the medicines you take. Where can you learn more? Go to http://louis-daniel.info/. Enter P020 in the search box to learn more about \"Learning About Breast Cancer Surgery. \"  Current as of: December 19, 2018  Content Version: 12.2  © 9882-9256 [employer]. Care instructions adapted under license by [employer] (which disclaims liability or warranty for this information). If you have questions about a medical condition or this instruction, always ask your healthcare professional. [address] any warranty or liability for your use of this information.

## 2019-12-16 DIAGNOSIS — C77.3 SECONDARY MALIGNANT NEOPLASM OF AXILLARY LYMPH NODES (HCC): ICD-10-CM

## 2019-12-16 DIAGNOSIS — C50.912 MALIGNANT NEOPLASM OF LEFT FEMALE BREAST, UNSPECIFIED ESTROGEN RECEPTOR STATUS, UNSPECIFIED SITE OF BREAST (HCC): Primary | ICD-10-CM

## 2019-12-19 ENCOUNTER — HOSPITAL ENCOUNTER (OUTPATIENT)
Dept: GENERAL RADIOLOGY | Age: 54
Discharge: HOME | End: 2019-12-19
Payer: COMMERCIAL

## 2019-12-19 DIAGNOSIS — C50.912 MALIGNANT NEOPLASM OF LEFT BREAST (HCC): ICD-10-CM

## 2019-12-19 DIAGNOSIS — R05.9 COUGH: ICD-10-CM

## 2019-12-19 PROCEDURE — 71046 X-RAY EXAM CHEST 2 VIEWS: CPT

## 2020-01-07 DIAGNOSIS — C50.912 MALIGNANT NEOPLASM OF LEFT FEMALE BREAST, UNSPECIFIED ESTROGEN RECEPTOR STATUS, UNSPECIFIED SITE OF BREAST (HCC): Primary | ICD-10-CM

## 2020-01-07 DIAGNOSIS — C77.3 SECONDARY MALIGNANT NEOPLASM OF AXILLARY LYMPH NODES (HCC): ICD-10-CM

## 2020-01-10 ENCOUNTER — HOSPITAL ENCOUNTER (OUTPATIENT)
Dept: RADIATION THERAPY | Age: 55
Discharge: HOME | End: 2020-01-10

## 2020-01-13 ENCOUNTER — HOSPITAL ENCOUNTER (OUTPATIENT)
Dept: PREADMISSION TESTING | Age: 55
Discharge: HOME | End: 2020-01-13
Payer: COMMERCIAL

## 2020-01-13 VITALS
HEART RATE: 72 BPM | SYSTOLIC BLOOD PRESSURE: 125 MMHG | WEIGHT: 203.25 LBS | OXYGEN SATURATION: 96 % | BODY MASS INDEX: 30.8 KG/M2 | TEMPERATURE: 98.1 F | HEIGHT: 68 IN | DIASTOLIC BLOOD PRESSURE: 82 MMHG

## 2020-01-13 DIAGNOSIS — C77.3 SECONDARY MALIGNANT NEOPLASM OF AXILLARY LYMPH NODES (HCC): ICD-10-CM

## 2020-01-13 DIAGNOSIS — C50.912 MALIGNANT NEOPLASM OF LEFT FEMALE BREAST, UNSPECIFIED ESTROGEN RECEPTOR STATUS, UNSPECIFIED SITE OF BREAST (HCC): ICD-10-CM

## 2020-01-13 LAB
BASOPHILS # BLD: 0 K/UL (ref 0–0.1)
BASOPHILS NFR BLD: 1 % (ref 0–1)
DIFFERENTIAL METHOD BLD: ABNORMAL
EOSINOPHIL # BLD: 0.1 K/UL (ref 0–0.4)
EOSINOPHIL NFR BLD: 3 % (ref 0–7)
ERYTHROCYTE [DISTWIDTH] IN BLOOD BY AUTOMATED COUNT: 12 % (ref 11.5–14.5)
HCT VFR BLD AUTO: 39.9 % (ref 35–47)
HGB BLD-MCNC: 13.7 G/DL (ref 11.5–16)
IMM GRANULOCYTES # BLD AUTO: 0 K/UL (ref 0–0.04)
IMM GRANULOCYTES NFR BLD AUTO: 0 % (ref 0–0.5)
LYMPHOCYTES # BLD: 1.3 K/UL (ref 0.8–3.5)
LYMPHOCYTES NFR BLD: 31 % (ref 12–49)
MCH RBC QN AUTO: 34.3 PG (ref 26–34)
MCHC RBC AUTO-ENTMCNC: 34.3 G/DL (ref 30–36.5)
MCV RBC AUTO: 100 FL (ref 80–99)
MONOCYTES # BLD: 0.5 K/UL (ref 0–1)
MONOCYTES NFR BLD: 12 % (ref 5–13)
NEUTS SEG # BLD: 2.1 K/UL (ref 1.8–8)
NEUTS SEG NFR BLD: 53 % (ref 32–75)
NRBC # BLD: 0 K/UL (ref 0–0.01)
NRBC BLD-RTO: 0 PER 100 WBC
PLATELET # BLD AUTO: 186 K/UL (ref 150–400)
PMV BLD AUTO: 9.9 FL (ref 8.9–12.9)
RBC # BLD AUTO: 3.99 M/UL (ref 3.8–5.2)
WBC # BLD AUTO: 4.1 K/UL (ref 3.6–11)

## 2020-01-13 PROCEDURE — 85025 COMPLETE CBC W/AUTO DIFF WBC: CPT

## 2020-01-13 RX ORDER — LANOLIN ALCOHOL/MO/W.PET/CERES
1000 CREAM (GRAM) TOPICAL DAILY
COMMUNITY
End: 2021-02-03

## 2020-01-13 NOTE — PERIOP NOTES
PAT instructions reviewed with patient and given the opportunity to ask questions. Patient given surgical site information FAQs handout and reviewed. Patient given two bottles CHG soap and instruction sheet, instructions for use reviewed with patient.

## 2020-01-16 ENCOUNTER — TELEPHONE (OUTPATIENT)
Dept: INTERNAL MEDICINE CLINIC | Age: 55
End: 2020-01-16

## 2020-01-16 DIAGNOSIS — Z20.828 EXPOSURE TO INFLUENZA: Primary | ICD-10-CM

## 2020-01-16 RX ORDER — OSELTAMIVIR PHOSPHATE 75 MG/1
75 CAPSULE ORAL DAILY
Qty: 10 CAP | Refills: 0 | Status: SHIPPED | OUTPATIENT
Start: 2020-01-16 | End: 2020-01-26

## 2020-01-16 NOTE — TELEPHONE ENCOUNTER
Pt here with son. Son (+) for influenza B. Reviewed with pt and elected to do preventive treatment for influenza, since she has mastectomy scheduled next week. Diagnoses and all orders for this visit:    1. Exposure to influenza  -     oseltamivir (TAMIFLU) 75 mg capsule; Take 1 Cap by mouth daily for 10 days. Future Appointments   Date Time Provider Preeti Joyce   1/21/2020 10:30 AM Jesenia Uribe MD  [address]   2/3/2020  9:30 AM Jesenia Uribe MD [facility]   2/13/2020  1:45 PM [facility] [id number]     Copy of electronic script/pharmacy receipt provided to pt.

## 2020-01-20 ENCOUNTER — ANESTHESIA EVENT (OUTPATIENT)
Dept: MEDSURG UNIT | Age: 55
End: 2020-01-20
Payer: COMMERCIAL

## 2020-01-21 ENCOUNTER — ANESTHESIA (OUTPATIENT)
Dept: MEDSURG UNIT | Age: 55
End: 2020-01-21
Payer: COMMERCIAL

## 2020-01-21 ENCOUNTER — HOSPITAL ENCOUNTER (OUTPATIENT)
Age: 55
Setting detail: OBSERVATION
Discharge: HOME | End: 2020-01-23
Attending: SURGERY | Admitting: SURGERY
Payer: COMMERCIAL

## 2020-01-21 DIAGNOSIS — C50.412 MALIGNANT NEOPLASM OF UPPER-OUTER QUADRANT OF LEFT BREAST IN FEMALE, ESTROGEN RECEPTOR POSITIVE (HCC): Primary | ICD-10-CM

## 2020-01-21 DIAGNOSIS — Z17.0 MALIGNANT NEOPLASM OF UPPER-OUTER QUADRANT OF LEFT BREAST IN FEMALE, ESTROGEN RECEPTOR POSITIVE (HCC): Primary | ICD-10-CM

## 2020-01-21 PROBLEM — Z98.890 STATUS POST SURGERY: Status: ACTIVE | Noted: 2020-01-21

## 2020-01-21 PROBLEM — C50.912 CARCINOMA OF LEFT BREAST (HCC): Status: ACTIVE | Noted: 2020-01-21

## 2020-01-21 PROCEDURE — 74011250636 HC RX REV CODE- 250/636: Performed by: SURGERY

## 2020-01-21 PROCEDURE — 77030018836 HC SOL IRR NACL ICUM -A: Performed by: SURGERY

## 2020-01-21 PROCEDURE — 77030019702 HC WRP THER MENM -C: Performed by: SURGERY

## 2020-01-21 PROCEDURE — 77030002916 HC SUT ETHLN J&J -A: Performed by: SURGERY

## 2020-01-21 PROCEDURE — 77030040504 HC DRN WND MDII -B: Performed by: SURGERY

## 2020-01-21 PROCEDURE — 77030020268 HC MISC GENERAL SUPPLY: Performed by: SURGERY

## 2020-01-21 PROCEDURE — 77030040361 HC SLV COMPR DVT MDII -B: Performed by: SURGERY

## 2020-01-21 PROCEDURE — C1789 PROSTHESIS, BREAST, IMP: HCPCS | Performed by: SURGERY

## 2020-01-21 PROCEDURE — 77030010512 HC APPL CLP LIG J&J -C: Performed by: SURGERY

## 2020-01-21 PROCEDURE — 77030040506 HC DRN WND MDII -A: Performed by: SURGERY

## 2020-01-21 PROCEDURE — 74011250636 HC RX REV CODE- 250/636: Performed by: ANESTHESIOLOGY

## 2020-01-21 PROCEDURE — 77030011640 HC PAD GRND REM COVD -A: Performed by: SURGERY

## 2020-01-21 PROCEDURE — 77030002933 HC SUT MCRYL J&J -A: Performed by: SURGERY

## 2020-01-21 PROCEDURE — 77030002986 HC SUT PROL J&J -A: Performed by: SURGERY

## 2020-01-21 PROCEDURE — 99218 HC RM OBSERVATION: CPT

## 2020-01-21 PROCEDURE — 74011000272 HC RX REV CODE- 272: Performed by: SURGERY

## 2020-01-21 PROCEDURE — P9045 ALBUMIN (HUMAN), 5%, 250 ML: HCPCS | Performed by: NURSE ANESTHETIST, CERTIFIED REGISTERED

## 2020-01-21 PROCEDURE — 74011000258 HC RX REV CODE- 258: Performed by: SURGERY

## 2020-01-21 PROCEDURE — 74011250636 HC RX REV CODE- 250/636: Performed by: NURSE ANESTHETIST, CERTIFIED REGISTERED

## 2020-01-21 PROCEDURE — 76060000072 HC AMB SURG ANES 5.5 TO 6 HR: Performed by: SURGERY

## 2020-01-21 PROCEDURE — 77030011264 HC ELECTRD BLD EXT COVD -A: Performed by: SURGERY

## 2020-01-21 PROCEDURE — 88374 M/PHMTRC ALYS ISHQUANT/SEMIQ: CPT

## 2020-01-21 PROCEDURE — 77030011825 HC SUPP SURG PSTOP S2SG -B: Performed by: SURGERY

## 2020-01-21 PROCEDURE — 74011000250 HC RX REV CODE- 250: Performed by: NURSE ANESTHETIST, CERTIFIED REGISTERED

## 2020-01-21 PROCEDURE — 77030008684 HC TU ET CUF COVD -B: Performed by: ANESTHESIOLOGY

## 2020-01-21 PROCEDURE — 77030002996 HC SUT SLK J&J -A: Performed by: SURGERY

## 2020-01-21 PROCEDURE — 88305 TISSUE EXAM BY PATHOLOGIST: CPT

## 2020-01-21 PROCEDURE — 77030031139 HC SUT VCRL2 J&J -A: Performed by: SURGERY

## 2020-01-21 PROCEDURE — 77030026438 HC STYL ET INTUB CARD -A: Performed by: ANESTHESIOLOGY

## 2020-01-21 PROCEDURE — 74011000250 HC RX REV CODE- 250: Performed by: SURGERY

## 2020-01-21 PROCEDURE — 77030018842 HC SOL IRR SOD CL 9% BAXT -A: Performed by: SURGERY

## 2020-01-21 PROCEDURE — 74011250637 HC RX REV CODE- 250/637: Performed by: SURGERY

## 2020-01-21 PROCEDURE — 77030011266 HC ELECTRD BLD INSL COVD -A: Performed by: SURGERY

## 2020-01-21 PROCEDURE — 88309 TISSUE EXAM BY PATHOLOGIST: CPT

## 2020-01-21 PROCEDURE — 77030008462 HC STPLR SKN PROX J&J -A: Performed by: SURGERY

## 2020-01-21 PROCEDURE — 77030011267 HC ELECTRD BLD COVD -A: Performed by: SURGERY

## 2020-01-21 PROCEDURE — 77030020263 HC SOL INJ SOD CL0.9% LFCR 1000ML: Performed by: SURGERY

## 2020-01-21 PROCEDURE — 77030011265 HC ELECTRD BLD HEX COVD -A: Performed by: SURGERY

## 2020-01-21 PROCEDURE — 77030002982 HC SUT POLYSRB J&J -A: Performed by: SURGERY

## 2020-01-21 PROCEDURE — 77030005513 HC CATH URETH FOL11 MDII -B: Performed by: SURGERY

## 2020-01-21 PROCEDURE — C9290 INJ, BUPIVACAINE LIPOSOME: HCPCS | Performed by: SURGERY

## 2020-01-21 PROCEDURE — 76210000038 HC AMBSU PH I REC 2.5 TO 3 HR: Performed by: SURGERY

## 2020-01-21 PROCEDURE — 77030040922 HC BLNKT HYPOTHRM STRY -A

## 2020-01-21 PROCEDURE — 77030034626 HC LIGASURE SM JAW SEAL OPN SURG COVD -E: Performed by: SURGERY

## 2020-01-21 PROCEDURE — 88360 TUMOR IMMUNOHISTOCHEM/MANUAL: CPT

## 2020-01-21 PROCEDURE — 77030020061 HC IV BLD WRMR ADMIN SET 3M -B: Performed by: ANESTHESIOLOGY

## 2020-01-21 PROCEDURE — 76030000011 HC AMB SURG OR TIME 5.5 TO 6: Performed by: SURGERY

## 2020-01-21 PROCEDURE — 88307 TISSUE EXAM BY PATHOLOGIST: CPT

## 2020-01-21 PROCEDURE — 77030002966 HC SUT PDS J&J -A: Performed by: SURGERY

## 2020-01-21 PROCEDURE — 74011250637 HC RX REV CODE- 250/637: Performed by: ANESTHESIOLOGY

## 2020-01-21 DEVICE — SILTEX TALL HEIGHT TISSUE EXPANDER STYLE 8300, 650CC
Type: IMPLANTABLE DEVICE | Site: BREAST | Status: FUNCTIONAL
Brand: MENTOR CPX 4 BREAST TISSUE EXPANDER

## 2020-01-21 DEVICE — Z DISCONTINUED SEE COMMENTS IMPLANT HUM TISS W96XH1.04-2.28XL193MM ACELLULAR DERM TISS: Type: IMPLANTABLE DEVICE | Site: BREAST | Status: FUNCTIONAL

## 2020-01-21 RX ORDER — GABAPENTIN 100 MG/1
100 CAPSULE ORAL 3 TIMES DAILY
Status: DISCONTINUED | OUTPATIENT
Start: 2020-01-21 | End: 2020-01-24 | Stop reason: HOSPADM

## 2020-01-21 RX ORDER — SERTRALINE HYDROCHLORIDE 50 MG/1
50 TABLET, FILM COATED ORAL DAILY
Status: DISCONTINUED | OUTPATIENT
Start: 2020-01-22 | End: 2020-01-24 | Stop reason: HOSPADM

## 2020-01-21 RX ORDER — BUPIVACAINE HYDROCHLORIDE AND EPINEPHRINE 5; 5 MG/ML; UG/ML
INJECTION, SOLUTION EPIDURAL; INTRACAUDAL; PERINEURAL AS NEEDED
Status: DISCONTINUED | OUTPATIENT
Start: 2020-01-21 | End: 2020-01-21 | Stop reason: HOSPADM

## 2020-01-21 RX ORDER — FENTANYL CITRATE 50 UG/ML
25 INJECTION, SOLUTION INTRAMUSCULAR; INTRAVENOUS
Status: COMPLETED | OUTPATIENT
Start: 2020-01-21 | End: 2020-01-21

## 2020-01-21 RX ORDER — ACETAMINOPHEN 325 MG/1
650 TABLET ORAL ONCE
Status: COMPLETED | OUTPATIENT
Start: 2020-01-21 | End: 2020-01-21

## 2020-01-21 RX ORDER — MORPHINE SULFATE 2 MG/ML
2 INJECTION, SOLUTION INTRAMUSCULAR; INTRAVENOUS
Status: DISCONTINUED | OUTPATIENT
Start: 2020-01-21 | End: 2020-01-24 | Stop reason: HOSPADM

## 2020-01-21 RX ORDER — HYDROMORPHONE HYDROCHLORIDE 1 MG/ML
0.2 INJECTION, SOLUTION INTRAMUSCULAR; INTRAVENOUS; SUBCUTANEOUS
Status: DISCONTINUED | OUTPATIENT
Start: 2020-01-21 | End: 2020-01-21 | Stop reason: HOSPADM

## 2020-01-21 RX ORDER — DEXMEDETOMIDINE HYDROCHLORIDE 100 UG/ML
INJECTION, SOLUTION INTRAVENOUS AS NEEDED
Status: DISCONTINUED | OUTPATIENT
Start: 2020-01-21 | End: 2020-01-21 | Stop reason: HOSPADM

## 2020-01-21 RX ORDER — OSELTAMIVIR PHOSPHATE 75 MG/1
75 CAPSULE ORAL DAILY
Status: DISCONTINUED | OUTPATIENT
Start: 2020-01-22 | End: 2020-01-24 | Stop reason: HOSPADM

## 2020-01-21 RX ORDER — DEXTROSE, SODIUM CHLORIDE, AND POTASSIUM CHLORIDE 5; .45; .15 G/100ML; G/100ML; G/100ML
75 INJECTION INTRAVENOUS CONTINUOUS
Status: DISCONTINUED | OUTPATIENT
Start: 2020-01-21 | End: 2020-01-22

## 2020-01-21 RX ORDER — LANOLIN ALCOHOL/MO/W.PET/CERES
400 CREAM (GRAM) TOPICAL DAILY
Status: DISCONTINUED | OUTPATIENT
Start: 2020-01-22 | End: 2020-01-24 | Stop reason: HOSPADM

## 2020-01-21 RX ORDER — SODIUM CHLORIDE 0.9 % (FLUSH) 0.9 %
5-40 SYRINGE (ML) INJECTION EVERY 8 HOURS
Status: DISCONTINUED | OUTPATIENT
Start: 2020-01-21 | End: 2020-01-21 | Stop reason: HOSPADM

## 2020-01-21 RX ORDER — LEVOTHYROXINE SODIUM 112 UG/1
112 TABLET ORAL
Status: DISCONTINUED | OUTPATIENT
Start: 2020-01-22 | End: 2020-01-24 | Stop reason: HOSPADM

## 2020-01-21 RX ORDER — ALPRAZOLAM 0.5 MG/1
0.5 TABLET ORAL
Status: DISCONTINUED | OUTPATIENT
Start: 2020-01-21 | End: 2020-01-24 | Stop reason: HOSPADM

## 2020-01-21 RX ORDER — SODIUM CHLORIDE 0.9 % (FLUSH) 0.9 %
5-40 SYRINGE (ML) INJECTION AS NEEDED
Status: DISCONTINUED | OUTPATIENT
Start: 2020-01-21 | End: 2020-01-21 | Stop reason: HOSPADM

## 2020-01-21 RX ORDER — SODIUM CHLORIDE, SODIUM LACTATE, POTASSIUM CHLORIDE, CALCIUM CHLORIDE 600; 310; 30; 20 MG/100ML; MG/100ML; MG/100ML; MG/100ML
50 INJECTION, SOLUTION INTRAVENOUS CONTINUOUS
Status: DISCONTINUED | OUTPATIENT
Start: 2020-01-21 | End: 2020-01-21 | Stop reason: HOSPADM

## 2020-01-21 RX ORDER — OXYCODONE AND ACETAMINOPHEN 5; 325 MG/1; MG/1
2 TABLET ORAL
Status: DISCONTINUED | OUTPATIENT
Start: 2020-01-21 | End: 2020-01-24 | Stop reason: HOSPADM

## 2020-01-21 RX ORDER — PROPOFOL 10 MG/ML
INJECTION, EMULSION INTRAVENOUS AS NEEDED
Status: DISCONTINUED | OUTPATIENT
Start: 2020-01-21 | End: 2020-01-21 | Stop reason: HOSPADM

## 2020-01-21 RX ORDER — LORAZEPAM 1 MG/1
1 TABLET ORAL
Status: DISCONTINUED | OUTPATIENT
Start: 2020-01-21 | End: 2020-01-24 | Stop reason: HOSPADM

## 2020-01-21 RX ORDER — ONDANSETRON 2 MG/ML
INJECTION INTRAMUSCULAR; INTRAVENOUS AS NEEDED
Status: DISCONTINUED | OUTPATIENT
Start: 2020-01-21 | End: 2020-01-21 | Stop reason: HOSPADM

## 2020-01-21 RX ORDER — ONDANSETRON 2 MG/ML
4 INJECTION INTRAMUSCULAR; INTRAVENOUS AS NEEDED
Status: DISCONTINUED | OUTPATIENT
Start: 2020-01-21 | End: 2020-01-21 | Stop reason: HOSPADM

## 2020-01-21 RX ORDER — CEFAZOLIN SODIUM 1 G/3ML
INJECTION, POWDER, FOR SOLUTION INTRAMUSCULAR; INTRAVENOUS AS NEEDED
Status: DISCONTINUED | OUTPATIENT
Start: 2020-01-21 | End: 2020-01-21 | Stop reason: HOSPADM

## 2020-01-21 RX ORDER — MIDAZOLAM HYDROCHLORIDE 1 MG/ML
1 INJECTION, SOLUTION INTRAMUSCULAR; INTRAVENOUS AS NEEDED
Status: DISCONTINUED | OUTPATIENT
Start: 2020-01-21 | End: 2020-01-21 | Stop reason: HOSPADM

## 2020-01-21 RX ORDER — ROCURONIUM BROMIDE 10 MG/ML
INJECTION, SOLUTION INTRAVENOUS AS NEEDED
Status: DISCONTINUED | OUTPATIENT
Start: 2020-01-21 | End: 2020-01-21 | Stop reason: HOSPADM

## 2020-01-21 RX ORDER — OXYCODONE HYDROCHLORIDE 5 MG/1
10 TABLET ORAL ONCE
Status: COMPLETED | OUTPATIENT
Start: 2020-01-21 | End: 2020-01-21

## 2020-01-21 RX ORDER — PHENYLEPHRINE HCL IN 0.9% NACL 0.4MG/10ML
SYRINGE (ML) INTRAVENOUS AS NEEDED
Status: DISCONTINUED | OUTPATIENT
Start: 2020-01-21 | End: 2020-01-21 | Stop reason: HOSPADM

## 2020-01-21 RX ORDER — LIDOCAINE HYDROCHLORIDE 10 MG/ML
0.1 INJECTION, SOLUTION EPIDURAL; INFILTRATION; INTRACAUDAL; PERINEURAL AS NEEDED
Status: DISCONTINUED | OUTPATIENT
Start: 2020-01-21 | End: 2020-01-21 | Stop reason: HOSPADM

## 2020-01-21 RX ORDER — MIDAZOLAM HYDROCHLORIDE 1 MG/ML
INJECTION, SOLUTION INTRAMUSCULAR; INTRAVENOUS AS NEEDED
Status: DISCONTINUED | OUTPATIENT
Start: 2020-01-21 | End: 2020-01-21 | Stop reason: HOSPADM

## 2020-01-21 RX ORDER — KETAMINE HYDROCHLORIDE 10 MG/ML
INJECTION, SOLUTION INTRAMUSCULAR; INTRAVENOUS AS NEEDED
Status: DISCONTINUED | OUTPATIENT
Start: 2020-01-21 | End: 2020-01-21 | Stop reason: HOSPADM

## 2020-01-21 RX ORDER — FENTANYL CITRATE 50 UG/ML
50 INJECTION, SOLUTION INTRAMUSCULAR; INTRAVENOUS AS NEEDED
Status: DISCONTINUED | OUTPATIENT
Start: 2020-01-21 | End: 2020-01-21 | Stop reason: HOSPADM

## 2020-01-21 RX ORDER — PROPOFOL 10 MG/ML
INJECTION, EMULSION INTRAVENOUS
Status: DISCONTINUED | OUTPATIENT
Start: 2020-01-21 | End: 2020-01-21 | Stop reason: HOSPADM

## 2020-01-21 RX ORDER — LANOLIN ALCOHOL/MO/W.PET/CERES
1000 CREAM (GRAM) TOPICAL DAILY
Status: DISCONTINUED | OUTPATIENT
Start: 2020-01-22 | End: 2020-01-24 | Stop reason: HOSPADM

## 2020-01-21 RX ORDER — ONDANSETRON 2 MG/ML
8 INJECTION INTRAMUSCULAR; INTRAVENOUS
Status: DISCONTINUED | OUTPATIENT
Start: 2020-01-21 | End: 2020-01-24 | Stop reason: HOSPADM

## 2020-01-21 RX ORDER — DEXAMETHASONE SODIUM PHOSPHATE 4 MG/ML
INJECTION, SOLUTION INTRA-ARTICULAR; INTRALESIONAL; INTRAMUSCULAR; INTRAVENOUS; SOFT TISSUE AS NEEDED
Status: DISCONTINUED | OUTPATIENT
Start: 2020-01-21 | End: 2020-01-21 | Stop reason: HOSPADM

## 2020-01-21 RX ORDER — TRAZODONE HYDROCHLORIDE 100 MG/1
100 TABLET ORAL
Status: DISCONTINUED | OUTPATIENT
Start: 2020-01-21 | End: 2020-01-24 | Stop reason: HOSPADM

## 2020-01-21 RX ORDER — LIOTHYRONINE SODIUM 5 UG/1
5 TABLET ORAL DAILY
Status: DISCONTINUED | OUTPATIENT
Start: 2020-01-22 | End: 2020-01-24 | Stop reason: HOSPADM

## 2020-01-21 RX ORDER — CEFAZOLIN SODIUM/WATER 2 G/20 ML
2 SYRINGE (ML) INTRAVENOUS ONCE
Status: COMPLETED | OUTPATIENT
Start: 2020-01-21 | End: 2020-01-21

## 2020-01-21 RX ORDER — SCOLOPAMINE TRANSDERMAL SYSTEM 1 MG/1
1 PATCH, EXTENDED RELEASE TRANSDERMAL ONCE
Status: DISCONTINUED | OUTPATIENT
Start: 2020-01-21 | End: 2020-01-24 | Stop reason: HOSPADM

## 2020-01-21 RX ORDER — LIDOCAINE HYDROCHLORIDE 20 MG/ML
INJECTION, SOLUTION EPIDURAL; INFILTRATION; INTRACAUDAL; PERINEURAL AS NEEDED
Status: DISCONTINUED | OUTPATIENT
Start: 2020-01-21 | End: 2020-01-21 | Stop reason: HOSPADM

## 2020-01-21 RX ORDER — FENTANYL CITRATE 50 UG/ML
INJECTION, SOLUTION INTRAMUSCULAR; INTRAVENOUS AS NEEDED
Status: DISCONTINUED | OUTPATIENT
Start: 2020-01-21 | End: 2020-01-21 | Stop reason: HOSPADM

## 2020-01-21 RX ORDER — SODIUM CHLORIDE, SODIUM LACTATE, POTASSIUM CHLORIDE, CALCIUM CHLORIDE 600; 310; 30; 20 MG/100ML; MG/100ML; MG/100ML; MG/100ML
INJECTION, SOLUTION INTRAVENOUS
Status: DISCONTINUED | OUTPATIENT
Start: 2020-01-21 | End: 2020-01-21 | Stop reason: HOSPADM

## 2020-01-21 RX ORDER — SUCCINYLCHOLINE CHLORIDE 20 MG/ML
INJECTION INTRAMUSCULAR; INTRAVENOUS AS NEEDED
Status: DISCONTINUED | OUTPATIENT
Start: 2020-01-21 | End: 2020-01-21 | Stop reason: HOSPADM

## 2020-01-21 RX ORDER — HYDROMORPHONE HYDROCHLORIDE 2 MG/ML
INJECTION, SOLUTION INTRAMUSCULAR; INTRAVENOUS; SUBCUTANEOUS AS NEEDED
Status: DISCONTINUED | OUTPATIENT
Start: 2020-01-21 | End: 2020-01-21 | Stop reason: HOSPADM

## 2020-01-21 RX ORDER — ACETAMINOPHEN 10 MG/ML
INJECTION, SOLUTION INTRAVENOUS AS NEEDED
Status: DISCONTINUED | OUTPATIENT
Start: 2020-01-21 | End: 2020-01-21 | Stop reason: HOSPADM

## 2020-01-21 RX ORDER — MELATONIN
5000 DAILY
Status: DISCONTINUED | OUTPATIENT
Start: 2020-01-22 | End: 2020-01-24 | Stop reason: HOSPADM

## 2020-01-21 RX ORDER — ALBUMIN HUMAN 50 G/1000ML
SOLUTION INTRAVENOUS AS NEEDED
Status: DISCONTINUED | OUTPATIENT
Start: 2020-01-21 | End: 2020-01-21 | Stop reason: HOSPADM

## 2020-01-21 RX ADMIN — LIDOCAINE HYDROCHLORIDE 60 MG: 20 INJECTION, SOLUTION EPIDURAL; INFILTRATION; INTRACAUDAL; PERINEURAL at 10:08

## 2020-01-21 RX ADMIN — MORPHINE SULFATE 2 MG: 2 INJECTION, SOLUTION INTRAMUSCULAR; INTRAVENOUS at 21:44

## 2020-01-21 RX ADMIN — DEXMEDETOMIDINE HYDROCHLORIDE 4 MCG: 100 INJECTION, SOLUTION, CONCENTRATE INTRAVENOUS at 14:37

## 2020-01-21 RX ADMIN — HYDROMORPHONE HYDROCHLORIDE 0.2 MG: 1 INJECTION, SOLUTION INTRAMUSCULAR; INTRAVENOUS; SUBCUTANEOUS at 16:36

## 2020-01-21 RX ADMIN — ACETAMINOPHEN 650 MG: 325 TABLET ORAL at 09:36

## 2020-01-21 RX ADMIN — FENTANYL CITRATE 75 MCG: 50 INJECTION, SOLUTION INTRAMUSCULAR; INTRAVENOUS at 10:08

## 2020-01-21 RX ADMIN — OXYCODONE HYDROCHLORIDE AND ACETAMINOPHEN 2 TABLET: 5; 325 TABLET ORAL at 23:55

## 2020-01-21 RX ADMIN — ALBUMIN (HUMAN) 250 ML: 12.5 INJECTION, SOLUTION INTRAVENOUS at 13:11

## 2020-01-21 RX ADMIN — PROPOFOL 100 MG: 10 INJECTION, EMULSION INTRAVENOUS at 10:09

## 2020-01-21 RX ADMIN — HYDROMORPHONE HYDROCHLORIDE 0.4 MG: 2 INJECTION, SOLUTION INTRAMUSCULAR; INTRAVENOUS; SUBCUTANEOUS at 15:31

## 2020-01-21 RX ADMIN — HYDROMORPHONE HYDROCHLORIDE 0.2 MG: 1 INJECTION, SOLUTION INTRAMUSCULAR; INTRAVENOUS; SUBCUTANEOUS at 17:10

## 2020-01-21 RX ADMIN — Medication 2 G: at 10:30

## 2020-01-21 RX ADMIN — SUCCINYLCHOLINE CHLORIDE 120 MG: 20 INJECTION, SOLUTION INTRAMUSCULAR; INTRAVENOUS at 10:10

## 2020-01-21 RX ADMIN — FENTANYL CITRATE 25 MCG: 50 INJECTION, SOLUTION INTRAMUSCULAR; INTRAVENOUS at 18:04

## 2020-01-21 RX ADMIN — ONDANSETRON HYDROCHLORIDE 4 MG: 2 INJECTION, SOLUTION INTRAVENOUS at 15:16

## 2020-01-21 RX ADMIN — HYDROMORPHONE HYDROCHLORIDE 0.2 MG: 1 INJECTION, SOLUTION INTRAMUSCULAR; INTRAVENOUS; SUBCUTANEOUS at 16:12

## 2020-01-21 RX ADMIN — DEXMEDETOMIDINE HYDROCHLORIDE 3 MCG: 100 INJECTION, SOLUTION, CONCENTRATE INTRAVENOUS at 15:07

## 2020-01-21 RX ADMIN — ROCURONIUM BROMIDE 5 MG: 10 SOLUTION INTRAVENOUS at 10:08

## 2020-01-21 RX ADMIN — ACETAMINOPHEN 1000 MG: 10 INJECTION, SOLUTION INTRAVENOUS at 15:31

## 2020-01-21 RX ADMIN — HYDROMORPHONE HYDROCHLORIDE 0.4 MG: 2 INJECTION, SOLUTION INTRAMUSCULAR; INTRAVENOUS; SUBCUTANEOUS at 13:38

## 2020-01-21 RX ADMIN — OXYCODONE 10 MG: 5 TABLET ORAL at 17:08

## 2020-01-21 RX ADMIN — SODIUM CHLORIDE, POTASSIUM CHLORIDE, SODIUM LACTATE AND CALCIUM CHLORIDE: 600; 310; 30; 20 INJECTION, SOLUTION INTRAVENOUS at 09:56

## 2020-01-21 RX ADMIN — Medication 40 MCG: at 10:09

## 2020-01-21 RX ADMIN — FENTANYL CITRATE 25 MCG: 50 INJECTION, SOLUTION INTRAMUSCULAR; INTRAVENOUS at 17:19

## 2020-01-21 RX ADMIN — DEXMEDETOMIDINE HYDROCHLORIDE 4 MCG: 100 INJECTION, SOLUTION, CONCENTRATE INTRAVENOUS at 14:43

## 2020-01-21 RX ADMIN — DEXMEDETOMIDINE HYDROCHLORIDE 4 MCG: 100 INJECTION, SOLUTION, CONCENTRATE INTRAVENOUS at 14:48

## 2020-01-21 RX ADMIN — DEXMEDETOMIDINE HYDROCHLORIDE 3 MCG: 100 INJECTION, SOLUTION, CONCENTRATE INTRAVENOUS at 15:27

## 2020-01-21 RX ADMIN — ONDANSETRON 4 MG: 2 INJECTION INTRAMUSCULAR; INTRAVENOUS at 15:51

## 2020-01-21 RX ADMIN — PROPOFOL 100 MG: 10 INJECTION, EMULSION INTRAVENOUS at 10:10

## 2020-01-21 RX ADMIN — DEXMEDETOMIDINE HYDROCHLORIDE 3 MCG: 100 INJECTION, SOLUTION, CONCENTRATE INTRAVENOUS at 15:00

## 2020-01-21 RX ADMIN — CEFAZOLIN SODIUM 0.5 G: 1 INJECTION, POWDER, FOR SOLUTION INTRAMUSCULAR; INTRAVENOUS at 22:12

## 2020-01-21 RX ADMIN — FENTANYL CITRATE 25 MCG: 50 INJECTION, SOLUTION INTRAMUSCULAR; INTRAVENOUS at 10:31

## 2020-01-21 RX ADMIN — DEXAMETHASONE SODIUM PHOSPHATE 8 MG: 4 INJECTION, SOLUTION INTRAMUSCULAR; INTRAVENOUS at 10:23

## 2020-01-21 RX ADMIN — KETAMINE HYDROCHLORIDE 40 MG: 10 INJECTION, SOLUTION INTRAMUSCULAR; INTRAVENOUS at 10:14

## 2020-01-21 RX ADMIN — CEFAZOLIN 2 G: 330 INJECTION, POWDER, FOR SOLUTION INTRAMUSCULAR; INTRAVENOUS at 14:15

## 2020-01-21 RX ADMIN — HYDROMORPHONE HYDROCHLORIDE 0.2 MG: 1 INJECTION, SOLUTION INTRAMUSCULAR; INTRAVENOUS; SUBCUTANEOUS at 16:54

## 2020-01-21 RX ADMIN — ALBUMIN (HUMAN) 250 ML: 12.5 INJECTION, SOLUTION INTRAVENOUS at 11:16

## 2020-01-21 RX ADMIN — FENTANYL CITRATE 25 MCG: 50 INJECTION, SOLUTION INTRAMUSCULAR; INTRAVENOUS at 18:09

## 2020-01-21 RX ADMIN — MIDAZOLAM HYDROCHLORIDE 3 MG: 1 INJECTION, SOLUTION INTRAMUSCULAR; INTRAVENOUS at 10:00

## 2020-01-21 RX ADMIN — Medication 40 MCG: at 14:34

## 2020-01-21 RX ADMIN — KETAMINE HYDROCHLORIDE 10 MG: 10 INJECTION, SOLUTION INTRAMUSCULAR; INTRAVENOUS at 12:15

## 2020-01-21 RX ADMIN — Medication 40 MCG: at 10:29

## 2020-01-21 RX ADMIN — KETAMINE HYDROCHLORIDE 10 MG: 10 INJECTION, SOLUTION INTRAMUSCULAR; INTRAVENOUS at 13:12

## 2020-01-21 RX ADMIN — MIDAZOLAM HYDROCHLORIDE 2 MG: 1 INJECTION, SOLUTION INTRAMUSCULAR; INTRAVENOUS at 10:02

## 2020-01-21 RX ADMIN — SODIUM CHLORIDE, POTASSIUM CHLORIDE, SODIUM LACTATE AND CALCIUM CHLORIDE: 600; 310; 30; 20 INJECTION, SOLUTION INTRAVENOUS at 12:39

## 2020-01-21 RX ADMIN — FENTANYL CITRATE 25 MCG: 50 INJECTION, SOLUTION INTRAMUSCULAR; INTRAVENOUS at 17:42

## 2020-01-21 RX ADMIN — DEXTROSE MONOHYDRATE, SODIUM CHLORIDE, AND POTASSIUM CHLORIDE 75 ML/HR: 50; 4.5; 1.49 INJECTION, SOLUTION INTRAVENOUS at 21:45

## 2020-01-21 RX ADMIN — DEXMEDETOMIDINE HYDROCHLORIDE 3 MCG: 100 INJECTION, SOLUTION, CONCENTRATE INTRAVENOUS at 14:55

## 2020-01-21 RX ADMIN — HYDROMORPHONE HYDROCHLORIDE 0.2 MG: 1 INJECTION, SOLUTION INTRAMUSCULAR; INTRAVENOUS; SUBCUTANEOUS at 17:44

## 2020-01-21 RX ADMIN — PROPOFOL 10 MCG/KG/MIN: 10 INJECTION, EMULSION INTRAVENOUS at 10:30

## 2020-01-21 RX ADMIN — HYDROMORPHONE HYDROCHLORIDE 0.2 MG: 2 INJECTION, SOLUTION INTRAMUSCULAR; INTRAVENOUS; SUBCUTANEOUS at 12:50

## 2020-01-21 RX ADMIN — HYDROMORPHONE HYDROCHLORIDE 0.4 MG: 2 INJECTION, SOLUTION INTRAMUSCULAR; INTRAVENOUS; SUBCUTANEOUS at 10:43

## 2020-01-21 RX ADMIN — KETAMINE HYDROCHLORIDE 10 MG: 10 INJECTION, SOLUTION INTRAMUSCULAR; INTRAVENOUS at 11:12

## 2020-01-21 NOTE — PERIOP NOTES
TRANSFER - OUT REPORT:    Verbal report given to Amanda(name) on Tona Sensing  being transferred to [facility](unit) for routine post - op       Report consisted of patients Situation, Background, Assessment and   Recommendations(SBAR). Information from the following report(s) SBAR, Kardex, Intake/Output, MAR and Cardiac Rhythm NSR was reviewed with the receiving nurse. Lines:   Venous Access Device 8FR Power POrt 07/18/19 Upper chest (subclavicular area, right (Active)       Peripheral IV 01/21/20 Left Forearm (Active)   Site Assessment Clean, dry, & intact 1/21/2020  3:35 PM   Phlebitis Assessment 0 1/21/2020  3:35 PM   Infiltration Assessment 0 1/21/2020  3:35 PM   Dressing Status Clean, dry, & intact 1/21/2020  3:35 PM   Dressing Type Tape;Transparent 1/21/2020  3:35 PM   Hub Color/Line Status Pink; Infusing 1/21/2020  3:35 PM        Opportunity for questions and clarification was provided.       Patient transported with:   Registered Nurse

## 2020-01-21 NOTE — ROUTINE PROCESS
Patient: Moisés Drake MRN: [id number]  SSN: [id number] YOB: 1965  Age: 62 y.o. Sex: female Patient is status post Procedure(s): RIGHT BREAST SKIN SPARING MASTECTOMY AND LEFT BREAST MODIFIED RADICAL MASTECTOMY ,PORT REMOVAL IMMEDIATE BILATERAL BREAST RECONSTRUCTION WITH TISSUE EXPANDERS AND ALLODERM GRAFTS. Surgeon(s) and Role: 
Panel 1: 
   Steven Woods MD - Primary Panel 2: 
   * Danelle Carson MD - Primary Local/Dose/Irrigation:  SEE MAR Venous Access Device 8FR Power POrt 07/18/19 Upper chest (subclavicular area, right (Active) Peripheral IV 01/21/20 Left Forearm (Active) Site Assessment Clean, dry, & intact 1/21/2020  9:57 AM  
Phlebitis Assessment 0 1/21/2020  9:57 AM  
Infiltration Assessment 0 1/21/2020  9:57 AM  
Dressing Status Clean, dry, & intact 1/21/2020  9:57 AM  
Dressing Type Transparent;Tape 1/21/2020  9:57 AM  
Hub Color/Line Status Pink; Infusing 1/21/2020  9:57 AM  
      
Rick-Bowman Drain 01/21/20 Superior;Right Breast (Active) Rick-Bowman Drain 01/21/20 Inferior;Right Breast (Active) Rick-Bowman Drain 01/21/20 Left;Superior Breast (Active) Rick-Bowman Drain 01/21/20 Left; Inferior Breast (Active) Rick-Bowman Drain 01/21/20 Left Other (comment) (Active) Airway - Endotracheal Tube 01/21/20 Oral (Active) Dressing/Packing:  Wound Breast-Dressing Type: (XEROFORM,4X4'S,HYPAFIX TAPE) (01/21/20 1300) Splint/Cast:  ] Other:  *

## 2020-01-21 NOTE — BRIEF OP NOTE
BRIEF OPERATIVE NOTE    Date of Procedure: 1/21/2020   Preoperative Diagnosis: LEFT BREAST CANCER  Postoperative Diagnosis: LEFT BREAST CANCER    Procedure(s):  RIGHT BREAST SKIN SPARING MASTECTOMY AND LEFT BREAST MODIFIED RADICAL MASTECTOMY ,PORT REMOVAL  IMMEDIATE BILATERAL BREAST RECONSTRUCTION WITH TISSUE EXPANDERS AND ALLODERM GRAFTS  Surgeon(s) and Role:  Panel 1:     Ty Hernandez MD - Primary  Panel 2:     Genoveva Vance MD - Primary         Surgical Assistant: Rodriguez Martell    Surgical Staff:  Circ-1: Ivonne Neri RN  Circ-Relief: Edmar Ramirez RN  Scrub Tech-Relief: Margene Mode  Scrub RN-1: Hayley Mcpherson RN  Surg Asst-1: Yuval Fuentes RN  Event Time In Time Out   Incision Start 1034    Incision Close       Anesthesia: General   Estimated Blood Loss: 76 ccs for my part  Specimens:   ID Type Source Tests Collected by Time Destination   1 : RIGHT BREAST PROPHYLACTIC MASTECTOMY Fresh Breast  Yuli Yin MD 1/21/2020 8998 Pathology   2 : LEFT  BREAST MASTECTOMY, SHORT STITCH IS SUPERIOR, LONG IS LATERAL  Fresh Breast  Yuli Yin MD 1/21/2020 1142 Pathology   3 : LEFT AXILLARY CONTENTS Fresh Axillary  Yuli Yin MD 1/21/2020 1151 Pathology   4 : RIGHT BREAST TISSUE Fresh Breast  WornomKaykay MD 1/21/2020 1331 Pathology   5 : LEFT BREAST TISSUE Fresh Breast  Wornom, Kaykay Chino MD 1/21/2020 1344 Pathology      Findings: 650 cc expanders placed subpectorally with contour alloderm in each side. filled to 350 ccs each. Wise pattern used for skin take out. Nice symmetric result. Complications: none  Implants:   Implant Name Type Inv.  Item Serial No.  Lot No. LRB No. Used Action   GRAFT TISS ALLODERM PERF MED -- 9.6X19.3CM 132SQ CM - [id number]  GRAFT TISS ALLODERM PERF MED -- 9.6X19.3CM 132SQ CM [id number] [address] [place] [address] [id number] Right 1 Implanted   GRAFT TISS ALLODERM PERF MED -- 9.6X19.3CM 132SQ CM - [id number]  GRAFT TISS ALLODERM PERF MED -- 9.6X19.3CM 296MJ CM [id number] [address] [place] [address] [id number] Left 1 Implanted   EXPNDR BRST TALL 650ML --  - [id number]  EXPNDR BRST TALL 650ML --  [id number] MENTOR [id number] Left 1 Implanted   EXPNDR BRST TALL 650ML --  - [id number]  EXPNDR BRST TALL 650ML --  [id number] MENTOR [id number] Right 1 Implanted

## 2020-01-21 NOTE — ANESTHESIA POSTPROCEDURE EVALUATION
Post-Anesthesia Evaluation and Assessment    Patient: Renetta Francis MRN: [id number]  SSN: [id number]    YOB: 1965  Age: 62 y.o. Sex: female      I have evaluated the patient and they are stable and ready for discharge from the PACU. Cardiovascular Function/Vital Signs  Visit Vitals  /61   Pulse 85   Temp 36.7 °C (98.1 °F)   Resp 15   Ht 5' 8\" (1.727 m)   Wt 92.1 kg (203 lb)   SpO2 94%   BMI 30.87 kg/m²       Patient is status post General anesthesia for Procedure(s):  RIGHT BREAST SKIN SPARING MASTECTOMY AND LEFT BREAST MODIFIED RADICAL MASTECTOMY ,PORT REMOVAL  IMMEDIATE BILATERAL BREAST RECONSTRUCTION WITH TISSUE EXPANDERS AND ALLODERM GRAFTS. Nausea/Vomiting: None    Postoperative hydration reviewed and adequate. Pain:  Pain Scale 1: Visual (01/21/20 1535)  Pain Intensity 1: 0 (01/21/20 1535)   Managed    Neurological Status:   Neuro (WDL): Within Defined Limits (01/21/20 1535)   At baseline    Mental Status, Level of Consciousness: Alert and  oriented to person, place, and time    Pulmonary Status:   O2 Device: CO2 nasal cannula (01/21/20 1542)   Adequate oxygenation and airway patent    Complications related to anesthesia: None    Post-anesthesia assessment completed.  No concerns    Signed By: Antonia Montgomery MD     January 21, 2020

## 2020-01-21 NOTE — OP NOTES
[address]   OPERATIVE REPORT    Name:  Blayne Cano  MR#:  [id number]  :  1965  ACCOUNT #:  [de-identified]  DATE OF SERVICE:  2020    PREOPERATIVE DIAGNOSIS:  Left breast cancer with axillary metastases. POSTOPERATIVE DIAGNOSIS:  Left breast cancer with axillary metastases. PROCEDURES PERFORMED:  bilateral breast skin-sparing mastectomies, left axillary lymphadenectomy, port removal.    SURGEON:  Aleksandr Cifuentes MD    ASSISTANT:  Royal Luna. ANESTHESIA:  General.    COMPLICATIONS:  None. SPECIMENS REMOVED:  1. Right prophylactic mastectomy. 2.  Left mastectomy. 3.  Left axillary contents. IMPLANTS:  No implants for my part. ESTIMATED BLOOD LOSS:  150 mL. FINDINGS:  Breast and axillary nodes removed. The port removed. INDICATIONS FOR PROCEDURE:  This is a 38-year-old female with a history of left breast cancer with axillary metastases. She had neoadjuvant chemo and was here for her definitive breast surgery. PROCEDURE IN DETAIL:  The patient was seen in the preoperative holding area where surgical site was marked by surgeon and informed consent was obtained. She was taken to the operating room, laid in supine position where general endotracheal anesthesia was induced. A Montero catheter was placed without complication. A time-out was performed. Bilateral breasts were prepped and draped in usual fashion. A time-out was performed again. Attention was turned to the right prophylactic breast and a Wise-pattern incision was made with a 10-blade. Bovie cautery was used to dissect superior, medial, inferior and lateral skin flaps. Breast was removed in total from the chest wall in a medial-to-lateral fashion with a Bovie cautery and marked short superior, long stitch lateral.  A moist lap was packed in this cavity. Prior to packing the cavity, the Bovie cautery was used to enter the port capsule.   The Prolene suture was cut on either side with heavy scissors and the port was removed intact. The port capsule was closed with interrupted 3-0 Vicryl. Next, attention was turned to the left side and using a Wise pattern, a 10 blade was used to make an incision for the mastectomy. Bovie cautery was used to dissect superior, medial, inferior, and lateral skin flaps. The breast was removed in total from chest wall in a medial-to-lateral fashion incorporating the pectoral fascia, and the breast was marked short superior, long stitch lateral and sent for permanent pathology. Next, axillary lymphadenectomy was performed. Bovie was used to free up the pec minor muscle and latissimus muscle. The axillary vein was identified and preserved. The long thoracic nerve and thoracodorsal bundle were identified and preserved. The axillary contents were dissected using the LigaSure device and sent for permanent pathology. A moist lap was packed in the left breast cavity. All sponge, needle, and instrument counts were correct. The patient was stable during this time.       Jazmine Venegas MD      [id number]  D:  01/21/2020 12:22  T:  01/21/2020 13:08  JOB #:  [id number]

## 2020-01-21 NOTE — ANESTHESIA PREPROCEDURE EVALUATION
Relevant Problems   No relevant active problems       Anesthetic History     PONV          Review of Systems / Medical History  Patient summary reviewed, nursing notes reviewed and pertinent labs reviewed    Pulmonary  Within defined limits                 Neuro/Psych   Within defined limits           Cardiovascular  Within defined limits                Exercise tolerance: >4 METS     GI/Hepatic/Renal  Within defined limits              Endo/Other      Hypothyroidism  Obesity and arthritis     Other Findings   Comments: Breast CA           Physical Exam    Airway  Mallampati: II  TM Distance: 4 - 6 cm  Neck ROM: normal range of motion   Mouth opening: Normal     Cardiovascular    Rhythm: regular  Rate: normal         Dental    Dentition: Caps/crowns     Pulmonary  Breath sounds clear to auscultation               Abdominal  Abdominal exam normal       Other Findings            Anesthetic Plan    ASA: 2  Anesthesia type: general          Induction: Intravenous  Anesthetic plan and risks discussed with: Patient

## 2020-01-21 NOTE — BRIEF OP NOTE
BRIEF OPERATIVE NOTE    Date of Procedure: 1/21/2020   Preoperative Diagnosis: LEFT BREAST CANCER axillary mets  Postoperative Diagnosis: LEFT BREAST CANCER    Procedure(s):  RIGHT BREAST SKIN SPARING MASTECTOMY AND LEFT BREAST MODIFIED RADICAL MASTECTOMY ,PORT REMOVAL  IMMEDIATE BILATERAL BREAST RECONSTRUCTION WITH TISSUE EXPANDERS AND ALLODERM GRAFTS  Surgeon(s) and Role:  Panel 1:     Moiz Sifuentes MD - Primary  Panel 2:     Genoveva Fletcher MD - Primary         Surgical Assistant: Alvina Bland     Surgical Staff:  Circ-1: Nahid Armas RN  Circ-Relief: Reba Monet RN  Registered Nurse Assistant: Josias Palencia RN  Scrub Tech-Relief: Lizandro Valladares  Scrub RN-1: Ciro Glez RN  Event Time In Time Out   Incision Start 1034    Incision Close       Anesthesia: General   Estimated Blood Loss: 150 ml   Specimens:   ID Type Source Tests Collected by Time Destination   1 : RIGHT BREAST PROPHYLACTIC MASTECTOMY Fresh Breast  Alexander Salomon MD 1/21/2020 2141 Pathology   2 : LEFT  BREAST MASTECTOMY, SHORT STITCH IS SUPERIOR, LONG IS LATERAL  Fresh Breast  Alexander Salomon MD 1/21/2020 1142 Pathology   3 : LEFT AXILLARY CONTENTS Fresh Axillary  Alexander Salomon MD 1/21/2020 1151 Pathology      Findings: breasts and ax nodes on left removed, port removed    Complications: none  Implants:   Implant Name Type Inv.  Item Serial No.  Lot No. LRB No. Used Action   GRAFT TISS ALLODERM PERF MED -- 9.6X19.3CM 132SQ CM - [id number]  GRAFT TISS ALLODERM PERF MED -- 9.6X19.3CM 132SQ CM [id number] [address] [place] [address] [id number] Right 1 Implanted   GRAFT TISS ALLODERM PERF MED -- 9.6X19.3CM 533ZO CM - [id number]  GRAFT TISS ALLODERM PERF MED -- 9.6X19.3CM 132SQ CM [id number] [address] [place] [address] [id number] Left 1 Implanted     Dictated stat

## 2020-01-21 NOTE — PROGRESS NOTES
1650 TRANSFER - IN REPORT:    Verbal report received from Georgie (name) on [facility] Shows  being received from OR (unit) for routine post - op      Report consisted of patients Situation, Background, Assessment and   Recommendations(SBAR). Information from the following report(s) SBAR was reviewed with the receiving nurse. Opportunity for questions and clarification was provided. 1830 Assessment completed upon patients arrival to unit and care assumed.

## 2020-01-22 PROCEDURE — 99218 HC RM OBSERVATION: CPT

## 2020-01-22 PROCEDURE — 74011000258 HC RX REV CODE- 258: Performed by: SURGERY

## 2020-01-22 PROCEDURE — 74011250637 HC RX REV CODE- 250/637: Performed by: SURGERY

## 2020-01-22 PROCEDURE — 65410000002 HC RM PRIVATE OB

## 2020-01-22 PROCEDURE — 74011250636 HC RX REV CODE- 250/636: Performed by: SURGERY

## 2020-01-22 RX ORDER — DIPHENHYDRAMINE HCL 25 MG
25 CAPSULE ORAL
Status: DISCONTINUED | OUTPATIENT
Start: 2020-01-22 | End: 2020-01-24 | Stop reason: HOSPADM

## 2020-01-22 RX ORDER — SODIUM CHLORIDE 0.9 % (FLUSH) 0.9 %
5-40 SYRINGE (ML) INJECTION EVERY 8 HOURS
Status: DISCONTINUED | OUTPATIENT
Start: 2020-01-22 | End: 2020-01-24 | Stop reason: HOSPADM

## 2020-01-22 RX ORDER — SODIUM CHLORIDE 0.9 % (FLUSH) 0.9 %
5-40 SYRINGE (ML) INJECTION AS NEEDED
Status: DISCONTINUED | OUTPATIENT
Start: 2020-01-22 | End: 2020-01-24 | Stop reason: HOSPADM

## 2020-01-22 RX ADMIN — Medication 10 ML: at 14:00

## 2020-01-22 RX ADMIN — OSELTAMIVIR PHOSPHATE 75 MG: 75 CAPSULE ORAL at 09:16

## 2020-01-22 RX ADMIN — FOLIC ACID TAB 400 MCG 0.4 MG: 400 TAB at 09:16

## 2020-01-22 RX ADMIN — LEVOTHYROXINE SODIUM 112 MCG: 112 TABLET ORAL at 08:39

## 2020-01-22 RX ADMIN — MELATONIN 5 TABLET: at 08:38

## 2020-01-22 RX ADMIN — Medication 1000 MCG: at 08:37

## 2020-01-22 RX ADMIN — MORPHINE SULFATE 2 MG: 2 INJECTION, SOLUTION INTRAMUSCULAR; INTRAVENOUS at 02:45

## 2020-01-22 RX ADMIN — MORPHINE SULFATE 2 MG: 2 INJECTION, SOLUTION INTRAMUSCULAR; INTRAVENOUS at 14:47

## 2020-01-22 RX ADMIN — OXYCODONE HYDROCHLORIDE AND ACETAMINOPHEN 2 TABLET: 5; 325 TABLET ORAL at 21:07

## 2020-01-22 RX ADMIN — MORPHINE SULFATE 2 MG: 2 INJECTION, SOLUTION INTRAMUSCULAR; INTRAVENOUS at 11:35

## 2020-01-22 RX ADMIN — OXYCODONE HYDROCHLORIDE AND ACETAMINOPHEN 2 TABLET: 5; 325 TABLET ORAL at 16:57

## 2020-01-22 RX ADMIN — MORPHINE SULFATE 2 MG: 2 INJECTION, SOLUTION INTRAMUSCULAR; INTRAVENOUS at 06:03

## 2020-01-22 RX ADMIN — Medication 10 ML: at 11:36

## 2020-01-22 RX ADMIN — CEFAZOLIN SODIUM 0.5 G: 1 INJECTION, POWDER, FOR SOLUTION INTRAMUSCULAR; INTRAVENOUS at 06:04

## 2020-01-22 RX ADMIN — DIPHENHYDRAMINE HYDROCHLORIDE 25 MG: 25 CAPSULE ORAL at 18:54

## 2020-01-22 RX ADMIN — DIPHENHYDRAMINE HYDROCHLORIDE 25 MG: 25 CAPSULE ORAL at 12:31

## 2020-01-22 RX ADMIN — OXYCODONE HYDROCHLORIDE AND ACETAMINOPHEN 2 TABLET: 5; 325 TABLET ORAL at 12:31

## 2020-01-22 RX ADMIN — Medication 10 ML: at 12:33

## 2020-01-22 RX ADMIN — LIOTHYRONINE SODIUM 5 MCG: 5 TABLET ORAL at 08:45

## 2020-01-22 RX ADMIN — OXYCODONE HYDROCHLORIDE AND ACETAMINOPHEN 2 TABLET: 5; 325 TABLET ORAL at 04:15

## 2020-01-22 RX ADMIN — CEFAZOLIN SODIUM 0.5 G: 1 INJECTION, POWDER, FOR SOLUTION INTRAMUSCULAR; INTRAVENOUS at 14:00

## 2020-01-22 RX ADMIN — CEFAZOLIN SODIUM 0.5 G: 1 INJECTION, POWDER, FOR SOLUTION INTRAMUSCULAR; INTRAVENOUS at 21:33

## 2020-01-22 RX ADMIN — OXYCODONE HYDROCHLORIDE AND ACETAMINOPHEN 2 TABLET: 5; 325 TABLET ORAL at 08:35

## 2020-01-22 RX ADMIN — Medication 10 ML: at 21:11

## 2020-01-22 NOTE — PROGRESS NOTES
Lots of pain. Still needing morphine. She has not been up and still has her najera. Exam shows dressings are dry and no hematomas. Usual REBECCA output. Plan to DC najera and IV fluids and get out of bed in chair. Regular diet. Push po pain meds. Home tomorrow if all goes well.

## 2020-01-22 NOTE — OP NOTES
[address]  OPERATIVE REPORT    Name:  Blayne Cano  MR#:  [id number]  :  1965  ACCOUNT #:  [de-identified]  DATE OF SERVICE:  2020      PREOPERATIVE DIAGNOSIS:  Carcinoma of the breast status post bilateral mastectomy. POSTOPERATIVE DIAGNOSIS:  Carcinoma of the breast status post bilateral mastectomy. PROCEDURE PERFORMED:  Immediate reconstruction of both breasts with 650 mL [place] Tissue Expanders filled to 350 mL each and contour AlloDerm. SURGEON:  Mona Hankins MD    ASSISTANTSkeeter Waldo    ANESTHESIA:  General anesthesia. COMPLICATIONS:  None. SPECIMENS REMOVED:  Bilateral breast skin. IMPLANTS:  See op note. ESTIMATED BLOOD LOSS:  Blood loss for this operation for my portion was about 75 mL. INDICATIONS:  This 49-year-old patient was brought to the operating room today for surgical treatment of a carcinoma of the left breast.  She had presented with a large breast cancer and had undergone neoadjuvant chemotherapy. Her left breast cancer had shrunk but had not completely gone away. She was brought to the operating room today by Dr. Antoine Park for bilateral mastectomy and a left axillary lymph node dissection. Dr. Antoine Park completed this procedure and will dictate that portion of the operative note. For my portion of the procedure, the patient had been marked preoperatively for a Wise pattern breast reconstruction. She was very large breasted and wanted to be much smaller when the reconstruction was completed. Preoperatively, the details of the reconstruction had once again been discussed with her including the scars which would result, the risk involved, the potential complications as well as the process of tissue expansion, and need for secondary surgery for expander removal and implant placement. She appeared to understand all these considerations.     FINDINGS AND PROCEDURE:  The patient was brought to the operating room and general anesthesia was induced. Dr. Prateek Montgomery then performed bilateral mastectomy and left axillary lymph node dissection. She will dictate that portion of the operative note. For my portion of the procedure, new gowns and gloves and overdraping and instruments were used. I began the procedure on the right side. A careful check for hemostasis was done in the mastectomy wound. The pectoralis major muscle was then raised off the chest wall freeing the costal origins of the muscle over to the sternal origin. A subpectoral pocket was created under the muscle. Markings were made for AlloDerm implantation along the inframammary fold and on the anterior axillary line. A medium thickness piece of perforated contour AlloDerm was opened and soaked in antibiotic irrigation. It was carefully positioned dermis side up. 20 mL of Exparel mixed with 20 mL of saline was then injected along the inframammary fold and anterior axillary line incision and over the pectoralis major muscle. The AlloDerm was then sutured in place along the inframammary fold and anterior axillary line with a running 2-0 PDS suture. It seemed that the based on the base diameter and the size of the breast specimen that the 650 mL tissue expander was the most appropriate. Therefore, one of these was opened, emptied of air and soaked in bacitracin. Two 15 round Rick-Bowman drains were left, one under the muscle and one between the AlloDerm, and the skin into the mastectomy defect. After a check for hemostasis, the expander was carefully positioned under the pectoralis major muscle and AlloDerm and flattened against the chest wall. The junction of the superior aspect of the AlloDerm to the inferior lateral aspect of the pectoralis major muscle was then closed with a running 2-0 PDS suture. This completely covered the expander with AlloDerm and pectoralis major muscle.     Using a Fill Kit, the expander was then filled to a volume of 350 mL using the injection port which was identified with the MagnaFinder. After the expander was then filled appropriately, the skin was then carefully contoured using a Wise pattern incision. Skin was removed both at the level of the inframammary fold and the vertical limb to contour the skin nicely against the underlying expander AlloDerm pectoralis major shape. The skin all appeared viable with good color. Final skin closure was done with interrupted 3-0 Vicryl in the deep dermis with buried knots and running subcuticular 3-0 Monocryl after hemostasis had been secured. A similar breast reconstruction was carried out on the patient's left breast.  When I was done, the overall size and shape of the breast appeared symmetric on the chest wall. Sterile dressings were applied. The only difference on the left side compared to the right was that an extra drain was placed into the left axilla where the axillary node dissection was done. The patient awoke from the anesthetic and went to the recovery room in good condition. Final sponge and needle counts were reported to be correct. Blood loss for this operation for my portion was about 75 mL.         MD ALECIA Vela [id number]  D:  01/22/2020 8:01  T:  01/22/2020 9:14  JOB #:  [id number]

## 2020-01-22 NOTE — PROGRESS NOTES
Bedside shift change report given to [address] (oncoming nurse) by Valetne Castillo RN (offgoing nurse). Report included SBAR, Kardex, Intake/Output, MAR, Recent Results and Progress of Care. I accept this patient to my care at this time. Any subsequent documentation in this Progress Note is intended to be information adjunct to other components of the patient's electronic medical record. Refer to accompanying timestamp(s) for chronology.

## 2020-01-23 VITALS
RESPIRATION RATE: 16 BRPM | HEART RATE: 76 BPM | BODY MASS INDEX: 30.77 KG/M2 | TEMPERATURE: 98.2 F | WEIGHT: 203 LBS | DIASTOLIC BLOOD PRESSURE: 74 MMHG | HEIGHT: 68 IN | SYSTOLIC BLOOD PRESSURE: 106 MMHG | OXYGEN SATURATION: 94 %

## 2020-01-23 PROCEDURE — 99218 HC RM OBSERVATION: CPT

## 2020-01-23 PROCEDURE — 74011250637 HC RX REV CODE- 250/637: Performed by: SURGERY

## 2020-01-23 PROCEDURE — 74011250636 HC RX REV CODE- 250/636: Performed by: SURGERY

## 2020-01-23 PROCEDURE — 74011000258 HC RX REV CODE- 258: Performed by: SURGERY

## 2020-01-23 RX ORDER — OXYCODONE AND ACETAMINOPHEN 5; 325 MG/1; MG/1
1-2 TABLET ORAL
Qty: 30 TAB | Refills: 0 | Status: SHIPPED | OUTPATIENT
Start: 2020-01-23 | End: 2020-01-28

## 2020-01-23 RX ORDER — NALOXONE HYDROCHLORIDE 4 MG/.1ML
SPRAY NASAL
Qty: 2 EACH | Refills: 0 | Status: SHIPPED | OUTPATIENT
Start: 2020-01-23

## 2020-01-23 RX ADMIN — OSELTAMIVIR PHOSPHATE 75 MG: 75 CAPSULE ORAL at 08:39

## 2020-01-23 RX ADMIN — Medication 1000 MCG: at 08:38

## 2020-01-23 RX ADMIN — MELATONIN 5 TABLET: at 08:38

## 2020-01-23 RX ADMIN — DIPHENHYDRAMINE HYDROCHLORIDE 25 MG: 25 CAPSULE ORAL at 13:56

## 2020-01-23 RX ADMIN — OXYCODONE HYDROCHLORIDE AND ACETAMINOPHEN 2 TABLET: 5; 325 TABLET ORAL at 14:00

## 2020-01-23 RX ADMIN — CEFAZOLIN SODIUM 0.5 G: 1 INJECTION, POWDER, FOR SOLUTION INTRAMUSCULAR; INTRAVENOUS at 14:07

## 2020-01-23 RX ADMIN — OXYCODONE HYDROCHLORIDE AND ACETAMINOPHEN 2 TABLET: 5; 325 TABLET ORAL at 01:46

## 2020-01-23 RX ADMIN — CEFAZOLIN SODIUM 0.5 G: 1 INJECTION, POWDER, FOR SOLUTION INTRAMUSCULAR; INTRAVENOUS at 06:31

## 2020-01-23 RX ADMIN — LEVOTHYROXINE SODIUM 112 MCG: 112 TABLET ORAL at 06:31

## 2020-01-23 RX ADMIN — Medication 10 ML: at 06:31

## 2020-01-23 RX ADMIN — DIPHENHYDRAMINE HYDROCHLORIDE 25 MG: 25 CAPSULE ORAL at 08:38

## 2020-01-23 RX ADMIN — DIPHENHYDRAMINE HYDROCHLORIDE 25 MG: 25 CAPSULE ORAL at 01:49

## 2020-01-23 RX ADMIN — OXYCODONE HYDROCHLORIDE AND ACETAMINOPHEN 2 TABLET: 5; 325 TABLET ORAL at 09:14

## 2020-01-23 RX ADMIN — FOLIC ACID TAB 400 MCG 0.4 MG: 400 TAB at 08:38

## 2020-01-23 RX ADMIN — OXYCODONE HYDROCHLORIDE AND ACETAMINOPHEN 2 TABLET: 5; 325 TABLET ORAL at 05:20

## 2020-01-23 RX ADMIN — LIOTHYRONINE SODIUM 5 MCG: 5 TABLET ORAL at 08:39

## 2020-01-23 NOTE — PROGRESS NOTES
Still hurting but moving around, eating and voiding well. Wants to go home. Afebrile. HD good. REBECCA drainage usual amount and serous. Dressing changed. All skin viable but some bruising at T on both sides that is small. No hematomas. Plan to discharge home on percocet. Usual wound and REBECCA care. See me in five days for check of bruised area. Hopefully this will no blister. If it does she may need early excision of this and reclosure. I discussed this possibility with her.

## 2020-01-23 NOTE — PROGRESS NOTES
Bedside shift change report given to [address] (oncoming nurse) by Sarai Ward RN (offgoing nurse). Report included SBAR, Kardex, Intake/Output, MAR, Recent Results and Progress of Care. I accept this patient to my care at this time. Any subsequent documentation in this Progress Note is intended to be information adjunct to other components of the patient's electronic medical record. Refer to accompanying timestamp(s) for chronology. Updates from day shift: POD1 today. With continued itching today, some relief with diphenhydramine PO. Pain control still obstacle; continue with PO percocet. Catheter removed today; x4 voids during day shift. Remains with scopolamine patch behind. OOB tonight please.

## 2020-01-23 NOTE — DISCHARGE SUMMARY
[facility] [address] SUMMARY    Name:  José Vang  MR#:  [id number]  :  1965  ACCOUNT #:  [de-identified]  ADMIT DATE:  2020  DISCHARGE DATE:  2020      PRIMARY DISCHARGE DIAGNOSIS:  For this admission, carcinoma of the left breast, status post bilateral mastectomy and reconstruction with tissue expanders. PRIMARY PROCEDURE:  For this admission, bilateral mastectomy and reconstruction with tissue expanders and AlloDerm performed on . DISCHARGE MEDICATIONS:  The patient is discharged home on Percocet and Narcan as well as her preop medications. WOUND CARE:  The patient was instructed to empty her Rick-Bowman drains three times a day and record the output separately. She was told she could shower, but to keep dry where the tubes come out of her body. She is to change her bandages over the tubes and over her breast incisions daily with a dry gauze and light tape. FOLLOWUP:  She is to see Dr. Charlotte Waite on Monday in 5 days. DISCHARGE SUMMARY:  The patient was admitted to the hospital and on the day of admission underwent a bilateral mastectomy and reconstruction with tissue expanders and AlloDerm using a Wise pattern surgical incision. Her postoperative convalescence was notable for severe pain on postoperative day #1 requiring another day in the hospital.  Over that day, she was gradually transitioned to p.o. pain medicine and began to ambulate and void well. At discharge, she was tolerating a regular diet and voiding well and getting up to the bathroom herself. She was still in pain, but her pain seemed to be controlled with Percocet well. A dressing change was done prior to discharge and her skin all appeared viable with no hematomas. There was some bruising around the vertical limb incisions on both sides. There was no blistering or skin necrosis. I am going to see her a little earlier than usual to check this area of bruising.   She is to follow up with me as noted above and is to call Dr. Alberta Mir for a followup as well. ISAAC Willean Shone III, MD      [id number]  D:  01/23/2020 7:46  T:  01/23/2020 9:10  JOB #:  [id number]  CC:   Amanda Perez MD

## 2020-01-23 NOTE — DISCHARGE INSTRUCTIONS
Sara Merrill M.D., F.A.C.S. Bret Saleem M.D., F.A.C.S.,  Jorge Caro III, M.D., F.A.C.S. Robert Sheppard M.D.,  Sera Bowden M.D. POSTOPERATIVE INSTRUCTIONS    BREAST RECONSTRUCTION    1. You must have a responsible adult with you for the first 24 hours after surgery. 2. Please wear comfortable clothing the day of surgery. Do not wear clothing that pulls over your head. 3. You may start clear liquids once you have arrived home and continue until any nausea has subsided. Then you may advance to resume a normal diet. 4. Bed rest is recommended for the first 24 hours. You may be up for meals or go to the bathroom with assistance. Minimize arm extension and lifting for the first two weeks. 5. Wear the JAVI hose until you are moving about normally, in 2-3 days. 6. Take your prescription medications as directed for the first 2-3 days. Then you may take it as needed. You have been prescribed________________________. Do not use any prescriptions with Aspirin or ibuprofen for 2 weeks. You may take non aspirin or non ibuprofen headache medications such as Tylenol Extra Strength. You may resume normal medications. 7. Do not drive or operate machinery while on pain medication. You may return to work ___________. You may drive___________. 8. Do not drink alcohol or take tranquilizers or take sleeping medications while on pain medication. 9. You may remove the bra and dressings in __24____ hours and may shower. Keep dry where the tubes come out of your body. Do not get them wet. Change the bandage over the tubes and your incisions daily with dry gauze and lite tape. 10. No lifting over 10 pounds for 2 weeks. No underwire bras. 11. You may find it more comfortable to sleep in a recliner for the first couple of nights. Do not sleep on your sides until your postop visit. 12. You may apply ice to the incision for the first 24 hours.     13. You will have a drain in each operated breast, Follow included drain care instructions. 14. If you have increasing pain and pain on one side or the other, with the feeling of tightness call the office at [phone]. 15. You will need to return to the office for your postop appointment and suture removal. Your appointment is ___________. 17. Please call the office at [phone] if you have any questions after your surgery. 18. COMPLICATIONS:  Call the office at [phone] if any of the following occur-  Fever over 101 degrees taken by mouth or 100 degrees rectally. Pain not relieved by medications prescribed. Blood soaked dressing (small amounts of oozing may be normal.)  Unable to urinate or completely empty your bladder. Nausea or vomiting lasting longer than 24 hours. Numbness, tingling or cold fingers or toes. Swelling around incision. Increasing and progressive drainage from incision or exam site. Increased redness, warmth or hardness around incision or exam site  If you experience shortness of breath, chest pain or swelling of legs or feet, go immediately to the emergency room      IF YOU NEED IMMEDIATE ATTENTION, GO TO [address]. I acknowledge receipt of the discharge instructions:    Patient/Guardian Signature _______________________________________ Date___________________    Nhung Tello Signature_______________________________________________ Date___________________                Tootie Roldan CARE - HOME CARE INSTRUCTIONS      You are going home with a drain called tiffanie Nickerson (sometimes called a J. PWendy). You will need to empty and measure the drainage during the first few days of recovery. In the picture below, # 1 indicates where your drain is connected to the reservoir. You will not need to work with this part of the drain. See the instructions below for references to # 3 and #4.              Empty the drain whenever it is close to full or at least 3 times per day    · Wash your hands prior to emptying the drain  · Un-pin the drainage reservoir (the lined container) from your   clothing    · Hold the reservoir with the emptying port at the top (#3)  · Remove the drainage plug (#4) from the emptying port (#3)  · Estimate the amount of drainage by the gradation   marks on the side of the reservoir  · Record the drainage amount on the Drainage Record  on the reverse side of this form  · Empty the drainage into the toilet  · Squeeze the reservoir and replug it (recharge)  · Remember, every time the drainage reservoir is fully expanded it is considered uncharged and it should be emptied, squeezed and replugged (recharged)     To prevent infection, avoid touching the end of the emptying port (#3) and the drainage plug (#4) with your hands (see diagram). For the drain to work it needs to be in the [facility] position. Keep the area around the tube dry and covered with a dressing. The color and amount of drainage may change. When you return for your post operative visit please bring the Record with you. When the drainage is less than 30 - 40 cc in a 24-hour period OR when the drains     have been in for 2 weeks, please call the office ([phone]) to schedule removal.  If you have any questions or concerns about your drain or the above instructions     please call our office at [phone].     [facility]  LUKEUZIEL DRAINAGE RECORD    Patient Name:  __________________________________________________    [employer] # 1    Date   Amount   Amount   Amount Daily Total Amount                                                                                                           Drain # 2    Date   Amount   Amount   Amount Daily Total Amount

## 2020-01-24 NOTE — PROGRESS NOTES
Pt shown and understood how to empty and record nneka drainage. Pt given cups and flow sheet for home.

## 2020-01-27 NOTE — ADT AUTH CERT NOTES
DOWNGRADED TO OBSERVATION 
 
ONCE RECEIVED AND COMPLETED, PLEASE FAX BACK CONFIRMATION AND NEW OBS AUTH# OR WHETHER OR NOT OBS REQUIRES AN AUTH.  
 
Jeannette Méndez

## 2020-02-03 ENCOUNTER — OFFICE VISIT (OUTPATIENT)
Dept: SURGERY | Age: 55
End: 2020-02-03

## 2020-02-03 VITALS
WEIGHT: 203 LBS | BODY MASS INDEX: 30.77 KG/M2 | DIASTOLIC BLOOD PRESSURE: 72 MMHG | HEIGHT: 68 IN | HEART RATE: 86 BPM | SYSTOLIC BLOOD PRESSURE: 108 MMHG

## 2020-02-03 DIAGNOSIS — Z90.13 S/P MASTECTOMY, BILATERAL: ICD-10-CM

## 2020-02-03 DIAGNOSIS — C50.912 BREAST CANCER METASTASIZED TO AXILLARY LYMPH NODE, LEFT (HCC): Primary | ICD-10-CM

## 2020-02-03 DIAGNOSIS — C77.3 BREAST CANCER METASTASIZED TO AXILLARY LYMPH NODE, LEFT (HCC): Primary | ICD-10-CM

## 2020-02-03 NOTE — PROGRESS NOTES
HISTORY OF PRESENT ILLNESS  Linda Olivares is a 62 y.o. female. HPI ESTABLISHED patient here today for post op follow up BILATERAL mastectomies with implants for LEFT breast cancer on 1/21/2020. The patient is doing well but is still having a lot of pain. Her nneka drains are intact and she will see Dr. Tereso Waggoner this afternoon. She is here with her mother and girlfriend. HORMONE RECEPTOR IMMUNOHISTOCHEMISTRY   RESULTS:   Invasive carcinoma   ER AK   Interpretation: Positive Interpretation: Positive   Nuclear Staining %: 100% Nuclear Staining %: 90%   Intensity: Strong Intensity Moderate   Ductal Carcinoma In Situ   ER AK   Interpretation: Positive Interpretation: Positive   Nuclear Staining %: 90% Nuclear Staining %: 5%   Intensity: Strong Intensity Weak   Results-Comments   HER-2/greg Immunohistochemistry for Breast tumors   Results: Equivocal, Score = 2+   FINAL PATHOLOGIC DIAGNOSIS   1. Right breast, prophylactic mastectomy:   Benign breast.   2. Left breast, modified radical mastectomy:   Invasive ductal carcinoma (see synoptic report). Ductal carcinoma in situ. Atypical lobular hyperplasia with associated macrocalcifications. Usual ductal hyperplasia with associated microcalcifications. Intraductal papilloma with usual ductal hyperplasia. INVASIVE CARCINOMA OF THE BREAST: Resection   SPECIMEN   Procedure: Total mastectomy   Specimen Laterality: Left   TUMOR   Histologic Type:  Invasive carcinoma of no special type (invasive   ductal carcinoma, not otherwise specified)   Glandular (Acinar) / Tubular Differentiation: Score 3   Nuclear Pleomorphism: Score 2   Mitotic Rate: Score 2   Overall Grade: Grade 2 (scores of 6 or 7)   Tumor Size: 2.5 mm (see Comment)   Ductal Carcinoma In Situ (DCIS): Present, solid type, nuclear grade 2   with associated microcalcifications   Tumor Extent   Skin: Uninvolved by tumor cells   Treatment Effect in the Breast: Probable or definite response to   presurgical therapy in the invasive carcinoma   Treatment Effect in the Lymph Nodes: Probable or definite response   to presurgical therapy in metastatic carcinoma   MARGINS   Invasive Carcinoma Margins: Uninvolved by invasive carcinoma   Distance from Closest Margin (Millimeters): Greater than: 10 mm   Closest Margin: Posterior   DCIS Margins: Uninvolved by DCIS   Distance from Closest Margin (Millimeters): Greater than: 10 mm   Closest Margin: Posterior   ER/OK/HER-2   Pending; results to be issued within addendum reports   LYMPH NODES   Regional Lymph Nodes: Involved by tumor cells   Number of Lymph Nodes with Macrometastases (> 2 mm): 2   Number of Lymph Nodes with Micrometastases (> 0.2 mm to 2 mm and   / or > 200 cells): 0   Extranodal Extension: Not identified   Number of Lymph Nodes Examined: 3   PATHOLOGIC STAGE CLASSIFICATION (pTNM, AJCC 8th Edition)   TNM Descriptors: y (post-treatment) m (multiple foci)   Primary Tumor (pT): pT1a   Regional Lymph Nodes (pN)   Category (pN): pN1a   3. Lymph nodes, left axillary dissection:   Two of three lymph nodes positive for metastatic carcinoma (2/3). 4. Excised tissue, right breast:   Fragments of skin and benign breast tissue. 5. Excised tissue, left breast:   Fragments of skin and benign breast tissue. Breast cancer-  5/28/19 - 44 yo female with left breast IDC and DCIS,T2 N1, potential skin involvement, Er/Pr + Her 2 greg equivocal, Not enough invasive to send Her 2 greg for fish  6/3/19 - LEFT breast skin punch biopsy, benign. 6/20/19 - biopsies of LEFT breast and LEFT axilla. I called patient to touch base with her. Biopsy Dr. Robles Current did node +, Er/pr+ her 2 greg negative, specimen went to [facility] due to medicaid,  Mammaprint high risk. 7/18/19 - port insertion  7/19/19 - started [facility]  9/13/19 - 12/3/19 - Taxol infusions          Review of Systems   All other systems reviewed and are negative. Physical Exam  Vitals signs and nursing note reviewed.    Chest: Comments: Bilateral expanders intact  Skin viable   No erythema or necrosis. ASSESSMENT and PLAN    ICD-10-CM ICD-9-CM    1. Breast cancer metastasized to axillary lymph node, left (HCC) C50.912 174.9     C77.3 196.3    2. S/P mastectomy, bilateral Z90.13 V45.71      61 yo female with T2 N1 carcinoma with great response to chemo. I will have path look for more nodes since dissection. Will go ahead refer to pt and lymphedema clinic. She sees Dr. Oakley Heading for xrt. F/u with Dr. Josie Ortega this afternoon. F/u with us in 4 months.

## 2020-02-03 NOTE — PATIENT INSTRUCTIONS

## 2020-02-20 ENCOUNTER — OFFICE VISIT (OUTPATIENT)
Dept: ONCOLOGY | Age: 55
End: 2020-02-20

## 2020-02-20 VITALS
WEIGHT: 196.5 LBS | TEMPERATURE: 97.6 F | HEIGHT: 68 IN | DIASTOLIC BLOOD PRESSURE: 69 MMHG | BODY MASS INDEX: 29.78 KG/M2 | SYSTOLIC BLOOD PRESSURE: 100 MMHG | OXYGEN SATURATION: 97 % | HEART RATE: 95 BPM

## 2020-02-20 DIAGNOSIS — Z91.89 AT RISK FOR OSTEOPOROSIS: ICD-10-CM

## 2020-02-20 DIAGNOSIS — Z17.0 MALIGNANT NEOPLASM OF UPPER-OUTER QUADRANT OF LEFT BREAST IN FEMALE, ESTROGEN RECEPTOR POSITIVE (HCC): Primary | ICD-10-CM

## 2020-02-20 DIAGNOSIS — C50.412 MALIGNANT NEOPLASM OF UPPER-OUTER QUADRANT OF LEFT BREAST IN FEMALE, ESTROGEN RECEPTOR POSITIVE (HCC): Primary | ICD-10-CM

## 2020-02-20 RX ORDER — LETROZOLE 2.5 MG/1
2.5 TABLET, FILM COATED ORAL DAILY
Qty: 30 TAB | Refills: 6 | Status: SHIPPED | OUTPATIENT
Start: 2020-02-20 | End: 2020-06-11 | Stop reason: SDUPTHER

## 2020-02-20 NOTE — PROGRESS NOTES
[facility], [address] [place] [address] Road, [address] [place] [address]  W: [phone]  F: [phone]    Reason for Visit:   Robert Madera is a 62 y.o. female who is seen for Left breast cancer- ER+IN+HER2 greg neg    Treatment History:   · 6/19: Mammogram:  Left breast mass with skin thickening, 2 suspicious nodes. Mass measures 1.4 x 0.9 x 1.3  · 6/19: MRI breast: Multicentric left breast carcinoma. Non-masslike enhancement extends from the nipple to the posterior third, involves all quadrants, and measures 106 x 44 x 79 mm. · 5/28/19: Biopsy breast- IDC % % HER 2 negative, skin biopsy benign , node biopsy mostly adipose tissue with atypical cells . BRCA1 and 2 negative. CT and bone scan negative for metastasis. Mammaprint with insufficient tissue for testing. Repeat biopsy of axillary node 6/20/19 positive for metastatic disease- repeat mammaprint - high risk  · 7/19/19: cycle 1 neoadjuvant of dd AC-T  · 9/12/19: US of breast shows increased malignant microcalcification in the left breast, etiology included tx related necrosis vs extension of disease. Decreased size in left axillary LN. · 10/22/19: calcifications in the left upper outer quadrant are stable, calcifications in left axillary LN are not changed   · 1/21/20: b/l mastectomy     History of Present Illness:   Patient is a 62 y.o. seen for L breast cancer. She felt a sensation in the shower about May 2019. She also noted a lump and  an inverted nipple. She had a physician friend look at this who directed her to mammogram and recommended she see Dr. Gamal Stephen. This led to imaging and diagnosis as above. She completed neoadjuvant ddACT followed by bilateral mastectomy on 1/21/20. Comes today for follow-up. Bilateral mastectomy was completed on 1/21/20. She still has some soreness and numbness under left arm. Has expanders in. She has met with rad onc. Energy is still low.      Rare ETOH  She does not smoke. Father had colon cancer    Past Medical History:   Diagnosis Date    Anxiety     Arthritis     NECK    At risk for sleep apnea     GAGAN 5     Basal cell carcinoma of skin     FACE, CHEST, BACK    Breast cancer ([facility] [address] 2019    LEFT    History of seasonal allergies     Hx antineoplastic chemo     COMPLETED 12-3-19    Ill-defined condition     hx motion sickness    Motion sickness     Nausea & vomiting     PONV after thyroidectomy; Hx motion sickness    Squamous cell skin cancer     Dr. Alice Russell Thyroid disease     H/ GOITER    Vertigo       Past Surgical History:   Procedure Laterality Date    HX BREAST RECONSTRUCTION Bilateral 2020    IMMEDIATE BILATERAL BREAST RECONSTRUCTION WITH TISSUE EXPANDERS AND ALLODERM GRAFTS performed by Ayad Escalante MD at [address] HX  SECTION      HX COLONOSCOPY      HX HEENT  2009    THYROIDECTOMY    HX KNEE ARTHROSCOPY Left     HX LUMBAR FUSION      HX MOHS PROCEDURES      x 4 times     HX SHOULDER ARTHROSCOPY Right     HX THYROIDECTOMY  2014    HX VASCULAR ACCESS  2019    PORTACATH RIGHT CHEST      Social History     Tobacco Use    Smoking status: Never Smoker    Smokeless tobacco: Never Used   Substance Use Topics    Alcohol use: Yes     Alcohol/week: 2.0 standard drinks     Types: 2 Glasses of wine per week      Family History   Problem Relation Age of Onset    Arthritis-osteo Mother     Cancer Father         Colon Cancer    No Known Problems Sister     No Known Problems Sister     Migraines Sister     Anesth Problems Neg Hx      Current Outpatient Medications   Medication Sig    naloxone (NARCAN) 4 mg/actuation nasal spray Use 1 spray intranasally, then discard. Repeat with new spray every 2 min as needed for opioid overdose symptoms, alternating nostrils.  cyanocobalamin 1,000 mcg tablet Take 1,000 mcg by mouth daily.     gabapentin (NEURONTIN) 100 mg capsule Take 1 Cap by mouth three (3) times daily. Max Daily Amount: 300 mg. (Patient taking differently: Take 100 mg by mouth three (3) times daily as needed.)    LORazepam (ATIVAN) 1 mg tablet Take 1 Tab by mouth every four (4) hours as needed for Anxiety. Max Daily Amount: 6 mg.    ALPRAZolam (XANAX) 0.5 mg tablet Take 1 Tab by mouth two (2) times daily as needed for Anxiety. Max Daily Amount: 1 mg.  FOLIC ACID PO Take 1 Tab by mouth daily.  sertraline (ZOLOFT) 100 mg tablet Take 0.5 Tabs by mouth daily. (Patient taking differently: Take 50 mg by mouth nightly.)    traZODone (DESYREL) 50 mg tablet Take 2 Tabs by mouth nightly.  cholecalciferol (VITAMIN D3) 1,000 unit cap Take 5,000 Units by mouth daily.  liothyronine (CYTOMEL) 5 mcg tablet Take 5 mcg by mouth daily.  levothyroxine (SYNTHROID) 50 mcg tablet Take 50 mcg by mouth Daily (before breakfast). No current facility-administered medications for this visit. Allergies   Allergen Reactions    Augmentin [Amoxicillin-Pot Clavulanate] Nausea and Vomiting        Review of Systems: A complete review of systems was obtained, negative except as described above. Physical Exam:     Visit Vitals  /69   Pulse 95   Temp 97.6 °F (36.4 °C)   Ht 5' 8\" (1.727 m)   Wt 196 lb 8 oz (89.1 kg)   SpO2 97%   BMI 29.88 kg/m²     ECOG PS: 1  General: No distress  Eyes: PERRLA, anicteric sclerae  HENT: Atraumatic  Neck: Supple; PAC has been removed   Lymphatic: no lymphadenopathy  Breasts: s/p b/l mastectomies, sites appear to be healing appropriately   MS: Normal gait and station. Digits without clubbing or cyanosis. Skin: No rashes, ecchymoses, or petechiae. Normal temperature, turgor, and texture.   Psych: Alert, oriented, appropriate affect, normal judgment/insight    Results:     Lab Results   Component Value Date/Time    WBC 4.1 01/13/2020 11:27 AM    HGB 13.7 01/13/2020 11:27 AM    HCT 39.9 01/13/2020 11:27 AM    PLATELET 123 57/16/9731 11:27 AM    .0 (H) 01/13/2020 11:27 AM    ABS. NEUTROPHILS 2.1 01/13/2020 11:27 AM     Lab Results   Component Value Date/Time    Sodium 141 11/22/2019 02:48 PM    Potassium 3.8 11/22/2019 02:48 PM    Chloride 110 (H) 11/22/2019 02:48 PM    CO2 27 11/22/2019 02:48 PM    Glucose 109 (H) 11/22/2019 02:48 PM    BUN 9 11/22/2019 02:48 PM    Creatinine 0.59 11/22/2019 02:48 PM    GFR est AA >60 11/22/2019 02:48 PM    GFR est non-AA >60 11/22/2019 02:48 PM    Calcium 8.1 (L) 11/22/2019 02:48 PM     Lab Results   Component Value Date/Time    Bilirubin, total 0.3 11/22/2019 02:48 PM    ALT (SGPT) 30 11/22/2019 02:48 PM    AST (SGOT) 17 11/22/2019 02:48 PM    Alk. phosphatase 56 11/22/2019 02:48 PM    Protein, total 5.6 (L) 11/22/2019 02:48 PM    Albumin 3.0 (L) 11/22/2019 02:48 PM    Globulin 2.6 11/22/2019 02:48 PM       Records reviewed and summarized above. Pathology report(s) reviewed above. 5/28/19  FINAL PATHOLOGIC DIAGNOSIS  1. Breast, left, core biopsy: In situ and invasive ductal carcinoma   Invasive carcinoma is nuclear grade 2 and measures up to 0.6 cm in greatest dimension on slide   Ductal carcinoma in situ is nuclear grade 3 with central necrosis   2. Soft tissue, left axilla, core biopsy: Adipose tissue with rare detached atypical cells   No lymph node tissue identified   See comment     Radiology report(s) reviewed above. US breast 9/12/19:  IMPRESSION:  1. Increased malignant microcalcifications in the left breast. This could  represent treatment-related necrosis or extension of disease. 2. Extensive carcinoma seen on prior MRI is largely mammographically occult. 3. Decreased size of a metastatic left axillary lymph node with  microcalcifications. 4. BI-RADS Assessment Category 6: Known biopsy proven malignancy. Mammogram 10/22/19:  IMPRESSION:  1. BI-RADS 6: known left breast cancer. 2. Segmental pleomorphic calcifications in the left upper outer quadrant are  stable.  Calcifications left axillary lymph node have not changed significantly. Patient was informed of the results. Bilateral mastectomy 1/3/20   FINAL PATHOLOGIC DIAGNOSIS   1. Right breast, prophylactic mastectomy:   Benign breast.   2. Left breast, modified radical mastectomy:   Invasive ductal carcinoma (see synoptic report). Ductal carcinoma in situ. Atypical lobular hyperplasia with associated macrocalcifications. Usual ductal hyperplasia with associated microcalcifications. Intraductal papilloma with usual ductal hyperplasia. INVASIVE CARCINOMA OF THE BREAST: Resection   SPECIMEN   Procedure: Total mastectomy   Specimen Laterality: Left   TUMOR   Histologic Type:  Invasive carcinoma of no special type (invasive   ductal carcinoma, not otherwise specified)   Glandular (Acinar) / Tubular Differentiation: Score 3   Nuclear Pleomorphism: Score 2   Mitotic Rate: Score 2   Overall Grade: Grade 2 (scores of 6 or 7)   Tumor Size: 2.5 mm (see Comment)   Ductal Carcinoma In Situ (DCIS): Present, solid type, nuclear grade 2   with associated microcalcifications   Tumor Extent   Skin: Uninvolved by tumor cells   Treatment Effect in the Breast: Probable or definite response to   presurgical therapy in the invasive carcinoma   Treatment Effect in the Lymph Nodes: Probable or definite response   to presurgical therapy in metastatic carcinoma   MARGINS   Invasive Carcinoma Margins: Uninvolved by invasive carcinoma   Distance from Closest Margin (Millimeters): Greater than: 10 mm   Closest Margin: Posterior   DCIS Margins: Uninvolved by DCIS   Distance from Closest Margin (Millimeters): Greater than: 10 mm [employer] Patient Name: Joceline Do Specimen #:    Patient Name: Joceline Do Page 4 of 6 Printed: 01/31/20 9:32 AM SURGICAL PATHOLOGY REPORT   Closest Margin: Posterior   ER/NH/HER-2   Pending; results to be issued within addendum reports   LYMPH NODES   Regional Lymph Nodes: Involved by tumor cells   Number of Lymph Nodes with Macrometastases (> 2 mm): 2   Number of Lymph Nodes with Micrometastases (> 0.2 mm to 2 mm and   / or > 200 cells): 0   Extranodal Extension: Not identified   Number of Lymph Nodes Examined: 3   PATHOLOGIC STAGE CLASSIFICATION (pTNM, AJCC 8th Edition)   TNM Descriptors: y (post-treatment) m (multiple foci)   Primary Tumor (pT): pT1a   Regional Lymph Nodes (pN)   Category (pN): pN1a   3. Lymph nodes, left axillary dissection:   Two of three lymph nodes positive for metastatic carcinoma (2/3). 4. Excised tissue, right breast:   Fragments of skin and benign breast tissue. 5. Excised tissue, left breast:   Fragments of skin and benign breast tissue. Assessment:   1) Left breast Invasive ductal carcinoma    [id number]  GRADE 2  % GA 20% HER2 greg equivocal - FISH could not be done  Repeat biopsy of breast mass 6/24/19 - HER2 greg negative, mamma print indicates she has genomically high risk disease  S/p ddAC-T and bilateral mastectomy on 1/21/2020-[id number]    Reviewed pathology and reassured her that though she has residual disease she did indeed have an excellent response to chemotherapy    Discussed adjuvant endocrine therapy with Letrozole 2.5mg daily. This reduces the risk of recurrence by 30% and saves 1 in 3 lives    The risks and benefits of aromatase inhibitors (anastrozole, letrozole, and exemestane) were discussed in detail and the patient was informed of the following: Risks include the development of painful muscles and joints (arthralgia/myalgia) and bone loss. Joint pain can be severe but rarely result in any tissue damage; symptoms usually resolve in several weeks when the medication is stopped. Bone loss is common and a bone density test is recommended as a baseline and then every 2 years. AIs can also cause or increase hot flashes. Any other symptoms should be reported. Will obtain baseline DEXA. Ca/Vit D reviewed.      She also has met with rad onc and plans to start RT 5.5 weeks soon. 2) Hypothyroidism    3) Obesity    Plan:     · Start Letrozole 2.5 mg daily x 10 years  · Vit D / Calcium doses reviewed  · Baseline DEXA ordered  · Follow with rad onc as scheduled  · Reconstruction planned post RT     RTC in 3 months     I appreciate the opportunity to participate in Ms. Rachelle ROSELINE Garcia's care.     Seen in conjunction with Saranya Treadwell NP      Signed By: Massimo Brown MD

## 2020-02-26 ENCOUNTER — HOSPITAL ENCOUNTER (OUTPATIENT)
Dept: PHYSICAL THERAPY | Age: 55
Discharge: HOME | End: 2020-02-26
Payer: COMMERCIAL

## 2020-02-26 ENCOUNTER — DOCUMENTATION ONLY (OUTPATIENT)
Dept: SURGERY | Age: 55
End: 2020-02-26

## 2020-02-26 PROCEDURE — 97162 PT EVAL MOD COMPLEX 30 MIN: CPT | Performed by: PHYSICAL THERAPIST

## 2020-02-26 NOTE — PROGRESS NOTES
[address] [facility], [address] [place] [address] Drive  Phone: [phone]  Fax: [phone]    Plan of Care/ Statement of Necessity for Physical Therapy Services 2-15    Patient name: Dina Austin  : 1965  Provider#: [id number]  Referral source: Cheryle Solorzano MD      Medical/Treatment Diagnosis: Shoulder stiffness, left [M25.612]     Prior Hospitalization: see medical history     Comorbidities: L breast cancer, B mastectomies with expanders  Prior Level of Function: no difficulties with using B UE's for ADL's, no difficulties with lifting, reaching, pushing/pulling, or driving  Medications: Verified on Patient Summary List    Start of Care: 2020      Onset Date:        The Plan of Care and following information is based on the information from the initial evaluation. Assessment/ key information: Pt is a 62 y.o female s/p B mastectomies with expander placement, has completed chemotherapy and will begin radiation end of March or April. Pt's chief concern is development of lymphedema and the loss of motion and strength in her UE's. Pt presents with decreased B upper quadrant motion and strength, poor posture and breath pattern, and difficulty with using her arms for ADL's at or above shoulder level. Patient will benefit from skilled PT services to address functional mobility deficits, address ROM deficits, address strength deficits, analyze and address soft tissue restrictions, analyze and cue movement patterns, analyze and modify body mechanics/ergonomics, assess and modify postural abnormalities and instruct in home and community integration to address rehab goals per plan of care      Evaluation Complexity History MEDIUM  Complexity : 1-2 comorbidities / personal factors will impact the outcome/ POC ; Examination MEDIUM Complexity : 3 Standardized tests and measures addressing body structure, function, activity limitation and / or participation in recreation  ;Presentation MEDIUM Complexity : Evolving with changing characteristics  ; Clinical Decision Making MEDIUM Complexity : FOTO score of 26-74  Overall Complexity Rating: MEDIUM    Problem List: pain affecting function, decrease ROM, decrease strength, edema affecting function and decrease ADL/ functional abilitiies   Treatment Plan may include any combination of the following: Therapeutic exercise, Therapeutic activities, Neuromuscular re-education, Physical agent/modality, Manual therapy, Patient education and Functional mobility training  Patient / Family readiness to learn indicated by: asking questions, trying to perform skills and interest  Persons(s) to be included in education: patient (P) and family support person (FSP);list partner  Barriers to Learning/Limitations: None  Patient Goal (s): \"regain arm movement and prevent lymphedema  Patient Self Reported Health Status: fair  Rehabilitation Potential: good    Short Term Goals: To be accomplished in 3 treatments:  1) Pt will be Independent with skin care routine to decrease risk of infection. 2) Pt will be Independent in don/doff/wearing schedule of light chest/torso compression  3) Pt will know which signs and symptoms to report immediately to surgeon/physician concerning their condition.        Long Term Goals: To be accomplished in 20 treatments  1) Pt will be Independent with pain management routine consisting of skin care, decongestive exercises, self-manual myofascial and lymphatic stimulation techniques, strengthening and motion exercises, using  correct breath pattern and improved posture in order to decrease risk of infection and lymphedema. 2) Pt will increase ROM of affected limb by 15-20 degrees with no pain in order to return to driving, lifting requirements of her job, and perform all ADL's involving use of UE's at or above shoulder level.    3) Pt will increase strength of affected limb by 1-2 muscle grades on MMT in order to return to driving, lifting requirements of her job, and all ADL's involving use of UE's at or above shoulder level    Frequency / Duration: Patient to be seen 1-2 times per week for 20 treatments. Patient/ Caregiver education and instruction: self care, activity modification, exercises and other risk reduction education on lymphedema and infection    [x]  Plan of care has been reviewed with MEMO BOYD, CLT-KARAN 2/26/2020     ________________________________________________________________________    I certify that the above Therapy Services are being furnished while the patient is under my care. I agree with the treatment plan and certify that this therapy is necessary.     [de-identified] Signature:____________________  Date:____________Time: _________

## 2020-02-26 NOTE — PROGRESS NOTES
PT ONCOLOGY EVAL/DAILY NOTE    Patient Name: Corine Crawley  Date:2020  : 1965  [x]  Patient  Verified  Payor: [address] / Plan: [address] / Product Type: Managed Care Medicaid /    In time:1:00  Out time:2:00  Total Treatment Time (min): 60  Total Timed Codes (min): 45     Visit #: 1 of 20    Treatment Area: Shoulder stiffness, left [M25.612]    SUBJECTIVE    Current symptoms/Complaints: 1 wk following surgery with expanders, had to have another surgery d/t necrosis on 2020. Pt gets regular weekly fills, has had 2 fills. Pt reports \" feels tighter following fills\". Pt reports pain along sides of breasts, and painful reaching overhead, lifting, driving, can't do own laundry. Still has restrictions from surgeon for driving and lifting over 5 pounds    Subjective functional status:     Present Symptoms/History:   Past Medical History:   Diagnosis Date    Anxiety      Arthritis       NECK    At risk for sleep apnea       GAGAN 5     Basal cell carcinoma of skin       FACE, CHEST, BACK    Breast cancer (HCC) 2019     LEFT    History of seasonal allergies      Hx antineoplastic chemo       COMPLETED 12-3-19    Ill-defined condition       hx motion sickness    Motion sickness      Nausea & vomiting       PONV after thyroidectomy;  Hx motion sickness    Squamous cell skin cancer       Dr. Viji Souza Thyroid disease       H/ GOITER    Vertigo              Past Surgical History:   Procedure Laterality Date    HX BREAST RECONSTRUCTION Bilateral 2020     IMMEDIATE BILATERAL BREAST RECONSTRUCTION WITH TISSUE EXPANDERS AND ALLODERM GRAFTS performed by Marisol Manning [facility] at [facility]       HX COLONOSCOPY        HX HEENT        THYROIDECTOMY    HX KNEE ARTHROSCOPY Left      HX LUMBAR FUSION       HX MOHS PROCEDURES         x 4 times     HX SHOULDER ARTHROSCOPY Right      HX THYROIDECTOMY   2014    HX VASCULAR ACCESS   2019     PORTACATH RIGHT CHEST        Referring Provider: Robinson Romero MD   Medical Oncologist: Rosina Rose   Radiation Oncologist: Navneet Peña   Plastic Surgeon: Reginaldo Fountain   Primary Care Physician: Andrea Ortiz MD  Next Appointment: Rosina Rose May 20th, [place] June 1st  Diagnosis: s/p B mastectomies with expanders, B shoulder pain and stiffness  Precautions/Indications: infection, lymphedema  History of Present Illness:  Treatment History:   · 6/19: Mammogram:  Left breast mass with skin thickening, 2 suspicious nodes. Mass measures 1.4 x 0.9 x 1.3  · 6/19: MRI breast: Multicentric left breast carcinoma. Non-masslike enhancement extends from the nipple to the posterior third, involves all quadrants, and measures 106 x 44 x 79 mm. · 5/28/19: Biopsy breast- IDC % % HER 2 negative, skin biopsy benign , node biopsy mostly adipose tissue with atypical cells . BRCA1 and 2 negative. CT and bone scan negative for metastasis. Mammaprint with insufficient tissue for testing. Repeat biopsy of axillary node 6/20/19 positive for metastatic disease- repeat mammaprint - high risk  · 7/19/19: cycle 1 neoadjuvant of dd AC-T  · 9/12/19: US of breast shows increased malignant microcalcification in the left breast, etiology included tx related necrosis vs extension of disease. Decreased size in left axillary LN.     · 10/22/19: calcifications in the left upper outer quadrant are stable, calcifications in left axillary LN are not changed   · 1/21/20: b/l mastectomy     Patient Name: Robles Garcia Page 4 of 6 Printed: 01/31/20 9:32 AM   SURGICAL PATHOLOGY REPORT   Closest Margin: Posterior   ER/[place]/HER-2   Pending; results to be issued within addendum reports   LYMPH NODES   Regional Lymph Nodes: Involved by tumor cells   Number of Lymph Nodes with Macrometastases (> 2 mm): 2   Number of Lymph Nodes with Micrometastases (> 0.2 mm to 2 mm and   / or > 200 cells): 0   Extranodal Extension: Not identified   Number of Lymph Nodes Examined: 3   PATHOLOGIC STAGE CLASSIFICATION (pTNM, AJCC 8th Edition)   TNM Descriptors: y (post-treatment) m (multiple foci)   Primary Tumor (pT): pT1a   Regional Lymph Nodes (pN)   Category (pN): pN1a   3. Lymph nodes, left axillary dissection:   Two of three lymph nodes positive for metastatic carcinoma (2/3). 4. Excised tissue, right breast:   Fragments of skin and benign breast tissue. 5. Excised tissue, left breast:   Fragments of skin and benign breast tissue    Pain Intensity:2-3 on a scale of 0-10  Has your activity changed since diagnosis? yes  Limitations/Prior level of functioning: was able to lift, reach, drive without difficulty  Dominant Hand: right handed  Personal Goals: \" arm movement and prevent lymphedema\"   Home Situation (including environment, stairs, family support, if living alone, any DME used at home):  Excellent support system, partner, dog, 13 and 16 y.o, excellent. 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  Left:   No         Right:   No      Upper Extremity AROM:                                                                                 R                                 L  Shoulder Flexion   104                               101 with end range discomfort                                                     Shoulder Abduction  92 90     with end range discomfort  Shoulder Extension  5                               3         with end range discomfort    Shoulder ER   24                               17     with end range discomfort    Shoulder IR                        20                               18     with end range discomfort                                                                                                             UPPER QUARTER                           MUSCLE STRENGTH  KEY                                                              R            L  0 - No Contraction        C1, C2 Neck Flex 3+                                       1 - Trace                       C3 Side Flex          3+           3+                                                 2 - Poor                         C4 Sh Elev     4           4-                                             3 - Fair                          C5 Deltoid/Biceps  NT   NT                                   4 - Good                       C6 Wrist Ext           4   4                                            5 - Normal                     C7 Triceps             NT   NT                                                                                  C8 Thumb Ext        4   4                                                                                          T1 Hand Inst   4   4                                             MMT: See above      R  L  Shoulder flexion  NT    NT     Shoulder ER  2+    2+    Shoulder IR  2+    2+      Neurological: Sensations: Light touch: Impaired to light touch B axilla, chest              Mobility/Gait: No observed or reported deficits  Palpation: TTP B UT/LS, periscapular musculature, pec minor/major, subscap  Posture: forward head, B IR at g-h jts  Breath pattern assessment  Diaphragm: able to initiate; not primary  Anterior chest:primary   Accessory respiratory muscle use: B daysi       60 min [x]Eval                  []Re-Eval Other Objective/Functional Measures: Girth (cm)                             Distance from wrist (cm)    R  L  Palm:  20.7  20.4  Wrist:  16.5  16.3  Forearm:  25.4  24.8      15               Elbow:  27.3  27.4   Bicep:  31.3  31.2      36  Axilla:  35.8  35.2       Pain Level (0-10 scale) post treatment: 0    ASSESSMENT/Changes in Function: Pt is a 62 y.o female s/p B mastectomies with expander placement, has completed chemotherapy and will begin radiation end of March or April. Pt's chief concern is development of lymphedema and the loss of motion and strength in her UE's. Pt presents with decreased B upper quadrant motion and strength, poor posture and breath pattern, and difficulty with using her arms for ADL's at or above shoulder level. Patient will benefit from skilled PT services to address functional mobility deficits, address ROM deficits, address strength deficits, analyze and address soft tissue restrictions, analyze and cue movement patterns, analyze and modify body mechanics/ergonomics, assess and modify postural abnormalities and instruct in home and community integration to address rehab goals per plan of care          Goals:  See Plan of Care    PLAN  []  Upgrade activities as tolerated     [x]  Continue plan of care  []  Update interventions per flow sheet       []  Discharge due to:_  []  Other:_      Schuyler POSEYT, KRISTI-KARAN 2/26/2020  1:14 PM

## 2020-03-03 ENCOUNTER — APPOINTMENT (OUTPATIENT)
Dept: PHYSICAL THERAPY | Age: 55
End: 2020-03-03
Payer: COMMERCIAL

## 2020-03-05 ENCOUNTER — HOSPITAL ENCOUNTER (OUTPATIENT)
Dept: PHYSICAL THERAPY | Age: 55
Discharge: HOME | End: 2020-03-05
Payer: COMMERCIAL

## 2020-03-05 PROCEDURE — 97140 MANUAL THERAPY 1/> REGIONS: CPT | Performed by: PHYSICAL THERAPIST

## 2020-03-05 PROCEDURE — 97110 THERAPEUTIC EXERCISES: CPT | Performed by: PHYSICAL THERAPIST

## 2020-03-05 NOTE — PROGRESS NOTES
PT DAILY TREATMENT NOTE 2-15    Patient Name: Marleni Montaño  Date:3/5/2020  : 1965  [x]  Patient  Verified  Payor: [address] [place] [address] Road / Plan: [address] / Product Type: Managed Care Medicaid /    In time:10:00  Out time:11:00  Total Treatment Time (min): 60  Visit #:   2    Treatment Area: Shoulder stiffness, left [M25.612]    SUBJECTIVE  Pain Level (0-10 scale): 0  Any medication changes, allergies to medications, adverse drug reactions, diagnosis change, or new procedure performed?: [x] No    [] Yes (see summary sheet for update)  Subjective functional status/changes:   [] No changes reported  Pt reports that she had her fill this past Monday, a little sore over edges of expanders    OBJECTIVE      30 min Therapeutic Exercise:  [x] See flow sheet :   Rationale: increase ROM and increase strength to improve the patients ability to perform ADL's required for return to work and/or community         30 min Manual Therapy:  Modified UE MLD sequence, STM B pec major rolling, MFR B axilla   Rationale: decrease pain, increase ROM, increase tissue extensibility, decrease edema , decrease trigger points and increase postural awareness  to improve the patients ability to effectively use extremity during functional tasks (reaching, grooming, dressing, walking)               With   [] TE   [] TA   [] neuro   [] other: Patient Education: [] Review HEP    [x] Progressed/Changed HEP based on: objective/subjective assessments  [] positioning   [] body mechanics   [] transfers   [] heat/ice application    [] other:      Other Objective/Functional Measures:      Pain Level (0-10 scale) post treatment: 0    ASSESSMENT/Changes in Function:   Pt did well with added exercises/stretches without pain.   Patient will continue to benefit from skilled PT services to modify and progress therapeutic interventions, address functional mobility deficits, address ROM deficits, address strength deficits, analyze and address soft tissue restrictions, analyze and cue movement patterns, assess and modify postural abnormalities and instruct in home and community integration to attain remaining goals.      []  See Plan of Care  []  See progress note/recertification  []  See Discharge Summary         Progress towards goals / Updated goals:       PLAN  [x]  Upgrade activities as tolerated     [x]  Continue plan of care  []  Update interventions per flow sheet       []  Discharge due to:_  []  Other:_      Brittney BOYD, KRISTI-KARAN 3/5/2020

## 2020-03-09 ENCOUNTER — HOSPITAL ENCOUNTER (OUTPATIENT)
Dept: PHYSICAL THERAPY | Age: 55
Discharge: HOME | End: 2020-03-09
Payer: COMMERCIAL

## 2020-03-09 PROCEDURE — 97110 THERAPEUTIC EXERCISES: CPT

## 2020-03-09 PROCEDURE — 97140 MANUAL THERAPY 1/> REGIONS: CPT

## 2020-03-09 NOTE — PROGRESS NOTES
PT DAILY TREATMENT NOTE 2-15    Patient Name: Corine Crawley  Date:3/9/2020  : 1965  [x]  Patient  Verified  Payor: [address] [place] [address] Road / Plan: [address] / Product Type: Managed Care Medicaid /    In time:4:00p  Out time:5:00p  Total Treatment Time (min): 60  Visit #:   3    Treatment Area: Shoulder stiffness, left [M25.972]    SUBJECTIVE  Pain Level (0-10 scale): 0  Any medication changes, allergies to medications, adverse drug reactions, diagnosis change, or new procedure performed?: [x] No    [] Yes (see summary sheet for update)  Subjective functional status/changes:   [] No changes reported  Patient reports she just got her fill this morning. Patient states she was having some aches through both arms and after last session the ache is barely there.      OBJECTIVE      30 min Therapeutic Exercise:  [x] See flow sheet :   Rationale: increase ROM and increase strength to improve the patients ability to perform ADL's required for return to work and/or community         30 min Manual Therapy:  Modified UE MLD sequence, STM B pec major rolling, MFR B axilla   Rationale: decrease pain, increase ROM, increase tissue extensibility, decrease edema , decrease trigger points and increase postural awareness  to improve the patients ability to effectively use extremity during functional tasks (reaching, grooming, dressing, walking)               With   [] TE   [] TA   [] neuro   [] other: Patient Education: [] Review HEP    [x] Progressed/Changed HEP based on: objective/subjective assessments  [] positioning   [] body mechanics   [] transfers   [] heat/ice application    [] other:      Other Objective/Functional Measures:      Pain Level (0-10 scale) post treatment: 0    ASSESSMENT/Changes in Function:     Patient will continue to benefit from skilled PT services to modify and progress therapeutic interventions, address functional mobility deficits, address ROM deficits, address strength deficits, analyze and address soft tissue restrictions, analyze and cue movement patterns, assess and modify postural abnormalities and instruct in home and community integration to attain remaining goals. []  See Plan of Care  []  See progress note/recertification  []  See Discharge Summary         Progress towards goals / Updated goals: Patient making good initial progress towards goals with good overall tolerance for interventions.         PLAN  [x]  Upgrade activities as tolerated     [x]  Continue plan of care  []  Update interventions per flow sheet       []  Discharge due to:_  []  Other:_      Gruver Cost PTA  3/9/2020

## 2020-03-17 ENCOUNTER — APPOINTMENT (OUTPATIENT)
Dept: PHYSICAL THERAPY | Age: 55
End: 2020-03-17
Payer: COMMERCIAL

## 2020-03-18 ENCOUNTER — HOSPITAL ENCOUNTER (OUTPATIENT)
Dept: MAMMOGRAPHY | Age: 55
Discharge: HOME | End: 2020-03-18
Attending: REGISTERED NURSE
Payer: COMMERCIAL

## 2020-03-18 DIAGNOSIS — Z91.89 AT RISK FOR OSTEOPOROSIS: ICD-10-CM

## 2020-03-18 DIAGNOSIS — Z17.0 MALIGNANT NEOPLASM OF UPPER-OUTER QUADRANT OF LEFT BREAST IN FEMALE, ESTROGEN RECEPTOR POSITIVE (HCC): ICD-10-CM

## 2020-03-18 DIAGNOSIS — C50.412 MALIGNANT NEOPLASM OF UPPER-OUTER QUADRANT OF LEFT BREAST IN FEMALE, ESTROGEN RECEPTOR POSITIVE (HCC): ICD-10-CM

## 2020-03-18 PROCEDURE — 77080 DXA BONE DENSITY AXIAL: CPT

## 2020-03-19 ENCOUNTER — APPOINTMENT (OUTPATIENT)
Dept: PHYSICAL THERAPY | Age: 55
End: 2020-03-19
Payer: COMMERCIAL

## 2020-03-24 ENCOUNTER — APPOINTMENT (OUTPATIENT)
Dept: PHYSICAL THERAPY | Age: 55
End: 2020-03-24
Payer: COMMERCIAL

## 2020-03-26 ENCOUNTER — APPOINTMENT (OUTPATIENT)
Dept: PHYSICAL THERAPY | Age: 55
End: 2020-03-26
Payer: COMMERCIAL

## 2020-03-31 ENCOUNTER — APPOINTMENT (OUTPATIENT)
Dept: PHYSICAL THERAPY | Age: 55
End: 2020-03-31
Payer: COMMERCIAL

## 2020-04-02 ENCOUNTER — APPOINTMENT (OUTPATIENT)
Dept: PHYSICAL THERAPY | Age: 55
End: 2020-04-02

## 2020-05-12 ENCOUNTER — HOSPITAL ENCOUNTER (OUTPATIENT)
Dept: PHYSICAL THERAPY | Age: 55
Discharge: HOME | End: 2020-05-12
Payer: COMMERCIAL

## 2020-05-12 PROCEDURE — 97110 THERAPEUTIC EXERCISES: CPT | Performed by: PHYSICAL THERAPIST

## 2020-05-12 PROCEDURE — 97140 MANUAL THERAPY 1/> REGIONS: CPT | Performed by: PHYSICAL THERAPIST

## 2020-05-12 NOTE — PROGRESS NOTES
PT DAILY TREATMENT NOTE 2-15    Patient Name: Mell Pierson  Date:2020  : 1965  [x]  Patient  Verified  Payor: [address] [place] [address] Road / Plan: [address] / Product Type: Managed Care Medicaid /    In time:12:00p  Out time:1:00p  Total Treatment Time (min): 60  Visit #:   4    Treatment Area: Shoulder stiffness, left [M25.612]    SUBJECTIVE  Pain Level (0-10 scale): 7/10 when using L UE at or above shoulder level  Any medication changes, allergies to medications, adverse drug reactions, diagnosis change, or new procedure performed?: [] No    [x] Yes (see summary sheet for update)  Subjective functional status/changes:   [] No changes reported  Pt reports that a couple weeks ago started feeling tugging pulling in L upper arm that progressed into forearm, painful. Completed  radiation treatment has 33 total.  Has to hold breath for 15 to 20 seconds. 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 She reports walking her dog from 45min-90mins daily, has done some gardenting.         OBJECTIVE      30 min Therapeutic Exercise:  [x] See flow sheet :   Rationale: increase ROM and increase strength to improve the patients ability to perform ADL's required for return to work and/or community         30 min Manual Therapy:  Modified UE MLD sequence, STM B pec major rolling, MFR B axilla, cording techniques L UE   Rationale: decrease pain, increase ROM, increase tissue extensibility, decrease edema , decrease trigger points and increase postural awareness  to improve the patients ability to effectively use extremity during functional tasks (reaching, grooming, dressing, walking)               With   [] TE   [] TA   [] neuro   [] other: Patient Education: [] Review HEP    [x] Progressed/Changed HEP based on: objective/subjective assessments  [] positioning   [] body mechanics   [] transfers   [] heat/ice application    [] other: Other Objective/Functional Measures:      ROM:                                                                      R                            L     Scapulohumoral Control / Rhythm:     impaired                   impaired    Able to eccentrically lower with good control?  Left:   No         Right:   No        Upper Extremity AROM:                                                                                      R                                 L  Shoulder Flexion                           135                             85 with end range discomfort                                                     Shoulder Abduction                      112                               80     with end range discomfort  Shoulder Extension                       12                                5        with end range discomfort    Shoulder ER                                  33                              10   with end range discomfort    Shoulder IR                                   20                               13     with end range discomfort                                                                                                                                     UPPER QUARTER                           MUSCLE STRENGTH  KEY                                                              R            L  0 - No Contraction        C1, C2 Neck Flex        3+                                       1 - Trace                       C3 Side Flex          3+           3+                                                 2 - Poor                         C4 Sh Elev               4           4-                                             3 - Fair                          C5 Deltoid/Biceps  3-        3-                                   4 - Good                       C6 Wrist Ext           4       4                                            5 - Normal                     C7 Triceps             4-     3+                                                                                 G7 Thumb Ext        4         4                                                                                          T1 Hand Inst           4        4                                              MMT: See above                                            R                      L  Shoulder flexion                3+                   2-         Shoulder ER                      2+                    2-    Shoulder IR                       2+                    2-      Neurological: Sensations: Light touch: Impaired to light touch B axilla, chest               Mobility/Gait: No observed or reported deficits  Palpation: TTP B UT/LS, periscapular musculature, pec minor/major, subscap and teres major B (L>R)   Posture: forward head, B IR at g-h jts  Breath pattern assessment  Diaphragm: able to initiate; not primary  Anterior chest:primary   Accessory respiratory muscle use: none                                                                                   Other Objective/Functional Measures: Girth (cm)                             Distance from wrist (cm)                          R                      L  Palm:               20.7                 20.4  Wrist:               16.5                 16.3  Forearm:         25.4                 25.7                                                                 15               Elbow:             27.3                 28.6       Bicep:              31.3                 33.1                                                                 36  Axilla:               35.8                 36.7                     Pain Level (0-10 scale) post treatment: 4-5    ASSESSMENT/Changes in Function: See Progress note.     Patient will continue to benefit from skilled PT services to modify and progress therapeutic interventions, address functional mobility deficits, address ROM deficits, address strength deficits, analyze and address soft tissue restrictions, analyze and cue movement patterns, assess and modify postural abnormalities and instruct in home and community integration to attain remaining goals.      []  See Plan of Care  [x]  See progress note/recertification  []  See Discharge Summary         Progress towards goals / Updated goals: see progress note  PLAN  [x]  Upgrade activities as tolerated     [x]  Continue plan of care  []  Update interventions per flow sheet       []  Discharge due to:_  []  Other:_      Kavon BOYD, KRISTI-KARAN  5/12/2020

## 2020-05-12 NOTE — PROGRESS NOTES
[facility]  [place]  [facility] [address] [place]  Phone: [phone]      Fax:  [phone]    Progress Note    Name: Karissa Joseph   : 1965   MD: Ruddy Zhu MD       Treatment Diagnosis: Shoulder stiffness, left [M25.612]  Start of Care: 2020    Visits from Start of Care: 4  Missed Visits: 6 visits d/t COVID-19    Summary of Care:Pt had been receiving PT for decreased shoulder function and pain including manual therapy, therapeutic ex, and HEP progression. D/T COVID-19 visits after 3/9/20 were placed on hold. Pt arrived today with continued B shoulder pain and decreased ROM but with increased L UE tightness and axillary cording. Assessment / Recommendations: Pt presents with decreased L upper quadrant motion and strength with palpable cording evident from axillary lymph node incision into mid forearm. Pt would benefit for continued PT to address previous shoulder dysfunction as well as new onset of axillary webbing/cording. Recommending 1-2xwk for an additional 4-6 weeks to address and complete below rehab goals. GOAL STATUS/UPDATED GOALS:  Short Term Goals:   1) Pt will be Independent with skin care routine to decrease risk of infection. MET  2) Pt will be Independent in don/doff/wearing schedule of light chest/torso compression PROGRESSING  3) Pt will know which signs and symptoms to report immediately to surgeon/physician concerning their condition. MET        Long Term Goals: To be accomplished in 20 treatments  1) Pt will be Independent with pain management routine consisting of skin care, decongestive exercises, self-manual myofascial and lymphatic stimulation techniques, strengthening and motion exercises, using  correct breath pattern and improved posture in order to decrease risk of infection and lymphedema. PROGRESSING  2) Pt will increase ROM of affected limb by 15-20 degrees with 0/10 pain in order to return to driving, lifting requirements of her job, and perform all ADL's involving use of UE's at or above shoulder level. PROGRESSING  3) Pt will increase strength of affected limb by 1-2 muscle grades on MMT in order to return to driving, lifting requirements of her job, and all ADL's involving use of UE's at or above shoulder levelPROGRESSING      Baldev Aponte MSPT, CLT-KARAN  5/12/2020    ________________________________________________________________________  NOTE TO PHYSICIAN:  Please complete the following and fax to: [facility]: [phone]  . Retain this original for your records. If you are unable to process this request in 24 hours, please contact our office.        ____ I have read the above report and request that my patient continue therapy with the following changes/special instructions:  ____ I have read the above report and request that my patient be discharged from therapy    Physician's Signature:_________________ Date:___________Time:__________

## 2020-05-14 ENCOUNTER — HOSPITAL ENCOUNTER (OUTPATIENT)
Dept: PHYSICAL THERAPY | Age: 55
Discharge: HOME | End: 2020-05-14
Payer: COMMERCIAL

## 2020-05-14 PROCEDURE — 97140 MANUAL THERAPY 1/> REGIONS: CPT | Performed by: PHYSICAL THERAPIST

## 2020-05-14 PROCEDURE — 97110 THERAPEUTIC EXERCISES: CPT | Performed by: PHYSICAL THERAPIST

## 2020-05-14 NOTE — PROGRESS NOTES
PT DAILY TREATMENT NOTE 2-15    Patient Name: Jamaal Ragsdale  Date:2020  : 1965  [x]  Patient  Verified  Payor: [address] [place] [address] Road / Plan: [address] / Product Type: Managed Care Medicaid /    In time:12:00  Out time:1:15  Total Treatment Time (min): 75  Visit #:   5    Treatment Area: Shoulder stiffness, left [M25.612]    SUBJECTIVE  Pain Level (0-10 scale): 4-5  Any medication changes, allergies to medications, adverse drug reactions, diagnosis change, or new procedure performed?: [x] No    [] Yes (see summary sheet for update)  Subjective functional status/changes:   [] No changes reported  Pt reports that she likes the tennis ball TrP exercise and feels it is already helping, she reports no problems with HEP    OBJECTIVE       30 min Therapeutic Exercise:  [x] See flow sheet :   Rationale: increase ROM, increase strength and increase proprioception to improve the patients ability to perform ADL's required for return to work and/or community     45 min Manual Therapy:  Modified UE MLD sequence, PROM B shoulders all planes, cording techniques, cord rolling MFR L axilla, ant elbow with wrist PROM and elbow supination   Rationale: decrease pain, increase ROM, increase tissue extensibility, decrease edema , decrease trigger points and increase postural awareness  to improve the patients ability to effectively use extremity during functional tasks (reaching, grooming, dressing, walking)             With   [x] TE   [] TA   [] neuro   [] other: Patient Education: [x] Review HEP    [x] Progressed/Changed HEP based on: subjective and objective assessments  [] positioning   [] body mechanics   [] transfers   [] heat/ice application    [] other:      Other Objective/Functional Measures:       Pain Level (0-10 scale) post treatment: 2-3    ASSESSMENT/Changes in Function:   Pt had increased tolerance to manual techniques with decreased sensitivity at L axilla.  She performed pulleys and UBE with only complaint being tightness and fatigue but no increased pain  Patient will continue to benefit from skilled PT services to modify and progress therapeutic interventions, address functional mobility deficits, address ROM deficits, address strength deficits, analyze and address soft tissue restrictions, analyze and cue movement patterns, analyze and modify body mechanics/ergonomics, assess and modify postural abnormalities and instruct in home and community integration to attain remaining goals.      []  See Plan of Care  []  See progress note/recertification  []  See Discharge Summary         Progress towards goals / Updated goals:       PLAN  [x]  Upgrade activities as tolerated     [x]  Continue plan of care  []  Update interventions per flow sheet       []  Discharge due to:_  []  Other:_      Gisela Gonsalves MSPT, CLT-KARAN 5/14/2020

## 2020-05-19 ENCOUNTER — HOSPITAL ENCOUNTER (OUTPATIENT)
Dept: PHYSICAL THERAPY | Age: 55
Discharge: HOME | End: 2020-05-19
Payer: COMMERCIAL

## 2020-05-19 PROCEDURE — 97140 MANUAL THERAPY 1/> REGIONS: CPT | Performed by: PHYSICAL THERAPIST

## 2020-05-19 PROCEDURE — 97110 THERAPEUTIC EXERCISES: CPT | Performed by: PHYSICAL THERAPIST

## 2020-05-19 NOTE — PROGRESS NOTES
PT DAILY TREATMENT NOTE 2-15    Patient Name: Amisha Maynard  Date:2020  : 1965  [x]  Patient  Verified  Payor: [address] [place] [address] Road / Plan: [address] / Product Type: Managed Care Medicaid /    In time:12:15  Out time:1:00  Total Treatment Time (min): 45  Visit #:   6    Treatment Area: Shoulder stiffness, left [M25.612]    SUBJECTIVE  Pain Level (0-10 scale): 5 in R axilla (tear from radiation)  Any medication changes, allergies to medications, adverse drug reactions, diagnosis change, or new procedure performed?: [x] No    [] Yes (see summary sheet for update)  Subjective functional status/changes:   [] No changes reported  Pt reports that she noticed pain in her right armpit after last Friday's radiation treatment (now has 9 more to go) and noticed a skin tear, she met with radiation doc and they said to only apply bacitracin once per day. She states it is very uncomfortable to put her arm down and she is unable to place any kind of dressing as per instructions.     OBJECTIVE       15 min Therapeutic Exercise:  [x] See flow sheet :   Rationale: increase ROM, increase strength and increase proprioception to improve the patients ability to perform ADL's required for return to work and/or community     30 min Manual Therapy:  Modified UE MLD sequence, PROM B shoulders all planes, cording techniques, cord rolling MFR neck and arm pull, L axilla, ant elbow with wrist PROM and elbow supination TrP L subscapularis/pronator teres   Rationale: decrease pain, increase ROM, increase tissue extensibility, decrease edema , decrease trigger points and increase postural awareness  to improve the patients ability to effectively use extremity during functional tasks (reaching, grooming, dressing, walking)             With   [x] TE   [] TA   [] neuro   [] other: Patient Education: [x] Review HEP    [] Progressed/Changed HEP based on:    [] positioning   [] body mechanics   [] transfers [] heat/ice application    [] other:      Other Objective/Functional Measures:  TTP along pronator teres and subscapularis L      Pain Level (0-10 scale) post treatment: 4-5    ASSESSMENT/Changes in Function:   No manual therapy performed in radiated field, focus today was on reducing friction in L axilla and periscapular and pericervical musculature. Patient will continue to benefit from skilled PT services to modify and progress therapeutic interventions, address functional mobility deficits, address ROM deficits, address strength deficits, analyze and address soft tissue restrictions, analyze and cue movement patterns, analyze and modify body mechanics/ergonomics, assess and modify postural abnormalities and instruct in home and community integration to attain remaining goals.      []  See Plan of Care  []  See progress note/recertification  []  See Discharge Summary         Progress towards goals / Updated goals:       PLAN  [x]  Upgrade activities as tolerated     [x]  Continue plan of care  []  Update interventions per flow sheet       []  Discharge due to:_  []  Other:_      Fadumo BOYD, AVA 5/19/2020

## 2020-05-21 ENCOUNTER — HOSPITAL ENCOUNTER (OUTPATIENT)
Dept: PHYSICAL THERAPY | Age: 55
Discharge: HOME | End: 2020-05-21
Payer: COMMERCIAL

## 2020-05-21 PROCEDURE — 97110 THERAPEUTIC EXERCISES: CPT | Performed by: PHYSICAL THERAPIST

## 2020-05-21 PROCEDURE — 97140 MANUAL THERAPY 1/> REGIONS: CPT | Performed by: PHYSICAL THERAPIST

## 2020-05-21 NOTE — PROGRESS NOTES
PT DAILY TREATMENT NOTE 2-15    Patient Name: Celine Cornejo  Date:2020  : 1965  [x]  Patient  Verified  Payor: [address] [place] [address] Road / Plan: [address] / Product Type: Managed Care Medicaid /    In time:12:00  Out time:1:00  Total Treatment Time (min): 60  Visit #:   7    Treatment Area: Shoulder stiffness, left [M25.612]    SUBJECTIVE  Pain Level (0-10 scale): 3 in R axilla (tear from radiation now closed  Any medication changes, allergies to medications, adverse drug reactions, diagnosis change, or new procedure performed?: [x] No    [] Yes (see summary sheet for update)  Subjective functional status/changes:   [] No changes reported  Pt reports that she only has 2 additional radiation treatments instead of 7 and she is very relieved, states armpit area is still painful and that she is trying to avoid friction.      OBJECTIVE       15 min Therapeutic Exercise:  [x] See flow sheet :   Rationale: increase ROM, increase strength and increase proprioception to improve the patients ability to perform ADL's required for return to work and/or community     45 min Manual Therapy:  Modified UE MLD sequence, PROM B shoulders all planes, cording techniques, cord rolling MFR neck and arm pull, L axilla, ant elbow with wrist PROM and elbow supination TrP L subscapularis/pronator teres   Rationale: decrease pain, increase ROM, increase tissue extensibility, decrease edema , decrease trigger points and increase postural awareness  to improve the patients ability to effectively use extremity during functional tasks (reaching, grooming, dressing, walking)             With   [x] TE   [] TA   [] neuro   [] other: Patient Education: [x] Review HEP    [] Progressed/Changed HEP based on:    [] positioning   [] body mechanics   [] transfers   [] heat/ice application    [] other:      Other Objective/Functional Measures:  TTP along pronator teres and subscapularis L      Pain Level (0-10 scale) post treatment:2-3    ASSESSMENT/Changes in Function:   No manual therapy performed in radiated field, focus today was on reducing friction in L axilla and periscapular and pericervical musculature. Palpable fascial restrictions today L upper arm, elbow and into wrist. Pt will be measured for a prophylactic sleeve and gauntlet next visit  Patient will continue to benefit from skilled PT services to modify and progress therapeutic interventions, address functional mobility deficits, address ROM deficits, address strength deficits, analyze and address soft tissue restrictions, analyze and cue movement patterns, analyze and modify body mechanics/ergonomics, assess and modify postural abnormalities and instruct in home and community integration to attain remaining goals.      []  See Plan of Care  []  See progress note/recertification  []  See Discharge Summary         Progress towards goals / Updated goals:       PLAN  [x]  Upgrade activities as tolerated     [x]  Continue plan of care  []  Update interventions per flow sheet       []  Discharge due to:_  [x]  Other: Measure for sleeve and glove     Mariela POSEYT, CLT-KARAN 5/21/2020

## 2020-05-26 ENCOUNTER — HOSPITAL ENCOUNTER (OUTPATIENT)
Dept: PHYSICAL THERAPY | Age: 55
Discharge: HOME | End: 2020-05-26
Payer: COMMERCIAL

## 2020-05-26 PROCEDURE — 97140 MANUAL THERAPY 1/> REGIONS: CPT | Performed by: PHYSICAL THERAPIST

## 2020-05-26 PROCEDURE — 97110 THERAPEUTIC EXERCISES: CPT | Performed by: PHYSICAL THERAPIST

## 2020-05-26 NOTE — PROGRESS NOTES
PT DAILY TREATMENT NOTE 2-15    Patient Name: Marium Wallace  Date:2020  : 1965  [x]  Patient  Verified  [facility] [address] / Plan: [address] / Product Type: Managed Care Medicaid /    In time:12:00  Out time:1:00  Total Treatment Time (min): 60  Visit #:   8    Treatment Area: Shoulder stiffness, left [M25.612]    SUBJECTIVE  Pain Level (0-10 scale): 1-2 in R axilla  Any medication changes, allergies to medications, adverse drug reactions, diagnosis change, or new procedure performed?: [x] No    [] Yes (see summary sheet for update)  Subjective functional status/changes:   [] No changes reported  \"much better in armpit, no longer feels pulling into arm, now from radiation I have peeling and discomfort under L breast\"     OBJECTIVE       30 min Therapeutic Exercise:  [x] See flow sheet :   Rationale: increase ROM, increase strength and increase proprioception to improve the patients ability to perform ADL's required for return to work and/or community     30 min Manual Therapy:  Modified UE MLD sequence, PROM B shoulders all planes, cording techniques, cord rolling MFR neck and arm pull, L axilla, ant elbow with wrist PROM and elbow supination TrP L subscapularis/pronator teres   Rationale: decrease pain, increase ROM, increase tissue extensibility, decrease edema , decrease trigger points and increase postural awareness  to improve the patients ability to effectively use extremity during functional tasks (reaching, grooming, dressing, walking)             With   [x] TE   [] TA   [] neuro   [] other: Patient Education: [x] Review HEP    [] Progressed/Changed HEP based on:    [] positioning   [] body mechanics   [] transfers   [] heat/ice application    [] other:      Other Objective/Functional Measures:     Girth (cm)                                   Distance from wrist (cm)     L   R  Palm:   21.2   19.2  Wrist:   17   16.4  Forearm:     25.7   25.4    17 Elbow:   28.2   28  Bicep:   33.7   33    38.5  Axilla:   37.9   33.6      Pain Level (0-10 scale) post treatment: 0    ASSESSMENT/Changes in Function:   Pt had decreased hypersensitivity to manual tx in areas of pec minor and pronator teres. Pt continues to be guarded with all motions of R UE at or above shoulder level. Avoidance of direct manual therapy over radiated tissue continues. Pt measured for a compression sleeve and gauntlet today. Patient will continue to benefit from skilled PT services to modify and progress therapeutic interventions, address functional mobility deficits, address ROM deficits, address strength deficits, analyze and address soft tissue restrictions, analyze and cue movement patterns, analyze and modify body mechanics/ergonomics, assess and modify postural abnormalities and instruct in home and community integration to attain remaining goals.      []  See Plan of Care  []  See progress note/recertification  []  See Discharge Summary         Progress towards goals / Updated goals:       PLAN  [x]  Upgrade activities as tolerated     [x]  Continue plan of care  []  Update interventions per flow sheet       []  Discharge due to:_  [x]  Other: send for authorization for roz BOYD, CLT-KARAN 5/26/2020

## 2020-05-28 ENCOUNTER — HOSPITAL ENCOUNTER (OUTPATIENT)
Dept: PHYSICAL THERAPY | Age: 55
Discharge: HOME | End: 2020-05-28
Payer: COMMERCIAL

## 2020-05-28 PROCEDURE — 97140 MANUAL THERAPY 1/> REGIONS: CPT | Performed by: PHYSICAL THERAPIST

## 2020-05-28 PROCEDURE — 97110 THERAPEUTIC EXERCISES: CPT | Performed by: PHYSICAL THERAPIST

## 2020-05-28 NOTE — PROGRESS NOTES
PT DAILY TREATMENT NOTE 2-15    Patient Name: Brooks Vasquez  Date:2020  : 1965  [x]  Patient  Verified  Payor: [address] / Plan: [address] / Product Type: Managed Care Medicaid /    In time:1:00  Out time:2:00  Total Treatment Time (min): 60  Visit #:   9    Treatment Area: Shoulder stiffness, left [M25.612]    SUBJECTIVE  Pain Level (0-10 scale): 1-2 inferior L breast  Any medication changes, allergies to medications, adverse drug reactions, diagnosis change, or new procedure performed?: [x] No    [] Yes (see summary sheet for update)  Subjective functional status/changes:   [x] No changes reported        OBJECTIVE       30 min Therapeutic Exercise:  [x] See flow sheet :   Rationale: increase ROM, increase strength and increase proprioception to improve the patients ability to perform ADL's required for return to work and/or community     30 min Manual Therapy:  Modified UE MLD sequence, PROM B shoulders all planes,  MFR neck and arm pull, L axilla, ant elbow with wrist PROM and elbow supination TrP  B subscapularis/pronator teres   Rationale: decrease pain, increase ROM, increase tissue extensibility, decrease edema , decrease trigger points and increase postural awareness  to improve the patients ability to effectively use extremity during functional tasks (reaching, grooming, dressing, walking)             With   [x] TE   [] TA   [] neuro   [] other: Patient Education: [x] Review HEP    [] Progressed/Changed HEP based on:    [] positioning   [] body mechanics   [] transfers   [] heat/ice application    [] other:      Other Objective/Functional Measures:        Pain Level (0-10 scale) post treatment: 0    ASSESSMENT/Changes in Function:  Pt tolerated manual therapy well, TTP throughout B scapular and paracervical musculature.      Patient will continue to benefit from skilled PT services to modify and progress therapeutic interventions, address functional mobility deficits, address ROM deficits, address strength deficits, analyze and address soft tissue restrictions, analyze and cue movement patterns, analyze and modify body mechanics/ergonomics, assess and modify postural abnormalities and instruct in home and community integration to attain remaining goals.      []  See Plan of Care  []  See progress note/recertification  []  See Discharge Summary         Progress towards goals / Updated goals:       PLAN  [x]  Upgrade activities as tolerated     [x]  Continue plan of care  []  Update interventions per flow sheet       []  Discharge due to:_  []  Other:     [facility] 5/28/2020

## 2020-06-01 ENCOUNTER — VIRTUAL VISIT (OUTPATIENT)
Dept: SURGERY | Age: 55
End: 2020-06-01

## 2020-06-01 DIAGNOSIS — C77.3 BREAST CANCER METASTASIZED TO AXILLARY LYMPH NODE, LEFT (HCC): Primary | ICD-10-CM

## 2020-06-01 DIAGNOSIS — C50.912 BREAST CANCER METASTASIZED TO AXILLARY LYMPH NODE, LEFT (HCC): Primary | ICD-10-CM

## 2020-06-02 ENCOUNTER — HOSPITAL ENCOUNTER (OUTPATIENT)
Dept: PHYSICAL THERAPY | Age: 55
Discharge: HOME | End: 2020-06-02
Payer: COMMERCIAL

## 2020-06-02 PROCEDURE — 97110 THERAPEUTIC EXERCISES: CPT | Performed by: PHYSICAL THERAPIST

## 2020-06-02 PROCEDURE — 97140 MANUAL THERAPY 1/> REGIONS: CPT | Performed by: PHYSICAL THERAPIST

## 2020-06-02 NOTE — PROGRESS NOTES
PT DAILY TREATMENT NOTE 2-15    Patient Name: Lorrayne Dakin  Date:2020  : 1965  [x]  Patient  Verified  Payor: [address] [place], [place] [address] / Plan: [address] / Product Type: Managed Care Medicaid /    In time:12:00  Out time:1:00  Total Treatment Time (min): 60  Visit #:   10    Treatment Area: Shoulder stiffness, left [M25.612]    SUBJECTIVE  Pain Level (0-10 scale): 0 \"just sore\" inferior L breast from radiation  Any medication changes, allergies to medications, adverse drug reactions, diagnosis change, or new procedure performed?: [x] No    [] Yes (see summary sheet for update)  Subjective functional status/changes:   [] No changes reported   Pt states that she felt \"so much better\" following last tx but since then her shoulders have gradually gotten tighter\"   OBJECTIVE       30 min Therapeutic Exercise:  [x] See flow sheet :   Rationale: increase ROM, increase strength and increase proprioception to improve the patients ability to perform ADL's required for return to work and/or community     30 min Manual Therapy:  Modified UE MLD sequence, PROM B shoulders all planes,  MFR neck and arm pull, L axilla, ant elbow with wrist PROM and elbow supination TrP  B subscapularis/pronator teres   Rationale: decrease pain, increase ROM, increase tissue extensibility, decrease edema , decrease trigger points and increase postural awareness  to improve the patients ability to effectively use extremity during functional tasks (reaching, grooming, dressing, walking)             With   [x] TE   [] TA   [] neuro   [] other: Patient Education: [x] Review HEP    [] Progressed/Changed HEP based on:    [] positioning   [] body mechanics   [] transfers   [] heat/ice application    [] other:      Other Objective/Functional Measures:        Pain Level (0-10 scale) post treatment: 0    ASSESSMENT/Changes in Function:   Pt tolerated increased intensity of stretching without discomfort.  Patient will continue to benefit from skilled PT services to modify and progress therapeutic interventions, address functional mobility deficits, address ROM deficits, address strength deficits, analyze and address soft tissue restrictions, analyze and cue movement patterns, analyze and modify body mechanics/ergonomics, assess and modify postural abnormalities and instruct in home and community integration to attain remaining goals.      []  See Plan of Care  []  See progress note/recertification  []  See Discharge Summary         Progress towards goals / Updated goals:       PLAN  [x]  Upgrade activities as tolerated     [x]  Continue plan of care  []  Update interventions per flow sheet       []  Discharge due to:_  []  Other:     [address] [facility], [facility] 6/2/2020

## 2020-06-02 NOTE — PROGRESS NOTES
HPI ESTABLISHED patient here today for post op follow up BILATERAL mastectomies and ALND with implants for LEFT breast cancer on 1/21/2020. She had T2 N1 left breast ca. The patient is doing well . She has finished xrt and is seeing PT. Today we are having a follow up visit. She sees med onc soon. I would like for her to come in for physical exan in September and she will do this. Minerva Dial is a 62 y.o. female being evaluated by a Virtual Visit (video visit) encounter to address concerns as mentioned above. A caregiver was present when appropriate. Due to this being a TeleHealth encounter (During TIUJJ-63 public health emergency), evaluation of the following organ systems was limited: Vitals/Constitutional/EENT/Resp/CV/GI//MS/Neuro/Skin/Heme-Lymph-Imm. Pursuant to the emergency declaration under the [address] [place] [address] and the [employer] Act, this Virtual Visit was conducted with patient's (and/or legal guardian's) consent, to reduce the risk of exposure to COVID-19 and provide necessary medical care. Services were provided through a video synchronous discussion virtually to substitute for in-person encounter. --Fariba Ontiveros MD on 6/2/2020 at 11:49 AM    An electronic signature was used to authenticate this note.

## 2020-06-04 ENCOUNTER — HOSPITAL ENCOUNTER (OUTPATIENT)
Dept: PHYSICAL THERAPY | Age: 55
Discharge: HOME | End: 2020-06-04
Payer: COMMERCIAL

## 2020-06-04 PROCEDURE — 97110 THERAPEUTIC EXERCISES: CPT | Performed by: PHYSICAL THERAPIST

## 2020-06-04 PROCEDURE — 97140 MANUAL THERAPY 1/> REGIONS: CPT | Performed by: PHYSICAL THERAPIST

## 2020-06-04 NOTE — PROGRESS NOTES
PT DAILY TREATMENT NOTE 2-15    Patient Name: Nazanin Roger  Date:2020  : 1965  [x]  Patient  Verified  Payor: [address] [place] [address] Road / Plan: [address] / Product Type: Managed Care Medicaid /    In time:12:00  Out time:1:00  Total Treatment Time (min): 60  Visit #:   11    Treatment Area: Shoulder stiffness, left [M25.612]    SUBJECTIVE  Pain Level (0-10 scale): 0 \"just sore\" inferior L breast from radiation  Any medication changes, allergies to medications, adverse drug reactions, diagnosis change, or new procedure performed?: [x] No    [] Yes (see summary sheet for update)  Subjective functional status/changes:   [] No changes reported  Pt has had no success in finding a vendor that takes her insurance for compression. She states that she has been doing a lot painting and she continues to have to take frequent breaks d/t at or above shoulder level repetitive activities.     OBJECTIVE       30 min Therapeutic Exercise:  [x] See flow sheet :   Rationale: increase ROM, increase strength and increase proprioception to improve the patients ability to perform ADL's required for return to work and/or community     30 min Manual Therapy:  Modified UE MLD sequence, PROM B shoulders all planes,  MFR neck and arm pull,  TrP  B subscapularis/lev scap/pec minor   Rationale: decrease pain, increase ROM, increase tissue extensibility, decrease edema , decrease trigger points and increase postural awareness  to improve the patients ability to effectively use extremity during functional tasks (reaching, grooming, dressing, walking)             With   [x] TE   [] TA   [] neuro   [] other: Patient Education: [x] Review HEP    [x] Progressed/Changed HEP based on: subjective and objective assessments   [] positioning   [] body mechanics   [] transfers   [] heat/ice application    [] other:      Other Objective/Functional Measures:        Pain Level (0-10 scale) post treatment: 0    ASSESSMENT/Changes in Function:    Patient will continue to benefit from skilled PT services to modify and progress therapeutic interventions, address functional mobility deficits, address ROM deficits, address strength deficits, analyze and address soft tissue restrictions, analyze and cue movement patterns, analyze and modify body mechanics/ergonomics, assess and modify postural abnormalities and instruct in home and community integration to attain remaining goals.      []  See Plan of Care  []  See progress note/recertification  []  See Discharge Summary         Progress towards goals / Updated goals:       PLAN  [x]  Upgrade activities as tolerated     [x]  Continue plan of care  []  Update interventions per flow sheet       []  Discharge due to:_  [x]  Other: Find vendor that takes [address], CLT-KARAN 6/4/2020

## 2020-06-09 ENCOUNTER — HOSPITAL ENCOUNTER (OUTPATIENT)
Dept: PHYSICAL THERAPY | Age: 55
Discharge: HOME | End: 2020-06-09
Payer: COMMERCIAL

## 2020-06-09 PROCEDURE — 97110 THERAPEUTIC EXERCISES: CPT | Performed by: PHYSICAL THERAPIST

## 2020-06-09 PROCEDURE — 97140 MANUAL THERAPY 1/> REGIONS: CPT | Performed by: PHYSICAL THERAPIST

## 2020-06-09 NOTE — PROGRESS NOTES
PT DAILY TREATMENT NOTE 2-15    Patient Name: Jered Fischer  Date:2020  : 1965  [x]  Patient  Verified  [facility] [address] / Plan: [address] / Product Type: Managed Care Medicaid /    In time:12:00  Out time:1:00  Total Treatment Time (min): 60  Visit #:   12    Treatment Area: Shoulder stiffness, left [M25.612]    SUBJECTIVE  Pain Level (0-10 scale): 0   Any medication changes, allergies to medications, adverse drug reactions, diagnosis change, or new procedure performed?: [x] No    [] Yes (see summary sheet for update)  Subjective functional status/changes:   [] No changes reported   Pt states that her left side is \"much better\" but still has discomfort in the back of R shoulder blade, but that the lacrosse ball has helped. OBJECTIVE       45 min Therapeutic Exercise:  [x] See flow sheet :   Rationale: increase ROM, increase strength and increase proprioception to improve the patients ability to perform ADL's required for return to work and/or community     15 min Manual Therapy:  PROM B shoulders all planes,  MFR neck and arm pull,  TrP  B subscapularis/lev scap/pec minor   Rationale: decrease pain, increase ROM, increase tissue extensibility, decrease edema , decrease trigger points and increase postural awareness  to improve the patients ability to effectively use extremity during functional tasks (reaching, grooming, dressing, walking)             With   [x] TE   [] TA   [] neuro   [] other: Patient Education: [x] Review HEP    [x] Progressed/Changed HEP based on: subjective and objective assessments   [] positioning   [] body mechanics   [] transfers   [] heat/ice application    [] other:      Other Objective/Functional Measures:        Pain Level (0-10 scale) post treatment: 0    ASSESSMENT/Changes in Function:    No palpable cording present on L UE.  TTP R levator scapulae.  Patient will continue to benefit from skilled PT services to modify and progress therapeutic interventions, address functional mobility deficits, address ROM deficits, address strength deficits, analyze and address soft tissue restrictions, analyze and cue movement patterns, analyze and modify body mechanics/ergonomics, assess and modify postural abnormalities and instruct in home and community integration to attain remaining goals.      []  See Plan of Care  []  See progress note/recertification  []  See Discharge Summary         Progress towards goals / Updated goals:       PLAN  [x]  Upgrade activities as tolerated     [x]  Continue plan of care  []  Update interventions per flow sheet       []  Discharge due to:_  [x]  Other:Progress note next visit        [facility], [facility] 6/9/2020

## 2020-06-11 ENCOUNTER — DOCUMENTATION ONLY (OUTPATIENT)
Dept: SURGERY | Age: 55
End: 2020-06-11

## 2020-06-11 ENCOUNTER — HOSPITAL ENCOUNTER (OUTPATIENT)
Dept: PHYSICAL THERAPY | Age: 55
Discharge: HOME | End: 2020-06-11
Payer: COMMERCIAL

## 2020-06-11 ENCOUNTER — VIRTUAL VISIT (OUTPATIENT)
Dept: ONCOLOGY | Age: 55
End: 2020-06-11

## 2020-06-11 DIAGNOSIS — C50.412 MALIGNANT NEOPLASM OF UPPER-OUTER QUADRANT OF LEFT BREAST IN FEMALE, ESTROGEN RECEPTOR POSITIVE (HCC): ICD-10-CM

## 2020-06-11 DIAGNOSIS — Z17.0 MALIGNANT NEOPLASM OF UPPER-OUTER QUADRANT OF LEFT BREAST IN FEMALE, ESTROGEN RECEPTOR POSITIVE (HCC): ICD-10-CM

## 2020-06-11 DIAGNOSIS — E66.09 CLASS 1 OBESITY DUE TO EXCESS CALORIES WITHOUT SERIOUS COMORBIDITY WITH BODY MASS INDEX (BMI) OF 30.0 TO 30.9 IN ADULT: Primary | ICD-10-CM

## 2020-06-11 PROCEDURE — 97110 THERAPEUTIC EXERCISES: CPT | Performed by: PHYSICAL THERAPIST

## 2020-06-11 PROCEDURE — 97140 MANUAL THERAPY 1/> REGIONS: CPT | Performed by: PHYSICAL THERAPIST

## 2020-06-11 RX ORDER — LETROZOLE 2.5 MG/1
2.5 TABLET, FILM COATED ORAL DAILY
Qty: 30 TAB | Refills: 6 | Status: SHIPPED | OUTPATIENT
Start: 2020-06-11 | End: 2020-07-23 | Stop reason: ALTCHOICE

## 2020-06-11 NOTE — PROGRESS NOTES
[facility] [place] per request. Fax confirmation received.

## 2020-06-11 NOTE — PROGRESS NOTES
PT DAILY TREATMENT NOTE 2-15    Patient Name: Peter Osorio  Date:2020  : 1965  [x]  Patient  Verified  Payor: [address] [place] [address] Road / Plan: [address] / Product Type: Managed Care Medicaid /    In time:12:00  Out time:1:00  Total Treatment Time (min): 60  Visit #:   13    Treatment Area: Shoulder stiffness, left [M25.612]    SUBJECTIVE  Pain Level (0-10 scale): 0   Any medication changes, allergies to medications, adverse drug reactions, diagnosis change, or new procedure performed?: [x] No    [] Yes (see summary sheet for update)  Subjective functional status/changes:   [] No changes reported  Pt has virtual visit tomorrow with Dr Olivia Cronin. Pt reported that R side behind shoulder felt much better after last visit. She states that her skin in radiated field is healing well and closed but still feeling tight.     OBJECTIVE       45 min Therapeutic Exercise:  [x] See flow sheet :   Rationale: increase ROM, increase strength and increase proprioception to improve the patients ability to perform ADL's required for return to work and/or community     15 min Manual Therapy:  PROM B shoulders all planes,  MFR neck and arm pull,  TrP  B subscapularis/lev scap/pec minor   Rationale: decrease pain, increase ROM, increase tissue extensibility, decrease edema , decrease trigger points and increase postural awareness  to improve the patients ability to effectively use extremity during functional tasks (reaching, grooming, dressing, walking)             With   [] TE   [] TA   [] neuro   [] other: Patient Education: [x] Review HEP    [] Progressed/Changed HEP based on:     [] positioning   [] body mechanics   [] transfers   [] heat/ice application    [] other:      Other Objective/Functional Measures:        Pain Level (0-10 scale) post treatment: 0    ASSESSMENT/Changes in Function:      Patient will continue to benefit from skilled PT services to modify and progress therapeutic interventions, address functional mobility deficits, address ROM deficits, address strength deficits, analyze and address soft tissue restrictions, analyze and cue movement patterns, analyze and modify body mechanics/ergonomics, assess and modify postural abnormalities and instruct in home and community integration to attain remaining goals.      []  See Plan of Care  [x]  See progress note/recertification  []  See Discharge Summary         Progress towards goals / Updated goals: see progress note       PLAN  [x]  Upgrade activities as tolerated     [x]  Continue plan of care  []  Update interventions per flow sheet       []  Discharge due to:_  []  Other:        232 [facility], [facility] 6/11/2020

## 2020-06-11 NOTE — PROGRESS NOTES
[employer] [facility]  [place]  [facility] [address]  Phone: [phone]      Fax:  [phone]    Progress Note    Name: Nazanin Roger   : 1965   MD: Savanah Elias MD       Treatment Diagnosis: Shoulder stiffness, left [M25.612]  Start of Care: 2020    Visits from Start of Care: 13  Missed Visits: Due to COVID-19, PT placed on hold from 3/9/2020-2020    Summary of Care: Pt has been receiving PT interventions for B shoulder stiffness and L axillary cording. She has made steady gains throughout her PT with increased motion, improved posture/strength, and decreased pain. Assessment / Recommendations: Pt would benefit from continuation of PT 1-2x/wk for an additional 4 weeks in order to complete all goals below. GOAL STATUS/UPDATED GOALS:    1) Pt will be Independent with skin care routine to decrease risk of infection. MET  2) Pt will be Independent in don/doff/wearing schedule of light chest/torso compression PROGRESSING  3) Pt will know which signs and symptoms to report immediately to surgeon/physician concerning their condition. MET  4) Pt will be Independent with pain management routine consisting of skin care, decongestive exercises, self-manual myofascial and lymphatic stimulation techniques, strengthening and motion exercises, using  correct breath pattern and improved posture in order to decrease risk of infection and lymphedema. PROGRESSING  5) Pt will increase ROM of affected limb by 15-20 degrees with 0/10 pain in order to return to driving, lifting requirements of her job, and perform all ADL's involving use of UE's at or above shoulder level. PROGRESSING  6) Pt will increase strength of affected limb by 1-2 muscle grades on MMT in order to return to driving, lifting requirements of her job, and all ADL's involving use of UE's at or above shoulder levelPROGRGARFIELDING    Jaqueline BOYD, AVA 6/11/2020    ________________________________________________________________________  NOTE TO PHYSICIAN:  Please complete the following and fax to: [facility]: [phone]  . Retain this original for your records. If you are unable to process this request in 24 hours, please contact our office.        ____ I have read the above report and request that my patient continue therapy with the following changes/special instructions:  ____ I have read the above report and request that my patient be discharged from therapy    Physician's Signature:_________________ Date:___________Time:__________

## 2020-06-11 NOTE — PROGRESS NOTES
Kimberly Brown is a 62 y.o. female  Chief Complaint   Patient presents with    Follow-up    Breast Cancer     1. Have you been to the ER, urgent care clinic since your last visit? Hospitalized since your last visit? No    2. Have you seen or consulted any other health care providers outside of the [address] [place] [address] since your last visit? Include any pap smears or colon screening.  No

## 2020-06-17 ENCOUNTER — APPOINTMENT (OUTPATIENT)
Dept: PHYSICAL THERAPY | Age: 55
End: 2020-06-17
Payer: COMMERCIAL

## 2020-06-23 ENCOUNTER — HOSPITAL ENCOUNTER (OUTPATIENT)
Dept: PHYSICAL THERAPY | Age: 55
Discharge: HOME | End: 2020-06-23
Payer: COMMERCIAL

## 2020-06-23 PROCEDURE — 97110 THERAPEUTIC EXERCISES: CPT | Performed by: PHYSICAL THERAPIST

## 2020-06-23 NOTE — PROGRESS NOTES
PT DAILY TREATMENT NOTE 2-15    Patient Name: Vanessa Ontiveros  Date:2020  : 1965  [x]  Patient  Verified  Payor: [address] / Plan: [address] / Product Type: Managed Care Medicaid /    In time:4:00  Out time:4:30  Total Treatment Time (min): 30  Visit #:   14    Treatment Area: Shoulder stiffness, left [M25.612]    SUBJECTIVE  Pain Level (0-10 scale): 0   Any medication changes, allergies to medications, adverse drug reactions, diagnosis change, or new procedure performed?: [x] No    [] Yes (see summary sheet for update)  Subjective functional status/changes:   [] No changes reported  Pt reports that she is \"so thankful for PT, look I can reach without pain! \"    OBJECTIVE       30 min Therapeutic Exercise:  [x] See flow sheet :   Rationale: increase ROM, increase strength and increase proprioception to improve the patients ability to perform ADL's required for return to work and/or community            With   [] TE   [] TA   [] neuro   [] other: Patient Education: [x] Review HEP    [] Progressed/Changed HEP based on:     [] positioning   [] body mechanics   [] transfers   [] heat/ice application    [] other:      Other Objective/Functional Measures:          R                      L  Palm:               20.7                 20.3  Wrist:               16.5                 16.9  Forearm:         25.4                  26                                                                15               Elbow:             27.3                  28.6      Bicep:              31.3                 32.8                                                                36  Axilla:               35.8                  36.5                AROM: B shoulder flexion 175 degrees, abduction 170, ER L=40 IR=45     Pain Level (0-10 scale) post treatment: 0    ASSESSMENT/Changes in Function:            []  See Plan of Care  []  See progress note/recertification  [x]  See Discharge Summary Progress towards goals / Updated goals:   See d/c summary       PLAN  []  Upgrade activities as tolerated     []  Continue plan of care  []  Update interventions per flow sheet       [x]  Discharge due to: all goals met  []  Other:        232 [facility], [facility] 6/23/2020

## 2020-06-23 NOTE — PROGRESS NOTES
[address] [facility],  [address]  Phone: [phone]  Fax: [phone]    Discharge Summary  2-15    Patient name: Yannick Murillo  : 1965  Provider#: [id number]  Referral source: Tete Juares MD      Medical/Treatment Diagnosis: Shoulder stiffness, left [M25.612]     Prior Hospitalization: see medical history     Comorbidities: See Plan of Care  Prior Level of Function:See Plan of Care  Medications: Verified on Patient Summary List    Start of Care: 2020      Onset Date:2019   Visits from Start of Care: 14     Missed Visits: From 3/9/ d/t COVID-19  Reporting Period : 2020 to 2020      ASSESSMENT/SUMMARY OF CARE: Pt has completed all PT goals and D/C to HEP. GOAL STATUS:  Short Term Goals:   1) Pt will be Independent with skin care routine to decrease risk of infection. MET  2) Pt will be Independent in don/doff/wearing schedule of light chest/torso compression MET  3) Pt will know which signs and symptoms to report immediately to surgeon/physician concerning their condition. MET        Long Term Goals: To be accomplished in 20 treatments  1) Pt will be Independent with pain management routine consisting of skin care, decongestive exercises, self-manual myofascial and lymphatic stimulation techniques, strengthening and motion exercises, using  correct breath pattern and improved posture in order to decrease risk of infection and lymphedema. MET  2) Pt will increase ROM of affected limb by 15-20 degrees with 0/10 pain in order to return to driving, lifting requirements of her job, and perform all ADL's involving use of UE's at or above shoulder level. MET  3) Pt will increase strength of affected limb by 1-2 muscle grades on MMT in order to return to driving, lifting requirements of her job, and all ADL's involving use of UE's at or above shoulder levelMET    RECOMMENDATIONS:  [x]Discontinue therapy: [x]Patient has reached or is progressing toward set goals      []Patient is non-compliant or has abdicated      []Due to lack of appreciable progress towards set goals      []Other    [address] [place] [address], [facility] 6/23/2020

## 2020-07-23 DIAGNOSIS — C50.412 MALIGNANT NEOPLASM OF UPPER-OUTER QUADRANT OF LEFT BREAST IN FEMALE, ESTROGEN RECEPTOR POSITIVE (HCC): Primary | ICD-10-CM

## 2020-07-23 DIAGNOSIS — Z17.0 MALIGNANT NEOPLASM OF UPPER-OUTER QUADRANT OF LEFT BREAST IN FEMALE, ESTROGEN RECEPTOR POSITIVE (HCC): Primary | ICD-10-CM

## 2020-07-23 RX ORDER — ANASTROZOLE 1 MG/1
1 TABLET ORAL DAILY
Qty: 30 TAB | Refills: 3 | Status: SHIPPED | OUTPATIENT
Start: 2020-07-23 | End: 2021-10-21 | Stop reason: SDUPTHER

## 2020-07-28 ENCOUNTER — HOSPITAL ENCOUNTER (OUTPATIENT)
Dept: RADIATION THERAPY | Age: 55
Discharge: HOME | End: 2020-07-28

## 2020-08-07 ENCOUNTER — OFFICE VISIT (OUTPATIENT)
Dept: SURGERY | Age: 55
End: 2020-08-07
Payer: COMMERCIAL

## 2020-08-07 VITALS
BODY MASS INDEX: 29.7 KG/M2 | TEMPERATURE: 95 F | HEIGHT: 68 IN | SYSTOLIC BLOOD PRESSURE: 136 MMHG | WEIGHT: 196 LBS | DIASTOLIC BLOOD PRESSURE: 95 MMHG | HEART RATE: 91 BPM

## 2020-08-07 DIAGNOSIS — Z17.0 MALIGNANT NEOPLASM OF UPPER-OUTER QUADRANT OF LEFT BREAST IN FEMALE, ESTROGEN RECEPTOR POSITIVE (HCC): Primary | ICD-10-CM

## 2020-08-07 DIAGNOSIS — R60.0 EDEMA OF SKIN: ICD-10-CM

## 2020-08-07 DIAGNOSIS — C50.412 MALIGNANT NEOPLASM OF UPPER-OUTER QUADRANT OF LEFT BREAST IN FEMALE, ESTROGEN RECEPTOR POSITIVE (HCC): Primary | ICD-10-CM

## 2020-08-07 PROCEDURE — 99213 OFFICE O/P EST LOW 20 MIN: CPT | Performed by: SURGERY

## 2020-08-07 NOTE — PROGRESS NOTES
HISTORY OF PRESENT ILLNESS Ira Jacobs is a 60 y.o. female. HPI  ESTABLISHED patient of Dr. Moira Flores here for swelling to LEFT axilla. The swelling has gotten worse. Had been going to physical therapy and then she was supposed to go for massage for lymphatic drainage. Completed XRT June 2. BILATERAL mastectomies and ALND with implants for LEFT breast cancer on 1/21/2020. She had T2 N1 left breast ca. Completed XRT, June 2, 2020 Breast cancer- 
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 6/20/19 - biopsies of LEFT breast and LEFT axilla. I called patient to touch base with her. Biopsy Dr. Cindi Rod did node +, Er/pr+ her 2 greg negative, specimen went to [facility] due to medicaid,  Mammaprint high risk. 7/18/19 - port insertion 7/19/19 - started [facility] 
9/13/19 - 12/3/19 - Taxol infusions ROS Physical Exam 
 
ASSESSMENT and PLAN 
{ASSESSMENT/PLAN:28167}

## 2020-08-07 NOTE — PATIENT INSTRUCTIONS
Breast Cancer: Care Instructions Your Care Instructions Breast cancer occurs when abnormal cells grow out of control in the breast. These cancer cells can spread within the breast, to nearby lymph nodes and other tissues, and to other parts of the body. Being treated for cancer can weaken your body, and you may feel very tired. Get the rest your body needs so you can feel better. Finding out that you have cancer is scary. You may feel many emotions and may need some help coping. Seek out family, friends, and counselors for support. You also can do things at home to make yourself feel better while you go through treatment. Call the [employer] (9-826.694.1438) or visit its website at [employer] for more information. Follow-up care is a key part of your treatment and safety. Be sure to make and go to all appointments, and call your doctor if you are having problems. It's also a good idea to know your test results and keep a list of the medicines you take. How can you care for yourself at home? · Take your medicines exactly as prescribed. Call your doctor if you think you are having a problem with your medicine. You may get medicine for nausea and vomiting if you have these side effects. · Follow your doctor's instructions to relieve pain. Pain from cancer and surgery can almost always be controlled. Use pain medicine when you first notice pain, before it becomes severe. · Eat healthy food. If you do not feel like eating, try to eat food that has protein and extra calories to keep up your strength and prevent weight loss. Drink liquid meal replacements for extra calories and protein. Try to eat your main meal early. · Get some physical activity every day, but do not get too tired. Keep doing the hobbies you enjoy as your energy allows. · Do not smoke. Smoking can make your cancer worse. If you need help quitting, talk to your doctor about stop-smoking programs and medicines. These can increase your chances of quitting for good. · Take steps to control your stress and workload. Learn relaxation techniques. ? Share your feelings. Stress and tension affect our emotions. By expressing your feelings to others, you may be able to understand and cope with them. ? Consider joining a support group. Talking about a problem with your spouse, a good friend, or other people with similar problems is a good way to reduce tension and stress. ? Express yourself through art. Try writing, crafts, dance, or art to relieve stress. Some dance, writing, or art groups may be available just for people who have cancer. ? Be kind to your body and mind. Getting enough sleep, eating a healthy diet, and taking time to do things you enjoy can contribute to an overall feeling of balance in your life and can help reduce stress. ? Get help if you need it. Discuss your concerns with your doctor or counselor. · If you are vomiting or have diarrhea: ? Drink plenty of fluids (enough so that your urine is light yellow or clear like water) to prevent dehydration. Choose water and other caffeine-free clear liquids. If you have kidney, heart, or liver disease and have to limit fluids, talk with your doctor before you increase the amount of fluids you drink. ? When you are able to eat, try clear soups, mild foods, and liquids until all symptoms are gone for 12 to 48 hours. Other good choices include dry toast, crackers, cooked cereal, and gelatin dessert, such as Jell-O. · If you have not already done so, prepare a list of advance directives. Advance directives are instructions to your doctor and family members about what kind of care you want if you become unable to speak or express yourself. When should you call for help? [id number] anytime you think you may need emergency care. For example, call if: 
· You passed out (lost consciousness). Call your doctor now or seek immediate medical care if: 
· You have a fever. · You have abnormal bleeding. · You think you have an infection. · You have new or worse pain. · You have new symptoms, such as a cough, belly pain, vomiting, diarrhea, or a rash. Watch closely for changes in your health, and be sure to contact your doctor if: 
· You are much more tired than usual. 
· You have swollen glands in your armpits, groin, or neck. · You do not get better as expected. Where can you learn more? Go to http://louis-daniel.info/ Enter V321 in the search box to learn more about \"Breast Cancer: Care Instructions. \" Current as of: August 22, 2019               Content Version: 12.5 © 6718-5496 [employer]. Care instructions adapted under license by [employer] (which disclaims liability or warranty for this information). If you have questions about a medical condition or this instruction, always ask your healthcare professional. [address] any warranty or liability for your use of this information.

## 2020-08-07 NOTE — PROGRESS NOTES
HISTORY OF PRESENT ILLNESS  Kenia Mann is a 60 y.o. female. HPI  ESTABLISHED patient of Dr. Lizbet Garcia here for swelling to LEFT axilla. The swelling has gotten worse. Had been going to physical therapy and then she was supposed to go for massage for lymphatic drainage. Completed XRT June 2. BILATERAL mastectomies and ALND with expander placement for LEFT breast cancer on 1/21/2020. She had T2 N1 left breast ca. Completed XRT, June 2, 2020. She is scheduled for implant exchange on September 21.     Breast cancer-  5/28/19 - 44 yo female with left breast IDC and DCIS,T2 N1, potential skin involvement, Er/Pr + Her 2 greg equivocal, Not enough invasive to send Her 2 greg for fish  6/3/19 - LEFT breast skin punch biopsy, benign. 6/20/19 - biopsies of LEFT breast and LEFT axilla. I called patient to touch base with her. Biopsy Dr. Cynthia Butcher did node +, Er/pr+ her 2 greg negative, specimen went to [facility] due to medicaid,  Mammaprint high risk. 7/18/19 - port insertion  7/19/19 - started AC  9/13/19 - 12/3/19 - Taxol infusions  1/21/20: b/l mastectomy   2/2020: Letrozole  6/2020: Completed RT      Past Medical History:   Diagnosis Date    Anxiety     Arthritis     NECK    At risk for sleep apnea     GAGAN 5     Basal cell carcinoma of skin     FACE, CHEST, BACK    Breast cancer ([address] 2019    LEFT    History of seasonal allergies     Hx antineoplastic chemo     COMPLETED 12-3-19    Ill-defined condition     hx motion sickness    Motion sickness     Nausea & vomiting     PONV after thyroidectomy;  Hx motion sickness    Squamous cell skin cancer     Dr. Verito Figueroa Thyroid disease     H/ GOITER    Vertigo        Past Surgical History:   Procedure Laterality Date    HX BREAST RECONSTRUCTION Bilateral 1/21/2020    IMMEDIATE BILATERAL BREAST RECONSTRUCTION WITH TISSUE EXPANDERS AND ALLODERM GRAFTS performed by Da [facility]  2004    HX COLONOSCOPY      HX HEENT  2009    THYROIDECTOMY    HX KNEE ARTHROSCOPY Left     HX LUMBAR FUSION  1995    HX MOHS PROCEDURES      x 4 times     HX SHOULDER ARTHROSCOPY Right     HX THYROIDECTOMY  2014    HX VASCULAR ACCESS  2019    PORTACATH RIGHT CHEST       Social History     Socioeconomic History    Marital status: SINGLE     Spouse name: Not on file    Number of children: Not on file    Years of education: Not on file    Highest education level: Not on file   Occupational History    Not on file   Social Needs    Financial resource strain: Not on file    Food insecurity     Worry: Not on file     Inability: Not on file    Transportation needs     Medical: Not on file     Non-medical: Not on file   Tobacco Use    Smoking status: Never Smoker    Smokeless tobacco: Never Used   Substance and Sexual Activity    Alcohol use: Yes     Alcohol/week: 2.0 standard drinks     Types: 2 Glasses of wine per week    Drug use: No    Sexual activity: Not on file   Lifestyle    Physical activity     Days per week: Not on file     Minutes per session: Not on file    Stress: Not on file   Relationships    Social connections     Talks on phone: Not on file     Gets together: Not on file     Attends Hinduism service: Not on file     Active member of club or organization: Not on file     Attends meetings of clubs or organizations: Not on file     Relationship status: Not on file    Intimate partner violence     Fear of current or ex partner: Not on file     Emotionally abused: Not on file     Physically abused: Not on file     Forced sexual activity: Not on file   Other Topics Concern    Not on file   Social History Narrative    Not on file       Current Outpatient Medications on File Prior to Visit   Medication Sig Dispense Refill    anastrozole (Arimidex) 1 mg tablet Take 1 mg by mouth daily. 30 Tab 3    cyanocobalamin 1,000 mcg tablet Take 1,000 mcg by mouth daily.       cholecalciferol (VITAMIN D3) 1,000 unit cap Take 5,000 Units by mouth daily.  liothyronine (CYTOMEL) 5 mcg tablet Take 5 mcg by mouth daily.  levothyroxine (SYNTHROID) 50 mcg tablet Take 50 mcg by mouth Daily (before breakfast).  naloxone (NARCAN) 4 mg/actuation nasal spray Use 1 spray intranasally, then discard. Repeat with new spray every 2 min as needed for opioid overdose symptoms, alternating nostrils. 2 Each 0    gabapentin (NEURONTIN) 100 mg capsule Take 1 Cap by mouth three (3) times daily. Max Daily Amount: 300 mg. (Patient taking differently: Take 100 mg by mouth three (3) times daily as needed.) 90 Cap 1    LORazepam (ATIVAN) 1 mg tablet Take 1 Tab by mouth every four (4) hours as needed for Anxiety. Max Daily Amount: 6 mg. 1 Tab 0    ALPRAZolam (XANAX) 0.5 mg tablet Take 1 Tab by mouth two (2) times daily as needed for Anxiety. Max Daily Amount: 1 mg. 30 Tab 0    [DISCONTINUED] FOLIC ACID PO Take 1 Tab by mouth daily.  [DISCONTINUED] sertraline (ZOLOFT) 100 mg tablet Take 0.5 Tabs by mouth daily. (Patient taking differently: Take 50 mg by mouth nightly.) 30 Tab 5    [DISCONTINUED] traZODone (DESYREL) 50 mg tablet Take 2 Tabs by mouth nightly. 60 Tab 5     No current facility-administered medications on file prior to visit. Allergies   Allergen Reactions    Augmentin [Amoxicillin-Pot Clavulanate] Nausea and Vomiting       OB History    No obstetric history on file. ROS    Physical Exam  Exam conducted with a chaperone present. Cardiovascular:      Rate and Rhythm: Normal rate and regular rhythm. Heart sounds: Normal heart sounds. Pulmonary:      Breath sounds: Normal breath sounds. Chest:      Breasts: Breasts are symmetrical.         Right: Normal. No swelling, bleeding, inverted nipple, mass, nipple discharge, skin change or tenderness. Left: Normal. No swelling, bleeding, inverted nipple, mass, nipple discharge, skin change or tenderness.        Lymphadenopathy: Cervical:      Right cervical: No superficial, deep or posterior cervical adenopathy. Left cervical: No superficial, deep or posterior cervical adenopathy. Upper Body:      Right upper body: No supraclavicular or axillary adenopathy. Left upper body: No supraclavicular or axillary adenopathy. ASSESSMENT and PLAN    ICD-10-CM ICD-9-CM    1. Malignant neoplasm of upper-outer quadrant of left breast in female, estrogen receptor positive (HCC)  C50.412 174.4     Z17.0 V86.0    2. Edema of skin  R60.0 782.3       Established patient presents for evaluation of LEFT axillary swelling, and is doing well overall. Well healed incisions s/p BL mastectomy, no evidence of recurrence. Expanders in place bilaterally. No fluid or seroma visualized on US. Mild post-radiation skin edema, expected to resolve. Advised pt to wait until after her surgery in September for lymphedema clinic or massage. Advised her of symptoms of arm lymphedema to watch for. Lateral dog ear on the LEFT which is uncomfortable; advised pt to discuss this with her plastic surgeon prior to surgery in September. F/U with Dr. Leslie Olea in September as previously scheduled. This plan was reviewed with the patient and patient agrees. All questions were answered.     Written by Reymundo Peabody, as dictated by Dr. Bj Serrano MD.

## 2020-09-11 DIAGNOSIS — G47.00 INSOMNIA, UNSPECIFIED TYPE: ICD-10-CM

## 2020-09-12 RX ORDER — TRAZODONE HYDROCHLORIDE 50 MG/1
TABLET ORAL
Qty: 60 TAB | Refills: 4 | Status: SHIPPED | OUTPATIENT
Start: 2020-09-12 | End: 2021-01-24

## 2020-09-14 ENCOUNTER — OFFICE VISIT (OUTPATIENT)
Dept: SURGERY | Age: 55
End: 2020-09-14
Payer: COMMERCIAL

## 2020-09-14 VITALS
HEIGHT: 68 IN | BODY MASS INDEX: 29.7 KG/M2 | TEMPERATURE: 97.4 F | WEIGHT: 196 LBS | SYSTOLIC BLOOD PRESSURE: 125 MMHG | DIASTOLIC BLOOD PRESSURE: 65 MMHG | HEART RATE: 87 BPM

## 2020-09-14 DIAGNOSIS — C50.912 BREAST CANCER METASTASIZED TO AXILLARY LYMPH NODE, LEFT (HCC): Primary | ICD-10-CM

## 2020-09-14 DIAGNOSIS — Z90.13 STATUS POST BILATERAL MASTECTOMY: ICD-10-CM

## 2020-09-14 DIAGNOSIS — I89.0 LYMPHEDEMA: ICD-10-CM

## 2020-09-14 DIAGNOSIS — C77.3 BREAST CANCER METASTASIZED TO AXILLARY LYMPH NODE, LEFT (HCC): Primary | ICD-10-CM

## 2020-09-14 PROCEDURE — 99213 OFFICE O/P EST LOW 20 MIN: CPT | Performed by: SURGERY

## 2020-09-14 NOTE — PATIENT INSTRUCTIONS
Breast Implant for Breast Reconstruction: Before Your Surgery What is breast implant surgery for breast reconstruction? Breast reconstruction is a type of surgery. It rebuilds your breast after you've had part or all of a breast removed. It is often done for people who have cancer. But people who have problems with breast development can also have this surgery. It usually takes more than one surgery to rebuild a breast. 
The breast surgeon who does your mastectomy can refer you to a plastic surgeon with special training in breast reconstruction. You will meet with the plastic surgeon before your mastectomy to discuss the best procedure for you. The surgeon will be able to recommend the type of implant that will work best for your body type. In this type of surgery, the doctor uses a device called an implant. The implant gives your breast its shape. The nipple and the darker area around it (areola) are usually created later. Several types of implants are available. Some of the most common implants have a soft silicone shell filled with saline (salt water) or silicone gel. Silicone may create a more natural-looking breast, because its weight and texture is more like breast tissue. In some cases, an implant is placed during the same surgery that removes the breast. In other cases, a tissue expander is inserted right after the breast is removed. The expander is used to stretch the skin to make room for the implant. This stretching happens over a period of months. Every 1 to 2 weeks, the expander is filled with a little more salt water or air. When the tissues have stretched enough so the implant will fit, your doctor will remove the expander and insert the implant. You will probably be asleep during the surgery. And you may get a medicine that numbs the breast area. The doctor will try to make cuts in places on your body that won't be seen. These cuts are called incisions.  Sometimes the doctor uses the same incisions that were used to remove the cancer. The incisions leave scars that fade with time. After surgery, you will probably go home the same day or the next day. Many people can go back to work or their normal routine in 3 to 6 weeks. But it depends on the type of work you do. Your new breast will look and feel different after surgery. With an implant, your breast will likely not have any feeling. Your new breast may be more firm, round, or flat than your other breast. It may also not look the same as the breast that was removed. Some people have surgery on the other breast to make them look as alike as possible. If you aren't able to have a nipple-sparing mastectomy, you have options if you want your new breast to have a nipple and areola. You can have surgery to create a nipple out of tissue. A tattoo can add color to the raised nipple and create an areola. Another option is a tattoo of a nipple and areola that creates a 3-dimensional look. Or you may use a prosthetic nipple and areola that attaches temporarily to your breast. 
Follow-up care is a key part of your treatment and safety. Be sure to make and go to all appointments, and call your doctor if you are having problems. It's also a good idea to know your test results and keep a list of the medicines you take. How do you prepare for surgery? Preparing for surgery 
  · Be sure you have someone to take you home. Anesthesia and pain medicine will make it unsafe for you to drive or get home on your own.  
  · Understand exactly what surgery is planned, along with the risks, benefits, and other options.  
  · If you take aspirin or some other blood thinner, ask your doctor if you should stop taking it before your surgery. Make sure that you understand exactly what your doctor wants you to do.  These medicines increase the risk of bleeding.  
  · Tell your doctor ALL the medicines, vitamins, supplements, and herbal remedies you take. Some may increase the risk of problems during your surgery. Your doctor will tell you if you should stop taking any of them before the surgery and how soon to do it.  
  · Make sure your doctor and the hospital have a copy of your advance directive. If you don't have one, you may want to prepare one. It lets others know your health care wishes. It's a good thing to have before any type of surgery or procedure. What happens on the day of surgery? · Follow the instructions exactly about when to stop eating and drinking. If you don't, your surgery may be canceled. If your doctor told you to take your medicines on the day of surgery, take them with only a sip of water.  
  · Take a bath or shower before you come in for your surgery. Do not apply lotions, perfumes, deodorants, or nail polish.  
  · Do not shave the surgical site yourself.  
  · Take off all jewelry and piercings. And take out contact lenses, if you wear them. At the hospital or surgery center · Bring a picture ID.  
  · The area for surgery is often marked to make sure there are no errors.  
  · You will be kept comfortable and safe by your anesthesia provider. You will be asleep during the surgery.  
  · The surgery will take 1 to 2 hours. When should you call your doctor? · You have questions or concerns.  
  · You don't understand how to prepare for your surgery.  
  · You become ill before the surgery (such as fever, flu, or a cold).  
  · You need to reschedule or have changed your mind about having the surgery. Where can you learn more? Go to http://louis-daniel.info/ Enter E914 in the search box to learn more about \"Breast Implant for Breast Reconstruction: Before Your Surgery. \" Current as of: April 29, 2020               Content Version: 12.6 © 4381-4479 [employer].   
Care instructions adapted under license by [employer] (which disclaims liability or warranty for this information). If you have questions about a medical condition or this instruction, always ask your healthcare professional. [address] any warranty or liability for your use of this information.

## 2020-09-14 NOTE — PROGRESS NOTES
HISTORY OF PRESENT ILLNESS  Boone Mcardle is a 60 y.o. female. HPI ESTABLISHED patient here for follow up LEFT breast cancer and to be examined prior to breast reconstruction surgery next week. History of LEFT breast cancer and has been struggling with lymphedema in LEFT axilla. Scheduled for bilateral breast implant exchange 9/21/20 with Dr. Neha Herrera. Taking Anastrozole.      Breast cancer-  5/28/19 - 44 yo female with left breast IDC and DCIS,T2 N1, potential skin involvement, Er/Pr + Her 2 greg equivocal, Not enough invasive to send Her 2 greg for fish  6/3/19 - LEFT breast skin punch biopsy, benign. 6/20/19 - biopsies of LEFT breast and LEFT axilla. I called patient to touch base with her. Biopsy Dr. Herrera uHa did node +, Er/pr+ her 2 greg negative, specimen went to [facility] due to medicaid,  Mammaprint high risk. 7/18/19 - port insertion  7/19/19 - started AC  9/13/19 - 12/3/19 - Taxol infusions  1/21/20: b/l mastectomy   2/2020: Letrozole  6/2020: Completed RT  9/14/2020: bilateral breast implant exchange with Dr. Uli Velazco [facility]   All other systems reviewed and are negative. Physical Exam  Vitals signs and nursing note reviewed. Chest:          Comments: Bilateral expanders intact  No masses   + lymphedema lateral chest wall on left, + lymphedema medial upper left arm ok rom left arm   Lymphadenopathy:      Upper Body:      Right upper body: No supraclavicular, axillary or pectoral adenopathy. Left upper body: No supraclavicular, axillary or pectoral adenopathy. ASSESSMENT and PLAN    ICD-10-CM ICD-9-CM    1. Breast cancer metastasized to axillary lymph node, left (HCC)  C50.912 174.9     C77.3 196.3    2. Lymphedema  I89.0 457.1    3.  Status post bilateral mastectomy  Z90.13 V45.71    Breast cancer-  5/28/19 - 44 yo female with left breast IDC and DCIS,T2 N1, potential skin involvement, Er/Pr + Her 2 greg equivocal, Not enough invasive to send Her 2 greg for fish  6/3/19 - LEFT breast skin punch biopsy, benign. 6/20/19 - biopsies of LEFT breast and LEFT axilla. I called patient to touch base with her. Biopsy Dr. Nel Francis did node +, Er/pr+ her 2 greg negative, specimen went to [facility] due to medicaid,  Mammaprint high risk. 7/18/19 - port insertion  7/19/19 - started AC  9/13/19 - 12/3/19 - Taxol infusions  1/21/20: b/l mastectomy   2/2020: Letrozole  6/2020: Completed RT  9/14/2020: bilateral breast implant exchange with Dr. Damaris Loomis lymphedema clinic. Ok for Dr. Shaquille Mendez to proceed next week and I let him know how she's doing.    F/u in 6 months with NP

## 2020-09-14 NOTE — LETTER
9/15/20 Patient: Jose Dimas YOB: 1965 Date of Visit: 9/14/2020 Leeroy You MD 
[address] [facility] [address] VIA In Basket Ramos Nix MD 
[facility] [address] [facility] [address] [id number] VIA In Basket Dear MD Ramos Hopson MD, Thank you for referring Ms. Licha Aguilera to [address] [facility] [place] MAIN OFFICE [address] [place] [address] for evaluation. My notes for this consultation are attached. If you have questions, please do not hesitate to call me. I look forward to following your patient along with you. Sincerely, Francisco Harris MD

## 2020-09-21 RX ORDER — LEVOTHYROXINE SODIUM 112 UG/1
112 TABLET ORAL
Qty: 30 TAB | Refills: 0
Start: 2020-09-21 | End: 2021-01-22 | Stop reason: SDUPTHER

## 2020-10-07 DIAGNOSIS — Z85.3 HISTORY OF BREAST CANCER: Primary | ICD-10-CM

## 2020-10-13 ENCOUNTER — DOCUMENTATION ONLY (OUTPATIENT)
Dept: SURGERY | Age: 55
End: 2020-10-13

## 2020-10-14 ENCOUNTER — DOCUMENTATION ONLY (OUTPATIENT)
Dept: SURGERY | Age: 55
End: 2020-10-14

## 2020-10-20 ENCOUNTER — HOSPITAL ENCOUNTER (OUTPATIENT)
Dept: PHYSICAL THERAPY | Age: 55
Discharge: HOME | End: 2020-10-20
Payer: COMMERCIAL

## 2020-10-20 PROCEDURE — 97162 PT EVAL MOD COMPLEX 30 MIN: CPT | Performed by: PHYSICAL THERAPIST

## 2020-10-20 NOTE — PROGRESS NOTES
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 Assessment/ key information: Pt is a 60 y.o female s/p B mastectomies with recent expander to implant reconstruction, has completed chemotherapy and radiation  Pt's chief concern is L sided trunk and UE  lymphedema and the loss of motion and strength in her L UE. Pt presents with decreased L upper quadrant motion and strength, poor posture and breath pattern, lymphedema L upper quadrant, and difficulty with using her L UE for ADL's at or above shoulder level.  Patient will benefit from skilled PT services to address functional mobility deficits, address ROM deficits, address strength deficits, analyze and address soft tissue restrictions, analyze and cue movement patterns, analyze and modify body mechanics/ergonomics, assess and modify postural abnormalities and effectively manage her lymphedema to address rehab goals per plan of care    Evaluation Complexity History MEDIUM  Complexity : 1-2 comorbidities / personal factors will impact the outcome/ POC ; Examination MEDIUM Complexity : 3 Standardized tests and measures addressing body structure, function, activity limitation and / or participation in recreation  ;Presentation MEDIUM Complexity : Evolving with changing characteristics  ; Clinical Decision Making MEDIUM Complexity : FOTO score of 26-74  Overall Complexity Rating: MEDIUM    Problem List: pain affecting function, decrease ROM, decrease strength, edema affecting function and decrease ADL/ functional abilitiies   Treatment Plan may include any combination of the following: Therapeutic exercise, Therapeutic activities, Neuromuscular re-education, Physical agent/modality, Manual therapy, Patient education, Functional mobility training and Other: effective lymphedema management  Patient / Family readiness to learn indicated by: asking questions, trying to perform skills and interest  Persons(s) to be included in education: patient (P)  Barriers to Learning/Limitations: None  Patient Goal (s): use my left arm without pain, decrease swelling in armpit  Patient Self Reported Health Status: fair  Rehabilitation Potential: good       Goals:    Short Term Goals: To be accomplished in 2 treatments  1) Pt will be Independent with skin care routine to decrease risk of infection. 2) Pt will be Independent in don/doff/care of/wearing schedule of light torso compression. 3) Pt will verbalize with 100% accuracy which signs and symptoms to report immediately to physician concerning their condition. Long Term Goals: To be accomplished in 20 treatments  1) Pt will be Independent with compression management routine consisting of skin care, decongestive exercises, self-manual lymphatic stimulation techniques, strengthening and motion exercises, and don/doff/care of appropriate compression garment(s).   2) Patient will demonstrate decongestion of limb by 3-4 cm and chest by 4-5cm in order to effectively manage her lymphedema and decrease risk of infection  3) Pt will increase ROM of affected limb by 20-30 degrees in order to use L UE in all ADL's with 0-1/10  pain at or above shoulder level on NPS scale. 4) Pt will increase strength of affected limb by 1-2 muscle grades  in order to perform job requirements of lifting, pushing, pulling and perform  ADL's with 0-1/10 pain at or above shoulder level on NPS    Frequency / Duration: Patient to be seen 1-2 times per week for 20 treatments. Patient/ Caregiver education and instruction: self care, activity modification, brace/ splint application, exercises and other pt education on proper skin care and infection risk reduction    [x]  [facility]/20/2020     ________________________________________________________________________    I certify that the above Therapy Services are being furnished while the patient is under my care. I agree with the treatment plan and certify that this therapy is necessary.     [de-identified] Signature:____________________  Date:____________Time: _________

## 2020-10-20 NOTE — PROGRESS NOTES
PT ONCOLOGY EVAL/DAILY NOTE    Patient Name: Cam Ch  Date:10/20/2020  : 1965  [x]  Patient  Verified  Payor: [address] / Plan: [address] / Product Type: Managed Care Medicaid /    In time:12:15  Out time:1:15  Total Treatment Time (min): 60  Total Timed Codes (min): 0     Visit #:  20    Treatment Area: Left shoulder pain M25.612 and stiffness M25.512    SUBJECTIVE    Current symptoms/Complaints: Pt reports that ever since her final reconstruction on 2020 she has had pain, lymphedema, and difficulty with using L UE for any ADL's. Pt currently has compression bra and sleeve and gauntlet and states that her \"cording has come back\" and feels it through to mid forearm. Pt frustrated that she continues to have difficulty once \"cancer has gone\"     [x] Constant []Intermittent    []Improving  []Staying the Same  [x]Getting worse    Subjective functional status:     Present Symptoms/History:   Past Medical History:   Diagnosis Date    Anxiety      Arthritis       NECK    At risk for sleep apnea       GAGAN 5     Basal cell carcinoma of skin       FACE, CHEST, BACK    Breast cancer ([facility] [address] 2019     LEFT    History of seasonal allergies      Hx antineoplastic chemo       COMPLETED 12-3-19    Ill-defined condition       hx motion sickness    Motion sickness      Nausea & vomiting       PONV after thyroidectomy;  Hx motion sickness    Squamous cell skin cancer       Dr. Ash Pancoast Thyroid disease       H/ GOITER    Vertigo                  Past Surgical History:   Procedure Laterality Date    HX BREAST RECONSTRUCTION Bilateral 2020     IMMEDIATE BILATERAL BREAST RECONSTRUCTION WITH TISSUE EXPANDERS AND ALLODERM GRAFTS performed by Alea Castro MD at [facility]       HX COLONOSCOPY        HX HEENT        THYROIDECTOMY    HX KNEE ARTHROSCOPY Left      HX LUMBAR FUSION       HX MOHS PROCEDURES         x 4 times     HX SHOULDER ARTHROSCOPY Right      HX THYROIDECTOMY   2014    HX VASCULAR ACCESS   2019     PORTACATH RIGHT CHEST       Referring Provider: Iris Bolivar MD   Medical Oncologist: Dr Dill Asper Oncologist: Mariela Freeman   Primary Care Physician: Colleen Navarro MD  Next Appointment: Dr Vanessa Padron in November to prepare for fat grafting and aerolae and nipples  Diagnosis: L post mastectomy lymphedema, L Axillary cording, L shoulder pain and stiffness  Precautions/Indications: infection,   History of Present Illness:  Breast cancer-  5/28/19 - 46 yo female with left breast IDC and DCIS,T2 N1, potential skin involvement, Er/Pr + Her 2 greg equivocal, Not enough invasive to send Her 2 greg for fish  6/3/19 - LEFT breast skin punch biopsy, benign. 6/20/19 - biopsies of LEFT breast and LEFT axilla. I called patient to touch base with her. Biopsy Dr. Estephania Hernández did node +, Er/pr+ her 2 greg negative, specimen went to [facility] due to medicaid,  Mammaprint high risk. 7/18/19 - port insertion  7/19/19 - started AC  9/13/19 - 12/3/19 - Taxol infusions  1/21/20: b/l mastectomy   2/2020: Letrozole  6/2020: Completed RT   9/14/2020: bilateral breast implant exchange with Dr. Serena Sanchez         Pain Intensity:8 on a scale of 0-10  · Pain Aggravating Factors: reaching, using for ADL's,   · Pain Relieving Factors: \"nothing\"      Has your activity changed since diagnosis? yes  Limitations/Prior level of functioning: was able to lift, reach, drive without difficulty, no edema in L armpit or pain down L UE  Dominant Hand: right handed  Personal Goals: \" use L arm without pain and decreased swelling in my armpit\"  Home Situation (including environment, stairs, family support, if living alone, any DME used at home):  Excellent support system, partner, dog, 12 and 25 y.o, excellent.    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 other  Motivation: high  Substance use:   [x]? N/A  []? Alcohol []? Tobacco []? other:   Cognition: A & O x 4       OBJECTIVE     Ports/ Drains: N/A  Skin/Soft Tissue: skin of extremities dry, no signs infection, Incisions B inferior breast area pink in color, healing well  Vitals: BP R EI=596/68             SpO2= 98%      HR=  89     Outcome Measures: FOTO=60/100  ROM:                                                                      R                            L     Scapulohumoral Control / Rhythm:     impaired                   impaired    Able to eccentrically lower with good control?  Left:   No         Right:   No        Upper Extremity AROM:                                                                                      R                                 L  Shoulder Flexion                           129                               87 with end range discomfort                                                     Shoulder Abduction                      115                               75     with end range discomfort  Shoulder Extension                       10                               0         with end range discomfort    Shoulder ER                                  24                              7     with end range discomfort    Shoulder IR                                   20                               11     with end range discomfort                                                                                                                                     UPPER QUARTER                           MUSCLE STRENGTH  KEY                                                              R            L  0 - No Contraction        C1, C2 Neck Flex        3+                                       1 - Trace                       C3 Side Flex          3+           3+                                                 2 - Poor                         C4 Sh Elev               4           4-                                             3 - Fair                          C5 Deltoid/Biceps  NT       NT                                   4 - Good                       C6 Wrist Ext           4       4                                            5 - Normal                     C7 Triceps             NT   NT                                                                                  J8 Thumb Ext        4         4                                                                                          A8 Hand Inst           4        4                                              MMT: See above                                            R                      L  Shoulder flexion                4-                   NT         Shoulder ER                      2+                    2+    Shoulder IR                       2+                    2+       Neurological: Sensations: Light touch: Impaired to light touch B axilla, chest, implant areas               Mobility/Gait: No observed or reported deficits  Palpation: TTP L  UT/LS, periscapular musculature, pec minor/major, subscap, palpable cording extending from ALND incision to mid forearm. TTP periscapular and lisa-breast areas.   Posture: forward head, B IR at g-h jts  Breath pattern assessment  Diaphragm: able to initiate; not primary  Anterior chest:primary   Accessory respiratory muscle use: B scalenes    Circumferential Measurements: girth (cm) Distance from wrist: Y                      W  ZVJS:               14. 7                 20.7  Wrist:               16.6                16.7  Forearm:         25.4                  25                                                              15               JNAL.6                  96.7      Bicep:              31. 5                 53. 6                                                             32  Axilla:               37. 3                    37    Axillary Line:103.4  Nipple (mid breast line):106.4                              With   [] TE   [x] TA   [] neuro   [] other: Patient Education: [] Review HEP    [] Progressed/Changed HEP based on:   [] positioning   [] body mechanics   [] transfers   [] heat/ice application    [x] other: wearing of compression, importance of avoiding infection     Other Objective/Functional Measures:       Pain Level (0-10 scale) post treatment: 7-8 when using L UE    ASSESSMENT/Changes in Function: Pt is a 60 y.o female s/p B mastectomies with recent expander to implant reconstruction, has completed chemotherapy and radiation  Pt's chief concern is L sided trunk and UE  lymphedema and the loss of motion and strength in her L UE. Pt presents with decreased L upper quadrant motion and strength, poor posture and breath pattern, lymphedema L upper quadrant, and difficulty with using her L UE for ADL's at or above shoulder level.  Patient will benefit from skilled PT services to address functional mobility deficits, address ROM deficits, address strength deficits, analyze and address soft tissue restrictions, analyze and cue movement patterns, analyze and modify body mechanics/ergonomics, assess and modify postural abnormalities and effectively manage her lymphedema to address rehab goals per plan of care          Goals:  See Plan of Care    PLAN  []  Upgrade activities as tolerated     [x]  Continue plan of care  []  Update interventions per flow sheet       []  Discharge due to:_  [] Other:_      Leeanne BOYD, CLT-KARAN 10/20/2020  12:12 PM

## 2020-10-21 ENCOUNTER — DOCUMENTATION ONLY (OUTPATIENT)
Dept: SURGERY | Age: 55
End: 2020-10-21

## 2020-10-22 ENCOUNTER — HOSPITAL ENCOUNTER (OUTPATIENT)
Dept: PHYSICAL THERAPY | Age: 55
Discharge: HOME | End: 2020-10-22
Payer: COMMERCIAL

## 2020-10-22 PROCEDURE — 97110 THERAPEUTIC EXERCISES: CPT | Performed by: PHYSICAL THERAPIST

## 2020-10-22 PROCEDURE — 97140 MANUAL THERAPY 1/> REGIONS: CPT | Performed by: PHYSICAL THERAPIST

## 2020-10-25 DIAGNOSIS — F32.A DEPRESSION, UNSPECIFIED DEPRESSION TYPE: ICD-10-CM

## 2020-10-25 DIAGNOSIS — F41.9 ANXIETY AND DEPRESSION: ICD-10-CM

## 2020-10-25 DIAGNOSIS — F32.A ANXIETY AND DEPRESSION: ICD-10-CM

## 2020-10-26 DIAGNOSIS — M79.89 LEFT LEG SWELLING: Primary | ICD-10-CM

## 2020-10-26 RX ORDER — SERTRALINE HYDROCHLORIDE 100 MG/1
50 TABLET, FILM COATED ORAL
Qty: 15 TAB | Refills: 3 | Status: SHIPPED | OUTPATIENT
Start: 2020-10-26 | End: 2021-02-23

## 2020-10-26 NOTE — PROGRESS NOTES
PT DAILY TREATMENT NOTE 2-15    Patient Name: Bianca Zamudio  Date:10/26/2020  : 1965  [x]  Patient  Verified  Payor: [address] [place] [address] Road / Plan: 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]    SUBJECTIVE  Pain Level (0-10 scale): 5  Any medication changes, allergies to medications, adverse drug reactions, diagnosis change, or new procedure performed?: [x] No    [] Yes (see summary sheet for update)  Subjective functional status/changes:   [x] No changes reported       OBJECTIVE      30 min Therapeutic Exercise:  [x] See flow sheet :   Rationale: increase ROM, increase strength and improve coordination to improve the patients ability to perform ADL's required for return to work and/or community     30 min Manual Therapy:  L upper quadrant MLD, PROM L UE all planes, MFR L axilla and ant elbow, cording techniques L UE   Rationale: decrease pain, increase ROM, increase tissue extensibility, decrease edema , decrease trigger points and increase postural awareness  to improve the patients ability to effectively use extremity during functional tasks (reaching, grooming, dressing, walking)     With   [] TE   [] TA   [] neuro   [] other: Patient Education: [x] Review HEP    [x] Progressed/Changed HEP based on: subjective and objective assessments  [] positioning   [] body mechanics   [] transfers   [] heat/ice application    [] other:      Other Objective/Functional Measures:       Pain Level (0-10 scale) post treatment: 3    ASSESSMENT/Changes in Function:   Pt tolerated manual therapy and MLD without increased discomfort, Pt required moderate v.c's for performance of breast massage and cording stretches  Patient will continue to benefit from skilled PT services to modify and progress therapeutic interventions, address functional mobility deficits, address ROM deficits, address strength deficits, analyze and address soft tissue restrictions, analyze and cue movement patterns, analyze and modify body mechanics/ergonomics, assess and modify postural abnormalities and instruct in home and community integration to attain remaining goals.      []  See Plan of Care  []  See progress note/recertification  []  See Discharge Summary         Progress towards goals / Updated goals:       PLAN  [x]  Upgrade activities as tolerated     [x]  Continue plan of care  []  Update interventions per flow sheet       []  Discharge due to:_  [x]  Other: submit authorization for compression pump     Chio POSEYT, CLT-KARAN 10/22/2020

## 2020-10-27 ENCOUNTER — HOSPITAL ENCOUNTER (OUTPATIENT)
Dept: PHYSICAL THERAPY | Age: 55
Discharge: HOME | End: 2020-10-27
Payer: COMMERCIAL

## 2020-10-27 ENCOUNTER — HOSPITAL ENCOUNTER (OUTPATIENT)
Dept: ULTRASOUND IMAGING | Age: 55
Discharge: HOME | End: 2020-10-27
Payer: COMMERCIAL

## 2020-10-27 DIAGNOSIS — M79.89 LEFT LEG SWELLING: ICD-10-CM

## 2020-10-27 PROCEDURE — 93971 EXTREMITY STUDY: CPT | Performed by: REGISTERED NURSE

## 2020-10-27 PROCEDURE — 97110 THERAPEUTIC EXERCISES: CPT | Performed by: PHYSICAL THERAPIST

## 2020-10-27 PROCEDURE — 97016 VASOPNEUMATIC DEVICE THERAPY: CPT | Performed by: PHYSICAL THERAPIST

## 2020-10-27 NOTE — PROGRESS NOTES
PT DAILY TREATMENT NOTE 2-15    Patient Name: Olga Avalos  Date:10/27/2020  : 1965  [x]  Patient  Verified  Payor: [address] [place] [address] Road / Plan: 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]    SUBJECTIVE  Pain Level (0-10 scale): 4  Any medication changes, allergies to medications, adverse drug reactions, diagnosis change, or new procedure performed?: [x] No    [] Yes (see summary sheet for update)  Subjective functional status/changes:   [] No changes reported   Pt reports that tape stayed in place, not sore following last treatment but states that she continues to have swelling \"her mozzerella ball\" in L armpit    OBJECTIVE      Modality rationale: decrease edema, decrease inflammation and decrease pain to improve the patients ability to perform ADL's required for return to work and/or community     Min Type Additional Details    [] Estim:  []Unatt       []IFC  []Premod                        []Other:  []w/ice   []w/heat  Position:  Location:    [] Estim: []Att    []TENS instruct  []NMES                    []Other:  []w/US   []w/ice   []w/heat  Position:  Location:    []  Traction: [] Cervical       []Lumbar                       [] Prone          []Supine                       []Intermittent   []Continuous Lbs:  [] before manual  [] after manual    []  Ultrasound: []Continuous   [] Pulsed                           []1MHz   []3MHz W/cm2:  Location:    []  Iontophoresis with dexamethasone         Location: [] Take home patch   [] In clinic    []  Ice     []  heat  []  Ice massage  []  Laser   []  Anodyne Position:  Location:    []  Laser with stim  []  Other:  Position:  Location:   30 [x]  Vasopneumatic Device  Flexi touch gradient compression L upper quadrant Pressure:       [] lo [] med [] hi   Temperature: [] lo [] med [] hi   [x] Skin assessment post-treatment:  [x]intact []redness- no adverse reaction    []redness  adverse reaction:     30 min Therapeutic Exercise:  [x] See flow sheet :   Rationale: increase ROM, increase strength and improve coordination to improve the patients ability to perform ADL's required for return to work and/or community       With   [] TE   [] TA   [] neuro   [] other: Patient Education: [x] Review HEP    [x] Progressed/Changed HEP based on: subjective and objective assessments  [] positioning   [] body mechanics   [] transfers   [] heat/ice application    [] other:      Other Objective/Functional Measures:       Pain Level (0-10 scale) post treatment: 3    ASSESSMENT/Changes in Function:   Pt exhibited good response to KT in L axilla, and reduced by 1 cm in chest and L bicep area following pump demo. Patient will continue to benefit from skilled PT services to modify and progress therapeutic interventions, address functional mobility deficits, address ROM deficits, address strength deficits, analyze and address soft tissue restrictions, analyze and cue movement patterns, analyze and modify body mechanics/ergonomics, assess and modify postural abnormalities and instruct in home and community integration to attain remaining goals.      []  See Plan of Care  []  See progress note/recertification  []  See Discharge Summary         Progress towards goals / Updated goals:       PLAN  [x]  Upgrade activities as tolerated     [x]  Continue plan of care  []  Update interventions per flow sheet       []  Discharge due to:_  []  Other:      [address] [facility], [facility] 10/22/2020

## 2020-10-27 NOTE — PROGRESS NOTES
Please let pt know that her US showed no evidence of a blood clot. It did show over the area she is complaining about is just adipose tissue.  I would suggest she monitor this and if it does not improve she can possibly see a dermatologist for removal or see PCP for next steps

## 2020-10-29 ENCOUNTER — HOSPITAL ENCOUNTER (OUTPATIENT)
Dept: PHYSICAL THERAPY | Age: 55
Discharge: HOME | End: 2020-10-29
Payer: COMMERCIAL

## 2020-10-29 ENCOUNTER — DOCUMENTATION ONLY (OUTPATIENT)
Dept: SURGERY | Age: 55
End: 2020-10-29

## 2020-10-29 PROCEDURE — 97140 MANUAL THERAPY 1/> REGIONS: CPT | Performed by: PHYSICAL THERAPIST

## 2020-10-29 PROCEDURE — 97016 VASOPNEUMATIC DEVICE THERAPY: CPT | Performed by: PHYSICAL THERAPIST

## 2020-10-29 PROCEDURE — 97110 THERAPEUTIC EXERCISES: CPT | Performed by: PHYSICAL THERAPIST

## 2020-10-29 NOTE — PROGRESS NOTES
PT DAILY TREATMENT NOTE 2-15    Patient Name: Beba Fuentes  Date:10/29/2020  : 1965  [x]  Patient  Verified  Payor: [address] [place] [address] Road / Plan: 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]    SUBJECTIVE  Pain Level (0-10 scale): 3-4  Any medication changes, allergies to medications, adverse drug reactions, diagnosis change, or new procedure performed?: [x] No    [] Yes (see summary sheet for update)  Subjective functional status/changes:   [] No changes reported    Pt reports that her underarm \"mozzerella ball\" feels less \"poofy\"    OBJECTIVE      Modality rationale: decrease edema, decrease inflammation and decrease pain to improve the patients ability to perform ADL's required for return to work and/or community     Min Type Additional Details    [] Estim:  []Unatt       []IFC  []Premod                        []Other:  []w/ice   []w/heat  Position:  Location:    [] Estim: []Att    []TENS instruct  []NMES                    []Other:  []w/US   []w/ice   []w/heat  Position:  Location:    []  Traction: [] Cervical       []Lumbar                       [] Prone          []Supine                       []Intermittent   []Continuous Lbs:  [] before manual  [] after manual    []  Ultrasound: []Continuous   [] Pulsed                           []1MHz   []3MHz W/cm2:  Location:    []  Iontophoresis with dexamethasone         Location: [] Take home patch   [] In clinic    []  Ice     []  heat  []  Ice massage  []  Laser   []  Anodyne Position:  Location:    []  Laser with stim  []  Other:  Position:  Location:   20 [x]  Vasopneumatic Device  Flexi touch gradient compression L chest/shoulder Pressure:       [] lo [] med [] hi   Temperature: [] lo [] med [] hi   [x] Skin assessment post-treatment:  [x]intact []redness- no adverse reaction    []redness  adverse reaction:     30 min Therapeutic Exercise:  [x] See flow sheet :   Rationale: increase ROM, increase strength and improve coordination to improve the patients ability to perform ADL's required for return to work and/or community       30 min Manual Therapy:  L upper quadrant MLD, PROM L UE all planes, MFR L axilla and ant elbow, cording techniques L UE   Rationale: decrease pain, increase ROM, increase tissue extensibility, decrease edema , decrease trigger points and increase postural awareness  to improve the patients ability to effectively use extremity during functional tasks (reaching, grooming, dressing, walking)    With   [] TE   [] TA   [] neuro   [] other: Patient Education: [x] Review HEP    [x] Progressed/Changed HEP based on: subjective and objective assessments  [] positioning   [] body mechanics   [] transfers   [] heat/ice application    [] other:      Other Objective/Functional Measures:       Pain Level (0-10 scale) post treatment: 3    ASSESSMENT/Changes in Function:   Pt continues to be TTP along lateral border of L implant and TTP cording in axilla. Patient will continue to benefit from skilled PT services to modify and progress therapeutic interventions, address functional mobility deficits, address ROM deficits, address strength deficits, analyze and address soft tissue restrictions, analyze and cue movement patterns, analyze and modify body mechanics/ergonomics, assess and modify postural abnormalities and instruct in home and community integration to attain remaining goals.      []  See Plan of Care  []  See progress note/recertification  []  See Discharge Summary         Progress towards goals / Updated goals:       PLAN  [x]  Upgrade activities as tolerated     [x]  Continue plan of care  []  Update interventions per flow sheet       []  Discharge due to:_  []  Other: [address] [facility], T-KARAN 10/22/2020

## 2020-10-30 ENCOUNTER — TELEPHONE (OUTPATIENT)
Dept: ONCOLOGY | Age: 55
End: 2020-10-30

## 2020-10-30 NOTE — TELEPHONE ENCOUNTER
Called pt [employer] verified, . Infomed pt [facility] results. Pt verbalized understanding.  No new questions or concerns

## 2020-11-03 ENCOUNTER — APPOINTMENT (OUTPATIENT)
Dept: GENERAL RADIOLOGY | Age: 55
End: 2020-11-03
Attending: EMERGENCY MEDICINE
Payer: COMMERCIAL

## 2020-11-03 ENCOUNTER — HOSPITAL ENCOUNTER (EMERGENCY)
Age: 55
Discharge: HOME | End: 2020-11-03
Attending: EMERGENCY MEDICINE
Payer: COMMERCIAL

## 2020-11-03 ENCOUNTER — APPOINTMENT (OUTPATIENT)
Dept: CT IMAGING | Age: 55
End: 2020-11-03
Attending: EMERGENCY MEDICINE
Payer: COMMERCIAL

## 2020-11-03 ENCOUNTER — HOSPITAL ENCOUNTER (OUTPATIENT)
Dept: PHYSICAL THERAPY | Age: 55
Discharge: HOME | End: 2020-11-03
Payer: COMMERCIAL

## 2020-11-03 VITALS
RESPIRATION RATE: 20 BRPM | WEIGHT: 205 LBS | HEIGHT: 68 IN | OXYGEN SATURATION: 97 % | BODY MASS INDEX: 31.07 KG/M2 | DIASTOLIC BLOOD PRESSURE: 84 MMHG | SYSTOLIC BLOOD PRESSURE: 144 MMHG | HEART RATE: 76 BPM | TEMPERATURE: 97.2 F

## 2020-11-03 DIAGNOSIS — R07.89 ATYPICAL CHEST PAIN: Primary | ICD-10-CM

## 2020-11-03 LAB
ALBUMIN SERPL-MCNC: 3.9 G/DL (ref 3.5–5)
ALBUMIN/GLOB SERPL: 1.1 {RATIO} (ref 1.1–2.2)
ALP SERPL-CCNC: 111 U/L (ref 45–117)
ALT SERPL-CCNC: 23 U/L (ref 12–78)
ANION GAP SERPL CALC-SCNC: 7 MMOL/L (ref 5–15)
APTT PPP: 24.3 SEC (ref 22.1–32)
AST SERPL-CCNC: 14 U/L (ref 15–37)
ATRIAL RATE: 72 BPM
BASOPHILS # BLD: 0 K/UL (ref 0–0.1)
BASOPHILS NFR BLD: 0 % (ref 0–1)
BILIRUB SERPL-MCNC: 0.3 MG/DL (ref 0.2–1)
BNP SERPL-MCNC: 139 PG/ML (ref 0–125)
BUN SERPL-MCNC: 13 MG/DL (ref 6–20)
BUN/CREAT SERPL: 16 (ref 12–20)
CALCIUM SERPL-MCNC: 9.3 MG/DL (ref 8.5–10.1)
CALCULATED P AXIS, ECG09: 60 DEGREES
CALCULATED R AXIS, ECG10: -26 DEGREES
CALCULATED T AXIS, ECG11: 28 DEGREES
CHLORIDE SERPL-SCNC: 102 MMOL/L (ref 97–108)
CO2 SERPL-SCNC: 30 MMOL/L (ref 21–32)
COMMENT, HOLDF: NORMAL
CREAT SERPL-MCNC: 0.8 MG/DL (ref 0.55–1.02)
D DIMER PPP FEU-MCNC: 0.47 MG/L FEU (ref 0–0.65)
DIAGNOSIS, 93000: NORMAL
DIFFERENTIAL METHOD BLD: NORMAL
EOSINOPHIL # BLD: 0.1 K/UL (ref 0–0.4)
EOSINOPHIL NFR BLD: 2 % (ref 0–7)
ERYTHROCYTE [DISTWIDTH] IN BLOOD BY AUTOMATED COUNT: 11.6 % (ref 11.5–14.5)
GLOBULIN SER CALC-MCNC: 3.6 G/DL (ref 2–4)
GLUCOSE SERPL-MCNC: 102 MG/DL (ref 65–100)
HCT VFR BLD AUTO: 41.9 % (ref 35–47)
HGB BLD-MCNC: 14 G/DL (ref 11.5–16)
IMM GRANULOCYTES # BLD AUTO: 0 K/UL (ref 0–0.04)
IMM GRANULOCYTES NFR BLD AUTO: 0 % (ref 0–0.5)
INR PPP: 0.9 (ref 0.9–1.1)
LYMPHOCYTES # BLD: 1.2 K/UL (ref 0.8–3.5)
LYMPHOCYTES NFR BLD: 17 % (ref 12–49)
MCH RBC QN AUTO: 32.9 PG (ref 26–34)
MCHC RBC AUTO-ENTMCNC: 33.4 G/DL (ref 30–36.5)
MCV RBC AUTO: 98.6 FL (ref 80–99)
MONOCYTES # BLD: 0.7 K/UL (ref 0–1)
MONOCYTES NFR BLD: 10 % (ref 5–13)
NEUTS SEG # BLD: 5.1 K/UL (ref 1.8–8)
NEUTS SEG NFR BLD: 72 % (ref 32–75)
NRBC # BLD: 0 K/UL (ref 0–0.01)
NRBC BLD-RTO: 0 PER 100 WBC
P-R INTERVAL, ECG05: 188 MS
PLATELET # BLD AUTO: 243 K/UL (ref 150–400)
PMV BLD AUTO: 9.5 FL (ref 8.9–12.9)
POTASSIUM SERPL-SCNC: 3.9 MMOL/L (ref 3.5–5.1)
PROT SERPL-MCNC: 7.5 G/DL (ref 6.4–8.2)
PROTHROMBIN TIME: 9.4 SEC (ref 9–11.1)
Q-T INTERVAL, ECG07: 404 MS
QRS DURATION, ECG06: 90 MS
QTC CALCULATION (BEZET), ECG08: 442 MS
RBC # BLD AUTO: 4.25 M/UL (ref 3.8–5.2)
SAMPLES BEING HELD,HOLD: NORMAL
SODIUM SERPL-SCNC: 139 MMOL/L (ref 136–145)
THERAPEUTIC RANGE,PTTT: NORMAL SECS (ref 58–77)
TROPONIN I BLD-MCNC: <0.04 NG/ML (ref 0–0.08)
TROPONIN I SERPL-MCNC: <0.05 NG/ML
VENTRICULAR RATE, ECG03: 72 BPM
WBC # BLD AUTO: 7.1 K/UL (ref 3.6–11)

## 2020-11-03 PROCEDURE — 99285 EMERGENCY DEPT VISIT HI MDM: CPT

## 2020-11-03 PROCEDURE — 97140 MANUAL THERAPY 1/> REGIONS: CPT | Performed by: PHYSICAL THERAPIST

## 2020-11-03 PROCEDURE — 84484 ASSAY OF TROPONIN QUANT: CPT

## 2020-11-03 PROCEDURE — 74011000636 HC RX REV CODE- 636: Performed by: EMERGENCY MEDICINE

## 2020-11-03 PROCEDURE — 85730 THROMBOPLASTIN TIME PARTIAL: CPT

## 2020-11-03 PROCEDURE — 80053 COMPREHEN METABOLIC PANEL: CPT

## 2020-11-03 PROCEDURE — 71045 X-RAY EXAM CHEST 1 VIEW: CPT

## 2020-11-03 PROCEDURE — 85025 COMPLETE CBC W/AUTO DIFF WBC: CPT

## 2020-11-03 PROCEDURE — 93005 ELECTROCARDIOGRAM TRACING: CPT

## 2020-11-03 PROCEDURE — 85379 FIBRIN DEGRADATION QUANT: CPT

## 2020-11-03 PROCEDURE — 97110 THERAPEUTIC EXERCISES: CPT | Performed by: PHYSICAL THERAPIST

## 2020-11-03 PROCEDURE — 71275 CT ANGIOGRAPHY CHEST: CPT

## 2020-11-03 PROCEDURE — 83880 ASSAY OF NATRIURETIC PEPTIDE: CPT

## 2020-11-03 PROCEDURE — 36415 COLL VENOUS BLD VENIPUNCTURE: CPT

## 2020-11-03 PROCEDURE — 85610 PROTHROMBIN TIME: CPT

## 2020-11-03 RX ADMIN — IOPAMIDOL 100 ML: 755 INJECTION, SOLUTION INTRAVENOUS at 14:43

## 2020-11-03 NOTE — PROGRESS NOTES
PT DAILY TREATMENT NOTE 2-15    Patient Name: Renate Padilla  Date:11/3/2020  : 1965  [x]  Patient  Verified  Payor: [address] / Plan: 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]    SUBJECTIVE  Pain Level (0-10 scale): 1-2 when using L UE  Any medication changes, allergies to medications, adverse drug reactions, diagnosis change, or new procedure performed?: [x] No    [] Yes (see summary sheet for update)  Subjective functional status/changes:   [] No changes reported  Pt reports that \"my cording has really decreased and I have more motion\" since last tx however pt reports that she woke up with left sided chest pain and reports that \"it feels like heartburn\"    OBJECTIVE         15 min Therapeutic Exercise:  [] See flow sheet : gently postural and breathing ex's   Rationale: increase ROM, increase strength and improve coordination to improve the patients ability to perform ADL's required for return to work and/or community       45 min Manual Therapy:    PROM L UE all planes, gentle MFR L axilla and ant elbow, cording techniques L UE   Rationale: decrease pain, increase ROM, increase tissue extensibility, decrease edema , decrease trigger points and increase postural awareness  to improve the patients ability to effectively use extremity during functional tasks (reaching, grooming, dressing, walking)    With   [] TE   [] TA   [] neuro   [] other: Patient Education: [] Review HEP    [] Progressed/Changed HEP based on:    [] positioning   [] body mechanics   [] transfers   [] heat/ice application    [x] other: signs and symptoms of heart attack, when to seek appropriate care     Other Objective/Functional Measures:  BP seated R UE at 12:15pm 140/92 SpO2=97% HR=76bpm          BP at 12:55pm 143/105 SpO2=93% HR=89 bpm     Pain Level (0-10 scale) post treatment: Pt escorted to ER d/t worsening chest pain with change in position and increased BP at end of tx session    ASSESSMENT/Changes in Function:   Pt had worsening chest pain when transferring from supine to and from sitting at end of treatment session. Pt escorted to [facility] ER for assessment d/t increased chest pain and increased BP. Patient will continue to benefit from skilled PT services to modify and progress therapeutic interventions, address functional mobility deficits, address ROM deficits, address strength deficits, analyze and address soft tissue restrictions, analyze and cue movement patterns, analyze and modify body mechanics/ergonomics, assess and modify postural abnormalities and instruct in home and community integration to attain remaining goals. []  See Plan of Care  []  See progress note/recertification  []  See Discharge Summary         Progress towards goals / Updated goals:       PLAN  [x]  Upgrade activities as tolerated     [x]  Continue plan of care  []  Update interventions per flow sheet       []  Discharge due to:_  [x]  Other: contact pt to see how she is doing.      232 [facility], [facility] 10/22/2020

## 2020-11-03 NOTE — ED TRIAGE NOTES
Patient presents ambulatory to treatment area with a steady gait. Patient states shortly after awakening this morning, she began to experience left sided chest pain. Patient reported to physical therapy and pain worsened. Also states she was told her blood pressure was elevated and was told to report to ED for evaluation.   (BP was 148/92 at that time per patient statement.)

## 2020-11-03 NOTE — ED PROVIDER NOTES
HPI   The patient is a 58-year-old white female with a history of breast cancer status post bilateral mastectomy with reconstruction in September. She presents today after being at physical therapy developing left precordial chest pain that has been ongoing since about 8 AM it is somewhat pleuritic in nature. She feels short of breath she rates it 10 out of 10 at its worst but now only 7 out of 10. She denies that it is any exertional component to it. Past Medical History:   Diagnosis Date    Anxiety     Arthritis     NECK    At risk for sleep apnea     GGAAN 5     Basal cell carcinoma of skin     FACE, CHEST, BACK    Breast cancer ([facility] [address] 2019    LEFT    History of seasonal allergies     Hx antineoplastic chemo     COMPLETED 12-3-19    Ill-defined condition     hx motion sickness    Motion sickness     Nausea & vomiting     PONV after thyroidectomy;  Hx motion sickness    Squamous cell skin cancer     Dr. Joseluis Arora Thyroid disease     H/ GOITER    Vertigo        Past Surgical History:   Procedure Laterality Date    HX BREAST RECONSTRUCTION Bilateral 1/21/2020    IMMEDIATE BILATERAL BREAST RECONSTRUCTION WITH TISSUE EXPANDERS AND ALLODERM GRAFTS performed by Aaron German MD at [address]  [facility]  2004    HX COLONOSCOPY      HX HEENT  2009    THYROIDECTOMY    HX KNEE ARTHROSCOPY Left     HX LUMBAR FUSION  1995    HX MOHS PROCEDURES      x 4 times     HX SHOULDER ARTHROSCOPY Right     HX THYROIDECTOMY  2014    HX VASCULAR ACCESS  2019    PORTACATH RIGHT CHEST         Family History:   Problem Relation Age of Onset    Arthritis-osteo Mother     Cancer Father         Colon Cancer    No Known Problems Sister     No Known Problems Sister     Migraines Sister     Anesth Problems Neg Hx        Social History     Socioeconomic History    Marital status: SINGLE     Spouse name: Not on file    Number of children: Not on file    Years of education: Not on file    Highest education level: Not on file   Occupational History    Not on file   Social Needs    Financial resource strain: Not on file    Food insecurity     Worry: Not on file     Inability: Not on file    Transportation needs     Medical: Not on file     Non-medical: Not on file   Tobacco Use    Smoking status: Never Smoker    Smokeless tobacco: Never Used   Substance and Sexual Activity    Alcohol use: Yes     Alcohol/week: 6.0 standard drinks     Types: 6 Glasses of wine per week    Drug use: No    Sexual activity: Not on file   Lifestyle    Physical activity     Days per week: Not on file     Minutes per session: Not on file    Stress: Not on file   Relationships    Social connections     Talks on phone: Not on file     Gets together: Not on file     Attends [employer] service: Not on file     Active member of club or organization: Not on file     Attends meetings of clubs or organizations: Not on file     Relationship status: Not on file    Intimate partner violence     Fear of current or ex partner: Not on file     Emotionally abused: Not on file     Physically abused: Not on file     Forced sexual activity: Not on file   Other Topics Concern    Not on file   Social History Narrative    Not on file         ALLERGIES: Augmentin [amoxicillin-pot clavulanate]    Review of Systems   All other systems reviewed and are negative. Vitals:    11/03/20 1336   BP: (!) 156/85   Pulse: 76   Resp: 16   Temp: 97.2 °F (36.2 °C)   SpO2: 100%   Weight: 93 kg (205 lb)   Height: 5' 8\" (1.727 m)            Physical Exam  Vitals signs and nursing note reviewed. Constitutional:       Appearance: She is well-developed. HENT:      Head: Normocephalic and atraumatic. Mouth/Throat:      Pharynx: No oropharyngeal exudate. Eyes:      General: No scleral icterus. Conjunctiva/sclera: Conjunctivae normal.   Neck:      Musculoskeletal: Neck supple. Thyroid: No thyromegaly.    Cardiovascular: Rate and Rhythm: Normal rate and regular rhythm. Heart sounds: Normal heart sounds. No murmur. No friction rub. No gallop. Pulmonary:      Effort: Pulmonary effort is normal. No respiratory distress. Breath sounds: Normal breath sounds. No stridor. No wheezing or rales. Abdominal:      General: Bowel sounds are normal.      Palpations: Abdomen is soft. Tenderness: There is no abdominal tenderness. There is no guarding or rebound. Musculoskeletal: Normal range of motion. Lymphadenopathy:      Cervical: No cervical adenopathy. Skin:     General: Skin is warm and dry. Neurological:      Mental Status: She is alert and oriented to person, place, and time. MDM  Number of Diagnoses or Management Options     Amount and/or Complexity of Data Reviewed  Clinical lab tests: ordered and reviewed  Tests in the radiology section of CPT®: ordered and reviewed  Tests in the medicine section of CPT®: ordered and reviewed    Risk of Complications, Morbidity, and/or Mortality  Presenting problems: moderate  Diagnostic procedures: moderate  Management options: moderate           Procedures        Assessment and plan     the patient's dimer CT troponins were all negative I believe she has atypical chest pain I will discharge her home with close follow-up by cardiology for possible stress test though I do not believe this is cardiac chest pain. ED EKG interpretation:  Rhythm:nsr rate 72 no acute st changes This EKG was interpreted by Geri Dandy, MD,ED Provider.

## 2020-11-03 NOTE — DISCHARGE INSTRUCTIONS

## 2020-11-03 NOTE — ED NOTES
Patient discharged home in stable condition by Dr Hanna Valadez. Discharge instructions given and signed by patient.

## 2020-11-04 ENCOUNTER — PATIENT OUTREACH (OUTPATIENT)
Dept: CASE MANAGEMENT | Age: 55
End: 2020-11-04

## 2020-11-04 NOTE — PROGRESS NOTES
11/4/2020 at 4:46 in office new patient appointment scheduled with patient to see Marissa Stark MD     Future Appointments   Date Time Provider Preeti Joyce   11/5/2020 11:00 AM Ana Aponte 62   11/10/2020 12:00 PM Juan 48   11/12/2020 11:00 AM Ana Aponte   11/17/2020 12:00 PM Juan 48   11/19/2020 11:00 AM Maria Elena Aponte [facility]   11/24/2020 12:00 PM Maria Elena Aponte [facility]   11/24/2020  3:20 PM MD CRISTHIAN Killian BS AMB   12/11/2020 10:30 AM Mey Sinha  N Arias  BS AMB   3/16/2021 10:15 AM Allyson Wynn NP [facility] BS AMB

## 2020-11-04 NOTE — PROGRESS NOTES
Patient contacted regarding COVID-19  risk. Discussed COVID-19 related testing which was not done at this time. Test results were not done. Patient informed of results, if available? n/a. Outreach made within 2 business days of discharge: Yes    Care Transition Nurse/ Ambulatory Care Manager/ LPN Care Coordinator contacted the patient by telephone to perform post discharge assessment. Verified name and  with patient as identifiers. Provided introduction to self, and explanation of the CTN/ACM/LPN role, and reason for call due to risk factors for infection and/or exposure to COVID-19. Symptoms reviewed with patient who verbalized the following symptoms: no new symptoms and no worsening symptoms. Due to no new or worsening symptoms encounter was not routed to provider for escalation. Discussed follow-up appointments. If no appointment was previously scheduled, appointment scheduling offered: yes  [facility] follow up appointment(s):   Future Appointments   Date Time Provider Preeti Joyce   2020 11:00 AM Ana Aponte   11/10/2020 12:00 PM Juan Yang   2020 11:00 AM Chuck Aponte [facility]   2020 12:00 PM Ana Aponte   2020 11:00 AM Chuck Aponte [facility]   2020 12:00 PM Chuck Aponte [facility]   2020 10:30 AM Codey Elena  N [facility] BS AMB   3/16/2021 10:15 AM Rolan Ruelas NP [facility] BS AMB     Non-BS follow up appointment(s): pt states feeling better today, no chest pain, shortness of breath or difficulty breathing noted. Pt states she has follow up with specialty cardiology. Advance Care Planning:   Does patient have an Advance Directive: currently not on file; education provided     Patient has following risk factors of: immunocompromised.  CTN/ACM/LPN reviewed discharge instructions, medical action plan and red flags such as increased shortness of breath, increasing fever and signs of decompensation with patient who verbalized understanding. Discussed exposure protocols and quarantine with CDC Guidelines What to do if you are sick with coronavirus disease 2019.  Patient was given an opportunity for questions and concerns. The patient agrees to contact the [employer] exposure line [phone], [facility] department [address]  ([phone]) and PCP office for questions related to their healthcare. CTN/ACM provided contact information for future needs. Reviewed and educated patient on any new and changed medications related to discharge diagnosis. Patient/family/caregiver given information for [employer] and agrees to enroll no  Patient's preferred e-mail:  n/a  Patient's preferred phone number: n/a  Based on Loop alert triggers, patient will be contacted by nurse care manager for worsening symptoms. Plan for follow up call in 14 days based on severity of symptoms and risk factors.

## 2020-11-05 ENCOUNTER — HOSPITAL ENCOUNTER (OUTPATIENT)
Dept: PHYSICAL THERAPY | Age: 55
Discharge: HOME | End: 2020-11-05
Payer: COMMERCIAL

## 2020-11-05 PROCEDURE — 97140 MANUAL THERAPY 1/> REGIONS: CPT | Performed by: PHYSICAL THERAPIST

## 2020-11-05 PROCEDURE — 97110 THERAPEUTIC EXERCISES: CPT | Performed by: PHYSICAL THERAPIST

## 2020-11-05 NOTE — PROGRESS NOTES
PT DAILY TREATMENT NOTE 2-15    Patient Name: Renate Padilla  Date:2020  : 1965  [x]  Patient  Verified  Payor: [address] [place] [address] Road / Plan: 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 Pt reports no chest pain today and that her motion and tightness in L shoulder continues to improve.     OBJECTIVE         30 min Therapeutic Exercise:  [x] See flow sheet :     Rationale: increase ROM, increase strength and improve coordination to improve the patients ability to perform ADL's required for return to work and/or community       30 min Manual Therapy:    PROM L UE all planes, gentle MFR L axilla and ant elbow, cording techniques L UE   Rationale: decrease pain, increase ROM, increase tissue extensibility, decrease edema , decrease trigger points and increase postural awareness  to improve the patients ability to effectively use extremity during functional tasks (reaching, grooming, dressing, walking)    With   [] TE   [] TA   [] neuro   [] other: Patient Education: [x] Review HEP    [] Progressed/Changed HEP based on:    [] positioning   [] body mechanics   [] transfers   [] heat/ice application    [] other:      Other Objective/Functional Measures:     Pain Level (0-10 scale) post treatment: 0     ASSESSMENT/Changes in Function: Pt's cording continues to improve patient reports that she is feeling better about her movement and decreasing pain. Patient will continue to benefit from skilled PT services to modify and progress therapeutic interventions, address functional mobility deficits, address ROM deficits, address strength deficits, analyze and address soft tissue restrictions, analyze and cue movement patterns, analyze and modify body mechanics/ergonomics, assess and modify postural abnormalities and instruct in home and community integration to attain remaining goals.      []  See Plan of Care  []  See progress note/recertification  []  See Discharge Summary         Progress towards goals / Updated goals:       PLAN  [x]  Upgrade activities as tolerated     [x]  Continue plan of care  []  Update interventions per flow sheet       []  Discharge due to:_  []  Other:      [address] [facility], [facility] 10/22/2020

## 2020-11-10 ENCOUNTER — HOSPITAL ENCOUNTER (OUTPATIENT)
Dept: PHYSICAL THERAPY | Age: 55
Discharge: HOME | End: 2020-11-10
Payer: COMMERCIAL

## 2020-11-10 PROCEDURE — 97110 THERAPEUTIC EXERCISES: CPT | Performed by: PHYSICAL THERAPIST

## 2020-11-10 PROCEDURE — 97140 MANUAL THERAPY 1/> REGIONS: CPT | Performed by: PHYSICAL THERAPIST

## 2020-11-10 NOTE — PROGRESS NOTES
PT DAILY TREATMENT NOTE 2-15    Patient Name: Olga Avalos  Date:11/10/2020  : 1965  [x]  Patient  Verified  Payor: [address] [place] [address] Road / Plan: 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 Pt reports no chest pain today and that her motion and tightness in L shoulder continues to improve.  She is complaining of \"clunkiness\" in L shoulder and \"wants it to pop\"    OBJECTIVE         30 min Therapeutic Exercise:  [x] See flow sheet :     Rationale: increase ROM, increase strength and improve coordination to improve the patients ability to perform ADL's required for return to work and/or community       30 min Manual Therapy:    PROM L UE all planes, gentle MFR L axilla and ant elbow, cording techniques L UE   Rationale: decrease pain, increase ROM, increase tissue extensibility, decrease edema , decrease trigger points and increase postural awareness  to improve the patients ability to effectively use extremity during functional tasks (reaching, grooming, dressing, walking)    With   [] TE   [] TA   [] neuro   [] other: Patient Education: [x] Review HEP    [] Progressed/Changed HEP based on:    [] positioning   [] body mechanics   [] transfers   [] heat/ice application    [] other:      Other Objective/Functional Measures: G                      I  NRWT:               87. 7                  20.4  Wrist:               16.6                16.6  Forearm:         25.4                   23.8                                                            15               Elbow:             27.3                  27.6      Bicep:              31. [address] 3                    36     Axillary Line:  105.6  Nipple (mid breast line):106.7  Pain Level (0-10 scale) post treatment: 0     ASSESSMENT/Changes in Function: Pt did well with advancement of strengthening and stability exercises for L shoulder strength. Patient will continue to benefit from skilled PT services to modify and progress therapeutic interventions, address functional mobility deficits, address ROM deficits, address strength deficits, analyze and address soft tissue restrictions, analyze and cue movement patterns, analyze and modify body mechanics/ergonomics, assess and modify postural abnormalities and instruct in home and community integration to attain remaining goals.      []  See Plan of Care  []  See progress note/recertification  []  See Discharge Summary         Progress towards goals / Updated goals:       PLAN  [x]  Upgrade activities as tolerated     [x]  Continue plan of care  []  Update interventions per flow sheet       []  Discharge due to:_  []  Other:      232 [facility], [facility] 10/22/2020

## 2020-11-12 ENCOUNTER — HOSPITAL ENCOUNTER (OUTPATIENT)
Dept: PHYSICAL THERAPY | Age: 55
Discharge: HOME | End: 2020-11-12
Payer: COMMERCIAL

## 2020-11-12 PROCEDURE — 97110 THERAPEUTIC EXERCISES: CPT | Performed by: PHYSICAL THERAPIST

## 2020-11-12 PROCEDURE — 97140 MANUAL THERAPY 1/> REGIONS: CPT | Performed by: PHYSICAL THERAPIST

## 2020-11-12 NOTE — PROGRESS NOTES
PT DAILY TREATMENT NOTE 2-15    Patient Name: Casper Vasques  Date:2020  : 1965  [x]  Patient  Verified  Payor: [address] [place] [address] Road / Plan: 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]    SUBJECTIVE  Pain Level (0-10 scale): 6 \"all over aches but especially L UE\"  Any medication changes, allergies to medications, adverse drug reactions, diagnosis change, or new procedure performed?: [x] No    [] Yes (see summary sheet for update)  Subjective functional status/changes:   [] No changes reported  Pt states that she believes that the anti estrogen therapy she is taking is causing her \"great discomfort\"  OBJECTIVE         30 min Therapeutic Exercise:  [x] See flow sheet :     Rationale: increase ROM, increase strength and improve coordination to improve the patients ability to perform ADL's required for return to work and/or community       30 min Manual Therapy:    PROM L UE all planes, gentle MFR L axilla and ant elbow, cording techniques L UE TrP L pec minor, subscap, rhomboids, UT/LS   Rationale: decrease pain, increase ROM, increase tissue extensibility, decrease edema , decrease trigger points and increase postural awareness  to improve the patients ability to effectively use extremity during functional tasks (reaching, grooming, dressing, walking)    With   [] TE   [] TA   [] neuro   [] other: Patient Education: [x] Review HEP    [] Progressed/Changed HEP based on:    [] positioning   [] body mechanics   [] transfers   [] heat/ice application    [] other:      Other Objective/Functional Measures:      Pain Level (0-10 scale) post treatment: 4     ASSESSMENT/Changes in Function: Recommended pt use her Theracane at home for periscapular musculature. Pt demonstrated good technique with performance. Patient will continue to benefit from skilled PT services to modify and progress therapeutic interventions, address functional mobility deficits, address ROM deficits, address strength deficits, analyze and address soft tissue restrictions, analyze and cue movement patterns, analyze and modify body mechanics/ergonomics, assess and modify postural abnormalities and instruct in home and community integration to attain remaining goals.      []  See Plan of Care  []  See progress note/recertification  []  See Discharge Summary         Progress towards goals / Updated goals:       PLAN  [x]  Upgrade activities as tolerated     [x]  Continue plan of care  []  Update interventions per flow sheet       []  Discharge due to:_  []  Other:      232 [facility], [facility] 10/22/2020

## 2020-11-17 ENCOUNTER — HOSPITAL ENCOUNTER (OUTPATIENT)
Dept: PHYSICAL THERAPY | Age: 55
Discharge: HOME | End: 2020-11-17
Payer: COMMERCIAL

## 2020-11-17 PROCEDURE — 97140 MANUAL THERAPY 1/> REGIONS: CPT | Performed by: PHYSICAL THERAPIST

## 2020-11-17 PROCEDURE — 97110 THERAPEUTIC EXERCISES: CPT | Performed by: PHYSICAL THERAPIST

## 2020-11-17 NOTE — PROGRESS NOTES
PT DAILY TREATMENT NOTE 2-15    Patient Name: Bear Giordano  Date:2020  : 1965  [x]  Patient  Verified  [facility] [address] [address] / Plan: 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]    SUBJECTIVE  Pain Level (0-10 scale): 6 \"all over aches but especially L UE\"  Any medication changes, allergies to medications, adverse drug reactions, diagnosis change, or new procedure performed?: [x] No    [] Yes (see summary sheet for update)  Subjective functional status/changes:   [x] No changes reported     OBJECTIVE        30 min Therapeutic Exercise:  [x] See flow sheet :     Rationale: increase ROM, increase strength and improve coordination to improve the patients ability to perform ADL's required for return to work and/or community       30 min Manual Therapy:    PROM L UE all planes, gentle MFR L axilla and ant elbow, cording techniques L UE TrP L pec minor, subscap, rhomboids, UT/LS   Rationale: decrease pain, increase ROM, increase tissue extensibility, decrease edema , decrease trigger points and increase postural awareness  to improve the patients ability to effectively use extremity during functional tasks (reaching, grooming, dressing, walking)    With   [] TE   [] TA   [] neuro   [] other: Patient Education: [x] Review HEP    [] Progressed/Changed HEP based on:    [] positioning   [] body mechanics   [] transfers   [] heat/ice application    [] other:      Other Objective/Functional Measures:      Pain Level (0-10 scale) post treatment: 5-6     ASSESSMENT/Changes in Function:  Pt did well with added scapular strengthening ex's with no increased pain but requires v.c for form and breath  Patient will continue to benefit from skilled PT services to modify and progress therapeutic interventions, address functional mobility deficits, address ROM deficits, address strength deficits, analyze and address soft tissue restrictions, analyze and cue movement patterns, analyze and modify body mechanics/ergonomics, assess and modify postural abnormalities and instruct in home and community integration to attain remaining goals.      []  See Plan of Care  []  See progress note/recertification  []  See Discharge Summary         Progress towards goals / Updated goals:       PLAN  [x]  Upgrade activities as tolerated     [x]  Continue plan of care  []  Update interventions per flow sheet       []  Discharge due to:_  []  Other:      232 [facility], [facility] 11/17/2020

## 2020-11-18 ENCOUNTER — PATIENT OUTREACH (OUTPATIENT)
Dept: CASE MANAGEMENT | Age: 55
End: 2020-11-18

## 2020-11-18 NOTE — PROGRESS NOTES
Patient resolved from Transition of Care episode on 11/18/20. Discussed COVID-19 related testing which was not done at this time. Test results were not done. Patient informed of results, if available? n/a     Patient/family has been provided the following resources and education related to COVID-19:                         Signs, symptoms and red flags related to COVID-19            [facility] exposure and quarantine guidelines            [employer] exposure contact - [phone]            Contact for their local Department of Health               Patient currently reports that the following symptoms have improved:  no new symptoms and no worsening symptoms. No further outreach scheduled with this CTN/ACM/LPN/HC/ MA. Episode of Care resolved. Patient has this CTN/ACM/LPN/HC/MA contact information if future needs arise.

## 2020-11-19 ENCOUNTER — HOSPITAL ENCOUNTER (OUTPATIENT)
Dept: PHYSICAL THERAPY | Age: 55
Discharge: HOME | End: 2020-11-19
Payer: COMMERCIAL

## 2020-11-19 PROCEDURE — 97140 MANUAL THERAPY 1/> REGIONS: CPT | Performed by: PHYSICAL THERAPIST

## 2020-11-19 PROCEDURE — 97110 THERAPEUTIC EXERCISES: CPT | Performed by: PHYSICAL THERAPIST

## 2020-11-19 NOTE — PROGRESS NOTES
PT DAILY TREATMENT NOTE 2-15    Patient Name: Jaye Reason  Date:2020  : 1965  [x]  Patient  Verified  Payor: [address] [place] [address] Road / Plan: 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]    SUBJECTIVE  Pain Level (0-10 scale): 2-3  L UE  Any medication changes, allergies to medications, adverse drug reactions, diagnosis change, or new procedure performed?: [x] No    [] Yes (see summary sheet for update)  Subjective functional status/changes:   [] No changes reported   Pt continues to report increased L top of shoulder   OBJECTIVE        30 min Therapeutic Exercise:  [x] See flow sheet :     Rationale: increase ROM, increase strength and improve coordination to improve the patients ability to perform ADL's required for return to work and/or community       30 min Manual Therapy:   MFR L axilla and ant elbow, cording techniques L UE TrP L pec minor, subscap, rhomboids, UT/LS in sitting   Rationale: decrease pain, increase ROM, increase tissue extensibility, decrease edema , decrease trigger points and increase postural awareness  to improve the patients ability to effectively use extremity during functional tasks (reaching, grooming, dressing, walking)    With   [] TE   [] TA   [] neuro   [] other: Patient Education: [x] Review HEP    [x] Changed HEP based on:  L shoulder pain  [] positioning   [] body mechanics   [] transfers   [] heat/ice application    [] other:      Other Objective/Functional Measures:      Pain Level (0-10 scale) post treatment:     ASSESSMENT/Changes in Function:   Pt is presenting with impingement syndrome in L UE.  Recommended keeping motions below shoulder level and talking to her physicians about an anti-inflammatory medication. Emphasis of HEP is posture, positioning of L shoulder blade, and gentle isometrics  Patient will continue to benefit from skilled PT services to modify and progress therapeutic interventions, address functional mobility deficits, address ROM deficits, address strength deficits, analyze and address soft tissue restrictions, analyze and cue movement patterns, analyze and modify body mechanics/ergonomics, assess and modify postural abnormalities and instruct in home and community integration to attain remaining goals.      []  See Plan of Care  []  See progress note/recertification  []  See Discharge Summary         Progress towards goals / Updated goals:       PLAN  [x]  Upgrade activities as tolerated     [x]  Continue plan of care  []  Update interventions per flow sheet       []  Discharge due to:_  []  Other:      [address] [facility], [facility] 11/19/2020

## 2020-11-24 ENCOUNTER — OFFICE VISIT (OUTPATIENT)
Dept: CARDIOLOGY CLINIC | Age: 55
End: 2020-11-24
Payer: COMMERCIAL

## 2020-11-24 ENCOUNTER — HOSPITAL ENCOUNTER (OUTPATIENT)
Dept: PHYSICAL THERAPY | Age: 55
Discharge: HOME | End: 2020-11-24
Payer: COMMERCIAL

## 2020-11-24 VITALS
WEIGHT: 207.6 LBS | OXYGEN SATURATION: 98 % | HEIGHT: 68 IN | RESPIRATION RATE: 15 BRPM | DIASTOLIC BLOOD PRESSURE: 84 MMHG | SYSTOLIC BLOOD PRESSURE: 134 MMHG | HEART RATE: 80 BPM | BODY MASS INDEX: 31.46 KG/M2

## 2020-11-24 DIAGNOSIS — E66.09 CLASS 1 OBESITY DUE TO EXCESS CALORIES WITHOUT SERIOUS COMORBIDITY WITH BODY MASS INDEX (BMI) OF 30.0 TO 30.9 IN ADULT: ICD-10-CM

## 2020-11-24 DIAGNOSIS — R94.31 ABNORMAL ECG: ICD-10-CM

## 2020-11-24 DIAGNOSIS — R06.02 SOB (SHORTNESS OF BREATH): ICD-10-CM

## 2020-11-24 DIAGNOSIS — R07.9 CHEST PAIN, UNSPECIFIED TYPE: Primary | ICD-10-CM

## 2020-11-24 DIAGNOSIS — Z09 CHEMOTHERAPY FOLLOW-UP EXAMINATION: ICD-10-CM

## 2020-11-24 DIAGNOSIS — I10 BENIGN ESSENTIAL HYPERTENSION: ICD-10-CM

## 2020-11-24 PROCEDURE — 99204 OFFICE O/P NEW MOD 45 MIN: CPT | Performed by: INTERNAL MEDICINE

## 2020-11-24 PROCEDURE — 97110 THERAPEUTIC EXERCISES: CPT | Performed by: PHYSICAL THERAPIST

## 2020-11-24 PROCEDURE — 97140 MANUAL THERAPY 1/> REGIONS: CPT | Performed by: PHYSICAL THERAPIST

## 2020-11-24 PROCEDURE — 93000 ELECTROCARDIOGRAM COMPLETE: CPT | Performed by: INTERNAL MEDICINE

## 2020-11-24 NOTE — PROGRESS NOTES
Reason for consult: chest pain  ER referral  Oncologist: Dr. Ceci Méndez    HPI: Squire Needles, a 60y.o. year-old who presents for evaluation of chest pain. 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 She has new inferior Q waves and poor R wave progression so went over that today in the setting of stress-induced chest pain and abnormally high blood pressures as well as her history of chemo and radiation I think she is best served by further investigation of her heart health. So she will have an echo and a Lexiscan for further evaluation    **After her office visit labs received dated 8/27/20  TSH 0.391, Free T4 1.4, Free T3 3.4, BMP ok    Assessment/Plan:  1. Chest pain - ECG with inferior Q waves on 11/3/20 and poor R wave progression, will proceed with lexiscan nuclear stress test to assess for ischemia   -will request a copy of her lipid results from her endocrinologist   2.   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 Ceci Méndez  -will order TTE to assess cardiac structure and function after finishing chemotherapy in 12/19  -as long as above testing in unremarkable will plan to see her in 1 year for an echocardiogram for routine surveillance post chemo  3. Hypothyroidism - on supplementation, followed by endocrinologist/Dr. Connie Daniel    Echo 7/19 - EF 60-65%    Fam Hx: parents are healthy, father had blood clots and on blood thinner, + CAD in grandparents  Soc Hx: no tobacco use, minimal social etoh/wine     She  has a past medical history of Anxiety, Arthritis, At risk for sleep apnea, Basal cell carcinoma of skin, Breast cancer ([facility] [address] (2019), History of seasonal allergies, antineoplastic chemo, Ill-defined condition, Motion sickness, Nausea & vomiting, Squamous cell skin cancer, Thyroid disease, and Vertigo. Cardiovascular ROS: positive for chest pain  Respiratory ROS: no cough or wheezing  Neurological ROS: no TIA or stroke symptoms  All other systems negative except as above. PE  Vitals:    11/24/20 1531   BP: 134/84   Pulse: 80   Resp: 15   SpO2: 98%   Weight: 207 lb 9.6 oz (94.2 kg)   Height: 5' 8\" (1.727 m)    Body mass index is 31.57 kg/m².    General appearance - alert, well appearing, and in no distress  Mental status - affect appropriate to mood  Eyes - sclera anicteric, moist mucous membranes  Neck - supple  Lymphatics - not assessed  Chest - clear to auscultation, no wheezes, rales or rhonchi  Heart - normal rate, regular rhythm, normal S1, S2, no murmurs, rubs, clicks or gallops  Abdomen - soft, nontender, nondistended  Back exam - full range of motion, no tenderness  Neurological - cranial nerves II through XII grossly intact, no focal deficit  Musculoskeletal - no muscular tenderness noted, normal strength  Extremities - peripheral pulses normal, left arm lymphedema, no pedal edema  Skin - normal coloration  no rashes    12 lead ECG in 2018 - NSR with Q wave in lead III  12 lead ECG 11/3/20 - NSR with Q waves in inferior leads    Recent Labs:  No results found for: CHOL, CHOLX, CHLST, CHOLV, 555234, HDL, HDLP, LDL, LDLC, DLDLP, TGLX, TRIGL, Ariana Murphy, [facility]  Lab Results   Component Value Date/Time    Creatinine 0.80 11/03/2020 01:25 PM     Lab Results   Component Value Date/Time    BUN 13 11/03/2020 01:25 PM     Lab Results   Component Value Date/Time    Potassium 3.9 11/03/2020 01:25 PM     No results found for: HBA1C, HGBE8, WMA3BQTZ, GRO4CPQN  Lab Results   Component Value Date/Time    HGB 14.0 11/03/2020 01:25 PM     Lab Results   Component Value Date/Time    PLATELET 407 96/92/2088 01:25 PM       Reviewed:  Past Medical History:   Diagnosis Date    Anxiety     Arthritis     NECK    At risk for sleep apnea     GAGAN 5     Basal cell carcinoma of skin     FACE, CHEST, BACK    Breast cancer ([facility] [address] 2019    LEFT    History of seasonal allergies     Hx antineoplastic chemo     COMPLETED 12-3-19    Ill-defined condition     hx motion sickness    Motion sickness     Nausea & vomiting     PONV after thyroidectomy; Hx motion sickness    Squamous cell skin cancer     Dr. Remi Loja Thyroid disease     H/ GOITER    Vertigo      Social History     Tobacco Use   Smoking Status Never Smoker   Smokeless Tobacco Never Used     Social History     Substance and Sexual Activity   Alcohol Use Yes    Alcohol/week: 6.0 standard drinks    Types: 6 Glasses of wine per week     Allergies   Allergen Reactions    Augmentin [Amoxicillin-Pot Clavulanate] Nausea and Vomiting       Current Outpatient Medications   Medication Sig    sertraline (ZOLOFT) 100 mg tablet Take 0.5 Tabs by mouth nightly.  levothyroxine (SYNTHROID) 112 mcg tablet Take 1 Tab by mouth Daily (before breakfast).  traZODone (DESYREL) 50 mg tablet TAKE TWO TABLETS BY MOUTH EVERY NIGHT AT BEDTIME    anastrozole (Arimidex) 1 mg tablet Take 1 mg by mouth daily.  naloxone (NARCAN) 4 mg/actuation nasal spray Use 1 spray intranasally, then discard. Repeat with new spray every 2 min as needed for opioid overdose symptoms, alternating nostrils.     ALPRAZolam (XANAX) 0.5 mg tablet Take 1 Tab by mouth two (2) times daily as needed for Anxiety. Max Daily Amount: 1 mg.  cholecalciferol (VITAMIN D3) 1,000 unit cap Take 5,000 Units by mouth daily.  liothyronine (CYTOMEL) 5 mcg tablet Take 5 mcg by mouth daily.  cyanocobalamin 1,000 mcg tablet Take 1,000 mcg by mouth daily.  gabapentin (NEURONTIN) 100 mg capsule Take 1 Cap by mouth three (3) times daily. Max Daily Amount: 300 mg. (Patient taking differently: Take 100 mg by mouth three (3) times daily as needed.)    LORazepam (ATIVAN) 1 mg tablet Take 1 Tab by mouth every four (4) hours as needed for Anxiety. Max Daily Amount: 6 mg. No current facility-administered medications for this visit.         Doreatha Goltz, MD  [facility] [address] [place] [address] [address], [address] [address]  [facility]  [phone]

## 2020-11-24 NOTE — PROGRESS NOTES
Physical Therapy at [facility],   a part of  [facility]  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 She has experienced no significant decrease in her circumferential measurements after 4 weeks of MLD, decongestive/therapeutic ex, elevation and compression. Her chest circumferences have increased as well as her forearm, elbow, and bicep regions since 10/20/2020.     FROM: 10/20/2020  Girth (cm)                                       Distance from wrist (cm)                R                      L  Palm:               20. 7                  20.7  Wrist:               16.6                16.7  Forearm:         25.4                   23.8                                                            15               GOPTE:             05.6                  05.1      Bicep:              31. 7                  26.1                                                           02  Axilla:               37.3                    36      Axillary Line:  105.6    Nipple (mid breast line):106.7       From 2020:  Girth (cm)                                       Distance from wrist (cm)                R                      L  Palm:               20. [place] 20.7  Wrist:               16.6                16.7  Forearm:         25.4                  25.5                                                          15               Elbow:             27.3                   28.1    Bicep:              31. 3                   33.7                                                          36  Axilla:               37.3                   36.2          Axillary line: 107.5cm  Nipple line: 108.2cm     Assessment / Recommendations: Pt would benefit from a compression pump that included decongestion of the chest and axillary regions as well as the UE for daily lymphatic drainage in addition to continuation of PT 1-2 times a week for an additional 3-4 weeks in order to complete all rehab goals below. Goals:    Short Term Goals:   1) Pt will be Independent with skin care routine to decrease risk of infection. MET  2) Pt will be Independent in don/doff/care of/wearing schedule of light torso compression. MET   3) Pt will verbalize with 100% accuracy which signs and symptoms to report immediately to physician concerning their condition. MET        Long Term Goals:   1) Pt will be Independent with compression management routine consisting of skin care, decongestive exercises, self-manual lymphatic stimulation techniques, strengthening and motion exercises, and don/doff/care of appropriate compression garment(s). PROGRESSING  2) Patient will demonstrate decongestion of limb by 3-4 cm and chest by 4-5cm in order to effectively manage her lymphedema and decrease risk of infectionPROGRESSING  3) Pt will increase ROM of affected limb by 20-30 degrees in order to use L UE in all ADL's with 0-1/10  pain at or above shoulder level on NPS scale. PROGRESSING  4) Pt will increase strength of affected limb by 1-2 muscle grades  in order to perform job requirements of lifting, pushing, pulling and perform  ADL's with 0-1/10 pain at or above shoulder level on  [address], CLT-KARAN 11/24/2020    ________________________________________________________________________  NOTE TO PHYSICIAN:  Please complete the following and fax to:  Physical Therapy at [facility],    part [address]: [phone]  . Retain this original for your records. If you are unable to process this request in 24 hours, please contact our office.        ____ I have read the above report and request that my patient continue therapy with the following changes/special instructions:  ____ I have read the above report and request that my patient be discharged from therapy    Physician's Signature:_________________ Date:___________Time:__________

## 2020-11-24 NOTE — PROGRESS NOTES
PT DAILY TREATMENT NOTE 2-15    Patient Name: Bryce Staff  Date:2020  : 1965  [x]  Patient  Verified  Payor: [address] [place] [address] Road / Plan: 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 OBJECTIVE        30 min Therapeutic Exercise:  [x] See flow sheet :     Rationale: increase ROM, increase strength and improve coordination to improve the patients ability to perform ADL's required for return to work and/or community       30 min Manual Therapy:   MFR L axilla and ant elbow, cording techniques L UE TrP L pec minor, subscap, rhomboids, UT/LS in sitting   Rationale: decrease pain, increase ROM, increase tissue extensibility, decrease edema , decrease trigger points and increase postural awareness  to improve the patients ability to effectively use extremity during functional tasks (reaching, grooming, dressing, walking)    With   [] TE   [] TA   [] neuro   [] other: Patient Education: [x] Review HEP    [x] Changed HEP based on:  L shoulder pain  [] positioning   [] body mechanics   [] transfers   [] heat/ice application    [] other:      Other Objective/Functional Measures:             R                      L  Palm:               20. [place] 20.7  Wrist:               16.6                16.7  Forearm:         25.4                    25.5                                                          15               Elbow:             27.3                   28.1    Bicep:              31. 3                  33.7                                                          36  Axilla:               37.3                     36.2     Axillary Line:   107.5  Nipple (mid breast line): 108.2   Pain Level (0-10 scale) post treatment:3     ASSESSMENT/Changes in Function:      Patient will continue to benefit from skilled PT services to modify and progress therapeutic interventions, address functional mobility deficits, address ROM deficits, address strength deficits, analyze and address soft tissue restrictions, analyze and cue movement patterns, analyze and modify body mechanics/ergonomics, assess and modify postural abnormalities and instruct in home and community integration to attain remaining goals.      []  See Plan of Care  [x]  See progress note/recertification  []  See Discharge Summary         Progress towards goals / Updated goals: see progress note       PLAN  [x]  Upgrade activities as tolerated     [x]  Continue plan of care  []  Update interventions per flow sheet       []  Discharge due to:_  []  Other:      232 [facility], [facility] 11/19/2020

## 2020-12-10 ENCOUNTER — HOSPITAL ENCOUNTER (OUTPATIENT)
Dept: PHYSICAL THERAPY | Age: 55
Discharge: HOME | End: 2020-12-10
Payer: COMMERCIAL

## 2020-12-10 PROCEDURE — 97110 THERAPEUTIC EXERCISES: CPT | Performed by: PHYSICAL THERAPIST

## 2020-12-10 PROCEDURE — 97530 THERAPEUTIC ACTIVITIES: CPT | Performed by: PHYSICAL THERAPIST

## 2020-12-10 PROCEDURE — 97016 VASOPNEUMATIC DEVICE THERAPY: CPT | Performed by: PHYSICAL THERAPIST

## 2020-12-10 NOTE — PROGRESS NOTES
Physical Therapy at [facility],   a part of  [facility]  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 Pt made progress towards all goals below but continues to have L axillary/chest edema and discomfort as well as painful L shoulder motion when using in all ADL's. Pt was denied compression pump to address torso edema and L UE edema. An appeal is in process with both the insurance company and [employer].    Long Term Goals:   1) Pt will be Independent with compression management routine consisting of skin care, decongestive exercises, self-manual lymphatic stimulation techniques, strengthening and motion exercises, and don/doff/care of appropriate compression garment(s). PROGRESSING  2) Patient will demonstrate decongestion of limb by 3-4 cm and chest by 4-5cm in order to effectively manage her lymphedema and decrease risk of infectionPROGRESSING  3) Pt will increase ROM of affected limb by 20-30 degrees in order to use L UE in all ADL's with 0-1/10  pain at or above shoulder level on NPS scale. PROGRESSING  4) Pt will increase strength of affected limb by 1-2 muscle grades  in order to perform job requirements of lifting, pushing, pulling and perform  ADL's with 0-1/10 pain at or above shoulder level on NPS PROGRESSING      RECOMMENDATIONS:  [x]Discontinue therapy: [x]Patient has reached or is progressing toward set goals      []Patient is non-compliant or has abdicated      []Due to lack of appreciable progress towards set goals      []Other    Bel Styles, ADORET-KARAN 12/10/2020     ______________________________________________________________________    NOTE TO PHYSICIAN:  Please complete the following and fax to:  Physical Therapy at [facility],   a part of [address]: [phone]  Retain this original for your records. If you are unable to process this request in 24 hours, please contact our office.        [de-identified] Signature:____________________  Date:____________Time:_________

## 2020-12-10 NOTE — PROGRESS NOTES
PT DAILY TREATMENT NOTE 2-15    Patient Name: Bebeto Coe  Date:12/10/2020  : 1965  [x]  Patient  Verified  Payor: [address] [place] [address] Road / Plan: 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 Pt had to stop taking anti-inflammation d/t surgery on 2021. Pt states that pain persisting in L shoulder and swelling chest/axilla persists. Haven't heard from [facility] about pump. She would like to have a ortho doc look into her shoulder as it is not getting better. OBJECTIVE     15 min Therapeutic Activity:  []? See flow sheet :   Rationale: Pt education on purpose of compression for torso and axilla, application on Komprex II foam, wearing schedule of garments      30 min Therapeutic Exercise:  [x] See flow sheet :     Rationale: increase ROM, increase strength and improve coordination to improve the patients ability to perform ADL's required for return to work and/or community               Modality rationale: decrease edema, decrease inflammation and decrease pain to improve the patients ability to perform ADL's required for return to work and/or community      Min Type Additional Details      []? Estim:  []? Unatt       []? IFC  []? Premod                        []?Other:  []?w/ice   []?w/heat  Position:  Location:      []? Estim: []? Att    []? TENS instruct  []? NMES                    []?Other:  []?w/US   []?w/ice   []?w/heat  Position:  Location:      []? Traction: []? Cervical       []? Lumbar                       []? Prone          []? Supine                       []?Intermittent   []? Continuous Lbs:  []? before manual  []? after manual      []? Ultrasound: []? Continuous   []? Pulsed                           []? 1MHz   []? 3MHz W/cm2:  Location:      []? Iontophoresis with dexamethasone         Location: []? Take home patch   []? In clinic      []? Ice     []?  heat  []? Ice massage  []? Laser   []? Anodyne Position:  Location:      []? Laser with stim  []? Other:  Position:  Location:    30 [x]? Vasopneumatic Device  Flexi touch gradient compression L chest/shoulder Pressure:       []? lo []? med []? hi   Temperature: []? lo []? med []? hi    [x]? Skin assessment post-treatment:  [x]? intact []? redness- no adverse reaction    []? redness  adverse reaction:        With   [] TE   [] TA   [] neuro   [] other: Patient Education: [x] Review HEP    [x] Changed HEP based on:  L shoulder pain  [] positioning   [] body mechanics   [] transfers   [] heat/ice application    [] other:      Other Objective/Functional Measures:               R                      L  Palm:               20. 7                  19.7  Wrist:               16.6                 16.6  Forearm:         25.4                     24.5                                                       15               Elbow:             27.3                   26.1   Bicep:              31. 5                  55. 3                                                         36  Axilla:               37.3                   35. 7     Axillary Line:   101.4  Nipple (mid breast line):   105.3   Pain Level (0-10 scale) post treatment: 5-7     ASSESSMENT/Changes in Function:       []  See Plan of Care  []  See progress note/recertification  [x]  See Discharge Summary         Progress towards goals / Updated goals: see d/c summary       PLAN  []  Upgrade activities as tolerated     []  Continue plan of care  []  Update interventions per flow sheet       [x]  Discharge due to: Pt has met or is progressing towards all goals   []  Other:      [facility], [facility] 11/19/2020

## 2020-12-11 ENCOUNTER — OFFICE VISIT (OUTPATIENT)
Dept: ONCOLOGY | Age: 55
End: 2020-12-11
Payer: COMMERCIAL

## 2020-12-11 VITALS
SYSTOLIC BLOOD PRESSURE: 137 MMHG | HEIGHT: 68 IN | TEMPERATURE: 96.8 F | WEIGHT: 202.4 LBS | BODY MASS INDEX: 30.68 KG/M2 | OXYGEN SATURATION: 98 % | HEART RATE: 86 BPM | DIASTOLIC BLOOD PRESSURE: 84 MMHG

## 2020-12-11 DIAGNOSIS — C50.412 MALIGNANT NEOPLASM OF UPPER-OUTER QUADRANT OF LEFT BREAST IN FEMALE, ESTROGEN RECEPTOR POSITIVE (HCC): Primary | ICD-10-CM

## 2020-12-11 DIAGNOSIS — Z17.0 MALIGNANT NEOPLASM OF UPPER-OUTER QUADRANT OF LEFT BREAST IN FEMALE, ESTROGEN RECEPTOR POSITIVE (HCC): Primary | ICD-10-CM

## 2020-12-11 DIAGNOSIS — E66.09 CLASS 1 OBESITY DUE TO EXCESS CALORIES WITHOUT SERIOUS COMORBIDITY WITH BODY MASS INDEX (BMI) OF 30.0 TO 30.9 IN ADULT: ICD-10-CM

## 2020-12-11 PROCEDURE — 99214 OFFICE O/P EST MOD 30 MIN: CPT | Performed by: INTERNAL MEDICINE

## 2020-12-11 NOTE — PROGRESS NOTES
Kelly Shelby is a 60 y.o. female  Chief Complaint   Patient presents with    Follow-up    Breast Cancer     1. Have you been to the ER, urgent care clinic since your last visit? Hospitalized since your last visit? Yes, patient went to the ER November 3rd for chest pains. 2. Have you seen or consulted any other health care providers outside of the [address] [place] [address] since your last visit? Include any pap smears or colon screening.  Yes, patient went to see her Endocrinologist.

## 2020-12-11 NOTE — PROGRESS NOTES
[facility] [place] [address], [address], [place]: [phone]  F: [phone]    Reason for Visit:   Beba Fuentes is a 60 y.o. female who is seen by synchronous (real-time) audio-video technology on 12/11/2020 for follow up of  Left breast cancer- ER+WY+HER2 greg neg    Treatment History:   · 6/19: Mammogram:  Left breast mass with skin thickening, 2 suspicious nodes. Mass measures 1.4 x 0.9 x 1.3  · 6/19: MRI breast: Multicentric left breast carcinoma. Non-masslike enhancement extends from the nipple to the posterior third, involves all quadrants, and measures 106 x 44 x 79 mm. · 5/28/19: Biopsy breast- IDC % % HER 2 negative, skin biopsy benign , node biopsy mostly adipose tissue with atypical cells . BRCA1 and 2 negative. CT and bone scan negative for metastasis. Mammaprint with insufficient tissue for testing. Repeat biopsy of axillary node 6/20/19 positive for metastatic disease- repeat mammaprint - high risk  · 7/19/19: cycle 1 neoadjuvant of dd AC-T  · 9/12/19: US of breast shows increased malignant microcalcification in the left breast, etiology included tx related necrosis vs extension of disease. Decreased size in left axillary LN. · 10/22/19: calcifications in the left upper outer quadrant are stable, calcifications in left axillary LN are not changed   · 1/21/20: b/l mastectomy   · 2/2020: Letrozole then switched to Anastrazole due to side effects  · 5/2020:Completed RT    History of Present Illness:   Patient is a 60 y.o. seen for L breast cancer. She felt a sensation in the shower about May 2019. She also noted a lump and  an inverted nipple. She had a physician friend look at this who directed her to mammogram and recommended she see Dr. Navarro Washington. This led to imaging and diagnosis as above. She completed neoadjuvant ddACT followed by bilateral mastectomy on 1/21/20. On letrozole then switched to Anastrazole.     Shavonne had R sided chest pain 2020 and was found to have an abnormal EKG. She had a normal CTA 11/3/2020. She saw Dr. Tania Cornejo. She has a Nuclear stress test and and ECHO schedule. She no longer has CP, Leg swelling, Orthopnea and PND. She has some joint stiffness. Has left arm Lymphedema. Follows with Lymphedema clinic. She wants to loose weight. Has no new cough, lumps, HA. She has had some left upper arm pain with abduction    Rare ETOH  She does not smoke. Father had colon cancer    Past Medical History:   Diagnosis Date    Anxiety     Arthritis     NECK    At risk for sleep apnea     GAGAN 5     Basal cell carcinoma of skin     FACE, CHEST, BACK    Breast cancer [facility] [address] 2019    LEFT    History of seasonal allergies     Hx antineoplastic chemo     COMPLETED 12-3-19    Ill-defined condition     hx motion sickness    Motion sickness     Nausea & vomiting     PONV after thyroidectomy; Hx motion sickness    Squamous cell skin cancer     Dr. Alyssa Wiggins Thyroid disease     H/ GOITER    Vertigo       Past Surgical History:   Procedure Laterality Date    HX BREAST RECONSTRUCTION Bilateral 2020    IMMEDIATE BILATERAL BREAST RECONSTRUCTION WITH TISSUE EXPANDERS AND ALLODERM GRAFTS performed by Myrna Garcia MD at [address] HX  SECTION      HX COLONOSCOPY      HX HEENT  2009    THYROIDECTOMY    HX KNEE ARTHROSCOPY Left     HX LUMBAR FUSION      HX MOHS PROCEDURES      x 4 times     HX SHOULDER ARTHROSCOPY Right     HX THYROIDECTOMY      HX VASCULAR ACCESS  2019    PORTACATH RIGHT CHEST      Social History     Tobacco Use    Smoking status: Never Smoker    Smokeless tobacco: Never Used   Substance Use Topics    Alcohol use:  Yes     Alcohol/week: 6.0 standard drinks     Types: 6 Glasses of wine per week      Family History   Problem Relation Age of Onset   [address] [facility] [address] Arthritis-osteo Mother     Cancer Father         Colon Cancer    No Known Problems Sister     No Known Problems Sister     Migraines Sister     Anesth Problems Neg Hx      Current Outpatient Medications   Medication Sig    sertraline (ZOLOFT) 100 mg tablet Take 0.5 Tabs by mouth nightly.  levothyroxine (SYNTHROID) 112 mcg tablet Take 1 Tab by mouth Daily (before breakfast).  traZODone (DESYREL) 50 mg tablet TAKE TWO TABLETS BY MOUTH EVERY NIGHT AT BEDTIME    anastrozole (Arimidex) 1 mg tablet Take 1 mg by mouth daily.  naloxone (NARCAN) 4 mg/actuation nasal spray Use 1 spray intranasally, then discard. Repeat with new spray every 2 min as needed for opioid overdose symptoms, alternating nostrils.  cyanocobalamin 1,000 mcg tablet Take 1,000 mcg by mouth daily.  gabapentin (NEURONTIN) 100 mg capsule Take 1 Cap by mouth three (3) times daily. Max Daily Amount: 300 mg. (Patient taking differently: Take 100 mg by mouth three (3) times daily as needed.)    LORazepam (ATIVAN) 1 mg tablet Take 1 Tab by mouth every four (4) hours as needed for Anxiety. Max Daily Amount: 6 mg.    ALPRAZolam (XANAX) 0.5 mg tablet Take 1 Tab by mouth two (2) times daily as needed for Anxiety. Max Daily Amount: 1 mg.  cholecalciferol (VITAMIN D3) 1,000 unit cap Take 5,000 Units by mouth daily.  liothyronine (CYTOMEL) 5 mcg tablet Take 5 mcg by mouth daily. No current facility-administered medications for this visit. Allergies   Allergen Reactions    Augmentin [Amoxicillin-Pot Clavulanate] Nausea and Vomiting        Review of Systems: A complete review of systems was obtained, negative except as described above.     Physical Exam:       General appearance - alert, well appearing, and in no distress  Mental status - oriented to person, place, and time  Neck - supple, no appreciable thyromegaly  Lymphatics - no palpable lymphadenopathy, Left arm lymphedema  Abdomen - soft, nontender, nondistended, no masses or organomegaly, bowel sounds present  Extremities - peripheral pulses normal, no pedal edema, no clubbing or cyanosis  Skin - normal coloration and turgor, no new rashes, no suspicious skin lesions noted    ECOG PS: 0        Results:     Lab Results   Component Value Date/Time    WBC 7.1 11/03/2020 01:25 PM    HGB 14.0 11/03/2020 01:25 PM    HCT 41.9 11/03/2020 01:25 PM    PLATELET 021 13/01/1788 01:25 PM    MCV 98.6 11/03/2020 01:25 PM    ABS. NEUTROPHILS 5.1 11/03/2020 01:25 PM     Lab Results   Component Value Date/Time    Sodium 139 11/03/2020 01:25 PM    Potassium 3.9 11/03/2020 01:25 PM    Chloride 102 11/03/2020 01:25 PM    CO2 30 11/03/2020 01:25 PM    Glucose 102 (H) 11/03/2020 01:25 PM    BUN 13 11/03/2020 01:25 PM    Creatinine 0.80 11/03/2020 01:25 PM    GFR est AA >60 11/03/2020 01:25 PM    GFR est non-AA >60 11/03/2020 01:25 PM    Calcium 9.3 11/03/2020 01:25 PM     Lab Results   Component Value Date/Time    Bilirubin, total 0.3 11/03/2020 01:25 PM    ALT (SGPT) 23 11/03/2020 01:25 PM    Alk. phosphatase 111 11/03/2020 01:25 PM    Protein, total 7.5 11/03/2020 01:25 PM    Albumin 3.9 11/03/2020 01:25 PM    Globulin 3.6 11/03/2020 01:25 PM       Records reviewed and summarized above. Pathology report(s) reviewed above. 5/28/19  FINAL PATHOLOGIC DIAGNOSIS  1. Breast, left, core biopsy: In situ and invasive ductal carcinoma   Invasive carcinoma is nuclear grade 2 and measures up to 0.6 cm in greatest dimension on slide   Ductal carcinoma in situ is nuclear grade 3 with central necrosis   2. Soft tissue, left axilla, core biopsy: Adipose tissue with rare detached atypical cells   No lymph node tissue identified   See comment     Radiology report(s) reviewed above. US breast 9/12/19:  IMPRESSION:  1. Increased malignant microcalcifications in the left breast. This could  represent treatment-related necrosis or extension of disease. 2. Extensive carcinoma seen on prior MRI is largely mammographically occult.   3. Decreased size of a metastatic left axillary lymph node with  microcalcifications. 4. BI-RADS Assessment Category 6: Known biopsy proven malignancy. Mammogram 10/22/19:  IMPRESSION:  1. BI-RADS 6: known left breast cancer. 2. Segmental pleomorphic calcifications in the left upper outer quadrant are  stable. Calcifications left axillary lymph node have not changed significantly. Patient was informed of the results. Bilateral mastectomy 1/3/20   FINAL PATHOLOGIC DIAGNOSIS   1. Right breast, prophylactic mastectomy:   Benign breast.   2. Left breast, modified radical mastectomy:   Invasive ductal carcinoma (see synoptic report). Ductal carcinoma in situ. Atypical lobular hyperplasia with associated macrocalcifications. Usual ductal hyperplasia with associated microcalcifications. Intraductal papilloma with usual ductal hyperplasia. INVASIVE CARCINOMA OF THE BREAST: Resection   SPECIMEN   Procedure: Total mastectomy   Specimen Laterality: Left   TUMOR   Histologic Type:  Invasive carcinoma of no special type (invasive   ductal carcinoma, not otherwise specified)   Glandular (Acinar) / Tubular Differentiation: Score 3   Nuclear Pleomorphism: Score 2   Mitotic Rate: Score 2   Overall Grade: Grade 2 (scores of 6 or 7)   Tumor Size: 2.5 mm (see Comment)   Ductal Carcinoma In Situ (DCIS): Present, solid type, nuclear grade 2   with associated microcalcifications   Tumor Extent   Skin: Uninvolved by tumor cells   Treatment Effect in the Breast: Probable or definite response to   presurgical therapy in the invasive carcinoma   Treatment Effect in the Lymph Nodes: Probable or definite response   to presurgical therapy in metastatic carcinoma   MARGINS   Invasive Carcinoma Margins: Uninvolved by invasive carcinoma   Distance from Closest Margin (Millimeters): Greater than: 10 mm   Closest Margin: Posterior   DCIS Margins: Uninvolved by DCIS   Distance from Closest Margin (Millimeters): Greater than: 10 mm [employer] Patient Name: Jasmin Garcia Specimen #:    Patient Name: Jasmin Garcia Page 4 of 6 Printed: 01/31/20 9:32 AM SURGICAL PATHOLOGY REPORT   Closest Margin: Posterior   ER/OR/HER-2   Pending; results to be issued within addendum reports   LYMPH NODES   Regional Lymph Nodes: Involved by tumor cells   Number of Lymph Nodes with Macrometastases (> 2 mm): 2   Number of Lymph Nodes with Micrometastases (> 0.2 mm to 2 mm and   / or > 200 cells): 0   Extranodal Extension: Not identified   Number of Lymph Nodes Examined: 3   PATHOLOGIC STAGE CLASSIFICATION (pTNM, AJCC 8th Edition)   TNM Descriptors: y (post-treatment) m (multiple foci)   Primary Tumor (pT): pT1a   Regional Lymph Nodes (pN)   Category (pN): pN1a   3. Lymph nodes, left axillary dissection:   Two of three lymph nodes positive for metastatic carcinoma (2/3). 4. Excised tissue, right breast:   Fragments of skin and benign breast tissue. 5. Excised tissue, left breast:   Fragments of skin and benign breast tissue. Assessment:   1) Left breast Invasive ductal carcinoma    [id number]  GRADE 2  % OR 20% HER2 greg equivocal - FISH could not be done  Repeat biopsy of breast mass 6/24/19 - HER2 greg negative, mamma print indicates she has genomically high risk disease  S/p ddAC-T and bilateral mastectomy on 1/21/2020-[id number]  RT completed 5/2020  Intolerant to Letrozole that was started 2/2020- switched to Anastrazole Planned 5-10 years    Tolerating well  No clinical evidence of recurrence  DEXA reviewed 5/2020 and is normal  Counseled on VD and Ca    Counseled on symptoms to call fall and NCCN based guidelines for surveillance  . 2) Hypothyroidism    3) Obesity  RD to reach out    4) Chest pain- resolved but she had evidence of ischemia  ECHO and stress test with Dr. Ceci Saldivar    5) Left arm Lymphedema    6) Left arm pain  Tendonitis?   Rest, ice Tylenol 1000 mg every 8 hours  Call back in 2-3 weeks if not better when we will order imaging    Plan: · Continue  Anastrazole daily 5-10 years  · Vit D / Calcium doses reviewed  · DEXA 2022  · Follow with Dr. Cornelious Lesches as scheduled  · Reconstruction as scheduled  · Call us for updates in 3 weeks  · Follow with cardiology    RTC in 3 months     I appreciate the opportunity to participate in Ms. Rachelle Garcia's care.       Signed By: Danish Baum MD

## 2020-12-14 ENCOUNTER — TELEPHONE (OUTPATIENT)
Dept: ONCOLOGY | Age: 55
End: 2020-12-14

## 2020-12-14 NOTE — TELEPHONE ENCOUNTER
[facility] [place] at [address], [address] [place] [id number]  W: [phone]  F: [phone]    Medical Nutrition Therapy    Nutrition Encounter:  Referral received from Dr. Annalee Solitario in regards to weight management. Left a message, explaining that RD is available to provide assistance in making positive dietary behavior changes for cancer prevention and reduction. Provided information on days available in oncology office. Patient called back. She is attempting to lose weight. She recently had a heart attack. She continues to struggle with lymphedema. She will send RD a food diary of the next 3 days and we will talk again on Friday December 18th.       Renate Noriega [address]

## 2020-12-18 ENCOUNTER — TELEPHONE (OUTPATIENT)
Dept: ONCOLOGY | Age: 55
End: 2020-12-18

## 2020-12-18 NOTE — TELEPHONE ENCOUNTER
[facility] [place] at [address], [address] [place], [place] [address]s [address]  [address]: [phone]  F: [phone] Nutrition Therapy      Reason for nutrition visit:    Spent over 50 mins with patient on the telephone for a weight management consultation. She reports she has followed several diets in the past (medi weight loss, etc) and has been successful and lost over 30 pounds. Her current weight is the highest (200#) and her lowest weight was around 173# after above diet. Her albumin levels were check on 11/3/2020 and were 3.9g/dL  She completed at 3 day food diary which was reviewed. She typically consumes 3 meals per day. Meal Preparation: Shopping: partner Cooking: partner   Food Frequency:    Food  Frequency  Description   Fruit: 1 x day     Vegetable: 2 x day     Dairy: 1 x month     Meat:         Red meat 1-2 x week  Lamb, beef, low fat hamburger       Whole eggs 5 x week         Poultry 4 x week         Fish 1 x month Likes salmon       Cheese  1 x week  Occasionally has chicken w/ little mozzarella        Meat alternatives (beans, tofu) Rarely  Used to do kidney beans on salad   Grains/Cereal/Bread 1 x every 2 weeks    [employer] (home or out) rarely    Fast food, dine-in, cafe & pre-made  1 x week  No fast food   Home-cooked meals Daily     Desserts/Candy rarely Don't like sweet   High Calorie Beverages rarely unsweet tea    Alcohol 5 x week 4oz wine with dinner    Water daily    24 hour diet recall:   Breakfast: 2 eggs and blackberries  Lunch: salad, avocado, slivered almonds, tomato, ranch dressing, chinese mixed veggies  Dinner: [place] [facility]) tenderloin wrapped in vick, steamed carrots, glass of wine     Emotional/Stress/sad eating: none     Physical Activity/Exercise: Limited due to knee injury and left arm lymphedema. She reports moderate/vigorious walks for about 45 mins 3-4 days a week.       Weight History:  Estimated body mass index is 30.77 kg/m² as calculated from the following:    Height as of 12/11/20: 5' 8\" (1.727 m). Weight as of 12/11/20: 202 lb 6.4 oz (91.8 kg). Ht Readings from Last 1 Encounters:   12/11/20 5' 8\" (1.727 m)     Wt Readings from Last 5 Encounters:   12/11/20 202 lb 6.4 oz (91.8 kg)   11/24/20 207 lb 9.6 oz (94.2 kg)   11/03/20 205 lb (93 kg)   09/14/20 196 lb (88.9 kg)   08/07/20 196 lb (88.9 kg)     Home weight: 202#   Lowest adult weight: 173#     Estimated Nutrition Needs:   Calorie Range: 1350-1620kcal/day     Protein Range: 60-70g protein      Fluid Needs: 1800ml       Assessment:   Nutrition Diagnoses:  1. Inadequate protein intake related to consumption of about 50-55 grams of protein daily, respectively. 2. Undesirable Food Choices related to food groups as evidence by patient report and food diary. She consumes limited carbohydrates and minimal fruit servings. Education & Recommendations Provided to Patient:  1. Increase protein intake by incorporating additional protein at lunch. For example, adding chicken or kidney beans to salads. For a snack, she will start to do cottage and cheese. 2. Improve nutrient quality of food choices by increasing fruit intake by adding fruit to salads at lunch and having fruit as a snack. For example: cottage cheese and fruit, or pistachios and fruit. She will incorporate a grain for breakfast such as a slice of toast with 1/2 avocado on it. And for dinner a 1/2 of a sweet potato. Plan:   Discussed role of oncology dietitian in providing supportive nutrition care. Explained that RD is available to be a resource for managing symptoms, minimizing weight changes and maintaining optimal nutrition status during and after cancer treatment. Specific tips and techniques to facilitate compliance with above recommendations were provided and discussed. My phone number and e-mail was  provided to the patient for any further questions or concerns.        Diamond Nino RD,

## 2020-12-22 ENCOUNTER — DOCUMENTATION ONLY (OUTPATIENT)
Dept: SURGERY | Age: 55
End: 2020-12-22

## 2020-12-22 ENCOUNTER — TELEPHONE (OUTPATIENT)
Dept: CARDIOLOGY CLINIC | Age: 55
End: 2020-12-22

## 2020-12-22 NOTE — TELEPHONE ENCOUNTER
LM to confirm appt w details.  Call to r/s if pt has been around anyone with COVID/experiencing symptoms

## 2020-12-22 NOTE — PROGRESS NOTES
Received voicemail from Roney Luna at [facility].  They are asking about a LOMN for a compression pump for the patient. I asked her to refax to the [facility] office tomorrow. She will do this.

## 2020-12-23 ENCOUNTER — ANCILLARY PROCEDURE (OUTPATIENT)
Dept: CARDIOLOGY CLINIC | Age: 55
End: 2020-12-23
Payer: COMMERCIAL

## 2020-12-23 VITALS
HEIGHT: 68 IN | DIASTOLIC BLOOD PRESSURE: 84 MMHG | BODY MASS INDEX: 30.62 KG/M2 | SYSTOLIC BLOOD PRESSURE: 137 MMHG | WEIGHT: 202 LBS

## 2020-12-23 VITALS — HEIGHT: 68 IN | WEIGHT: 202 LBS | BODY MASS INDEX: 30.62 KG/M2

## 2020-12-23 DIAGNOSIS — R07.9 CHEST PAIN, UNSPECIFIED TYPE: ICD-10-CM

## 2020-12-23 DIAGNOSIS — Z09 CHEMOTHERAPY FOLLOW-UP EXAMINATION: ICD-10-CM

## 2020-12-23 DIAGNOSIS — E66.09 CLASS 1 OBESITY DUE TO EXCESS CALORIES WITHOUT SERIOUS COMORBIDITY WITH BODY MASS INDEX (BMI) OF 30.0 TO 30.9 IN ADULT: ICD-10-CM

## 2020-12-23 DIAGNOSIS — R94.31 ABNORMAL ECG: ICD-10-CM

## 2020-12-23 LAB
STRESS BASELINE DIAS BP: 92 MMHG
STRESS BASELINE HR: 69 BPM
STRESS BASELINE SYS BP: 138 MMHG
STRESS O2 SAT PEAK: 99 %
STRESS O2 SAT REST: 99 %
STRESS PEAK DIAS BP: 70 MMHG
STRESS PEAK SYS BP: 126 MMHG
STRESS PERCENT HR ACHIEVED: 59 %
STRESS POST PEAK HR: 96 BPM
STRESS RATE PRESSURE PRODUCT: NORMAL BPM*MMHG
STRESS ST DEPRESSION: 0 MM
STRESS ST ELEVATION: 0 MM
STRESS TARGET HR: 163 BPM

## 2020-12-23 PROCEDURE — 93306 TTE W/DOPPLER COMPLETE: CPT | Performed by: INTERNAL MEDICINE

## 2020-12-23 PROCEDURE — 93015 CV STRESS TEST SUPVJ I&R: CPT | Performed by: INTERNAL MEDICINE

## 2020-12-23 PROCEDURE — 78452 HT MUSCLE IMAGE SPECT MULT: CPT | Performed by: INTERNAL MEDICINE

## 2020-12-23 PROCEDURE — A9500 TC99M SESTAMIBI: HCPCS | Performed by: INTERNAL MEDICINE

## 2020-12-23 RX ORDER — TETRAKIS(2-METHOXYISOBUTYLISOCYANIDE)COPPER(I) TETRAFLUOROBORATE 1 MG/ML
10 INJECTION, POWDER, LYOPHILIZED, FOR SOLUTION INTRAVENOUS ONCE
Status: COMPLETED | OUTPATIENT
Start: 2020-12-23 | End: 2020-12-23

## 2020-12-23 RX ORDER — TETRAKIS(2-METHOXYISOBUTYLISOCYANIDE)COPPER(I) TETRAFLUOROBORATE 1 MG/ML
30 INJECTION, POWDER, LYOPHILIZED, FOR SOLUTION INTRAVENOUS ONCE
Status: COMPLETED | OUTPATIENT
Start: 2020-12-23 | End: 2020-12-23

## 2020-12-23 RX ADMIN — TETRAKIS(2-METHOXYISOBUTYLISOCYANIDE)COPPER(I) TETRAFLUOROBORATE 26 MILLICURIE: 1 INJECTION, POWDER, LYOPHILIZED, FOR SOLUTION INTRAVENOUS at 10:45

## 2020-12-23 RX ADMIN — TETRAKIS(2-METHOXYISOBUTYLISOCYANIDE)COPPER(I) TETRAFLUOROBORATE 8.3 MILLICURIE: 1 INJECTION, POWDER, LYOPHILIZED, FOR SOLUTION INTRAVENOUS at 09:15

## 2020-12-24 ENCOUNTER — DOCUMENTATION ONLY (OUTPATIENT)
Dept: SURGERY | Age: 55
End: 2020-12-24

## 2020-12-24 NOTE — PROGRESS NOTES
Faxed letterhead/signature statement of medical necessity request to [facility] for compression arm device.

## 2020-12-26 LAB
ECHO AO ASC DIAM: 2.91 CM
ECHO AO ROOT DIAM: 2.57 CM
ECHO AV AREA PEAK VELOCITY: 2.7 CM2
ECHO AV AREA VTI: 2.59 CM2
ECHO AV AREA/BSA PEAK VELOCITY: 1.3 CM2/M2
ECHO AV AREA/BSA VTI: 1.3 CM2/M2
ECHO AV MEAN GRADIENT: 5.87 MMHG
ECHO AV PEAK GRADIENT: 9.7 MMHG
ECHO AV PEAK VELOCITY: 155.52 CM/S
ECHO AV VTI: 32.11 CM
ECHO LA AREA 4C: 11.63 CM2
ECHO LA MAJOR AXIS: 3.88 CM
ECHO LA MINOR AXIS: 1.89 CM
ECHO LA VOL 2C: 50.07 ML (ref 22–52)
ECHO LA VOL 4C: 25.81 ML (ref 22–52)
ECHO LA VOL BP: 41.78 ML (ref 22–52)
ECHO LA VOL/BSA BIPLANE: 20.36 ML/M2 (ref 16–28)
ECHO LA VOLUME INDEX A2C: 24.39 ML/M2 (ref 16–28)
ECHO LA VOLUME INDEX A4C: 12.57 ML/M2 (ref 16–28)
ECHO LV E' LATERAL VELOCITY: 9.82 CM/S
ECHO LV E' SEPTAL VELOCITY: 13.15 CM/S
ECHO LV EDV A2C: 75.7 ML
ECHO LV EDV A4C: 70.96 ML
ECHO LV EDV BP: 74.32 ML (ref 56–104)
ECHO LV EDV INDEX A4C: 34.6 ML/M2
ECHO LV EDV INDEX BP: 36.2 ML/M2
ECHO LV EDV NDEX A2C: 36.9 ML/M2
ECHO LV EJECTION FRACTION A2C: 64 PERCENT
ECHO LV EJECTION FRACTION A4C: 66 PERCENT
ECHO LV EJECTION FRACTION BIPLANE: 65.2 PERCENT (ref 55–100)
ECHO LV ESV A2C: 26.93 ML
ECHO LV ESV A4C: 24.06 ML
ECHO LV ESV BP: 25.89 ML (ref 19–49)
ECHO LV ESV INDEX A2C: 13.1 ML/M2
ECHO LV ESV INDEX A4C: 11.7 ML/M2
ECHO LV ESV INDEX BP: 12.6 ML/M2
ECHO LV GLOBAL LONGITUDINAL STRAIN (GLS): -25.9 PERCENT
ECHO LV INTERNAL DIMENSION DIASTOLIC: 4.48 CM (ref 3.9–5.3)
ECHO LV INTERNAL DIMENSION SYSTOLIC: 3.22 CM
ECHO LV IVSD: 1.18 CM (ref 0.6–0.9)
ECHO LV MASS 2D: 188.6 G (ref 67–162)
ECHO LV MASS INDEX 2D: 91.9 G/M2 (ref 43–95)
ECHO LV POSTERIOR WALL DIASTOLIC: 1.15 CM (ref 0.6–0.9)
ECHO LVOT DIAM: 2.12 CM
ECHO LVOT PEAK GRADIENT: 5.63 MMHG
ECHO LVOT PEAK VELOCITY: 118.61 CM/S
ECHO LVOT SV: 83.2 ML
ECHO LVOT VTI: 23.5 CM
ECHO MV A VELOCITY: 78.81 CM/S
ECHO MV AREA PHT: 4.25 CM2
ECHO MV E DECELERATION TIME (DT): 178.46 MS
ECHO MV E VELOCITY: 89.84 CM/S
ECHO MV E/A RATIO: 1.14
ECHO MV E/E' LATERAL: 9.15
ECHO MV E/E' RATIO (AVERAGED): 7.99
ECHO MV E/E' SEPTAL: 6.83
ECHO MV PRESSURE HALF TIME (PHT): 51.75 MS
ECHO RA AREA 4C: 11.45 CM2
ECHO RA MAJOR AXIS: 2.72 CM
ECHO RV INTERNAL DIMENSION: 3.52 CM
ECHO RV TAPSE: 2.25 CM (ref 1.5–2)
GLOBAL LONGITUDINAL STRAIN 2 CHAMBER: -28.9 PERCENT
GLOBAL LONGITUDINAL STRAIN 4 CHAMBER: -22.8 PERCENT
GLOBAL LONGITUDINAL STRAIN LONG AXIS: -26.2 PERCENT
LA VOL DISK BP: 39.08 ML (ref 22–52)
LVOT MG: 2.94 MMHG

## 2021-01-04 ENCOUNTER — TELEPHONE (OUTPATIENT)
Dept: CARDIOLOGY CLINIC | Age: 56
End: 2021-01-04

## 2021-01-12 DIAGNOSIS — M79.602 PAIN OF LEFT UPPER EXTREMITY: Primary | ICD-10-CM

## 2021-01-14 ENCOUNTER — HOSPITAL ENCOUNTER (OUTPATIENT)
Dept: VASCULAR SURGERY | Age: 56
Discharge: HOME | End: 2021-01-14
Attending: REGISTERED NURSE
Payer: COMMERCIAL

## 2021-01-14 DIAGNOSIS — M79.602 PAIN OF LEFT UPPER EXTREMITY: ICD-10-CM

## 2021-01-14 PROCEDURE — 93971 EXTREMITY STUDY: CPT

## 2021-01-18 DIAGNOSIS — C50.412 MALIGNANT NEOPLASM OF UPPER-OUTER QUADRANT OF LEFT BREAST IN FEMALE, ESTROGEN RECEPTOR POSITIVE (HCC): Primary | ICD-10-CM

## 2021-01-18 DIAGNOSIS — Z17.0 MALIGNANT NEOPLASM OF UPPER-OUTER QUADRANT OF LEFT BREAST IN FEMALE, ESTROGEN RECEPTOR POSITIVE (HCC): Primary | ICD-10-CM

## 2021-01-18 DIAGNOSIS — M79.602 PAIN OF LEFT UPPER EXTREMITY: ICD-10-CM

## 2021-01-19 ENCOUNTER — TELEPHONE (OUTPATIENT)
Dept: CARDIOLOGY CLINIC | Age: 56
End: 2021-01-19

## 2021-01-19 NOTE — TELEPHONE ENCOUNTER
Patient is calling to schedule an appointment with Dr. Carolyne Ledesma. Please call patient @ [phone].  Thanks Yes

## 2021-01-19 NOTE — TELEPHONE ENCOUNTER
1/19/2021 at  3:18 in office appointment scheduled with patient     Future Appointments   Date Time Provider Preeti Maria Del [place]en   1/20/2021 11:00 AM Chris Saenz MD CP BS AMB   1/29/2021 10:00 AM MAGGIE [facility] 2 [facility]   1/29/2021 11:00 AM MAGGIE MRI 2 [facility]   2/2/2021  3:00 PM MD CRISTHIAN [place] BS AMB   3/16/2021 10:15 AM Antony Lam NP [facility] BS AMB   3/23/2021  8:30 AM Aiden Alba  [address] BS AMB   11/2/2021  9:00 AM LALA RODRIGUEZ BS AMB   11/2/2021 10:00 AM MD CRISTHIAN [place] BS AMB

## 2021-01-20 ENCOUNTER — OFFICE VISIT (OUTPATIENT)
Dept: INTERNAL MEDICINE CLINIC | Age: 56
End: 2021-01-20
Payer: COMMERCIAL

## 2021-01-20 VITALS
WEIGHT: 202 LBS | TEMPERATURE: 99.2 F | SYSTOLIC BLOOD PRESSURE: 122 MMHG | OXYGEN SATURATION: 96 % | DIASTOLIC BLOOD PRESSURE: 84 MMHG | RESPIRATION RATE: 16 BRPM | HEART RATE: 94 BPM | HEIGHT: 68 IN | BODY MASS INDEX: 30.62 KG/M2

## 2021-01-20 DIAGNOSIS — Z11.59 NEED FOR HEPATITIS C SCREENING TEST: ICD-10-CM

## 2021-01-20 DIAGNOSIS — E55.9 VITAMIN D DEFICIENCY: ICD-10-CM

## 2021-01-20 DIAGNOSIS — Z23 NEEDS FLU SHOT: ICD-10-CM

## 2021-01-20 DIAGNOSIS — Z12.11 COLON CANCER SCREENING: ICD-10-CM

## 2021-01-20 DIAGNOSIS — F32.A DEPRESSION, UNSPECIFIED DEPRESSION TYPE: ICD-10-CM

## 2021-01-20 DIAGNOSIS — R06.81 WITNESSED APNEIC SPELLS: ICD-10-CM

## 2021-01-20 DIAGNOSIS — E03.9 ACQUIRED HYPOTHYROIDISM: ICD-10-CM

## 2021-01-20 DIAGNOSIS — R06.83 SNORING: ICD-10-CM

## 2021-01-20 DIAGNOSIS — Z23 ENCOUNTER FOR IMMUNIZATION: ICD-10-CM

## 2021-01-20 DIAGNOSIS — M75.102 ROTATOR CUFF SYNDROME OF LEFT SHOULDER: ICD-10-CM

## 2021-01-20 DIAGNOSIS — Z00.00 WELL WOMAN EXAM (NO GYNECOLOGICAL EXAM): Primary | ICD-10-CM

## 2021-01-20 PROCEDURE — 90472 IMMUNIZATION ADMIN EACH ADD: CPT | Performed by: INTERNAL MEDICINE

## 2021-01-20 PROCEDURE — 99396 PREV VISIT EST AGE 40-64: CPT | Performed by: INTERNAL MEDICINE

## 2021-01-20 PROCEDURE — 90471 IMMUNIZATION ADMIN: CPT | Performed by: INTERNAL MEDICINE

## 2021-01-20 PROCEDURE — 90686 IIV4 VACC NO PRSV 0.5 ML IM: CPT | Performed by: INTERNAL MEDICINE

## 2021-01-20 PROCEDURE — 90750 HZV VACC RECOMBINANT IM: CPT | Performed by: INTERNAL MEDICINE

## 2021-01-20 RX ORDER — CALCIUM CARBONATE/VITAMIN D3 600 MG-125
TABLET ORAL
COMMUNITY

## 2021-01-20 NOTE — PROGRESS NOTES
Subjective:   60 y.o. female for Well Woman Check. Her gyne and breast care is done elsewhere by her Ob-Gyne physician. Past medical, Social, and Family history reviewed  Medications reviewed and updated. ROS: Feeling generally well. No TIA's or unusual headaches, no dysphagia. No prolonged cough. No dyspnea or chest pain on exertion. No abdominal pain, change in bowel habits, black or bloody stools. No urinary tract symptoms. No new or unusual musculoskeletal symptoms. Specific concerns today:     Working on getting a lymphedema garment - insurance originally denied. S/p breast Ca surgery    Left shoulder, upper arm pain  +popping sounds  Limited ROM due to pain - cannot get above 90 degrees. Reviewed cardiac testing - EKGs and echo and stress test    Snoring - desires GAGAN assessment  +witnessed pauses  +daytime somnolence      Objective: The patient appears well, alert, oriented x 3, in no distress. Visit Vitals  /84 (BP 1 Location: Right arm, BP Patient Position: Sitting)   Pulse 94   Temp 99.2 °F (37.3 °C) (Oral)   Resp 16   Ht 5' 8\" (1.727 m)   Wt 202 lb (91.6 kg)   SpO2 96%   BMI 30.71 kg/m²     ENT normal.  Neck supple. No adenopathy or thyromegaly. BRITTANY. Lungs are clear, good air entry, no wheezes, rhonchi or rales. S1 and S2 normal, no murmurs, regular rate and rhythm. Abdomen soft without tenderness, guarding, mass or organomegaly. Extremities show no edema, normal peripheral pulses. Neurological is normal, no focal findings. Breast and Pelvic exams are deferred. Prior labs reviewed. Assessment/Plan:   Well Woman  follow low fat diet, routine labs ordered, call if any problems    ICD-10-CM ICD-9-CM    1.  Well woman exam (no gynecological exam)  Z00.00 V70.0 CBC WITH AUTOMATED DIFF      HEMOGLOBIN A1C WITH EAG      VITAMIN D, 25 HYDROXY      METABOLIC PANEL, COMPREHENSIVE      LIPID PANEL      LIPID PANEL      METABOLIC PANEL, COMPREHENSIVE      VITAMIN D, 25 HYDROXY      HEMOGLOBIN A1C WITH EAG      CBC WITH AUTOMATED DIFF    [V70.0]   2. Rotator cuff syndrome of left shoulder  M75.102 726.10 REFERRAL TO ORTHOPEDICS   3. Depression, unspecified depression type  F32.9 311    4. Snoring  R06.83 786.09 SLEEP MEDICINE REFERRAL   5. Witnessed apneic spells  R06.81 786.03 SLEEP MEDICINE REFERRAL   6. Colon cancer screening  Z12.11 V76.51 REFERRAL TO GASTROENTEROLOGY   7. Needs flu shot  Z23 V04.81 INFLUENZA VIRUS VAC QUAD,SPLIT,PRESV FREE SYRINGE IM   8. Need for hepatitis C screening test  Z11.59 V73.89 HCV AB W/RFLX TO SOLIS      HCV AB W/RFLX TO SOLIS   9. Vitamin D deficiency  E55.9 268.9 VITAMIN D, 25 HYDROXY      VITAMIN D, 25 HYDROXY   10. Acquired hypothyroidism  E03.9 244.9 T4, FREE      TSH 3RD GENERATION      T3 TOTAL      T3 TOTAL      TSH 3RD GENERATION      T4, FREE   11. Encounter for immunization  Z23 V03.89 ZOSTER VACC RECOMBINANT ADJUVANTED     Follow-up and Dispositions    · Return in about 1 year (around 1/20/2022), or if symptoms worsen or fail to improve, for wellness visit. results and schedule of future  studies reviewed with patient  reviewed diet, exercise and weight   cardiovascular risk and specific lipid/LDL goals reviewed  reviewed medications and side effects in detail     Agree with MRI for shoulder  Ref to ortho for rec's on rotator cuff issue  GYN scheduled 1/28/21  To see endocrine soon as well. Ref to sleep  See Cards as scheduled.   Ref GI for colonoscopy

## 2021-01-20 NOTE — PROGRESS NOTES
Les Woods is a 60 y.o. female  Chief Complaint   Patient presents with    Complete Physical     Health Maintenance Due   Topic Date Due    Hepatitis C Screening  1965    Pneumococcal 0-64 years (1 of 3 - PCV13) 08/07/1969    COVID-19 Vaccine (1 of 2) 08/07/1979    PAP AKA CERVICAL CYTOLOGY  08/07/1984    Lipid Screen  08/07/2003    Shingrix Vaccine Age 50> (1 of 2) 08/07/2013    Colorectal Cancer Screening Combo  08/07/2013    Flu Vaccine (1) 09/01/2020    Breast Cancer Screen Mammogram  10/22/2020     Visit Vitals  /84 (BP 1 Location: Right arm, BP Patient Position: Sitting)   Pulse 94   Temp 99.2 °F (37.3 °C) (Oral)   Resp 16   Ht 5' 8\" (1.727 m)   Wt 202 lb (91.6 kg)   SpO2 96%   BMI 30.71 kg/m²     1. Have you been to the ER, urgent care clinic since your last visit? Hospitalized since your last visit? Yes Reason for visit: chest pain     2. Have you seen or consulted any other health care providers outside of the [address] [place] [address] since your last visit? Include any pap smears or colon screening.  No

## 2021-01-22 LAB
25(OH)D3 SERPL-MCNC: 50.5 NG/ML (ref 30–100)
ALBUMIN SERPL-MCNC: 4 G/DL (ref 3.5–5)
ALBUMIN/GLOB SERPL: 1.4 {RATIO} (ref 1.1–2.2)
ALP SERPL-CCNC: 114 U/L (ref 45–117)
ALT SERPL-CCNC: 25 U/L (ref 12–78)
ANION GAP SERPL CALC-SCNC: 3 MMOL/L (ref 5–15)
AST SERPL-CCNC: 13 U/L (ref 15–37)
BASOPHILS # BLD: 0 K/UL (ref 0–0.1)
BASOPHILS NFR BLD: 1 % (ref 0–1)
BILIRUB SERPL-MCNC: 0.4 MG/DL (ref 0.2–1)
BUN SERPL-MCNC: 12 MG/DL (ref 6–20)
BUN/CREAT SERPL: 19 (ref 12–20)
CALCIUM SERPL-MCNC: 9.6 MG/DL (ref 8.5–10.1)
CHLORIDE SERPL-SCNC: 104 MMOL/L (ref 97–108)
CHOLEST SERPL-MCNC: 229 MG/DL
CO2 SERPL-SCNC: 30 MMOL/L (ref 21–32)
CREAT SERPL-MCNC: 0.62 MG/DL (ref 0.55–1.02)
DIFFERENTIAL METHOD BLD: ABNORMAL
EOSINOPHIL # BLD: 0.2 K/UL (ref 0–0.4)
EOSINOPHIL NFR BLD: 4 % (ref 0–7)
ERYTHROCYTE [DISTWIDTH] IN BLOOD BY AUTOMATED COUNT: 12.3 % (ref 11.5–14.5)
EST. AVERAGE GLUCOSE BLD GHB EST-MCNC: 97 MG/DL
GLOBULIN SER CALC-MCNC: 2.9 G/DL (ref 2–4)
GLUCOSE SERPL-MCNC: 103 MG/DL (ref 65–100)
HBA1C MFR BLD: 5 % (ref 4–5.6)
HCT VFR BLD AUTO: 41.3 % (ref 35–47)
HCV AB S/CO SERPL IA: <0.1 S/CO RATIO (ref 0–0.9)
HCV AB SERPL QL IA: NORMAL
HDLC SERPL-MCNC: 76 MG/DL
HDLC SERPL: 3 {RATIO} (ref 0–5)
HGB BLD-MCNC: 14.2 G/DL (ref 11.5–16)
IMM GRANULOCYTES # BLD AUTO: 0 K/UL (ref 0–0.04)
IMM GRANULOCYTES NFR BLD AUTO: 0 % (ref 0–0.5)
LDLC SERPL CALC-MCNC: 132.8 MG/DL (ref 0–100)
LIPID PROFILE,FLP: ABNORMAL
LYMPHOCYTES # BLD: 1.1 K/UL (ref 0.8–3.5)
LYMPHOCYTES NFR BLD: 24 % (ref 12–49)
MCH RBC QN AUTO: 34.5 PG (ref 26–34)
MCHC RBC AUTO-ENTMCNC: 34.4 G/DL (ref 30–36.5)
MCV RBC AUTO: 100.5 FL (ref 80–99)
MONOCYTES # BLD: 0.4 K/UL (ref 0–1)
MONOCYTES NFR BLD: 9 % (ref 5–13)
NEUTS SEG # BLD: 2.7 K/UL (ref 1.8–8)
NEUTS SEG NFR BLD: 62 % (ref 32–75)
NRBC # BLD: 0 K/UL (ref 0–0.01)
NRBC BLD-RTO: 0 PER 100 WBC
PLATELET # BLD AUTO: 212 K/UL (ref 150–400)
PMV BLD AUTO: 10.1 FL (ref 8.9–12.9)
POTASSIUM SERPL-SCNC: 4.3 MMOL/L (ref 3.5–5.1)
PROT SERPL-MCNC: 6.9 G/DL (ref 6.4–8.2)
RBC # BLD AUTO: 4.11 M/UL (ref 3.8–5.2)
SODIUM SERPL-SCNC: 137 MMOL/L (ref 136–145)
T3 SERPL-MCNC: 125 NG/DL (ref 71–180)
T4 FREE SERPL-MCNC: 1.2 NG/DL (ref 0.8–1.5)
TRIGL SERPL-MCNC: 101 MG/DL (ref ?–150)
TSH SERPL DL<=0.05 MIU/L-ACNC: 0.03 UIU/ML (ref 0.36–3.74)
VLDLC SERPL CALC-MCNC: 20.2 MG/DL
WBC # BLD AUTO: 4.5 K/UL (ref 3.6–11)

## 2021-01-22 RX ORDER — LEVOTHYROXINE SODIUM 100 UG/1
100 TABLET ORAL
Qty: 30 TAB | Refills: 5 | Status: SHIPPED | OUTPATIENT
Start: 2021-01-22 | End: 2021-02-09

## 2021-01-22 NOTE — PROGRESS NOTES
LDL cholesterol is relatively high - work on cutting out saturated fat in your diet and exercise to lower this without medication for now. TSH is low = overall thyroid dose is high. I propose you reduce the levothyroxine to 100 mcg daily and leave the T3 the same. I can send a new Rx, but you can review with endocrine as well to be sure she agrees with this plan.   Other labs are either normal or stable and at goal.  Continue your other current medications

## 2021-01-23 DIAGNOSIS — G47.00 INSOMNIA, UNSPECIFIED TYPE: ICD-10-CM

## 2021-01-24 RX ORDER — TRAZODONE HYDROCHLORIDE 50 MG/1
TABLET ORAL
Qty: 60 TAB | Refills: 3 | Status: SHIPPED | OUTPATIENT
Start: 2021-01-24 | End: 2021-06-02

## 2021-01-27 DIAGNOSIS — I97.2 POST-MASTECTOMY LYMPHEDEMA SYNDROME: Primary | ICD-10-CM

## 2021-01-29 ENCOUNTER — TELEPHONE (OUTPATIENT)
Dept: ONCOLOGY | Age: 56
End: 2021-01-29

## 2021-01-29 ENCOUNTER — HOSPITAL ENCOUNTER (OUTPATIENT)
Dept: GENERAL RADIOLOGY | Age: 56
Discharge: HOME | End: 2021-01-29
Payer: COMMERCIAL

## 2021-01-29 DIAGNOSIS — C50.412 MALIGNANT NEOPLASM OF UPPER-OUTER QUADRANT OF LEFT BREAST IN FEMALE, ESTROGEN RECEPTOR POSITIVE (HCC): ICD-10-CM

## 2021-01-29 DIAGNOSIS — M79.602 LEFT ARM PAIN: ICD-10-CM

## 2021-01-29 DIAGNOSIS — Z17.0 MALIGNANT NEOPLASM OF UPPER-OUTER QUADRANT OF LEFT BREAST IN FEMALE, ESTROGEN RECEPTOR POSITIVE (HCC): ICD-10-CM

## 2021-01-29 PROCEDURE — 73060 X-RAY EXAM OF HUMERUS: CPT

## 2021-01-29 PROCEDURE — 73030 X-RAY EXAM OF SHOULDER: CPT

## 2021-01-29 NOTE — TELEPHONE ENCOUNTER
1543:  Called patient per NP Tanya request to provide Xray results and next steps     No answer   LVM to call office back     1550:    Patient returned call and confirmed x2 identifiers   Informed patient of Xray results and informed that can go forward with trying to reschedule the MRI   Provided scheduling number to patient     Patient verbalized understanding   No new questions or concerns at this time

## 2021-01-29 NOTE — PROGRESS NOTES
Please let pt know her xrays were normal  Hopefully now her insurance will cover MRI  Is she able to call and re-schedule her MRI? Thanks!

## 2021-02-01 ENCOUNTER — DOCUMENTATION ONLY (OUTPATIENT)
Dept: SURGERY | Age: 56
End: 2021-02-01

## 2021-02-01 NOTE — PROGRESS NOTES
I followed up with the patient about her appointment with lymphedema clinic. I spoke with Elli Hamilton at [facility] last week. They are going to work on getting the patient into their clinic. Until then she will see Harshal Jean to help with the problem. Patient states the insurance is not helping with the device she needs, she will continue to try and work this out.

## 2021-02-02 ENCOUNTER — OFFICE VISIT (OUTPATIENT)
Dept: CARDIOLOGY CLINIC | Age: 56
End: 2021-02-02
Payer: COMMERCIAL

## 2021-02-02 VITALS
RESPIRATION RATE: 20 BRPM | OXYGEN SATURATION: 95 % | DIASTOLIC BLOOD PRESSURE: 70 MMHG | BODY MASS INDEX: 31.04 KG/M2 | SYSTOLIC BLOOD PRESSURE: 110 MMHG | HEART RATE: 93 BPM | WEIGHT: 204.8 LBS | HEIGHT: 68 IN

## 2021-02-02 DIAGNOSIS — Z09 CHEMOTHERAPY FOLLOW-UP EXAMINATION: Primary | ICD-10-CM

## 2021-02-02 PROCEDURE — 99214 OFFICE O/P EST MOD 30 MIN: CPT | Performed by: INTERNAL MEDICINE

## 2021-02-02 NOTE — PROGRESS NOTES
Reason for consult: chest pain  ER referral  Oncologist: Dr. Salmon Persons    HPI: Kirk Claude, a 60y.o. year-old who presents for evaluation of chest pain. BP is great now  Stress and fighting the castro over her lymphedema and trying to get her sleeve done. Is doing better she is not having any more chest pain and her breathing seems to be okay she cites her weight gain and lack of exercise with everything she has been going through as well as her left arm and chest swelling lifting some of her activity may be contributing to this today to review her testing which was recently performed to evaluate all of the symptoms. Based on her abnormal EKG with possible old inferior MI in the setting of her symptoms we proceeded with Lexiscan testing which was normal and showed no evidence of blockages. We also looked at her heart function and valves with an echo. Study quality was suboptimal secondary to her breast surgeries but she has normal heart function no evidence of thinning or scarring her valves are working properly there is no effusion or evidence of pericardial disease. I showed her some of the images from her echo as well as reviewed her EKG with her today and answered additional questions about her heart health now and going forward. I recommend that she continues on with exercise and healthy lifestyle. Her blood pressure looks good and she will need periodic checking of her cholesterol to make sure that it stays under control. She will return in 1 year for an echo to follow-up post chemo let me know if she develops any additional symptoms in the meantime.     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 She has inferior Q waves and poor R wave progression. labs received dated 8/27/20  TSH 0.391, Free T4 1.4, Free T3 3.4, BMP ok    Assessment/Plan:  1. Chest pain - ECG with inferior Q waves on 11/3/20 and poor R wave progression better now stress test negative  -will request a copy of her lipid results from her endocrinologist   2. 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 Nikolay Mercer chemo echo in 1 year  3. Hypothyroidism - on supplementation, followed by endocrinologist/Dr. Kaleb Austin  4. Dyslipidemia- , work on reduction through diet exercise lifestyle for now    Echo 7/19 - EF 60-65%    Fam Hx: parents are healthy, father had blood clots and on blood thinner, + CAD in grandparents  Soc Hx: no tobacco use, minimal social etoh/wine     She  has a past medical history of Anxiety, Arthritis, At risk for sleep apnea, Basal cell carcinoma of skin, Breast cancer ([facility] (2019), H/O total mastectomy (2020), Heart attack ([facility] (2019), History of seasonal allergies, antineoplastic chemo, Ill-defined condition, Motion sickness, Nausea & vomiting, Squamous cell skin cancer, Thyroid disease, and Vertigo. Cardiovascular ROS: positive for chest pain  Respiratory ROS: no cough or wheezing  Neurological ROS: no TIA or stroke symptoms  All other systems negative except as above.      PE  Vitals:    02/02/21 1512   BP: 110/70   Pulse: 93   Resp: 20   SpO2: 95%   Weight: 204 lb 12.8 oz (92.9 kg)   Height: 5' 8\" (1.727 m)    Body mass index is 31.14 kg/m².    General appearance - alert, well appearing, and in no distress  Mental status - affect appropriate to mood  Eyes - sclera anicteric, moist mucous membranes  Neck - supple  Lymphatics - not assessed  Chest - clear to auscultation, no wheezes, rales or rhonchi  Heart - normal rate, regular rhythm, normal S1, S2, no murmurs, rubs, clicks or gallops  Abdomen - soft, nontender, nondistended  Back exam - full range of motion, no tenderness  Neurological - cranial nerves II through XII grossly intact, no focal deficit  Musculoskeletal - no muscular tenderness noted, normal strength  Extremities - peripheral pulses normal, left arm lymphedema, no pedal edema  Skin - normal coloration  no rashes    12 lead ECG in 2018 - NSR with Q wave in lead III  12 lead ECG 11/3/20 - NSR with Q waves in inferior leads    Recent Labs:  Lab Results   Component Value Date/Time    Cholesterol, total 229 (H) 01/20/2021 01:57 PM    HDL Cholesterol 76 01/20/2021 01:57 PM    LDL, calculated 132.8 (H) 01/20/2021 01:57 PM    Triglyceride 101 01/20/2021 01:57 PM    CHOL/HDL Ratio 3.0 01/20/2021 01:57 PM     Lab Results   Component Value Date/Time    Creatinine 0.62 01/20/2021 01:57 PM     Lab Results   Component Value Date/Time    BUN 12 01/20/2021 01:57 PM     Lab Results   Component Value Date/Time    Potassium 4.3 01/20/2021 01:57 PM     Lab Results   Component Value Date/Time    Hemoglobin A1c 5.0 01/20/2021 01:57 PM     Lab Results   Component Value Date/Time    HGB 14.2 01/20/2021 01:57 PM     Lab Results   Component Value Date/Time    PLATELET 095 32/60/6381 01:57 PM       Reviewed:  Past Medical History:   Diagnosis Date    Anxiety     Arthritis     NECK    At risk for sleep apnea     GAGAN 5     Basal cell carcinoma of skin     FACE, CHEST, BACK    Breast cancer ([facility] 2019    LEFT    H/O total mastectomy 2020    Heart [facility] 2019    History of seasonal allergies     Hx antineoplastic chemo     COMPLETED 12-3-19    Ill-defined condition     hx motion sickness    Motion sickness     Nausea & vomiting     PONV after thyroidectomy; Hx motion sickness    Squamous cell skin cancer     Dr. Sandra Larsen Thyroid disease     H/ GOITER    Vertigo      Social History     Tobacco Use   Smoking Status Never Smoker   Smokeless Tobacco Never Used     Social History     Substance and Sexual Activity   Alcohol Use Yes    Alcohol/week: 6.0 standard drinks    Types: 6 Glasses of wine per week     Allergies   Allergen Reactions    Augmentin [Amoxicillin-Pot Clavulanate] Nausea and Vomiting       Current Outpatient Medications   Medication Sig    traZODone (DESYREL) 50 mg tablet TAKE TWO TABLETS BY MOUTH EVERY NIGHT AT BEDTIME    levothyroxine (SYNTHROID) 100 mcg tablet Take 1 Tab by mouth Daily (before breakfast).  calcium-cholecalciferol, d3, (CALCIUM 600 + D) 600-125 mg-unit tab Take  by mouth.  sertraline (ZOLOFT) 100 mg tablet Take 0.5 Tabs by mouth nightly.  anastrozole (Arimidex) 1 mg tablet Take 1 mg by mouth daily.  naloxone (NARCAN) 4 mg/actuation nasal spray Use 1 spray intranasally, then discard. Repeat with new spray every 2 min as needed for opioid overdose symptoms, alternating nostrils.  cholecalciferol (VITAMIN D3) 1,000 unit cap Take 5,000 Units by mouth daily.  liothyronine (CYTOMEL) 5 mcg tablet Take 5 mcg by mouth daily.  cyanocobalamin 1,000 mcg tablet Take 1,000 mcg by mouth daily.  gabapentin (NEURONTIN) 100 mg capsule Take 1 Cap by mouth three (3) times daily. Max Daily Amount: 300 mg. (Patient taking differently: Take 100 mg by mouth three (3) times daily as needed.)    LORazepam (ATIVAN) 1 mg tablet Take 1 Tab by mouth every four (4) hours as needed for Anxiety.  Max Daily Amount: 6 mg.    ALPRAZolam (XANAX) 0.5 mg tablet Take 1 Tab by mouth two (2) times daily as needed for Anxiety. Max Daily Amount: 1 mg. No current facility-administered medications for this visit.         Padilla Oscar MD  [facility] [address] [facility] [address], [address] [place] [address]  [facility]  [phone]

## 2021-02-04 ENCOUNTER — TELEPHONE (OUTPATIENT)
Dept: SURGERY | Age: 56
End: 2021-02-04

## 2021-02-04 ENCOUNTER — DOCUMENTATION ONLY (OUTPATIENT)
Dept: ONCOLOGY | Age: 56
End: 2021-02-04

## 2021-02-04 NOTE — TELEPHONE ENCOUNTER
Returned pt's Hello message call. She has already spoken to and has been in touch with Dr. Mack Harada. I let the patient know that Dr. Mack Harada saw the pictures that she sent in [address] The patient is going to be seeing Dr. Gerhard Dalal today. She was appreciative of the call.

## 2021-02-04 NOTE — PROGRESS NOTES
Patient's insurance called. They need recent xray report to approve MRI.     Please fax both x-rays that were just done to [phone] and ensure case number is on cover sheet #[id number]

## 2021-02-05 ENCOUNTER — DOCUMENTATION ONLY (OUTPATIENT)
Dept: ONCOLOGY | Age: 56
End: 2021-02-05

## 2021-02-05 DIAGNOSIS — C50.412 MALIGNANT NEOPLASM OF UPPER-OUTER QUADRANT OF LEFT BREAST IN FEMALE, ESTROGEN RECEPTOR POSITIVE (HCC): Primary | ICD-10-CM

## 2021-02-05 DIAGNOSIS — Z17.0 MALIGNANT NEOPLASM OF UPPER-OUTER QUADRANT OF LEFT BREAST IN FEMALE, ESTROGEN RECEPTOR POSITIVE (HCC): Primary | ICD-10-CM

## 2021-02-05 DIAGNOSIS — M79.602 PAIN OF LEFT UPPER EXTREMITY: ICD-10-CM

## 2021-02-05 DIAGNOSIS — M25.512 ACUTE PAIN OF LEFT SHOULDER: ICD-10-CM

## 2021-02-05 NOTE — PROGRESS NOTES
Called scheduling and provided Ron Vaughan #[id number] for MRI     Contacted patient and confirmed x2 identifiers and provided updates on approval for the MRI  Also informed patient Haptikhart message sent for reference and included Auth#    Patient verbalized understanding   No new questions or concerns at this time

## 2021-02-05 NOTE — PROGRESS NOTES
MRI approved  W/ & without contrast (order updated)    Please let scheduling know and notify pt via my chart     Auth # is #[id number]

## 2021-02-09 RX ORDER — LEVOTHYROXINE SODIUM 100 UG/1
100 TABLET ORAL
Qty: 30 TAB | Refills: 5 | Status: SHIPPED | OUTPATIENT
Start: 2021-02-09

## 2021-02-11 ENCOUNTER — APPOINTMENT (OUTPATIENT)
Dept: PHYSICAL THERAPY | Age: 56
End: 2021-02-11
Payer: COMMERCIAL

## 2021-02-11 ENCOUNTER — HOSPITAL ENCOUNTER (OUTPATIENT)
Dept: PHYSICAL THERAPY | Age: 56
Discharge: HOME | End: 2021-02-11
Payer: COMMERCIAL

## 2021-02-11 PROCEDURE — 97162 PT EVAL MOD COMPLEX 30 MIN: CPT | Performed by: PHYSICAL THERAPIST

## 2021-02-11 NOTE — PROGRESS NOTES
Lymphedema EVAL/DAILY NOTE    Patient Name: Celine Cornejo  Date:2021  : 1965  [x]  Patient  Verified  Payor: [address] / Plan: [address] / Product Type: Managed Care Medicaid /    In time:12:00  Out time:1:00  Total Treatment Time (min): 60  Total Timed Codes (min): 0  1:1 Treatment Time ( [address] only): 60   Visit #: 1 of 20    Referring Provider: Hayley Veloz MD  Next Appointment: None scheduled with Dr Reg May, but with plastic surgeon Herberth Hamilton to finish process of reconstruction once her skin heals from radiation recall  Treatment Area: Lymphedema [I89.0]    SUBJECTIVE     Current symptoms/Complaints: Pt reports that she was denied getting the Flexi Touch compression pump for her chest, armpit, and L arm edema. She states that her swelling has gotten worse and her L shoulder pain is also worse, especially with the increase in edema. She reports 2 weeks ago she experienced \"radiation recall\" and was given antibiotics and a topical cream to help her skin heal. Pt is very frustrated with all of the \"post side effects\" after being done with cancer treatments. She states that compression bra and sleeve/glove wear, the decongestive exercises and elevation have not helped her worsening edema.   Subjective functional status:     Present Symptoms/History:        Past Medical History:   Diagnosis Date    Anxiety      Arthritis       NECK    At risk for sleep apnea       GAGAN 5     Basal cell carcinoma of skin       FACE, CHEST, BACK    Breast cancer (HCC) 2019     LEFT    History of seasonal allergies      Hx antineoplastic chemo       COMPLETED 12-3-19    Ill-defined condition       hx motion sickness    Motion sickness      Nausea & vomiting       PONV after thyroidectomy;  Hx motion sickness    Squamous cell skin cancer       Dr. Neena Olea Thyroid disease       H/ GOITER    Vertigo                  Past Surgical History:   Procedure Laterality Date    HX BREAST RECONSTRUCTION Bilateral 1/21/2020     IMMEDIATE BILATERAL BREAST RECONSTRUCTION WITH TISSUE EXPANDERS AND ALLODERM GRAFTS performed by Mere Valencia MD at [address]  [facility]   2004    HX COLONOSCOPY        HX HEENT   2009     THYROIDECTOMY    HX KNEE ARTHROSCOPY Left      HX LUMBAR FUSION   1995    HX MOHS PROCEDURES         x 4 times     HX SHOULDER ARTHROSCOPY Right      HX THYROIDECTOMY   2014    HX VASCULAR ACCESS   2019     PORTACATH RIGHT CHEST         Referring Provider: Jak Green MD              Medical Oncologist: Dr Wanda Ochoa Oncologist: Dora Westfall Surgeon: Dr La Nena Lopez  Next Appointment: Dr Phu Haji in November to prepare for fat grafting and aerolae and nipples  Diagnosis: L post mastectomy lymphedema, L Axillary cording, L shoulder pain and stiffness  Precautions/Indications: infection,   History of Present Illness:  Breast cancer-  5/28/19 - 44 yo female with left breast IDC and DCIS,T2 N1, potential skin involvement, Er/Pr + Her 2 greg equivocal, Not enough invasive to send Her 2 greg for fish  6/3/19 - LEFT breast skin punch biopsy, benign. 6/20/19 - biopsies of LEFT breast and LEFT axilla. I called patient to touch base with her. Biopsy Dr. Tamica Mccartney did node +, Er/pr+ her 2 greg negative, specimen went to [facility] due to medicaid,  Mammaprint high risk. 7/18/19 - port insertion  7/19/19 - started AC  9/13/19 - 12/3/19 - Taxol infusions  1/21/20: b/l mastectomy   2/2020: Letrozole  6/2020: Completed RT   9/14/2020: bilateral breast implant exchange with Dr. LaN ena Lopez                    Pain Intensity:8 on a scale of 0-10  ? Pain Aggravating Factors: reaching, using L UEfor ADL's,   ?  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, flipping houses) which also means she has to hire someone to do the work and is \"sufferring financially\"  Personal/Social History: anxiety and stress over cancer, shoulder pain, lymphedema  Barriers to treatment:    [x]N/A []pain []financial []time []transportation []other  Motivation: high  Substance use:   [x]N/A  []Alcohol []Tobacco []other:      Cognition: A & O x 4    Other:     Affected Limb:   [x]Upper Extremity []R  [x] L [] Both [x] Chest/Abdomen                           []Lower Extremity []R  [] L [] Both                          [] Chest/Abdomen   Dominant Hand: right handed    Current or Previous Compression Garment(s):  :   []Stocking [x]Sleeves [place]  [] Pantyhose                          [x] gauntlets Juzo                           [x]Brand:   [place]                       [x]mmH-30mmHg   [] Size:            OBJECTIVE:   Edema:   Location: L chest/axilla/lateral torso and L UE        []min [x]moderate  [] severe [] pitting  Circumferences: Girth (cm)                                                                                                                Distance from wrist crease (cm)                             X                                    L    2/11/21           12/10/20         2/11/21  Palm:               20. 7                  19.7               20.2  Wrist:               16.6                 16.6               16.5  Forearm:         25.4                   24.5              25.7                                                    15               Elbow:             27.3                   26.1              28.2  Bicep:              31. 5                  80. 3               32.8                                                     36  Axilla:               37.3                   36.8 38.2     Axillary Line:                              101.4           106.4  Nipple (mid breast line):           105. 3            106  Skin integrity:  Temperature []normal []cool  [x] warm                Sensation        []normal [x] sensitive  [] decreased  Light touch/Sharp/Dull                Color []normal []red  [x] pink                  Ports/ Drains: N/A  Skin/Soft Tissue:  Incisions lisa areola, pink in color, Edema noted in L axilla and ant/post chest wall extending to lateral torso and into L upper arm/bicep/elbow regions    Vitals: BP R RJ=892/76            SpO2= 98%      HR=  92    Outcome Measures: FOTO=50/100  ROM:                                                                      R                            L     Scapulohumoral Control / Rhythm:     normal                 impaired    Able to eccentrically lower with good control?  R:   LGM         J[de-identified]   yes        Upper Extremity AROM:                                                                                      R                                 L  Shoulder Flexion                           129                               62 with end range discomfort                                                     Shoulder Abduction                      115                               13     with end range discomfort  Shoulder Extension                       10                               0         with end range discomfort    Shoulder ER                                  24                                 5   with end range discomfort    Shoulder IR                                   20                                 10    with end range discomfort                                                                                                                                     UPPER QUARTER                           MUSCLE STRENGTH  KEY                                                              R            L  0 - No Contraction        C1, C2 Neck Flex        3+                                       1 - Trace                       C3 Side Flex          3+           3+                                                 2 - Poor                         C4 Sh Elev               4           3-                                             3 - Fair                          C5 Deltoid/Biceps  3+       NT                                   4 - Good                       C6 Wrist Ext           4       3                                            5 - Normal                     C7 Triceps             4          NT                                                                                  P2 Thumb Ext        4         4                                                                                          V3 Hand Inst           4        7                                              MMT: See above                                            R                      L  Shoulder flexion                4-                   NT         Shoulder ER                      2+                 NT   Shoulder IR                       2+                  NT       Neurological: Sensations: Light touch: Impaired to light touch with hypersensitivity L axilla, chest, implant area               Mobility/Gait: No observed or reported deficits  Palpation: TTP L  UT/LS, periscapular musculature, pec minor/major, subscap, L axilla and lateral torso, L intercostal areas   Posture: forward head, L  IR at g-h jt, protracted scapulae  Breath pattern assessment  Diaphragm: able to initiate; not primary  Anterior chest:primary   Accessory respiratory muscle use: B UT, scalenes                   With   [] TE   [] TA   [] neuro   [] other: Patient Education: [] Review HEP    [] Progressed/Changed HEP based on:   [] positioning   [] body mechanics   [] transfers   [] heat/ice application    [x] other: radiation recall, infection risk reduction, effective lymphedema management          Pain Level (0-10 scale) post treatment: 8     ASSESSMENT/Changes in Function: Pt is a 60 y.o female s/p B mastectomies with currently undergoing final breast reconstruction. She was seen in PT from October through December of 2020 for L chest, axilla and UE lymphedema and L shoulder pain. Currently her chief concern is worsening L sided ant/post chest, axillary, and arm lymphedema and painful use of L UE for all ADL's and her job requirements.  Pt presents with decreased L upper quadrant motion and strength, poor posture and breath pattern, lymphedema L upper quadrant (chest, axilla, arm).   Patient will benefit from skilled PT services to address functional mobility deficits, address ROM deficits, address strength deficits, analyze and address soft tissue restrictions, analyze and cue movement patterns, analyze and modify body mechanics/ergonomics, assess and modify postural abnormalities and effectively manage her lymphedema to address rehab goals per plan of care       Treatment Protocol:   o Manual Lymphatic Drainage   o Soft Tissue Mobilization/MFR   o Pneumatic Gradient Compression Pump that includes chest/torso treatment areas as well as L UE   o Skin Care/Wound care   o Decongestive Exercises/Self MFR/Strengthening   o Education   o Compression Garment          Goals:    See POC    PLAN  []  Upgrade activities as tolerated     [x]  Continue plan of care  []  Update interventions per flow sheet       []  Discharge due to:_  []  Other:_       AVA Burger 2/11/2021  12:19 PM

## 2021-02-11 NOTE — PROGRESS NOTES
Physical Therapy at [facility],   a part of  [facility]  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]                              Prior Hospitalization: see medical history                                      Comorbidities: L breast cancer, radiation, hx of axillary cording, undergoing reconstruction,  lymphedema, left shoulder pain  Prior Level of Function: No difficulties with edema or pain when using L UE  Medications: Verified on Patient Summary List     Start of Care: 2021                                                                           Onset Date: 2020                                The Plan of Care and following information is based on the information from the initial evaluation. Assessment/ key information: Pt is a 55 y. o female s/p B mastectomies with currently undergoing final breast reconstruction. She was seen in PT from October through 2020 for L chest, axilla and UE lymphedema and L shoulder pain. Currently her chief concern is worsening L sided ant/post chest, axillary, and arm lymphedema and painful use of L UE for all ADL's and her job requirements.  Pt presents with decreased L upper quadrant motion and strength, poor posture and breath pattern, lymphedema L upper quadrant (chest, axilla, arm).   Patient will benefit from skilled PT services to address functional mobility deficits, address ROM deficits, address strength deficits, analyze and address soft tissue restrictions, analyze and cue movement patterns, analyze and modify body mechanics/ergonomics, assess and modify postural abnormalities and effectively manage her lymphedema to address rehab goals per plan of care        Treatment Protocol:              o Manual Lymphatic Drainage              o Soft Tissue Mobilization/MFR              o Pneumatic Gradient Compression Pump that includes chest/torso treatment areas as well as L UE              o Skin Care/Wound care              o Decongestive Exercises/Self MFR/Strengthening              o Education              o Compression Garment             Evaluation Complexity History MEDIUM  Complexity : 1-2 comorbidities / personal factors will impact the outcome/ POC ; Examination MEDIUM Complexity : 3 Standardized tests and measures addressing body structure, function, activity limitation and / or participation in recreation  ;Presentation MEDIUM Complexity : Evolving with changing characteristics  ; Clinical Decision Making MEDIUM Complexity : FOTO score of 26-74  Overall Complexity Rating: MEDIUM     Problem List: pain affecting function, decrease ROM, decrease strength, edema affecting function and decrease ADL/ functional abilitiies     Treatment Plan may include any combination of the following: Therapeutic exercise, Therapeutic activities, Neuromuscular re-education, Physical agent/modality, Manual therapy, Patient education, Functional mobility training and Other: effective lymphedema management  Patient / Family readiness to learn indicated by: asking questions, trying to perform skills and interest  Persons(s) to be included in education: patient (P)  Barriers to Learning/Limitations: None  Patient Goal (s): be able to manage my lymphedema and decrease my shoulder pain without having to come to therapy, use my L UE for dressing, reaching, 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 2) Pt will verbalize with 100% accuracy which signs and symptoms to report immediately to surgeon concerning their condition.        Long Term Goals: To be accomplished in 20 treatments  1) Pt will be Independent with compression management routine consisting of skin care, decongestive exercises, self-manual lymphatic stimulation techniques, strengthening and motion exercises, and don/doff/care of appropriate compression garment(s). 2) Patient will demonstrate decongestion of limb by 3-4 cm and chest by 4-5cm in order to effectively manage her lymphedema and decrease risk of infection  3) Pt will increase ROM of affected limb by 20-30 degrees in order to use L UE in all ADL's with 0-1/10  pain at or above shoulder level on NPS scale. 4) Pt will increase strength of affected limb by 1-2 muscle grades  in order to perform job requirements of lifting, pushing, pulling and perform  ADL's with 0-1/10 pain at or above shoulder level on NPS     Frequency / Duration: Patient to be seen 1-2 times per week for 20 treatments.     Patient/ Caregiver education and instruction: self care, activity modification, brace/ splint application, pt education on proper skin care and infection risk reduction   [x]  Plan of care has been reviewed with MEMO POSEYT, CLT-KARAN 2/11/2021     ________________________________________________________________________    I certify that the above Therapy Services are being furnished while the patient is under my care. I agree with the treatment plan and certify that this therapy is necessary.     [de-identified] Signature:____________________  Date:____________Time: _________

## 2021-02-16 ENCOUNTER — HOSPITAL ENCOUNTER (OUTPATIENT)
Dept: MRI IMAGING | Age: 56
Discharge: HOME | End: 2021-02-16
Attending: REGISTERED NURSE
Payer: COMMERCIAL

## 2021-02-16 ENCOUNTER — APPOINTMENT (OUTPATIENT)
Dept: PHYSICAL THERAPY | Age: 56
End: 2021-02-16
Payer: COMMERCIAL

## 2021-02-16 ENCOUNTER — APPOINTMENT (OUTPATIENT)
Dept: MRI IMAGING | Age: 56
End: 2021-02-16
Attending: REGISTERED NURSE
Payer: COMMERCIAL

## 2021-02-16 DIAGNOSIS — Z17.0 MALIGNANT NEOPLASM OF UPPER-OUTER QUADRANT OF LEFT BREAST IN FEMALE, ESTROGEN RECEPTOR POSITIVE (HCC): ICD-10-CM

## 2021-02-16 DIAGNOSIS — M25.512 ACUTE PAIN OF LEFT SHOULDER: ICD-10-CM

## 2021-02-16 DIAGNOSIS — M79.602 PAIN OF LEFT UPPER EXTREMITY: ICD-10-CM

## 2021-02-16 DIAGNOSIS — C50.412 MALIGNANT NEOPLASM OF UPPER-OUTER QUADRANT OF LEFT BREAST IN FEMALE, ESTROGEN RECEPTOR POSITIVE (HCC): ICD-10-CM

## 2021-02-16 PROCEDURE — 73220 MRI UPPR EXTREMITY W/O&W/DYE: CPT

## 2021-02-16 PROCEDURE — 73223 MRI JOINT UPR EXTR W/O&W/DYE: CPT

## 2021-02-16 PROCEDURE — 74011250636 HC RX REV CODE- 250/636: Performed by: REGISTERED NURSE

## 2021-02-16 PROCEDURE — A9575 INJ GADOTERATE MEGLUMI 0.1ML: HCPCS

## 2021-02-16 PROCEDURE — A9575 INJ GADOTERATE MEGLUMI 0.1ML: HCPCS | Performed by: REGISTERED NURSE

## 2021-02-16 PROCEDURE — 74011250636 HC RX REV CODE- 250/636

## 2021-02-16 RX ORDER — GADOTERATE MEGLUMINE 376.9 MG/ML
18 INJECTION INTRAVENOUS ONCE
Status: COMPLETED | OUTPATIENT
Start: 2021-02-16 | End: 2021-02-16

## 2021-02-16 RX ADMIN — GADOTERATE MEGLUMINE 18 ML: 376.9 INJECTION INTRAVENOUS at 14:45

## 2021-02-17 ENCOUNTER — TELEPHONE (OUTPATIENT)
Dept: ONCOLOGY | Age: 56
End: 2021-02-17

## 2021-02-17 NOTE — TELEPHONE ENCOUNTER
Called pt [employer]IP[employer] verified. Informed pt on MRI results. Pt verbalized understanding. No new questions or concerns.

## 2021-02-17 NOTE — PROGRESS NOTES
Please let her know that MRI was reviewed and shows no cancer. But it does show arthritis and an effusion in her joints which is likely cause of pain. She can see an ortho doctor for this. Does she have one or do we need to place referraL?

## 2021-02-18 ENCOUNTER — APPOINTMENT (OUTPATIENT)
Dept: PHYSICAL THERAPY | Age: 56
End: 2021-02-18
Payer: COMMERCIAL

## 2021-02-22 DIAGNOSIS — F41.9 ANXIETY AND DEPRESSION: ICD-10-CM

## 2021-02-22 DIAGNOSIS — F32.A ANXIETY AND DEPRESSION: ICD-10-CM

## 2021-02-22 DIAGNOSIS — F32.A DEPRESSION, UNSPECIFIED DEPRESSION TYPE: ICD-10-CM

## 2021-02-23 ENCOUNTER — HOSPITAL ENCOUNTER (OUTPATIENT)
Dept: PHYSICAL THERAPY | Age: 56
Discharge: HOME | End: 2021-02-23
Payer: COMMERCIAL

## 2021-02-23 PROCEDURE — 97140 MANUAL THERAPY 1/> REGIONS: CPT | Performed by: PHYSICAL THERAPIST

## 2021-02-23 PROCEDURE — 97110 THERAPEUTIC EXERCISES: CPT | Performed by: PHYSICAL THERAPIST

## 2021-02-23 RX ORDER — SERTRALINE HYDROCHLORIDE 100 MG/1
TABLET, FILM COATED ORAL
Qty: 15 TAB | Refills: 2 | Status: SHIPPED | OUTPATIENT
Start: 2021-02-23 | End: 2021-06-03 | Stop reason: SDUPTHER

## 2021-02-23 NOTE — PROGRESS NOTES
PT DAILY TREATMENT NOTE 2-15    Patient Name: Les Woods  Date:2021  : 1965  [x]  Patient  Verified  [facility] / Plan: [address] / Product Type: Managed Care Medicaid /    In time:11:10  Out time:12:10  Total Treatment Time (min): 60  Visit #:   2    Treatment Area: Lymphedema [I89.0]    SUBJECTIVE  Pain Level (0-10 scale): 4   Any medication changes, allergies to medications, adverse drug reactions, diagnosis change, or new procedure performed?: [x] No    [] Yes (see summary sheet for update)  Subjective functional status/changes:   [] No changes reported  Pt reports that she had her MRI last week which showed OA and joint effusion in her L shoulder, she received a cortisone shot and has been placed on an anti-imflammatory. She states that she is feeling better but that she is still having pain and swelling in L chest/axilla and upper arm.     OBJECTIVE     15 min Therapeutic Exercise:  [x] See flow sheet :   Rationale: increase ROM, increase strength and decrease edema to improve the patients ability to perform ADL's required for return to work and/or community and to effectively manage her lymphedema       45 min Manual Therapy:  MLD L upper quadrant with emphasis on AAA, FAIZA, L AI, parasternal and intercostal lymph nodes, PROM L shoulder all planes to tolerance, MFR L axilla, KT AI and FAIZA   Rationale: decrease pain, increase ROM, increase tissue extensibility, decrease edema , decrease trigger points and increase postural awareness  to improve the patients ability to effectively use extremity during functional tasks (reaching, grooming, dressing, walking) and to effectively manage her lymphedema           With   [x] TE   [] TA   [] Neuro   [] SC   [] other: Patient Education: [x] Review HEP    [x] Progressed/Changed HEP based on:   [x] positioning   [] body mechanics   [] transfers   [] heat/ice application    [x] other: posture, elevation, Other Objective/Functional Measures:       Pain Level (0-10 scale) post treatment: 3    ASSESSMENT/Changes in Function:   Pt tolerated her 1st MLD and manual therapy session without increased discomfort. Pt was able to demonstrate performance of prescribed decongestive ex's and elevation procedure with cues required for form, posture, and holding times. Patient will continue to benefit from skilled PT services to modify and progress therapeutic interventions, address functional mobility deficits, address ROM deficits, address strength deficits, analyze and address soft tissue restrictions, analyze and cue movement patterns, analyze and modify body mechanics/ergonomics, assess and modify postural abnormalities and effectively manage her lymphedema to attain remaining goals.      []  See Plan of Care  []  See progress note/recertification  []  See Discharge Summary         Progress towards goals / Updated goals:  1st f/u visit    PLAN  [x]  Upgrade activities as tolerated     [x]  Continue plan of care  []  Update interventions per flow sheet       []  Discharge due to:_  []  Other:_      Daniele Martins, PT 2/23/2021

## 2021-02-25 ENCOUNTER — HOSPITAL ENCOUNTER (OUTPATIENT)
Dept: PHYSICAL THERAPY | Age: 56
Discharge: HOME | End: 2021-02-25
Payer: COMMERCIAL

## 2021-02-25 PROCEDURE — 97110 THERAPEUTIC EXERCISES: CPT | Performed by: PHYSICAL THERAPIST

## 2021-02-25 PROCEDURE — 97140 MANUAL THERAPY 1/> REGIONS: CPT | Performed by: PHYSICAL THERAPIST

## 2021-02-25 NOTE — PROGRESS NOTES
PT DAILY TREATMENT NOTE 2-15    Patient Name: Wilbur Carl  Date:2021  : 1965  [x]  Patient  Verified  [facility] [address] / Plan: [address] / Product Type: Managed Care Medicaid /    In time:10:00  Out time:11:00  Total Treatment Time (min): 60  Visit #:   3    Treatment Area: Lymphedema [I89.0]    SUBJECTIVE  Pain Level (0-10 scale): 2-3 L shoulder   Any medication changes, allergies to medications, adverse drug reactions, diagnosis change, or new procedure performed?: [x] No    [] Yes (see summary sheet for update)  Subjective functional status/changes:   [] No changes reported  Pt reports that she continues to have swelling in L armpit and chest as well as L shoulder discomfort    OBJECTIVE     15 min Therapeutic Exercise:  [x] See flow sheet :   Rationale: increase ROM, increase strength and decrease edema to improve the patients ability to perform ADL's required for return to work and/or community and to effectively manage her lymphedema       45 min Manual Therapy:  MLD L upper quadrant with emphasis on AAA, FAIZA, L AI, parasternal and intercostal lymph nodes, PROM L shoulder all planes to tolerance, MFR L axilla, KT AI and FAIZA   Rationale: decrease pain, increase ROM, increase tissue extensibility, decrease edema , decrease trigger points and increase postural awareness  to improve the patients ability to effectively use extremity during functional tasks (reaching, grooming, dressing, walking) and to effectively manage her lymphedema           With   [x] TE   [] TA   [] Neuro   [] SC   [] other: Patient Education: [x] Review HEP    [x] Progressed/Changed HEP based on:   [x] positioning   [] body mechanics   [] transfers   [] heat/ice application    [x] other: posture, elevation,      Other Objective/Functional Measures:       Pain Level (0-10 scale) post treatment: 2    ASSESSMENT/Changes in Function:     Patient will continue to benefit from skilled PT services to modify and progress therapeutic interventions, address functional mobility deficits, address ROM deficits, address strength deficits, analyze and address soft tissue restrictions, analyze and cue movement patterns, analyze and modify body mechanics/ergonomics, assess and modify postural abnormalities and effectively manage her lymphedema to attain remaining goals. []  See Plan of Care  []  See progress note/recertification  []  See Discharge Summary         Progress towards goals / Updated goals: Will re-measure girth at next visit.       PLAN  [x]  Upgrade activities as tolerated     [x]  Continue plan of care  []  Update interventions per flow sheet       []  Discharge due to:_  []  Other:_      Jamil Cleary, PT 2/25/2021

## 2021-03-02 ENCOUNTER — HOSPITAL ENCOUNTER (OUTPATIENT)
Dept: PHYSICAL THERAPY | Age: 56
Discharge: HOME | End: 2021-03-02
Payer: COMMERCIAL

## 2021-03-02 PROCEDURE — 97110 THERAPEUTIC EXERCISES: CPT | Performed by: PHYSICAL THERAPIST

## 2021-03-02 PROCEDURE — 97140 MANUAL THERAPY 1/> REGIONS: CPT | Performed by: PHYSICAL THERAPIST

## 2021-03-02 NOTE — PROGRESS NOTES
PT DAILY TREATMENT NOTE 2-15    Patient Name: Hussein Khalil  Date:3/2/2021  : 1965  [x]  Patient  Verified  Payor: [address] [place] [address] Road / Plan: [address] / Product Type: Managed Care Medicaid /    In time:11:00  Out time:12:00  Total Treatment Time (min): 60  Visit #:   4    Treatment Area: Lymphedema [I89.0]    SUBJECTIVE  Pain Level (0-10 scale): 4 L shoulder   Any medication changes, allergies to medications, adverse drug reactions, diagnosis change, or new procedure performed?: [x] No    [] Yes (see summary sheet for update)  Subjective functional status/changes:   [] No changes reported  Pt reports that she doesn't think her cortisone shot worked and that her L shoulder is more painful and her chest and armpit feel \"poole\"  OBJECTIVE     15 min Therapeutic Exercise:  [x] See flow sheet :   Rationale: increase ROM, increase strength and decrease edema to improve the patients ability to perform ADL's required for return to work and/or community and to effectively manage her lymphedema       45 min Manual Therapy:  MLD L upper quadrant with emphasis on AAA, FAIZA, L AI, parasternal and intercostal lymph nodes, L breast pumping, PROM L shoulder all planes to tolerance, MFR L axilla, KT AI and FAIZA   Rationale: decrease pain, increase ROM, increase tissue extensibility, decrease edema , decrease trigger points and increase postural awareness  to improve the patients ability to effectively use extremity during functional tasks (reaching, grooming, dressing, walking) and to effectively manage her lymphedema           With   [x] TE   [] TA   [] Neuro   [] SC   [] other: Patient Education: [x] Review HEP    [] Progressed/Changed HEP based on:   [x] positioning   [] body mechanics   [] transfers   [] heat/ice application    [x] other: posture, elevation,      Other Objective/Functional Measures:    Girth (cm) Distance from wrist crease (cm)                             R                                     L                          2/11/21                         2/11/21  3/2/2021  Palm:               20. 7                                  20.2    20.4  Wrist:               16.6                                16.5                16.8  Forearm:         25.4                                  25.7                26                                                  15               Elbow:             27.3                                 28.2                  28  Bicep:              31. 3                                  32.8                 33.4                                              36  Axilla:               37.3                                  38.2      39.2     Axillary Line:                                          106.4 106.9  Nipple (mid breast line):                        106             106     Pain Level (0-10 scale) post treatment: 3-4    ASSESSMENT/Changes in Function: No improvement in circumferences at chest or bicep area L. Patient will continue to benefit from skilled PT services to modify and progress therapeutic interventions, address functional mobility deficits, address ROM deficits, address strength deficits, analyze and address soft tissue restrictions, analyze and cue movement patterns, analyze and modify body mechanics/ergonomics, assess and modify postural abnormalities and effectively manage her lymphedema to attain remaining goals.      []  See Plan of Care  []  See progress note/recertification  []  See Discharge Summary         Progress towards goals / Updated goals:        PLAN  [x]  Upgrade activities as tolerated     [x]  Continue plan of care  []  Update interventions per flow sheet       []  Discharge due to:_  []  Other:_      Lizbet Elliott, PT 3/2/2021

## 2021-03-04 ENCOUNTER — HOSPITAL ENCOUNTER (OUTPATIENT)
Dept: PHYSICAL THERAPY | Age: 56
Discharge: HOME | End: 2021-03-04
Payer: COMMERCIAL

## 2021-03-04 PROBLEM — E03.9 ACQUIRED HYPOTHYROIDISM: Status: ACTIVE | Noted: 2021-03-04

## 2021-03-04 PROCEDURE — 97140 MANUAL THERAPY 1/> REGIONS: CPT | Performed by: PHYSICAL THERAPIST

## 2021-03-04 PROCEDURE — 97110 THERAPEUTIC EXERCISES: CPT | Performed by: PHYSICAL THERAPIST

## 2021-03-04 NOTE — PROGRESS NOTES
PT DAILY TREATMENT NOTE 2-15    Patient Name: Marcos Morgan  Date:3/4/2021  : 1965  [x]  Patient  Verified  Payor: [address] / Plan: [address] / Product Type: Managed Care Medicaid /    In time:10:00  Out time:11:00  Total Treatment Time (min): 60  Visit #:   5    Treatment Area: Lymphedema [I89.0]    SUBJECTIVE  Pain Level (0-10 scale): 3-4  L shoulder   Any medication changes, allergies to medications, adverse drug reactions, diagnosis change, or new procedure performed?: [x] No    [] Yes (see summary sheet for update)  Subjective functional status/changes:   [x] No changes reported     OBJECTIVE     15 min Therapeutic Exercise:  [x] See flow sheet :   Rationale: increase ROM, increase strength and decrease edema to improve the patients ability to perform ADL's required for return to work and/or community and to effectively manage her lymphedema       45 min Manual Therapy:  MLD L upper quadrant with emphasis on AAA, FAIZA, L AI, parasternal and intercostal lymph nodes, L breast pumping, PROM L shoulder all planes to tolerance, MFR L axilla, KT AI and FAIZA   Rationale: decrease pain, increase ROM, increase tissue extensibility, decrease edema , decrease trigger points and increase postural awareness  to improve the patients ability to effectively use extremity during functional tasks (reaching, grooming, dressing, walking) and to effectively manage her lymphedema           With   [x] TE   [] TA   [] Neuro   [] SC   [] other: Patient Education: [x] Review HEP    [] Progressed/Changed HEP based on:   [x] positioning   [] body mechanics   [] transfers   [] heat/ice application    [x] other: posture, elevation,      Other Objective/Functional Measures:       Pain Level (0-10 scale) post treatment: 3-4    ASSESSMENT/Changes in Function: Pt continues to exhibit pocket of edema in L axilla/lateral to implant as well as decreased tissue mobility around R implant.  She is also has hypersensitivity intercostal spaces and parasternal L. Patient will continue to benefit from skilled PT services to modify and progress therapeutic interventions, address functional mobility deficits, address ROM deficits, address strength deficits, analyze and address soft tissue restrictions, analyze and cue movement patterns, analyze and modify body mechanics/ergonomics, assess and modify postural abnormalities and effectively manage her lymphedema to attain remaining goals.      []  See Plan of Care  []  See progress note/recertification  []  See Discharge Summary         Progress towards goals / Updated goals:        PLAN  [x]  Upgrade activities as tolerated     [x]  Continue plan of care  []  Update interventions per flow sheet       []  Discharge due to:_  []  Other:_      Susanna Montana, PT 3/4/2021

## 2021-03-09 ENCOUNTER — HOSPITAL ENCOUNTER (OUTPATIENT)
Dept: PHYSICAL THERAPY | Age: 56
Discharge: HOME | End: 2021-03-09
Payer: COMMERCIAL

## 2021-03-09 PROCEDURE — 97140 MANUAL THERAPY 1/> REGIONS: CPT | Performed by: PHYSICAL THERAPIST

## 2021-03-09 PROCEDURE — 97110 THERAPEUTIC EXERCISES: CPT | Performed by: PHYSICAL THERAPIST

## 2021-03-09 NOTE — PROGRESS NOTES
PT DAILY TREATMENT NOTE 2-15    Patient Name: Marcos Morgan  Date:3/9/2021  : 1965  [x]  Patient  Verified  Payor: [address] / Plan: [address] / Product Type: Managed Care Medicaid /    In time:11:00  Out time:12:00  Total Treatment Time (min): 60  Visit #:   6    Treatment Area: Lymphedema [I89.0]    SUBJECTIVE  Pain Level (0-10 scale): 2-3  L shoulder   Any medication changes, allergies to medications, adverse drug reactions, diagnosis change, or new procedure performed?: [x] No    [] Yes (see summary sheet for update)  Subjective functional status/changes:   [] No changes reported   Pt states that her swelling persists but that her L shoulder joint feels \"a little looser\"  OBJECTIVE     15 min Therapeutic Exercise:  [x] See flow sheet :   Rationale: increase ROM, increase strength and decrease edema to improve the patients ability to perform ADL's required for return to work and/or community and to effectively manage her lymphedema       45 min Manual Therapy:  MLD L upper quadrant with emphasis on AAA, FAIZA, L AI, parasternal and intercostal lymph nodes, L breast pumping, PROM L shoulder all planes to tolerance, MFR L axilla, KT AI and FAIZA   Rationale: decrease pain, increase ROM, increase tissue extensibility, decrease edema , decrease trigger points and increase postural awareness  to improve the patients ability to effectively use extremity during functional tasks (reaching, grooming, dressing, walking) and to effectively manage her lymphedema           With   [x] TE   [] TA   [] Neuro   [] SC   [] other: Patient Education: [x] Review HEP    [] Progressed/Changed HEP based on:   [x] positioning   [] body mechanics   [] transfers   [] heat/ice application    [x] other: posture, elevation,      Other Objective/Functional Measures:       Pain Level (0-10 scale) post treatment: 2-3    ASSESSMENT/Changes in Function: Pt continues to exhibit TTP and soreness L chest/lisa implant and incisional,  axilla, and intercostal areas   Patient will continue to benefit from skilled PT services to modify and progress therapeutic interventions, address functional mobility deficits, address ROM deficits, address strength deficits, analyze and address soft tissue restrictions, analyze and cue movement patterns, analyze and modify body mechanics/ergonomics, assess and modify postural abnormalities and effectively manage her lymphedema to attain remaining goals.      []  See Plan of Care  []  See progress note/recertification  []  See Discharge Summary         Progress towards goals / Updated goals:        PLAN  [x]  Upgrade activities as tolerated     [x]  Continue plan of care  []  Update interventions per flow sheet       []  Discharge due to:_  []  Other:_      Sal Ramirez, PT 3/9/2021

## 2021-03-11 ENCOUNTER — HOSPITAL ENCOUNTER (OUTPATIENT)
Dept: PHYSICAL THERAPY | Age: 56
Discharge: HOME | End: 2021-03-11
Payer: COMMERCIAL

## 2021-03-11 PROCEDURE — 97110 THERAPEUTIC EXERCISES: CPT | Performed by: PHYSICAL THERAPIST

## 2021-03-11 PROCEDURE — 97140 MANUAL THERAPY 1/> REGIONS: CPT | Performed by: PHYSICAL THERAPIST

## 2021-03-11 NOTE — PROGRESS NOTES
PT DAILY TREATMENT NOTE 2-15    Patient Name: Edie Kelley  Date:3/11/2021  : 1965  [x]  Patient  Verified  Payor: [address] [place] [address] Road / Plan: [address] / Product Type: Managed Care Medicaid /    In time:10:00  Out time:11:00  Total Treatment Time (min): 60  Visit #:   7    Treatment Area: Lymphedema [I89.0]    SUBJECTIVE  Pain Level (0-10 scale): 2 L shoulder   Any medication changes, allergies to medications, adverse drug reactions, diagnosis change, or new procedure performed?: [x] No    [] Yes (see summary sheet for update)  Subjective functional status/changes:   [] No changes reported  Pt states that she continues to elevate, perform decongestive exercises and self MLD \"but there is no change in my underarm, chest, and upper arm swelling\"    OBJECTIVE     15 min Therapeutic Exercise:  [x] See flow sheet :   Rationale: increase ROM, increase strength and decrease edema to improve the patients ability to perform ADL's required for return to work and/or community and to effectively manage her lymphedema       45 min Manual Therapy:  MLD L upper quadrant with emphasis on AAA, FAIZA, L AI, parasternal and intercostal lymph nodes, L breast pumping, PROM L shoulder all planes to tolerance, MFR L axilla, KT AI and FAIZA   Rationale: decrease pain, increase ROM, increase tissue extensibility, decrease edema , decrease trigger points and increase postural awareness  to improve the patients ability to effectively use extremity during functional tasks (reaching, grooming, dressing, walking) and to effectively manage her lymphedema           With   [x] TE   [] TA   [] Neuro   [] SC   [] other: Patient Education: [x] Review HEP    [] Progressed/Changed HEP based on:   [x] positioning   [] body mechanics   [] transfers   [] heat/ice application    [x] other: posture, elevation,      Other Objective/Functional Measures:       Pain Level (0-10 scale) post treatment: 2    ASSESSMENT/Changes in Function:  Pt very frustrated at lack of progress in edema. Pt fitted with Kompre[employer] II foam to wear under her bra to the side of L implant. Patient will continue to benefit from skilled PT services to modify and progress therapeutic interventions, address functional mobility deficits, address ROM deficits, address strength deficits, analyze and address soft tissue restrictions, analyze and cue movement patterns, analyze and modify body mechanics/ergonomics, assess and modify postural abnormalities and effectively manage her lymphedema to attain remaining goals.      []  See Plan of Care  []  See progress note/recertification  []  See Discharge Summary         Progress towards goals / Updated goals:        PLAN  [x]  Upgrade activities as tolerated     [x]  Continue plan of care  []  Update interventions per flow sheet       []  Discharge due to:_  []  Other:_      Merrill Melgar, PT 3/11/2021

## 2021-03-16 ENCOUNTER — OFFICE VISIT (OUTPATIENT)
Dept: SURGERY | Age: 56
End: 2021-03-16
Payer: COMMERCIAL

## 2021-03-16 ENCOUNTER — HOSPITAL ENCOUNTER (OUTPATIENT)
Dept: PHYSICAL THERAPY | Age: 56
Discharge: HOME | End: 2021-03-16
Payer: COMMERCIAL

## 2021-03-16 VITALS
WEIGHT: 203 LBS | DIASTOLIC BLOOD PRESSURE: 90 MMHG | TEMPERATURE: 97.7 F | BODY MASS INDEX: 30.77 KG/M2 | HEART RATE: 89 BPM | HEIGHT: 68 IN | SYSTOLIC BLOOD PRESSURE: 132 MMHG

## 2021-03-16 DIAGNOSIS — C77.3 BREAST CANCER METASTASIZED TO AXILLARY LYMPH NODE, LEFT (HCC): ICD-10-CM

## 2021-03-16 DIAGNOSIS — C50.412 MALIGNANT NEOPLASM OF UPPER-OUTER QUADRANT OF LEFT BREAST IN FEMALE, ESTROGEN RECEPTOR POSITIVE (HCC): Primary | ICD-10-CM

## 2021-03-16 DIAGNOSIS — Z17.0 MALIGNANT NEOPLASM OF UPPER-OUTER QUADRANT OF LEFT BREAST IN FEMALE, ESTROGEN RECEPTOR POSITIVE (HCC): Primary | ICD-10-CM

## 2021-03-16 DIAGNOSIS — I89.0 LYMPHEDEMA: ICD-10-CM

## 2021-03-16 DIAGNOSIS — Z90.13 S/P MASTECTOMY, BILATERAL: ICD-10-CM

## 2021-03-16 DIAGNOSIS — C50.912 BREAST CANCER METASTASIZED TO AXILLARY LYMPH NODE, LEFT (HCC): ICD-10-CM

## 2021-03-16 PROCEDURE — 99213 OFFICE O/P EST LOW 20 MIN: CPT | Performed by: NURSE PRACTITIONER

## 2021-03-16 PROCEDURE — 97110 THERAPEUTIC EXERCISES: CPT | Performed by: PHYSICAL THERAPIST

## 2021-03-16 PROCEDURE — 97140 MANUAL THERAPY 1/> REGIONS: CPT | Performed by: PHYSICAL THERAPIST

## 2021-03-16 NOTE — PROGRESS NOTES
HISTORY OF PRESENT ILLNESS  Zuleika Whiteside is a 60 y.o. female. HPI Established patient presents for follow-up to LEFT breast cancer. Denies breast mass, skin changes and pain. Continues to address LEFT chest and axillary lymphedema. 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 6/20/19 - biopsies of LEFT breast and LEFT axilla.   Mammaprint high risk. 7/18/19 - port insertion  7/19/19 - started [facility] - Dr. Rhea Parsons  9/13/19 - 12/3/19 - Taxol infusions  1/21/20: b/l mastectomy - Dr. Rosales Bryan and Dr. Montana Greenberg  2/2020: started letrozole - had side effect of significant joint pain; switched to anastrozole  6/2020: Completed RT - Dr. Wil Patiño  9/14/2020: bilateral breast implant exchange with Dr. Montana Greenberg      Family history -   Possibly maternal grandmother with breast cancer but not sure? Father - colon cancer      OB History    No obstetric history on file. Past Surgical History:   Procedure Laterality Date    HX BREAST RECONSTRUCTION Bilateral 1/21/2020    IMMEDIATE BILATERAL BREAST RECONSTRUCTION WITH TISSUE EXPANDERS AND ALLODERM GRAFTS performed by Vel Nino [facility] at [facility]  2004    HX COLONOSCOPY      HX HEENT  2009    THYROIDECTOMY    HX KNEE ARTHROSCOPY Left     HX LUMBAR FUSION  1995    HX MOHS PROCEDURES      x 4 times     HX SHOULDER ARTHROSCOPY Right     HX THYROIDECTOMY  2014    HX VASCULAR ACCESS  2019    PORTACATH RIGHT CHEST         ROS    Physical Exam  Constitutional:       Appearance: Normal appearance. Chest:      Breasts:         Right: Absent. Left: Absent. Comments: RIGHT reconstructed breast - normal exam  LEFT reconstructed breast - no evidence of recurrence; lymphedema across LEFT breast and into axilla.   Reconstruction is firmer and less mobile than RIGHT  Musculoskeletal: Normal range of motion. Comments: UE x 2  Tightness through LEFT axilla secondary to lymphedema   Lymphadenopathy:      Upper Body:      Right upper body: No supraclavicular or axillary adenopathy. Left upper body: No supraclavicular or axillary adenopathy. Neurological:      Mental Status: She is alert. Psychiatric:         Attention and Perception: Attention normal.         Mood and Affect: Mood normal.         Speech: Speech normal.         Behavior: Behavior normal.       Visit Vitals  BP (!) 132/90   Pulse 89   Temp 97.7 °F (36.5 °C)   Ht 5' 8\" (1.727 m)   Wt 203 lb (92.1 kg)   BMI 30.87 kg/m²         ASSESSMENT and PLAN  Encounter Diagnoses   Name Primary?  Malignant neoplasm of upper-outer quadrant of left breast in female, estrogen receptor positive ([facility] [address] Yes    Breast cancer metastasized to axillary lymph node, left (HCC)     S/P mastectomy, bilateral     Lymphedema       Normal exam with no evidence of local recurrence. Continues on AI and has follow-up with Dr. Kj Guzman. Tolerating anastrozole much better. Has one additional surgery planned with Dr. La Nena Lopez for LEFT breast but waiting until lymphedema has improved. Seeing Margie Downing for lymphedema/PT. Has been addressing coverage for pump with [employer] and will contact our office if a peer to peer is needed. RTC in 6 months or sooner PRN. She is comfortable with this plan. All questions answered and she stated understanding. Total time spent for this patient - 20 minutes.

## 2021-03-16 NOTE — PATIENT INSTRUCTIONS

## 2021-03-16 NOTE — PROGRESS NOTES
PT DAILY TREATMENT NOTE 2-15    Patient Name: Tamiko Lofton  Date:3/16/2021  : 1965  [x]  Patient  Verified  Payor: [address] / Plan: [address] / Product Type: Managed Care Medicaid /    In time:12:00  Out time:1:00  Total Treatment Time (min): 60  Visit #:   8    Treatment Area: Lymphedema [I89.0]    SUBJECTIVE  Pain Level (0-10 scale): 2-3 L shoulder and \"fullness in armpit and chest\"  Any medication changes, allergies to medications, adverse drug reactions, diagnosis change, or new procedure performed?: [x] No    [] Yes (see summary sheet for update)  Subjective functional status/changes:   [] No changes reported  Pt states that she had f/u appointment with [address] [place] [address] who also noted increased edema in L axilla, lisa-implant and upper arm.       OBJECTIVE     15 min Therapeutic Exercise:  [x] See flow sheet :   Rationale: increase ROM, increase strength and decrease edema to improve the patients ability to perform ADL's required for return to work and/or community and to effectively manage her lymphedema       45 min Manual Therapy:  MLD L upper quadrant with emphasis on AAA, FAIZA, L AI, parasternal and intercostal lymph nodes, L breast pumping, PROM L shoulder all planes to tolerance, MFR L axilla, KT AI and FAIZA   Rationale: decrease pain, increase ROM, increase tissue extensibility, decrease edema , decrease trigger points and increase postural awareness  to improve the patients ability to effectively use extremity during functional tasks (reaching, grooming, dressing, walking) and to effectively manage her lymphedema           With   [x] TE   [] TA   [] Neuro   [] SC   [] other: Patient Education: [x] Review HEP    [x] Progressed/Changed HEP based on:subjective and  Objective assessments   [x] positioning   [] body mechanics   [] transfers   [] heat/ice application    [x] other: posture, elevation,      Other Objective/Functional Measures:       Pain Level (0-10 scale) post treatment: 1-2    ASSESSMENT/Changes in Function:     Patient will continue to benefit from skilled PT services to modify and progress therapeutic interventions, address functional mobility deficits, address ROM deficits, address strength deficits, analyze and address soft tissue restrictions, analyze and cue movement patterns, analyze and modify body mechanics/ergonomics, assess and modify postural abnormalities and effectively manage her lymphedema to attain remaining goals.      []  See Plan of Care  []  See progress note/recertification  []  See Discharge Summary         Progress towards goals / Updated goals:        PLAN  [x]  Upgrade activities as tolerated     [x]  Continue plan of care  []  Update interventions per flow sheet       []  Discharge due to:_  []  Other:_      Crystal Cost, PT 3/16/2021

## 2021-03-18 ENCOUNTER — HOSPITAL ENCOUNTER (OUTPATIENT)
Dept: PHYSICAL THERAPY | Age: 56
Discharge: HOME | End: 2021-03-18
Payer: COMMERCIAL

## 2021-03-18 PROCEDURE — 97110 THERAPEUTIC EXERCISES: CPT | Performed by: PHYSICAL THERAPIST

## 2021-03-18 PROCEDURE — 97140 MANUAL THERAPY 1/> REGIONS: CPT | Performed by: PHYSICAL THERAPIST

## 2021-03-18 NOTE — PROGRESS NOTES
PT DAILY TREATMENT NOTE 2-15    Patient Name: Vilma Garrido  Date:3/18/2021  : 1965  [x]  Patient  Verified  Payor: [address] [place] [address] Road / Plan: [address] / Product Type: Managed Care Medicaid /    In time:10:00  Out time:11:00  Total Treatment Time (min): 60  Visit #:   9    Treatment Area: Lymphedema [I89.0]    SUBJECTIVE  Pain Level (0-10 scale): 3-4 L shoulder and \"fullness in armpit and chest\"  Any medication changes, allergies to medications, adverse drug reactions, diagnosis change, or new procedure performed?: [x] No    [] Yes (see summary sheet for update)  Subjective functional status/changes:   [] No changes reported  Pt states that she had f/u appointment with [address] [place] [address] who also noted increased edema in L axilla, lisa-implant and upper arm. OBJECTIVE        Circumferences: Girth (cm)                                                                                                                                                                               Distance from wrist crease (cm)                             D                                                   L                          2/11/21                     2/11/21        3/2/2021       3/18/2021  Palm:               20. 7                            20.2            20.4       20.5  PMEQN:               53.3                           16.5            16.8       17  Forearm:         25.4                          25.7                26                 28. 7                                 94               Elbow:             27.3                        28.2                  28                 31  Bicep:              31. 3                        32.8                 33.4               35.6                                36  Axilla:               37.3                         38.2                39.2               41.3     Axillary/Chest Line:                        106.4           106.9 109  Nipple (mid breast line):                 106             106.7               108.9                                                                                                                 ROM:                                                                      M                            Y     Scapulohumoral Control / Rhythm:     normal                 impaired    Able to eccentrically lower with good control?  R:   TEF         X[de-identified]   no        Upper Extremity AROM:                                                                                      R                                 L  Shoulder Flexion                           129                               77 with end range discomfort                                                     Shoulder Abduction                      115                                65     with end range discomfort  Shoulder Extension                       10                                   5         with end range discomfort    Shoulder ER                                  24                                 12   with end range discomfort    Shoulder IR                                   20                                 10    with end range discomfort                                                                                                                                     UPPER QUARTER                           MUSCLE STRENGTH  KEY                                                              R            L  0 - No Contraction        C1, C2 Neck Flex        3+                                       1 - Trace                       C3 Side Flex          3+           3+                                                 2 - Poor                         C4 Sh Elev               4           8-                                             3 - Fair                          C5 Deltoid/Biceps  3+       NT                                   4 - Good                       C6 Wrist Ext           4       7                                            5 - Normal                     C7 Triceps             4          NT                                                                                  E3 Thumb Ext        4         4                                                                                          J6 Hand Inst           4        6                                              MMT: See above                                            R                      L  Shoulder flexion                4-                  3-        Shoulder ER                      2+                 2+   Shoulder IR                       2+                  2+          15 min Therapeutic Exercise:  [x] See flow sheet :   Rationale: increase ROM, increase strength and decrease edema to improve the patients ability to perform ADL's required for return to work and/or community and to effectively manage her lymphedema       45 min Manual Therapy:  MLD L upper quadrant with emphasis on AAA, FAIZA, L AI, parasternal and intercostal lymph nodes, L breast pumping, PROM L shoulder all planes to tolerance, MFR L axilla, KT AI and FAIZA   Rationale: decrease pain, increase ROM, increase tissue extensibility, decrease edema , decrease trigger points and increase postural awareness  to improve the patients ability to effectively use extremity during functional tasks (reaching, grooming, dressing, walking) and to effectively manage her lymphedema           With   [x] TE   [] TA   [] Neuro   [] SC   [] other: Patient Education: [x] Review HEP    [x] Progressed/Changed HEP based on:subjective and  Objective assessments   [x] positioning   [] body mechanics   [] transfers   [] heat/ice application    [x] other: posture, elevation,      Other Objective/Functional Measures:       Pain Level (0-10 scale) post treatment: 3-4    ASSESSMENT/Changes in Function:     Patient will continue to benefit from skilled PT services to modify and progress therapeutic interventions, address functional mobility deficits, address ROM deficits, address strength deficits, analyze and address soft tissue restrictions, analyze and cue movement patterns, analyze and modify body mechanics/ergonomics, assess and modify postural abnormalities and effectively manage her lymphedema to attain remaining goals.      []  See Plan of Care  [x]  See progress note/recertification  []  See Discharge Summary         Progress towards goals / Updated goals:  See progress note      PLAN  [x]  Upgrade activities as tolerated     [x]  Continue plan of care  []  Update interventions per flow sheet       []  Discharge due to:_  []  Other:_      [address], PT 3/18/2021

## 2021-03-18 NOTE — PROGRESS NOTES
Physical Therapy at [facility],   a part of  [facility]  [place]  [facility]  Eli Garzon  Phone: [phone]      Fax:  [phone]    Progress Note    Name: Stephany Medellin   : 1965   MD: More Ramos MD       Treatment Diagnosis: Lymphedema [I89.0]  Start of Care: 2021    Visits from Start of Care: 9  Missed Visits: 0       Summary of Care: Pt has been receiving PT interventions for L chest and L arm post mastectomy lymphedema. She has experienced a significant increase in her chest and arm circumferential measurements after 4 weeks of conservative therapy that included both manual lymphatic drainage in clinic and instruction and performance of self manual lymphatic drainage, decongestive/therapeutic ex, elevation and use of a gradient compression sleeve and glove as well as wear of  a compression bra.   Over the past 4 weeks the conservative lymphedema treatment she has received has failed. Please see below:    1)\" Pneumatic compressor that does not have calibrated gradient pressure\" is contraindicated as Ms. Recinoss lymphedema is concentrated  in her chest and axilla, therefore based on the anatomy and physiology of lymphatic pathways, use of a basic compression pump that treats the ARM ONLY would force more fluid into her already edematous chest causing further increases in her chest and arm circumferences. Increased lymphatic fluid in the chest as well as the arm increases the chance of cellulitis infections and further loss of function in her L upper quadrant.     2) Circumferences have increased in her chest as well as her forearm, elbow, bicep, and axillary regions since 2021:                                                                                              Circumferences: Girth (cm)                                                       Distance from wrist crease (cm)                                                                                                                                               C                                                   L                          2/11/21                     2/11/21        3/2/2021       3/18/2021  Palm:               20. 4                            20.2            20.4       20.5  PCOWB:               98.4                           16.5            16.8       17  Forearm:         25.4                          25.7                26                 28. 5                                 95               Elbow:             27.3                        28.2                  28                 31  Bicep:              31. 3                        32.8                 33.4               35.6                                36  Axilla:               37.3                         38.2                39.2               41.3     Axillary/Chest Line:                        106.4           106.9               109  Nipple (mid breast line):                 106             106.7               108.9      3)  Pt has been prescribed and utilized a gradient compression arm sleeve and glove as well as a compression bra to address chest and axillary edema. She has been compliant in wearing daily. 4)   Pt has been instructed in self manual drainage of the L upper quadrant and has been performing for at least 30 minutes per day    5)   Pt has seen a Oncology Dietician Miya Castillo RD and made recommended changes to her diet.     6)   Pt has been prescribed and performed for 3-5 days a week a walking program along with her daily prescribed decongestive exercises and self manual lymphatic drainage treatments    7)   Pt has had blood work performed with all results being \"within normal limits\" by Dr Marquise Joyce MD         Assessment / Ashok Galicia would benefit from a gradient compression pump that includes decongestion of the chest and axillary regions as well as the UE for daily lymphatic drainage and to effectively manage her L chest and arm lymphedema.        Jean Paul Aponte, PT 0/30/2816     I certify that the above Therapy Services are being furnished while the patient is under my care.  I agree with the treatment plan and certify that this therapy is necessary.     Physician's Signature:____________________  Date:____________Time: _________

## 2021-03-23 ENCOUNTER — OFFICE VISIT (OUTPATIENT)
Dept: ONCOLOGY | Age: 56
End: 2021-03-23
Payer: COMMERCIAL

## 2021-03-23 ENCOUNTER — APPOINTMENT (OUTPATIENT)
Dept: PHYSICAL THERAPY | Age: 56
End: 2021-03-23
Payer: COMMERCIAL

## 2021-03-23 VITALS
TEMPERATURE: 96 F | BODY MASS INDEX: 31.28 KG/M2 | OXYGEN SATURATION: 98 % | HEART RATE: 80 BPM | WEIGHT: 206.4 LBS | SYSTOLIC BLOOD PRESSURE: 146 MMHG | HEIGHT: 68 IN | DIASTOLIC BLOOD PRESSURE: 85 MMHG

## 2021-03-23 DIAGNOSIS — Z17.0 MALIGNANT NEOPLASM OF UPPER-OUTER QUADRANT OF LEFT BREAST IN FEMALE, ESTROGEN RECEPTOR POSITIVE (HCC): Primary | ICD-10-CM

## 2021-03-23 DIAGNOSIS — E66.09 CLASS 1 OBESITY DUE TO EXCESS CALORIES WITHOUT SERIOUS COMORBIDITY WITH BODY MASS INDEX (BMI) OF 30.0 TO 30.9 IN ADULT: ICD-10-CM

## 2021-03-23 DIAGNOSIS — C50.412 MALIGNANT NEOPLASM OF UPPER-OUTER QUADRANT OF LEFT BREAST IN FEMALE, ESTROGEN RECEPTOR POSITIVE (HCC): Primary | ICD-10-CM

## 2021-03-23 PROCEDURE — 99214 OFFICE O/P EST MOD 30 MIN: CPT | Performed by: INTERNAL MEDICINE

## 2021-03-23 NOTE — PROGRESS NOTES
Sandor Romano is a 60 y.o. female  Chief Complaint   Patient presents with    Follow-up    Breast Cancer     1. Have you been to the ER, urgent care clinic since your last visit? Hospitalized since your last visit? No.  2. Have you seen or consulted any other health care providers outside of the [address] [place] [address] since your last visit? Include any pap smears or colon screening. Yes, patient saw her endocrinologist.    Patient stated she has gotten her first dose of the covid-19 vaccine.

## 2021-03-23 NOTE — PROGRESS NOTES
[facility] [place] at [place] [address] [address]: [phone]  F: [phone]    Reason for Visit:   Wilbur Carl is a 60 y.o. female who is seen by synchronous (real-time) audio-video technology on 3/23/2021 for follow up of  Left breast cancer- ER+AR+HER2 greg neg    Treatment History:   · 6/19: Mammogram:  Left breast mass with skin thickening, 2 suspicious nodes. Mass measures 1.4 x 0.9 x 1.3  · 6/19: MRI breast: Multicentric left breast carcinoma. Non-masslike enhancement extends from the nipple to the posterior third, involves all quadrants, and measures 106 x 44 x 79 mm. · 5/28/19: Biopsy breast- IDC % % HER 2 negative, skin biopsy benign , node biopsy mostly adipose tissue with atypical cells . BRCA1 and 2 negative. CT and bone scan negative for metastasis. Mammaprint with insufficient tissue for testing. Repeat biopsy of axillary node 6/20/19 positive for metastatic disease- repeat mammaprint - high risk  · 7/19/19: cycle 1 neoadjuvant of dd AC-T  · 9/12/19: US of breast shows increased malignant microcalcification in the left breast, etiology included tx related necrosis vs extension of disease. Decreased size in left axillary LN. · 10/22/19: calcifications in the left upper outer quadrant are stable, calcifications in left axillary LN are not changed   · 1/21/20: b/l mastectomy   · 2/2020: Letrozole then switched to Anastrazole due to side effects  · 5/2020:Completed RT    History of Present Illness:   Patient is a 60 y.o. seen for L breast cancer. She felt a sensation in the shower about May 2019. She also noted a lump and  an inverted nipple. She had a physician friend look at this who directed her to mammogram and recommended she see Dr. Marion Benjamin. This led to imaging and diagnosis as above. She completed neoadjuvant ddACT followed by bilateral mastectomy on 1/21/20. On letrozole then switched to Anastrazole.     She feels well. She is working again. She has some hot flashes, getting better. Had some initial arthralgias. She had a R cortisone shot that has improved the pain and meloxicam ocasionaly is still needed. Stress test was normal and has no chest pain. She has no new HA, has no SOB, she has some dry cough. She has no abdominal fullness. She continues to follow with Lymphedema clinic. Rare ETOH  She does not smoke. Father had colon cancer    Past Medical History:   Diagnosis Date    Acquired hypothyroidism 3/4/2021    Anxiety     Arthritis     NECK    At risk for sleep apnea     GAGAN 5     Basal cell carcinoma of skin     FACE, CHEST, BACK    Breast cancer ([facility] [address] 2019    LEFT    H/O total mastectomy     Heart attack ([facility] [address] 2019    History of seasonal allergies     Hx antineoplastic chemo     COMPLETED 12-3-19    Ill-defined condition     hx motion sickness    Motion sickness     Nausea & vomiting     PONV after thyroidectomy; Hx motion sickness    Squamous cell skin cancer     Dr. Chris Justice Thyroid disease     H/ GOITER    Vertigo       Past Surgical History:   Procedure Laterality Date    HX BREAST RECONSTRUCTION Bilateral 2020    IMMEDIATE BILATERAL BREAST RECONSTRUCTION WITH TISSUE EXPANDERS AND ALLODERM GRAFTS performed by Maryanne Vilchis MD at [address] HX  SECTION      HX COLONOSCOPY      HX HEENT  2009    THYROIDECTOMY    HX KNEE ARTHROSCOPY Left     HX LUMBAR FUSION      HX MOHS PROCEDURES      x 4 times     HX SHOULDER ARTHROSCOPY Right     HX THYROIDECTOMY      HX VASCULAR ACCESS  2019    PORTACATH RIGHT CHEST      Social History     Tobacco Use    Smoking status: Never Smoker    Smokeless tobacco: Never Used   Substance Use Topics    Alcohol use:  Yes     Alcohol/week: 6.0 standard drinks     Types: 6 Glasses of wine per week      Family History   Problem Relation Age of Onset    Arthritis-osteo Mother     Cancer Father Colon Cancer    No Known Problems Sister     No Known Problems Sister     Migraines Sister     Anesth Problems Neg Hx      Current Outpatient Medications   Medication Sig    sertraline (ZOLOFT) 100 mg tablet TAKE 1/2 TABLET BY MOUTH EVERY EVENING    Synthroid 100 mcg tablet Take 1 Tab by mouth Daily (before breakfast).  traZODone (DESYREL) 50 mg tablet TAKE TWO TABLETS BY MOUTH EVERY NIGHT AT BEDTIME    calcium-cholecalciferol, d3, (CALCIUM 600 + D) 600-125 mg-unit tab Take  by mouth.  anastrozole (Arimidex) 1 mg tablet Take 1 mg by mouth daily.  naloxone (NARCAN) 4 mg/actuation nasal spray Use 1 spray intranasally, then discard. Repeat with new spray every 2 min as needed for opioid overdose symptoms, alternating nostrils.  cholecalciferol (VITAMIN D3) 1,000 unit cap Take 5,000 Units by mouth daily.  liothyronine (CYTOMEL) 5 mcg tablet Take 5 mcg by mouth daily. No current facility-administered medications for this visit. Allergies   Allergen Reactions    Augmentin [Amoxicillin-Pot Clavulanate] Nausea and Vomiting        Review of Systems: A complete review of systems was obtained, negative except as described above. Physical Exam:       General appearance - alert, well appearing, and in no distress  Lymphatics - no palpable lymphadenopathy, Left arm lymphedema  Chest wall: No masses, ulcers, skin changes  Abdomen - soft, nontender, nondistended, no masses or organomegaly, bowel sounds present  Extremities - peripheral pulses normal, no pedal edema, no clubbing or cyanosis  Skin - normal coloration and turgor, no new rashes, no suspicious skin lesions noted    ECOG PS: 0        Results:     Lab Results   Component Value Date/Time    WBC 4.5 01/20/2021 01:57 PM    HGB 14.2 01/20/2021 01:57 PM    HCT 41.3 01/20/2021 01:57 PM    PLATELET 336 12/26/4296 01:57 PM    .5 (H) 01/20/2021 01:57 PM    ABS.  NEUTROPHILS 2.7 01/20/2021 01:57 PM     Lab Results   Component Value Date/Time    Sodium 137 01/20/2021 01:57 PM    Potassium 4.3 01/20/2021 01:57 PM    Chloride 104 01/20/2021 01:57 PM    CO2 30 01/20/2021 01:57 PM    Glucose 103 (H) 01/20/2021 01:57 PM    BUN 12 01/20/2021 01:57 PM    Creatinine 0.62 01/20/2021 01:57 PM    GFR est AA >60 01/20/2021 01:57 PM    GFR est non-AA >60 01/20/2021 01:57 PM    Calcium 9.6 01/20/2021 01:57 PM     Lab Results   Component Value Date/Time    Bilirubin, total 0.4 01/20/2021 01:57 PM    ALT (SGPT) 25 01/20/2021 01:57 PM    Alk. phosphatase 114 01/20/2021 01:57 PM    Protein, total 6.9 01/20/2021 01:57 PM    Albumin 4.0 01/20/2021 01:57 PM    Globulin 2.9 01/20/2021 01:57 PM       Records reviewed and summarized above. Pathology report(s) reviewed above. 5/28/19  FINAL PATHOLOGIC DIAGNOSIS  1. Breast, left, core biopsy: In situ and invasive ductal carcinoma   Invasive carcinoma is nuclear grade 2 and measures up to 0.6 cm in greatest dimension on slide   Ductal carcinoma in situ is nuclear grade 3 with central necrosis   2. Soft tissue, left axilla, core biopsy: Adipose tissue with rare detached atypical cells   No lymph node tissue identified   See comment     Radiology report(s) reviewed above. US breast 9/12/19:  IMPRESSION:  1. Increased malignant microcalcifications in the left breast. This could  represent treatment-related necrosis or extension of disease. 2. Extensive carcinoma seen on prior MRI is largely mammographically occult. 3. Decreased size of a metastatic left axillary lymph node with  microcalcifications. 4. BI-RADS Assessment Category 6: Known biopsy proven malignancy. Mammogram 10/22/19:  IMPRESSION:  1. BI-RADS 6: known left breast cancer. 2. Segmental pleomorphic calcifications in the left upper outer quadrant are  stable. Calcifications left axillary lymph node have not changed significantly. Patient was informed of the results. Bilateral mastectomy 1/3/20   FINAL PATHOLOGIC DIAGNOSIS   1.  Right breast, prophylactic mastectomy:   Benign breast.   2. Left breast, modified radical mastectomy:   Invasive ductal carcinoma (see synoptic report). Ductal carcinoma in situ. Atypical lobular hyperplasia with associated macrocalcifications. Usual ductal hyperplasia with associated microcalcifications. Intraductal papilloma with usual ductal hyperplasia. INVASIVE CARCINOMA OF THE BREAST: Resection   SPECIMEN   Procedure: Total mastectomy   Specimen Laterality: Left   TUMOR   Histologic Type:  Invasive carcinoma of no special type (invasive   ductal carcinoma, not otherwise specified)   Glandular (Acinar) / Tubular Differentiation: Score 3   Nuclear Pleomorphism: Score 2   Mitotic Rate: Score 2   Overall Grade: Grade 2 (scores of 6 or 7)   Tumor Size: 2.5 mm (see Comment)   Ductal Carcinoma In Situ (DCIS): Present, solid type, nuclear grade 2   with associated microcalcifications   Tumor Extent   Skin: Uninvolved by tumor cells   Treatment Effect in the Breast: Probable or definite response to   presurgical therapy in the invasive carcinoma   Treatment Effect in the Lymph Nodes: Probable or definite response   to presurgical therapy in metastatic carcinoma   MARGINS   Invasive Carcinoma Margins: Uninvolved by invasive carcinoma   Distance from Closest Margin (Millimeters): Greater than: 10 mm   Closest Margin: Posterior   DCIS Margins: Uninvolved by DCIS   Distance from Closest Margin (Millimeters): Greater than: 10 mm [employer] Patient Name: Dolph Siemens Specimen #:    Patient Name: Dolph Siemens Page 4 of 6 Printed: 01/31/20 9:32 AM SURGICAL PATHOLOGY REPORT   Closest Margin: Posterior   ER/OR/HER-2   Pending; results to be issued within addendum reports   LYMPH NODES   Regional Lymph Nodes: Involved by tumor cells   Number of Lymph Nodes with Macrometastases (> 2 mm): 2   Number of Lymph Nodes with Micrometastases (> 0.2 mm to 2 mm and   / or > 200 cells): 0 Extranodal Extension: Not identified   Number of Lymph Nodes Examined: 3   PATHOLOGIC STAGE CLASSIFICATION (pTNM, AJCC 8th Edition)   TNM Descriptors: y (post-treatment) m (multiple foci)   Primary Tumor (pT): pT1a   Regional Lymph Nodes (pN)   Category (pN): pN1a   3. Lymph nodes, left axillary dissection:   Two of three lymph nodes positive for metastatic carcinoma (2/3). 4. Excised tissue, right breast:   Fragments of skin and benign breast tissue. 5. Excised tissue, left breast:   Fragments of skin and benign breast tissue. Assessment:   1) Left breast Invasive ductal carcinoma    [id number]  GRADE 2  % DE 20% HER2 greg equivocal - FISH could not be done  Repeat biopsy of breast mass 6/24/19 - HER2 greg negative, mamma print indicates she has genomically high risk disease  S/p ddAC-T and bilateral mastectomy on 1/21/2020-[id number]  RT completed 5/2020  Intolerant to Letrozole that was started 2/2020- switched to Anastrazole Planned 5-years    Tolerating well  No clinical evidence of recurrence  DEXA 5/2020 and is normal  Counseled on VD and Ca    Counseled on symptoms to call and NCCN based guidelines for surveillance  . 2) Hypothyroidism    3) Obesity    4) Chest pain- resolved , Stress test normal.     5) Left arm Lymphedema    6) Left arm arthritis  Improving after a Cortisone shot    Plan:     · Continue  Anastrazole daily 5 years  · Vit D / Calcium doses reviewed  · DEXA 2022  · Follow with Dr. Mack Harada as scheduled  · Call if cough persists      RTC in 3 months     I appreciate the opportunity to participate in MsWendy Rachelle ROSELINE Garcia's care.       Signed By: Angeles Glaser MD

## 2021-03-25 ENCOUNTER — APPOINTMENT (OUTPATIENT)
Dept: PHYSICAL THERAPY | Age: 56
End: 2021-03-25
Payer: COMMERCIAL

## 2021-04-01 ENCOUNTER — APPOINTMENT (OUTPATIENT)
Dept: PHYSICAL THERAPY | Age: 56
End: 2021-04-01
Payer: COMMERCIAL

## 2021-04-01 ENCOUNTER — DOCUMENTATION ONLY (OUTPATIENT)
Dept: SLEEP MEDICINE | Age: 56
End: 2021-04-01

## 2021-04-01 ENCOUNTER — VIRTUAL VISIT (OUTPATIENT)
Dept: SLEEP MEDICINE | Age: 56
End: 2021-04-01
Payer: COMMERCIAL

## 2021-04-01 ENCOUNTER — HOSPITAL ENCOUNTER (OUTPATIENT)
Dept: PHYSICAL THERAPY | Age: 56
Discharge: HOME | End: 2021-04-01
Payer: COMMERCIAL

## 2021-04-01 ENCOUNTER — TELEPHONE (OUTPATIENT)
Dept: SLEEP MEDICINE | Age: 56
End: 2021-04-01

## 2021-04-01 DIAGNOSIS — G47.33 OBSTRUCTIVE SLEEP APNEA (ADULT) (PEDIATRIC): Primary | ICD-10-CM

## 2021-04-01 DIAGNOSIS — E66.9 OBESITY, CLASS I, BMI 30-34.9: ICD-10-CM

## 2021-04-01 PROCEDURE — 97110 THERAPEUTIC EXERCISES: CPT | Performed by: PHYSICAL THERAPIST

## 2021-04-01 PROCEDURE — 99204 OFFICE O/P NEW MOD 45 MIN: CPT | Performed by: INTERNAL MEDICINE

## 2021-04-01 PROCEDURE — 97140 MANUAL THERAPY 1/> REGIONS: CPT | Performed by: PHYSICAL THERAPIST

## 2021-04-01 NOTE — PATIENT INSTRUCTIONS
217 [facility] [place]. 1668 [facility], [address] Tel.  [phone] Fax. 100 [facility] 60 [place], [address] [facility] Tel.  [phone] Fax. [phone] 9[address] [place] Maria Luisa Jiménez Tel.  [phone] Fax. [phone] Sleep Apnea: After Your Visit Your Care Instructions Sleep apnea occurs when you frequently stop breathing for 10 seconds or longer during sleep. It can be mild to severe, based on the number of times per hour that you stop breathing or have slowed breathing. Blocked or narrowed airways in your nose, mouth, or throat can cause sleep apnea. Your airway can become blocked when your throat muscles and tongue relax during sleep. Sleep apnea is common, occurring in 1 out of 20 individuals. Individuals having any of the following characteristics should be evaluated and treated right away due to high risk and detrimental consequences from untreated sleep apnea: 
1. Obesity 2. Congestive Heart failure 3. Atrial Fibrillation 4. Uncontrolled Hypertension 5. Type II Diabetes 6. Night-time Arrhythmias 7. Stroke 8. Pulmonary Hypertension 9. High-risk Driving Populations (pilots, truck drivers, etc.) 10. Patients Considering Weight-loss Surgery How do you know you have sleep apnea? You probably have sleep apnea if you answer 'yes' to 3 or more of the following questions: S - Have you been told that you Snore? T - Are you often Tired during the day? O - Has anyone Observed you stop breathing while sleeping? P- Do you have (or are being treated for) high blood Pressure? B - Are you obese (Body Mass Index > 35)? A - Is your Age 48years old or older? N - Is your Neck size greater than 16 inches? G - Are you male Gender? A sleep physician can prescribe a breathing device that prevents tissues in the throat from blocking your airway.  Or your doctor may recommend using a dental device (oral breathing device) to help keep your airway open. In some cases, surgery may be needed to remove enlarged tissues in the throat. Follow-up care is a key part of your treatment and safety. Be sure to make and go to all appointments, and call your doctor if you are having problems. It's also a good idea to know your test results and keep a list of the medicines you take. How can you care for yourself at home? · Lose weight, if needed. It may reduce the number of times you stop breathing or have slowed breathing. · Go to bed at the same time every night. · Sleep on your side. It may stop mild apnea. If you tend to roll onto your back, sew a pocket in the back of your pajama top. Put a tennis ball into the pocket, and stitch the pocket shut. This will help keep you from sleeping on your back. · Avoid alcohol and medicines such as sleeping pills and sedatives before bed. · Do not smoke. Smoking can make sleep apnea worse. If you need help quitting, talk to your doctor about stop-smoking programs and medicines. These can increase your chances of quitting for good. · Prop up the head of your bed 4 to 6 inches by putting bricks under the legs of the bed. · Treat breathing problems, such as a stuffy nose, caused by a cold or allergies. · Use a continuous positive airway pressure (CPAP) breathing machine if lifestyle changes do not help your apnea and your doctor recommends it. The machine keeps your airway from closing when you sleep. · If CPAP does not help you, ask your doctor whether you should try other breathing machines. A bilevel positive airway pressure machine has two types of air pressureâone for breathing in and one for breathing out. Another device raises or lowers air pressure as needed while you breathe. · If your nose feels dry or bleeds when using one of these machines, talk with your doctor about increasing moisture in the air. A humidifier may help.  
· If your nose is runny or stuffy from using a breathing machine, talk with your doctor about using decongestants or a corticosteroid nasal spray. When should you call for help? Watch closely for changes in your health, and be sure to contact your doctor if: 
· You still have sleep apnea even though you have made lifestyle changes. · You are thinking of trying a device such as CPAP. · You are having problems using a CPAP or similar machine. Where can you learn more? Go to [employer]. Enter A822 in the search box to learn more about \"Sleep Apnea: After Your Visit. \"  
© 9832-1588 Healthwise, Incorporated. Care instructions adapted under license by [employer] (which disclaims liability or warranty for this information). This care instruction is for use with your licensed healthcare professional. If you have questions about a medical condition or this instruction, always ask your healthcare professional. Juju Rahman any warranty or liability for your use of this information. PROPER SLEEP HYGIENE What to avoid · Do not have drinks with caffeine, such as coffee or black tea, for 8 hours before bed. · Do not smoke or use other types of tobacco near bedtime. Nicotine is a stimulant and can keep you awake. · Avoid drinking alcohol late in the evening, because it can cause you to wake in the middle of the night. · Do not eat a big meal close to bedtime. If you are hungry, eat a light snack. · Do not drink a lot of water close to bedtime, because the need to urinate may wake you up during the night. · Do not read or watch TV in bed. Use the bed only for sleeping and sexual activity. What to try · Go to bed at the same time every night, and wake up at the same time every morning. Do not take naps during the day. · Keep your bedroom quiet, dark, and cool. · Get regular exercise, but not within 3 to 4 hours of your bedtime. Irais Monroe · Sleep on a comfortable pillow and mattress.  
· If watching the clock makes you anxious, turn it facing away from you so you cannot see the time. · If you worry when you lie down, start a worry book. Well before bedtime, write down your worries, and then set the book and your concerns aside. · Try meditation or other relaxation techniques before you go to bed. · If you cannot fall asleep, get up and go to another room until you feel sleepy. Do something relaxing. Repeat your bedtime routine before you go to bed again. · Make your house quiet and calm about an hour before bedtime. Turn down the lights, turn off the TV, log off the computer, and turn down the volume on music. This can help you relax after a busy day. Drowsy Driving The [place] [address] cites drowsiness as a causing factor in more than 161,660 police reported crashes annually, resulting in 76,000 injuries and 1,500 deaths. Other surveys suggest 55% of people polled have driven while drowsy in the past year, 23% had fallen asleep but not crashed, 3% crashed, and 2% had and accident due to drowsy driving. Who is at risk? Young Drivers: One study of drowsy driving accidents states that 55% of the drivers were under 25 years. Of those, 75% were male. Shift Workers and Travelers: People who work overnight or travel across time zones frequently are at higher risk of experiencing Circadian Rhythm Disorders. They are trying to work and function when their body is programed to sleep. Sleep Deprived: Lack of sleep has a serious impact on your ability to pay attention or focus on a task. Consistently getting less than the average of 8 hours your body needs creates partial or cumulative sleep deprivation. Untreated Sleep Disorders: Sleep Apnea, Narcolepsy, R.L.S., and other sleep disorders (untreated) prevent a person from getting enough restful sleep. This leads to excessive daytime sleepiness and increases the risk for drowsy driving accidents by up to 7 times.  
Medications / Alcohol: Even over the counter medications can cause drowsiness. Medications that impair a drivers attention should have a warning label. Alcohol naturally makes you sleepy and on its own can cause accidents. Combined with excessive drowsiness its effects are amplified. Signs of Drowsy Driving: * You don't remember driving the last few miles * You may drift out of your nayeli * You are unable to focus and your thoughts wander * You may yawn more often than normal 
 * You have difficulty keeping your eyes open / nodding off * Missing traffic signs, speeding, or tailgating Prevention-  
Good sleep hygiene, lifestyle and behavioral choices have the most impact on drowsy driving. There is no substitute for sleep and the average person requires 8 hours nightly. If you find yourself driving drowsy, stop and sleep. Consider the sleep hygiene tips provided during your visit as well. Medication Refill Policy: Refills for all medications require 1 week advance notice. Please have your pharmacy fax a refill request. We are unable to fax, or call in \"controled substance\" medications and you will need to pick these prescriptions up from our office. [employer] Activation Thank you for requesting access to [employer]. Please follow the instructions below to securely access and download your online medical record. [employer] allows you to send messages to your doctor, view your test results, renew your prescriptions, schedule appointments, and more. How Do I Sign Up? 1. In your internet browser, go to [email or web address]. 2. Click on the First Time User? Click Here link in the Sign In box. You will see the New Member Sign Up page. 3. Enter your [employer] Access Code exactly as it appears below. You will not need to use this code after youve completed the sign-up process. If you do not sign up before the expiration date, you must request a new code. [employer] Access Code: Activation code not generated Current [employer] Status: Active (This is the date your [employer] access code will ) 4. Enter the last four digits of your Social Security Number (xxxx) and Date of Birth (mm/dd/yyyy) as indicated and click Submit. You will be taken to the next sign-up page. 5. Create a Ph.Creativet ID. This will be your [employer] login ID and cannot be changed, so think of one that is secure and easy to remember. 6. Create a [employer] password. You can change your password at any time. 7. Enter your Password Reset Question and Answer. This can be used at a later time if you forget your password. 8. Enter your e-mail address. You will receive e-mail notification when new information is available in [address] 9. Click Sign Up. You can now view and download portions of your medical record. 10. Click the Download Summary menu link to download a portable copy of your medical information. Additional Information If you have questions, please call [phone]. Remember, [employer] is NOT to be used for urgent needs. For medical emergencies, dial 911. Full Thickness Lip Wedge Repair (Flap) Text: Given the location of the defect and the proximity to free margins a full thickness wedge repair was deemed most appropriate.  Using a sterile surgical marker, the appropriate repair was drawn incorporating the defect and placing the expected incisions perpendicular to the vermilion border.  The vermilion border was also meticulously outlined to ensure appropriate reapproximation during the repair.  The area thus outlined was incised through and through with a #15 scalpel blade.  The muscularis and dermis were reaproximated with deep sutures following hemostasis. Care was taken to realign the vermilion border before proceeding with the superficial closure.  Once the vermilion was realigned the superfical and mucosal closure was finished.

## 2021-04-01 NOTE — PROGRESS NOTES
PT DAILY TREATMENT NOTE 2-15    Patient Name: Danielle Griffin  Date:2021  : 1965  [x]  Patient  Verified  Payor: [address] / Plan: [address] / Product Type: Managed Care Medicaid /    In time:12:00  Out time:1:00  Total Treatment Time (min): 60  Visit #:   10    Treatment Area: Lymphedema [I89.0]    SUBJECTIVE  Pain Level (0-10 scale): 2 L shoulder and \"fullness in armpit and chest\"  Any medication changes, allergies to medications, adverse drug reactions, diagnosis change, or new procedure performed?: [x] No    [] Yes (see summary sheet for update)  Subjective functional status/changes:   [] No changes reported  Pt reports that she has been notified that her gradient compression pump has been approved. She states that she feels like her L shoulder pain has improved.      OBJECTIVE                 15 min Therapeutic Exercise:  [x] See flow sheet :   Rationale: increase ROM, increase strength and decrease edema to improve the patients ability to perform ADL's required for return to work and/or community and to effectively manage her lymphedema       45 min Manual Therapy:  MLD L upper quadrant with emphasis on AAA, FAIZA, L AI, parasternal and intercostal lymph nodes, L breast pumping, PROM L shoulder all planes to tolerance, MFR L axilla, KT AI and FAIZA   Rationale: decrease pain, increase ROM, increase tissue extensibility, decrease edema , decrease trigger points and increase postural awareness  to improve the patients ability to effectively use extremity during functional tasks (reaching, grooming, dressing, walking) and to effectively manage her lymphedema           With   [x] TE   [] TA   [] Neuro   [] SC   [] other: Patient Education: [x] Review HEP    [] Progressed/Changed HEP based on:subjective and  Objective assessments   [] positioning   [] body mechanics   [] transfers   [] heat/ice application    [] other: posture, elevation,      Other Objective/Functional Measures:       Pain Level (0-10 scale) post treatment: 1-2    ASSESSMENT/Changes in Function:     Patient will continue to benefit from skilled PT services to modify and progress therapeutic interventions, address functional mobility deficits, address ROM deficits, address strength deficits, analyze and address soft tissue restrictions, analyze and cue movement patterns, analyze and modify body mechanics/ergonomics, assess and modify postural abnormalities and effectively manage her lymphedema to attain remaining goals.      []  See Plan of Care  []  See progress note/recertification  []  See Discharge Summary         Progress towards goals / Updated goals:         PLAN  [x]  Upgrade activities as tolerated     [x]  Continue plan of care  []  Update interventions per flow sheet       []  Discharge due to:_  []  Other:_      Romina Menchaca, PT 4/1/2021

## 2021-04-01 NOTE — PROGRESS NOTES
[address] [facility] [address] [place] [place], [address]  Tel.  [phone]  Fax. 100 [facility] 60  [place], 200 [address] [facility]  Tel.  [phone]  Fax. [phone] 9250 [facility] Maria Luisa Garzon   Tel.  [phone]  Fax. [phone]         Subjective:        Telemedicine visit performed with verbal consent of the patient. Patient called and identity confirmed with 2 patient identifers    Patient was seen at home  Steven Cummings is a 60 y.o. female who was seen by synchronous (real-time) audio-video technology on 4/1/2021. Consent:  She and/or her healthcare decision maker is aware that this patient-initiated Telehealth encounter is the equivalent to a face to face encounter in the sleep disorder center and has provided verbal consent to proceed: Yes    I was in the office while conducting this encounter. Steven Cummings is an 60 y.o. female referred for evaluation for a sleep disorder. She complains of snoring, snorting, choking, periods of not breathing associated with excessive daytime sleepiness. Symptoms began several years ago, gradually worsening since that time. She usually can fall asleep in 5 minutes. Family or house members note snoring, snorting, periods of not breathing. She denies falling asleep while driving. Steven Cummings does not wake up frequently at night. She is not bothered by waking up too early and left unable to get back to sleep. She actually sleeps about 10 hours at night and wakes up about 3 times during the night. She does not work shifts: Cristhian Walker indicates she does not get too little sleep at night. Her bedtime is 1200. She awakens at 1030. She does take naps. She takes 1 naps a week lasting 2. She has the following observed behaviors: Loud snoring, Light snoring, Pauses in breathing, Grinding teeth; (Vivid dreams ). Other remarks:    She has been undergoing cancer treatment for past 2 years.  She has one last reconstructive surgery left  Mother and sister both have sleep apnea  [place] Sleepiness Score: 7     Allergies   Allergen Reactions    Augmentin [Amoxicillin-Pot Clavulanate] Nausea and Vomiting         Current Outpatient Medications:     sertraline (ZOLOFT) 100 mg tablet, TAKE 1/2 TABLET BY MOUTH EVERY EVENING, Disp: 15 Tab, Rfl: 2    Synthroid 100 mcg tablet, Take 1 Tab by mouth Daily (before breakfast). , Disp: 30 Tab, Rfl: 5    traZODone (DESYREL) 50 mg tablet, TAKE TWO TABLETS BY MOUTH EVERY NIGHT AT BEDTIME, Disp: 60 Tab, Rfl: 3    calcium-cholecalciferol, d3, (CALCIUM 600 + D) 600-125 mg-unit tab, Take  by mouth., Disp: , Rfl:     anastrozole (Arimidex) 1 mg tablet, Take 1 mg by mouth daily. , Disp: 30 Tab, Rfl: 3    naloxone (NARCAN) 4 mg/actuation nasal spray, Use 1 spray intranasally, then discard. Repeat with new spray every 2 min as needed for opioid overdose symptoms, alternating nostrils. , Disp: 2 Each, Rfl: 0    cholecalciferol (VITAMIN D3) 1,000 unit cap, Take 5,000 Units by mouth daily. , Disp: , Rfl:     liothyronine (CYTOMEL) 5 mcg tablet, Take 5 mcg by mouth daily. , Disp: , Rfl:      She  has a past medical history of Acquired hypothyroidism (3/4/2021), Anxiety, Arthritis, At risk for sleep apnea, Basal cell carcinoma of skin, Breast cancer ([facility] [address] (), H/O total mastectomy (), Heart attack ([facility] [address] (), History of seasonal allergies, antineoplastic chemo, Ill-defined condition, Motion sickness, Nausea & vomiting, Squamous cell skin cancer, Thyroid disease, and Vertigo. She  has a past surgical history that includes hx thyroidectomy (); hx mohs procedure; hx colonoscopy; hx lumbar fusion (); hx shoulder arthroscopy (Right); hx knee arthroscopy (Left); hx heent (); hx vascular access (); hx  section (); and hx breast reconstruction (Bilateral, 2020).     She family history includes Arthritis-osteo in her mother; Cancer in her father; Migraines in her sister; No Known Problems in her sister and sister. She  reports that she has never smoked. She has never used smokeless tobacco. She reports current alcohol use of about 6.0 standard drinks of alcohol per week. She reports that she does not use drugs.      Review of Systems:  Constitutional:  +weight gain  Eyes:  No blurred vision  CVS:  No significant chest pain  Pulm:  No significant shortness of breath  GI:  No significant nausea or vomiting  :  No significant nocturia  Musculoskeletal:  No significant joint pain at night  Skin:  No significant rashes  Neuro:  No significant dizziness   Psych:  Depression controlled    Sleep Review of Systems: notable for no difficulty falling asleep; infrequent awakenings at night;  + dreaming noted; no nightmares ; + early morning headaches      Objective:     Weight 200 lb  Vital Signs: (As obtained by patient/caregiver at home)        Constitutional: [x] Appears well-developed and well-nourished [x] No apparent distress      [] Abnormal -     Mental status: [x] Alert and awake  [x] Oriented to person/place/time [x] Able to follow commands    [] Abnormal -     Eyes:   EOM    [x]  Normal    [] Abnormal -   Sclera  [x]  Normal    [] Abnormal -          Discharge [x]  None visible   [] Abnormal -     HENT: [x] Normocephalic, atraumatic  [] Abnormal -   [x] Mouth/Throat: Mucous membranes are moist                   External Ears [x] Normal  [] Abnormal -    Neck: [x] No visualized mass [] Abnormal -     Pulmonary/Chest: [x] Respiratory effort normal   [x] No visualized signs of difficulty breathing or respiratory distress        [] Abnormal -       Neurological:        [x] No Facial Asymmetry (Cranial nerve 7 motor function) (limited exam due to video visit)          [x] No gaze palsy        [] Abnormal -          Skin:        [x] No significant exanthematous lesions or discoloration noted on facial skin         [] Abnormal -            Psychiatric:       [x] Normal Affect [] Abnormal -       Other pertinent observable physical exam findings:-          Assessment:       ICD-10-CM ICD-9-CM    1. Obstructive sleep apnea (adult) (pediatric)  G47.33 327.23 SLEEP STUDY UNATTENDED, 4 CHANNEL   2. Obesity, Class I, BMI 30-34.9  E66.9 278.00          Plan:       * Sleep testing was ordered for initial evaluation. * She was provided information on sleep apnea including coresponding risk factors and the importance of proper treatment. * Treatment options for sleep apnea were reviewed today. Patient agrees to a trial of APAP therapy if indicated. * Counseling was provided regarding proper sleep hygiene, appropriate sleep schedule, need for sleep environment safety and safe driving. * Effect of sleep disturbance on weight was reviewed. We have recommended a dedicated weight loss through appropriate diet and an exercise regimen as significant weight reduction has been shown to reduce severity of obstructive sleep apnea. * Patient agrees to telephone follow-up by sleep technologist shortly after sleep study to review results and plan final management. 2. Obesity - I have counseled the patient regarding the benefits of weight loss. The treatment plan was reviewed with the patient in detail . she understands that the lead technologist will be calling her  with the results and assisting with the next step in the treatment plan as outlined today during the consultation with me. All of her questions were addressed. Thank you for allowing us to participate in your patient's medical care. We'll keep you updated on these investigations.     Pursuant to the emergency declaration under the [facility], [facility] waiver authority and the [employer] Act, this Virtual  Visit was conducted, with patient's consent, to reduce the patient's risk of exposure to COVID-19 and provide continuity of care for an established patient. Services were provided through a video synchronous discussion virtually to substitute for in-person clinic visit.     Raymond Salomon MD    Electronically signed by    Cezar Prajapati MD  Diplomate in Sleep Medicine  [facility]

## 2021-04-06 ENCOUNTER — HOSPITAL ENCOUNTER (OUTPATIENT)
Dept: PHYSICAL THERAPY | Age: 56
Discharge: HOME | End: 2021-04-06
Payer: COMMERCIAL

## 2021-04-06 PROCEDURE — 97140 MANUAL THERAPY 1/> REGIONS: CPT | Performed by: PHYSICAL THERAPIST

## 2021-04-06 PROCEDURE — 97110 THERAPEUTIC EXERCISES: CPT | Performed by: PHYSICAL THERAPIST

## 2021-04-06 PROCEDURE — 97530 THERAPEUTIC ACTIVITIES: CPT | Performed by: PHYSICAL THERAPIST

## 2021-04-06 NOTE — PROGRESS NOTES
PT DAILY TREATMENT NOTE 2-15    Patient Name: Danielle Griffin  Date:2021  : 1965  [x]  Patient  Verified  [facility] [address] / Plan: [address] / Product Type: Managed Care Medicaid /    In time:2:00  Out time:3:00  Total Treatment Time (min): 60  Visit #:   11    Treatment Area: Lymphedema [I89.0]    SUBJECTIVE  Pain Level (0-10 scale): 1-2 L shoulder and \"fullness in armpit and chest\"  Any medication changes, allergies to medications, adverse drug reactions, diagnosis change, or new procedure performed?: [x] No    [] Yes (see summary sheet for update)  Subjective functional status/changes:   [] No changes reported  Pt arrives to PT clinic with compression garment pump jacket for adjustments and instruction in don/doff/care of    OBJECTIVE        15 min Therapeutic Activity:  []  See flow sheet :   Rationale: Pt instruction in don/doff, care of, pump schedule, operation of pump settings for home use  to improve the patients ability to effectively manage her lymphedema          15 min Therapeutic Exercise:  [x] See flow sheet :   Rationale: increase ROM, increase strength and decrease edema to improve the patients ability to perform ADL's required for return to work and/or community and to effectively manage her lymphedema       30 min Manual Therapy:  MLD L upper quadrant with emphasis on AAA, FAIZA, L AI, parasternal and intercostal lymph nodes, L breast pumping, PROM L shoulder all planes to tolerance, MFR L axilla,   Rationale: decrease pain, increase ROM, increase tissue extensibility, decrease edema , decrease trigger points and increase postural awareness  to improve the patients ability to effectively use extremity during functional tasks (reaching, grooming, dressing, walking) and to effectively manage her lymphedema           With   [] TE   [x] TA   [] Neuro   [] SC   [] other: Patient Education: [x] Review HEP    [] Progressed/Changed HEP based on:subjective and  Objective assessments   [] positioning   [] body mechanics   [] transfers   [] heat/ice application    [x] other: don/doff compression pump, care of , pump schedule     Other Objective/Functional Measures:       Pain Level (0-10 scale) post treatment: 1-2    ASSESSMENT/Changes in Function:     Patient will continue to benefit from skilled PT services to modify and progress therapeutic interventions, address functional mobility deficits, address ROM deficits, address strength deficits, analyze and address soft tissue restrictions, analyze and cue movement patterns, analyze and modify body mechanics/ergonomics, assess and modify postural abnormalities and effectively manage her lymphedema to attain remaining goals.      []  See Plan of Care  []  See progress note/recertification  []  See Discharge Summary         Progress towards goals / Updated goals:         PLAN  [x]  Upgrade activities as tolerated     [x]  Continue plan of care  []  Update interventions per flow sheet       []  Discharge due to:_  []  Other:_      Prudence Ketty, PT 4/6/2021

## 2021-04-08 ENCOUNTER — HOSPITAL ENCOUNTER (OUTPATIENT)
Dept: PHYSICAL THERAPY | Age: 56
Discharge: HOME | End: 2021-04-08
Payer: COMMERCIAL

## 2021-04-08 PROCEDURE — 97140 MANUAL THERAPY 1/> REGIONS: CPT | Performed by: PHYSICAL THERAPIST

## 2021-04-08 PROCEDURE — 97110 THERAPEUTIC EXERCISES: CPT | Performed by: PHYSICAL THERAPIST

## 2021-04-08 NOTE — PROGRESS NOTES
PT DAILY TREATMENT NOTE 2-15    Patient Name: Renetta Lynch  Date:2021  : 1965  [x]  Patient  Verified  Payor: [address] Dumont [address] / Plan: [address] / Product Type: Managed Care Medicaid /    In time:11:00  Out time:12:00  Total Treatment Time (min): 60  Visit #:   12    Treatment Area: Lymphedema [I89.0]    SUBJECTIVE  Pain Level (0-10 scale): 1-2 L shoulder    Any medication changes, allergies to medications, adverse drug reactions, diagnosis change, or new procedure performed?: [x] No    [] Yes (see summary sheet for update)  Subjective functional status/changes:   [] No changes reported  Pt states that using her compression pump has been [place] lot easier\" since garments adjusted in clinic at last visit.  She also reports \"no change in fullness in my L armpit\"    OBJECTIVE            30 min Therapeutic Exercise:  [x] See flow sheet :   Rationale: increase ROM, increase strength and decrease edema to improve the patients ability to perform ADL's required for return to work and/or community and to effectively manage her lymphedema       30 min Manual Therapy:  MLD L upper quadrant with emphasis on AAA, FAIZA, L AI, parasternal and intercostal lymph nodes, L breast pumping, PROM L shoulder all planes to tolerance, MFR L axilla,   Rationale: decrease pain, increase ROM, increase tissue extensibility, decrease edema , decrease trigger points and increase postural awareness  to improve the patients ability to effectively use extremity during functional tasks (reaching, grooming, dressing, walking) and to effectively manage her lymphedema           With   [x] TE   [] TA   [] Neuro   [] SC   [] other: Patient Education: [x] Review HEP    [] Progressed/Changed HEP based on:subjective and  Objective assessments   [] positioning   [] body mechanics   [] transfers   [] heat/ice application    [x] other: don/doff compression pump, care of , pump schedule     Other Objective/Functional Measures:       Pain Level (0-10 scale) post treatment: 1    ASSESSMENT/Changes in Function:        []  See Plan of Care  []  See progress note/recertification  [x]  See Discharge Summary         Progress towards goals / Updated goals:   See d/c summary      PLAN  []  Upgrade activities as tolerated     []  Continue plan of care  []  Update interventions per flow sheet       [x]  Discharge due to: pt progressing towards or has completed all rehab goals  []  Other:_      Maya Hickman, PT 4/8/2021

## 2021-04-08 NOTE — PROGRESS NOTES
Physical Therapy at [facility],   a part of [address]  [place] [address] [address] [facility] [place], [address]  Phone: [phone]  Fax: [phone]    Medicaid Discharge Summary  2-15    Patient name: Marylu Jefferson  : 1965  Provider#: [id number]  Referral source: Zuleima Moreno MD      Medical/Treatment Diagnosis: Lymphedema [I89.0]     Prior Hospitalization: see medical history     Comorbidities: See Plan of Care  Prior Level of Function:See Plan of Care  Medications: Verified on Patient Summary List    Start of Care: 2021                      Visits from Start of Care: 12  Missed Visits: 0  Onset Date:2020   Reporting Period : 2021 to 2021      ASSESSMENT/SUMMARY OF CARE: Pt has been receiving PT treatments for L upper quadrant chest and arm lymphedema. She is progressing towards or has completed all rehab goals below. D/C to HEP    Goal Status:  1) Pt will be Independent with skin care routine to decrease risk of infection. 2) Pt will verbalize with 100% accuracy which signs and symptoms to report immediately to surgeon concerning their condition. 3) Pt will be Independent with compression management routine consisting of skin care, decongestive exercises, self-manual lymphatic stimulation techniques, strengthening and motion exercises, and don/doff/care of appropriate compression garment(s). 4) Patient will demonstrate decongestion of limb by 3-4 cm and chest by 4-5cm in order to effectively manage her lymphedema and decrease risk of infection  5) Pt will increase ROM of affected limb by 20-30 degrees in order to use L UE in all ADL's with 0-1/10  pain at or above shoulder level on NPS scale.   6) Pt will increase strength of affected limb by 1-2 muscle grades  in order to perform job requirements of lifting, pushing, pulling and perform  ADL's with 0-1/10 pain at or above shoulder level on NPS       RECOMMENDATIONS:  [x]Discontinue therapy: [x]Patient has reached or is progressing toward set goals      []Patient is non-compliant or has abdicated      []Due to lack of appreciable progress towards set goals      []Other    [address], PT 4/8/2021     ______________________________________________________________________    NOTE TO PHYSICIAN:  Please complete the following and fax to:  Physical Therapy at [facility],   a part of [address]: [phone]  Retain this original for your records. If you are unable to process this request in 24 hours, please contact our office.        Physician's Signature:____________________  Date:____________Time:_________           Laura Valenzuela MD

## 2021-04-09 ENCOUNTER — OFFICE VISIT (OUTPATIENT)
Dept: SLEEP MEDICINE | Age: 56
End: 2021-04-09

## 2021-04-09 DIAGNOSIS — G47.33 OSA (OBSTRUCTIVE SLEEP APNEA): Primary | ICD-10-CM

## 2021-04-09 NOTE — PROGRESS NOTES
S>Rachelle Lenz is a 60 y.o. female seen today to receive a home sleep testing unit (HST). · Patient was educated on proper hookup and operation of the HST via detailed instruction sheet (per COVID-19 precautions)  · Instruction forms with after hours contact and documentation were signed. O>    There were no vitals taken for this visit. A>  No diagnosis found. P>  · General information regarding operations and maintenance of the device was provided. · Follow-up appointment was made to return the HST. She will be contacted once the results have been reviewed. · She was asked to contact our office for any problems regarding her home sleep test study.

## 2021-04-10 ENCOUNTER — HOSPITAL ENCOUNTER (OUTPATIENT)
Dept: SLEEP MEDICINE | Age: 56
Discharge: HOME | End: 2021-04-10
Payer: COMMERCIAL

## 2021-04-10 PROCEDURE — 95806 SLEEP STUDY UNATT&RESP EFFT: CPT | Performed by: INTERNAL MEDICINE

## 2021-04-12 ENCOUNTER — OFFICE VISIT (OUTPATIENT)
Dept: SLEEP MEDICINE | Age: 56
End: 2021-04-12

## 2021-04-12 DIAGNOSIS — G47.33 OSA (OBSTRUCTIVE SLEEP APNEA): Primary | ICD-10-CM

## 2021-04-12 NOTE — PROGRESS NOTES
Patient returned HSAT device in box with device and belt intact. Questionnaire completed. Patient advised results would be available within 7-10 business days.

## 2021-04-13 ENCOUNTER — APPOINTMENT (OUTPATIENT)
Dept: PHYSICAL THERAPY | Age: 56
End: 2021-04-13
Payer: COMMERCIAL

## 2021-04-15 ENCOUNTER — APPOINTMENT (OUTPATIENT)
Dept: PHYSICAL THERAPY | Age: 56
End: 2021-04-15
Payer: COMMERCIAL

## 2021-04-20 ENCOUNTER — TELEPHONE (OUTPATIENT)
Dept: SLEEP MEDICINE | Age: 56
End: 2021-04-20

## 2021-04-20 ENCOUNTER — APPOINTMENT (OUTPATIENT)
Dept: PHYSICAL THERAPY | Age: 56
End: 2021-04-20
Payer: COMMERCIAL

## 2021-04-20 DIAGNOSIS — G47.33 OBSTRUCTIVE SLEEP APNEA (ADULT) (PEDIATRIC): Primary | ICD-10-CM

## 2021-04-20 NOTE — TELEPHONE ENCOUNTER
Results of sleep study in [employer]  Lead tech to convey results to patient  HSAT results in [employer]. Test positive for significant sleep apnea. AHI 23/hour and lowest oxygen saturation was 82%. Its more severe when on her back. We had discussed treatment options at initial consultation. Based on the results of the home sleep apnea test, I believe a trial of APAP would be an effective mode of therapy. APAP order attached. she should be seen in the sleep disorder center 4-6 weeks after initiating PAP therapy. The APAP will have modem access so she can call the sleep center if she  has questions/concerns regarding the initial PAP settings. She should avoid sleeping on her back until she gets her APAP. Front staff to Order PAP and call patient and let them know which DME company they should be hearing from after results reviewed with lead support technologist.   Schedule for first adherence visit in 6 weeks.

## 2021-04-21 ENCOUNTER — DOCUMENTATION ONLY (OUTPATIENT)
Dept: SLEEP MEDICINE | Age: 56
End: 2021-04-21

## 2021-04-21 ENCOUNTER — TELEPHONE (OUTPATIENT)
Dept: SLEEP MEDICINE | Age: 56
End: 2021-04-21

## 2021-04-21 NOTE — TELEPHONE ENCOUNTER
Patient lvm 04/21/21 at 12:47 pm, returning our call about results. She can be reached at [phone].

## 2021-04-22 ENCOUNTER — APPOINTMENT (OUTPATIENT)
Dept: PHYSICAL THERAPY | Age: 56
End: 2021-04-22
Payer: COMMERCIAL

## 2021-04-27 ENCOUNTER — APPOINTMENT (OUTPATIENT)
Dept: PHYSICAL THERAPY | Age: 56
End: 2021-04-27
Payer: COMMERCIAL

## 2021-04-29 ENCOUNTER — APPOINTMENT (OUTPATIENT)
Dept: PHYSICAL THERAPY | Age: 56
End: 2021-04-29
Payer: COMMERCIAL

## 2021-06-01 DIAGNOSIS — G47.00 INSOMNIA, UNSPECIFIED TYPE: ICD-10-CM

## 2021-06-02 RX ORDER — TRAZODONE HYDROCHLORIDE 50 MG/1
TABLET ORAL
Qty: 60 TABLET | Refills: 2 | Status: SHIPPED | OUTPATIENT
Start: 2021-06-02 | End: 2021-06-24

## 2021-06-03 DIAGNOSIS — F32.A DEPRESSION, UNSPECIFIED DEPRESSION TYPE: ICD-10-CM

## 2021-06-03 DIAGNOSIS — F32.A ANXIETY AND DEPRESSION: ICD-10-CM

## 2021-06-03 DIAGNOSIS — F41.9 ANXIETY AND DEPRESSION: ICD-10-CM

## 2021-06-03 RX ORDER — SERTRALINE HYDROCHLORIDE 100 MG/1
50 TABLET, FILM COATED ORAL DAILY
Qty: 15 TABLET | Refills: 5 | Status: SHIPPED | OUTPATIENT
Start: 2021-06-03 | End: 2021-10-20 | Stop reason: SDUPTHER

## 2021-06-08 DIAGNOSIS — I89.0 LYMPHEDEMA: ICD-10-CM

## 2021-06-08 DIAGNOSIS — Z90.13 S/P MASTECTOMY, BILATERAL: ICD-10-CM

## 2021-06-08 DIAGNOSIS — Z17.0 MALIGNANT NEOPLASM OF UPPER-OUTER QUADRANT OF LEFT BREAST IN FEMALE, ESTROGEN RECEPTOR POSITIVE (HCC): Primary | ICD-10-CM

## 2021-06-08 DIAGNOSIS — C50.412 MALIGNANT NEOPLASM OF UPPER-OUTER QUADRANT OF LEFT BREAST IN FEMALE, ESTROGEN RECEPTOR POSITIVE (HCC): Primary | ICD-10-CM

## 2021-06-14 NOTE — TELEPHONE ENCOUNTER
Left voicemail for patient on her cell phone stating that she is ok to proceed with surgery, she is low risk for cardiovascular complications during a non-cardiac surgery given her recent normal echo and stress test
Patient is currently at the surgery center waiting to have surgery but was told that they needed to speak with Dr. Monico Conte prior to being able to proceed with the procedure as they need a verbal clearance from a cardiac standpoint. Please advise.     Phone: [phone]
Detail Level: Generalized

## 2021-06-23 ENCOUNTER — OFFICE VISIT (OUTPATIENT)
Dept: ONCOLOGY | Age: 56
End: 2021-06-23
Payer: COMMERCIAL

## 2021-06-23 VITALS
OXYGEN SATURATION: 96 % | RESPIRATION RATE: 20 BRPM | BODY MASS INDEX: 31.63 KG/M2 | TEMPERATURE: 98.1 F | HEART RATE: 96 BPM | SYSTOLIC BLOOD PRESSURE: 116 MMHG | DIASTOLIC BLOOD PRESSURE: 83 MMHG | WEIGHT: 208 LBS

## 2021-06-23 DIAGNOSIS — C50.412 MALIGNANT NEOPLASM OF UPPER-OUTER QUADRANT OF LEFT BREAST IN FEMALE, ESTROGEN RECEPTOR POSITIVE (HCC): ICD-10-CM

## 2021-06-23 DIAGNOSIS — G47.00 INSOMNIA, UNSPECIFIED TYPE: ICD-10-CM

## 2021-06-23 DIAGNOSIS — Z17.0 MALIGNANT NEOPLASM OF UPPER-OUTER QUADRANT OF LEFT BREAST IN FEMALE, ESTROGEN RECEPTOR POSITIVE (HCC): ICD-10-CM

## 2021-06-23 PROCEDURE — 99215 OFFICE O/P EST HI 40 MIN: CPT | Performed by: INTERNAL MEDICINE

## 2021-06-23 NOTE — PROGRESS NOTES
[facility] [place] at [facility]  [address], [place]: [phone]  F: [phone]    Reason for Visit:   Mikey Mccarty is a 60 y.o. female who is seen by synchronous (real-time) audio-video technology on 6/23/2021 for follow up of  Left breast cancer- ER+NM+HER2 greg neg    Treatment History:   · 6/19: Mammogram:  Left breast mass with skin thickening, 2 suspicious nodes. Mass measures 1.4 x 0.9 x 1.3  · 6/19: MRI breast: Multicentric left breast carcinoma. Non-masslike enhancement extends from the nipple to the posterior third, involves all quadrants, and measures 106 x 44 x 79 mm. · 5/28/19: Biopsy breast- IDC % % HER 2 negative, skin biopsy benign , node biopsy mostly adipose tissue with atypical cells . BRCA1 and 2 negative. CT and bone scan negative for metastasis. Mammaprint with insufficient tissue for testing. Repeat biopsy of axillary node 6/20/19 positive for metastatic disease- repeat mammaprint - high risk  · 7/19/19: cycle 1 neoadjuvant of dd AC-T  · 9/12/19: US of breast shows increased malignant microcalcification in the left breast, etiology included tx related necrosis vs extension of disease. Decreased size in left axillary LN. · 10/22/19: calcifications in the left upper outer quadrant are stable, calcifications in left axillary LN are not changed   · 1/21/20: b/l mastectomy   · 2/2020: Letrozole then switched to Anastrazole due to side effects  · 5/2020:Completed RT    History of Present Illness:   Patient is a 60 y.o. seen for L breast cancer. She felt a sensation in the shower about May 2019. She also noted a lump and  an inverted nipple. She had a physician friend look at this who directed her to mammogram and recommended she see Dr. Olman Sims. This led to imaging and diagnosis as above. She completed neoadjuvant ddACT followed by bilateral mastectomy on 1/21/20. On letrozole then switched to Anastrazole.      She feels well.   She has \" no energy\". Even going up the steps exhausts her. She wonders if its her thyroid. She continues to have a dry cough, comes unpredictably. She does have PND. She takes Ipratropium nasal drops. She feels overwhelmed. She has improved shoulder pain. She has a cough still. Frances Roy is working again. Rare ETOH  She does not smoke. Father had colon cancer    Past Medical History:   Diagnosis Date    Acquired hypothyroidism 3/4/2021    Anxiety     Arthritis     NECK    At risk for sleep apnea     GAGAN 5     Basal cell carcinoma of skin     FACE, CHEST, BACK    Breast cancer ([address] 2019    LEFT    H/O total mastectomy     Heart attack ([address] 2019    History of seasonal allergies     Hx antineoplastic chemo     COMPLETED 12-3-19    Ill-defined condition     hx motion sickness    Motion sickness     Nausea & vomiting     PONV after thyroidectomy; Hx motion sickness    Squamous cell skin cancer     Dr. Ryan Ritter Thyroid disease     H/ GOITER    Vertigo       Past Surgical History:   Procedure Laterality Date    HX BREAST RECONSTRUCTION Bilateral 2020    IMMEDIATE BILATERAL BREAST RECONSTRUCTION WITH TISSUE EXPANDERS AND ALLODERM GRAFTS performed by Dottie Nice MD at [address] HX  SECTION      HX COLONOSCOPY      HX HEENT  2009    THYROIDECTOMY    HX KNEE ARTHROSCOPY Left     HX LUMBAR FUSION      HX MOHS PROCEDURES      x 4 times     HX SHOULDER ARTHROSCOPY Right     HX THYROIDECTOMY      HX VASCULAR ACCESS  2019    PORTACATH RIGHT CHEST      Social History     Tobacco Use    Smoking status: Never Smoker    Smokeless tobacco: Never Used   Substance Use Topics    Alcohol use:  Yes     Alcohol/week: 6.0 standard drinks     Types: 6 Glasses of wine per week      Family History   Problem Relation Age of Onset    Arthritis-osteo Mother     Cancer Father         Colon Cancer    No Known Problems Sister     No Known Problems Sister    [address] Migraines Sister     Anesth Problems Neg Hx      Current Outpatient Medications   Medication Sig    sertraline (ZOLOFT) 100 mg tablet Take 0.5 Tablets by mouth daily.  traZODone (DESYREL) 50 mg tablet TAKE TWO TABLETS BY MOUTH EVERY NIGHT AT BEDTIME    Synthroid 100 mcg tablet Take 1 Tab by mouth Daily (before breakfast).  calcium-cholecalciferol, d3, (CALCIUM 600 + D) 600-125 mg-unit tab Take  by mouth.  anastrozole (Arimidex) 1 mg tablet Take 1 mg by mouth daily.  naloxone (NARCAN) 4 mg/actuation nasal spray Use 1 spray intranasally, then discard. Repeat with new spray every 2 min as needed for opioid overdose symptoms, alternating nostrils.  cholecalciferol (VITAMIN D3) 1,000 unit cap Take 5,000 Units by mouth daily.  liothyronine (CYTOMEL) 5 mcg tablet Take 5 mcg by mouth daily. No current facility-administered medications for this visit. Allergies   Allergen Reactions    Augmentin [Amoxicillin-Pot Clavulanate] Nausea and Vomiting        Review of Systems: A complete review of systems was obtained, negative except as described above. Physical Exam:       General appearance - alert, well appearing, and in no distress  Lymphatics - no palpable lymphadenopathy, Left arm lymphedema  Chest wall: No masses, ulcers, skin changes  Abdomen - soft, nontender, nondistended, no masses or organomegaly, bowel sounds present  Extremities - peripheral pulses normal, no pedal edema, no clubbing or cyanosis  Skin - normal coloration and turgor, no new rashes, no suspicious skin lesions noted    ECOG PS: 0        Results:     Lab Results   Component Value Date/Time    WBC 4.5 01/20/2021 01:57 PM    HGB 14.2 01/20/2021 01:57 PM    HCT 41.3 01/20/2021 01:57 PM    PLATELET 504 46/62/2124 01:57 PM    .5 (H) 01/20/2021 01:57 PM    ABS.  NEUTROPHILS 2.7 01/20/2021 01:57 PM     Lab Results   Component Value Date/Time    Sodium 137 01/20/2021 01:57 PM    Potassium 4.3 01/20/2021 01:57 PM    Chloride 104 01/20/2021 01:57 PM    CO2 30 01/20/2021 01:57 PM    Glucose 103 (H) 01/20/2021 01:57 PM    BUN 12 01/20/2021 01:57 PM    Creatinine 0.62 01/20/2021 01:57 PM    GFR est AA >60 01/20/2021 01:57 PM    GFR est non-AA >60 01/20/2021 01:57 PM    Calcium 9.6 01/20/2021 01:57 PM     Lab Results   Component Value Date/Time    Bilirubin, total 0.4 01/20/2021 01:57 PM    ALT (SGPT) 25 01/20/2021 01:57 PM    Alk. phosphatase 114 01/20/2021 01:57 PM    Protein, total 6.9 01/20/2021 01:57 PM    Albumin 4.0 01/20/2021 01:57 PM    Globulin 2.9 01/20/2021 01:57 PM       Records reviewed and summarized above. Pathology report(s) reviewed above. 5/28/19  FINAL PATHOLOGIC DIAGNOSIS  1. Breast, left, core biopsy: In situ and invasive ductal carcinoma   Invasive carcinoma is nuclear grade 2 and measures up to 0.6 cm in greatest dimension on slide   Ductal carcinoma in situ is nuclear grade 3 with central necrosis   2. Soft tissue, left axilla, core biopsy: Adipose tissue with rare detached atypical cells   No lymph node tissue identified   See comment     Radiology report(s) reviewed above. US breast 9/12/19:  IMPRESSION:  1. Increased malignant microcalcifications in the left breast. This could  represent treatment-related necrosis or extension of disease. 2. Extensive carcinoma seen on prior MRI is largely mammographically occult. 3. Decreased size of a metastatic left axillary lymph node with  microcalcifications. 4. BI-RADS Assessment Category 6: Known biopsy proven malignancy. Mammogram 10/22/19:  IMPRESSION:  1. BI-RADS 6: known left breast cancer. 2. Segmental pleomorphic calcifications in the left upper outer quadrant are  stable. Calcifications left axillary lymph node have not changed significantly. Patient was informed of the results. Bilateral mastectomy 1/3/20   FINAL PATHOLOGIC DIAGNOSIS   1.  Right breast, prophylactic mastectomy:   Benign breast.   2. Left breast, modified radical mastectomy:   Invasive ductal carcinoma (see synoptic report). Ductal carcinoma in situ. Atypical lobular hyperplasia with associated macrocalcifications. Usual ductal hyperplasia with associated microcalcifications. Intraductal papilloma with usual ductal hyperplasia. INVASIVE CARCINOMA OF THE BREAST: Resection   SPECIMEN   Procedure: Total mastectomy   Specimen Laterality: Left   TUMOR   Histologic Type:  Invasive carcinoma of no special type (invasive   ductal carcinoma, not otherwise specified)   Glandular (Acinar) / Tubular Differentiation: Score 3   Nuclear Pleomorphism: Score 2   Mitotic Rate: Score 2   Overall Grade: Grade 2 (scores of 6 or 7)   Tumor Size: 2.5 mm (see Comment)   Ductal Carcinoma In Situ (DCIS): Present, solid type, nuclear grade 2   with associated microcalcifications   Tumor Extent   Skin: Uninvolved by tumor cells   Treatment Effect in the Breast: Probable or definite response to   presurgical therapy in the invasive carcinoma   Treatment Effect in the Lymph Nodes: Probable or definite response   to presurgical therapy in metastatic carcinoma   MARGINS   Invasive Carcinoma Margins: Uninvolved by invasive carcinoma   Distance from Closest Margin (Millimeters): Greater than: 10 mm   Closest Margin: Posterior   DCIS Margins: Uninvolved by DCIS   Distance from Closest Margin (Millimeters): Greater than: 10 mm [employer] Patient Name: Ashwin Valdes Specimen #:    Patient Name: Ashwin Valdes Page 4 of 6 Printed: 01/31/20 9:32 AM SURGICAL PATHOLOGY REPORT   Closest Margin: Posterior   ER/ID/HER-2   Pending; results to be issued within addendum reports   LYMPH NODES   Regional Lymph Nodes: Involved by tumor cells   Number of Lymph Nodes with Macrometastases (> 2 mm): 2   Number of Lymph Nodes with Micrometastases (> 0.2 mm to 2 mm and   / or > 200 cells): 0   Extranodal Extension: Not identified   Number of Lymph Nodes Examined: 3 PATHOLOGIC STAGE CLASSIFICATION (pTNM, AJCC 8th Edition)   TNM Descriptors: y (post-treatment) m (multiple foci)   Primary Tumor (pT): pT1a   Regional Lymph Nodes (pN)   Category (pN): pN1a   3. Lymph nodes, left axillary dissection:   Two of three lymph nodes positive for metastatic carcinoma (2/3). 4. Excised tissue, right breast:   Fragments of skin and benign breast tissue. 5. Excised tissue, left breast:   Fragments of skin and benign breast tissue. Assessment:   1) Left breast Invasive ductal carcinoma    [id number]  GRADE 2  % WA 20% HER2 greg equivocal - FISH could not be done  Repeat biopsy of breast mass 6/24/19 - HER2 gerg negative, mamma print indicates she has genomically high risk disease  S/p ddAC-T and bilateral mastectomy on 1/21/2020-[id number]  RT completed 5/2020  Intolerant to Letrozole that was started 2/2020- switched to [address] Planned 5-years    She has extreme fatigue which could be due to Anastrazole , we will exclude other medical reasons like anemia, hypothyroidism or recurrence  She had a normal ECHO in 12/2020  No clinical evidence of recurrence today   DEXA 5/2020 and is normal  Counseled on VD and Ca    Counseled on symptoms to call and NCCN based guidelines for surveillance  . 2) Hypothyroidism    3) Obesity    4) Cough  Likely PND  However will r/o recurrence     5) Left arm Lymphedema    6) Extreme Fatigue  See work up below. Plan:     · Continue  Anastrazole daily 5 years  · Vit D / Calcium doses reviewed  · CBC, Iron studies, ferritin, TSH, T4, VD  · CT CAP- Call with results   · DEXA 2022  · Follow with Dr. Misael Ferguson as scheduled        RTC in 3 months as long as all these tests are negative     I appreciate the opportunity to participate in Ms. Rachelle Garcia's care.       Signed By: Aftab Cervantes MD

## 2021-06-23 NOTE — PROGRESS NOTES
Shirley Gu is a 60 y.o. female    Chief Complaint   Patient presents with    Follow-up       Left breast cancer- ER+NC+HER2 greg neg       1. Have you been to the ER, urgent care clinic since your last visit? Hospitalized since your last visit? No    2. Have you seen or consulted any other health care providers outside of the [address] [place] [address] since your last visit? Include any pap smears or colon screening.  No

## 2021-06-24 LAB
25(OH)D3+25(OH)D2 SERPL-MCNC: 55.9 NG/ML (ref 30–100)
BASOPHILS # BLD AUTO: 0 X10E3/UL (ref 0–0.2)
BASOPHILS NFR BLD AUTO: 1 %
EOSINOPHIL # BLD AUTO: 0.2 X10E3/UL (ref 0–0.4)
EOSINOPHIL NFR BLD AUTO: 3 %
ERYTHROCYTE [DISTWIDTH] IN BLOOD BY AUTOMATED COUNT: 11.6 % (ref 11.7–15.4)
FERRITIN SERPL-MCNC: 100 NG/ML (ref 15–150)
HCT VFR BLD AUTO: 42 % (ref 34–46.6)
HGB BLD-MCNC: 14.3 G/DL (ref 11.1–15.9)
IMM GRANULOCYTES # BLD AUTO: 0 X10E3/UL (ref 0–0.1)
IMM GRANULOCYTES NFR BLD AUTO: 0 %
IRON SATN MFR SERPL: 28 % (ref 15–55)
IRON SERPL-MCNC: 91 UG/DL (ref 27–159)
LYMPHOCYTES # BLD AUTO: 1.8 X10E3/UL (ref 0.7–3.1)
LYMPHOCYTES NFR BLD AUTO: 31 %
MCH RBC QN AUTO: 35.4 PG (ref 26.6–33)
MCHC RBC AUTO-ENTMCNC: 34 G/DL (ref 31.5–35.7)
MCV RBC AUTO: 104 FL (ref 79–97)
MONOCYTES # BLD AUTO: 0.4 X10E3/UL (ref 0.1–0.9)
MONOCYTES NFR BLD AUTO: 7 %
NEUTROPHILS # BLD AUTO: 3.5 X10E3/UL (ref 1.4–7)
NEUTROPHILS NFR BLD AUTO: 58 %
PLATELET # BLD AUTO: 251 X10E3/UL (ref 150–450)
RBC # BLD AUTO: 4.04 X10E6/UL (ref 3.77–5.28)
TIBC SERPL-MCNC: 329 UG/DL (ref 250–450)
UIBC SERPL-MCNC: 238 UG/DL (ref 131–425)
WBC # BLD AUTO: 5.9 X10E3/UL (ref 3.4–10.8)

## 2021-06-24 RX ORDER — TRAZODONE HYDROCHLORIDE 50 MG/1
TABLET ORAL
Qty: 60 TABLET | Refills: 2 | Status: SHIPPED | OUTPATIENT
Start: 2021-06-24 | End: 2021-10-20 | Stop reason: SDUPTHER

## 2021-06-30 ENCOUNTER — HOSPITAL ENCOUNTER (OUTPATIENT)
Dept: CT IMAGING | Age: 56
Discharge: HOME | End: 2021-06-30
Attending: INTERNAL MEDICINE
Payer: COMMERCIAL

## 2021-06-30 ENCOUNTER — APPOINTMENT (OUTPATIENT)
Dept: CT IMAGING | Age: 56
End: 2021-06-30
Attending: INTERNAL MEDICINE
Payer: COMMERCIAL

## 2021-06-30 ENCOUNTER — TELEPHONE (OUTPATIENT)
Dept: ONCOLOGY | Age: 56
End: 2021-06-30

## 2021-06-30 DIAGNOSIS — C50.412 MALIGNANT NEOPLASM OF UPPER-OUTER QUADRANT OF LEFT BREAST IN FEMALE, ESTROGEN RECEPTOR POSITIVE (HCC): ICD-10-CM

## 2021-06-30 DIAGNOSIS — Z17.0 MALIGNANT NEOPLASM OF UPPER-OUTER QUADRANT OF LEFT BREAST IN FEMALE, ESTROGEN RECEPTOR POSITIVE (HCC): ICD-10-CM

## 2021-06-30 PROCEDURE — 71260 CT THORAX DX C+: CPT

## 2021-06-30 PROCEDURE — 74011000636 HC RX REV CODE- 636: Performed by: RADIOLOGY

## 2021-06-30 PROCEDURE — 74177 CT ABD & PELVIS W/CONTRAST: CPT

## 2021-06-30 RX ADMIN — IOPAMIDOL 100 ML: 755 INJECTION, SOLUTION INTRAVENOUS at 10:31

## 2021-06-30 NOTE — TELEPHONE ENCOUNTER
Joaquim Souza [facility]. patient, Jesse Stephen verified x2  Informed patient that her CT shows no signs of cancer per MD Presley, but there is some suggestion of some lung irritation and MD Presley recommends she follow up with her PCP to discuss this. Patient voiced understanding and has no further questions or concerns at this time.

## 2021-07-13 ENCOUNTER — HOSPITAL ENCOUNTER (OUTPATIENT)
Dept: PHYSICAL THERAPY | Age: 56
Discharge: HOME | End: 2021-07-13
Payer: COMMERCIAL

## 2021-07-13 ENCOUNTER — VIRTUAL VISIT (OUTPATIENT)
Dept: INTERNAL MEDICINE CLINIC | Age: 56
End: 2021-07-13

## 2021-07-13 VITALS — BODY MASS INDEX: 32.62 KG/M2 | WEIGHT: 207.8 LBS | HEIGHT: 67 IN

## 2021-07-13 DIAGNOSIS — R05.3 PERSISTENT DRY COUGH: Primary | ICD-10-CM

## 2021-07-13 DIAGNOSIS — R91.8 GROUND GLASS OPACITY PRESENT ON IMAGING OF LUNG: ICD-10-CM

## 2021-07-13 PROCEDURE — 97161 PT EVAL LOW COMPLEX 20 MIN: CPT

## 2021-07-13 PROCEDURE — 99214 OFFICE O/P EST MOD 30 MIN: CPT | Performed by: INTERNAL MEDICINE

## 2021-07-13 RX ORDER — IPRATROPIUM BROMIDE 21 UG/1
SPRAY, METERED NASAL AS NEEDED
COMMUNITY
Start: 2021-02-07

## 2021-07-13 RX ORDER — MELOXICAM 7.5 MG/1
7.5 TABLET ORAL DAILY
COMMUNITY
Start: 2021-07-01 | End: 2021-07-31

## 2021-07-13 RX ORDER — EPINEPHRINE 0.3 MG/.3ML
INJECTION SUBCUTANEOUS
COMMUNITY
Start: 2021-02-07

## 2021-07-13 RX ORDER — AZITHROMYCIN 250 MG/1
TABLET, FILM COATED ORAL
Qty: 6 TABLET | Refills: 0 | Status: SHIPPED | OUTPATIENT
Start: 2021-07-13 | End: 2021-07-18

## 2021-07-13 NOTE — PROGRESS NOTES
[facility]  Lymphedema Clinic  [address] [facility], [address]  [place], [facility]    INITIAL EVALUATION    NAME: Juanpablo Castro AGE: 60 y.o. GENDER: female  DATE: 7/13/2021  REFERRING PHYSICIAN: Kenan Perez NP  HISTORY AND BACKGROUND:   Primary Diagnosis:  · Lymphedema, not elsewhere classified (L UE and truncal) (I89.0)  Other Treatment Diagnoses:   Swelling not relieved by elevation (R60.9)   Malignant neoplasm of upper-outer quadrant of L breast in female, estrogen receptor positive (HCC) (C50.412, Z17.0)   S/p bilateral mastectomy, bilateral (Z90.13)  Date of Onset: 1/21/20   Present Symptoms and Functional Limitations: Patient reports the onset of L UE and truncal swelling soon after diagnosis and treatment for L breast cancer. Patient had bilateral mastectomies with L ALND and reconstruction 1/20/20 with chemotherapy and radiation treatments. Patient was seen by the therapist, Ifeanyi Lucero, in 2020 and then again in 2021 for swelling in the L arm and trunk, ROM limitations, and she obtained a vaso-pneumatic pump and compression products at that time. Patient reports that swelling in the L UE has improved but swelling in the L lateral trunk is progressing. Lymphedema Life Impact Scale: patient scores a 19 with 28% impairment   Past Medical History:   Past Medical History:   Diagnosis Date    Acquired hypothyroidism 3/4/2021    Anxiety     Arthritis     NECK    At risk for sleep apnea     GAGAN 5     Basal cell carcinoma of skin     FACE, CHEST, BACK    Breast cancer ([address] 2019    LEFT    H/O total mastectomy 2020    Heart attack ([facility] 2019    History of seasonal allergies     Hx antineoplastic chemo     COMPLETED 12-3-19    Ill-defined condition     hx motion sickness    Motion sickness     Nausea & vomiting     PONV after thyroidectomy;  Hx motion sickness    Squamous cell skin cancer     Dr. Anuradha Reina Thyroid disease     H/ GOITER    Vertigo      Past Surgical History:   Procedure Laterality Date    HX BREAST RECONSTRUCTION Bilateral 1/21/2020    IMMEDIATE BILATERAL BREAST RECONSTRUCTION WITH TISSUE EXPANDERS AND ALLODERM GRAFTS performed by Jesus Freeman MD at [facility]  2004    HX COLONOSCOPY      HX HEENT  2009    THYROIDECTOMY    HX KNEE ARTHROSCOPY Left     HX LUMBAR FUSION  1995    HX MOHS PROCEDURES      x 4 times     HX SHOULDER ARTHROSCOPY Right     HX THYROIDECTOMY  2014    HX VASCULAR ACCESS  2019    PORTACATH RIGHT CHEST     Current Medications:    Current Outpatient Medications   Medication Sig    traZODone (DESYREL) 50 mg tablet TAKE TWO TABLETS BY MOUTH EVERY NIGHT AT BEDTIME    sertraline (ZOLOFT) 100 mg tablet Take 0.5 Tablets by mouth daily.  Synthroid 100 mcg tablet Take 1 Tab by mouth Daily (before breakfast).  calcium-cholecalciferol, d3, (CALCIUM 600 + D) 600-125 mg-unit tab Take  by mouth.  anastrozole (Arimidex) 1 mg tablet Take 1 mg by mouth daily.  naloxone (NARCAN) 4 mg/actuation nasal spray Use 1 spray intranasally, then discard. Repeat with new spray every 2 min as needed for opioid overdose symptoms, alternating nostrils.  cholecalciferol (VITAMIN D3) 1,000 unit cap Take 5,000 Units by mouth daily.  liothyronine (CYTOMEL) 5 mcg tablet Take 5 mcg by mouth daily. No current facility-administered medications for this encounter. Allergies: Allergies   Allergen Reactions    Augmentin [Amoxicillin-Pot Clavulanate] Nausea and Vomiting        Prior Level of Function/Social/Work History/Personal factors and/or comorbidities impacting plan of care: Patient does not work. She normally participates in a walking program but has been inconsistent with exercise due to the summer heat. Living Situation: Lives with her partner and two sons.     Trainable Caregiver?: Yes  Mobility: Independent gait without any assistive device for community distances  Sleeping Arrangement:  in bed at night  Other: Patient is able to don compression products without assistance    Previous Therapy:  With Kristin Godwin, for several visits in 2020 and then again in 2021  Compression/Lymphedema Equipment: Juzo arm sleeve and gauntlet (not brought to the clinic today). Flexi-touch vaso-pneumatic device. SUBJECTIVE:   Patient reports swelling in the L lateral trunk seems to be getting worse. She attempted to obtain compression garments for the trunk in the past but they were not covered by insurance. She reports having Juzo garments for the L arm and hand but  one set is too tight and one set is too loose. Patients goals for therapy: decrease swelling and to be measured for new compression garments. OBJECTIVE DATA SUMMARY:   EXAMINATION/PRESENTATION/DECISION MAKING:   Pain:  Pain Scale 1: Numeric (0 - 10)  Pain Intensity 1: 0 but reports that at times the pain in the L shoulder is debilitating. Self-Care and ADLs: Modified independent with bathing and dressing. Difficulty reaching behind her back. Skin and Tissue Assessment:  Dermal Status: Intact and with scars s/p B mastectomies. Texture/Consistency: Boggy L lateral trunk/axilla     Pigmentation/Color Change: Normal    Anomalies: N/A    Circulatory: Vascular studies ruled out DVT in past - January 2021    Nails: Normal    Stemmers Sign: Negative  Height:  Height: 5' 7\" (170.2 cm)  Weight:  Weight: 94.3 kg (207 lb 12.8 oz)   BMI:  BMI (calculated): 32.5  (36 or greater: adversely affecting lymphedema)  Wound/Ulcer:  None        Volumetric Measurements:   Right:  3,080. 38 mL Left:  2,992.40 mL   % Difference: 3% R UE > L UE Dominance: Right hand dominant     Range of Motion: within functional limits all extremities, except L shoulder: flexion 130 degrees,  abduction 125 degrees , and difficulty reaching behind her back. ROM limited due to discomfort and arthritis per patient.    Strength: Not formally assessed 2* patient is able to complete all functional activities in the clinic today. Sensation:  Impaired - decreased sensation L lateral trunk/axilla    Mobility:    Bed/Chair Mobility: Independent  Transfers: Modified independent  Gait: Independent  Endurance: Functional     Safety:  Patient is alert and oriented: Yes  Safety awareness: Appears intact  Fall risk?: Low  Patient given written fall prevention handout: Yes     Based on the above components, the patient evaluation is determined to be of the following complexity level: LOW     Evaluation Time: 2-3:00 pm  Patient arrives to the clinic today with reports of continual L lateral trunk swelling and intermittent L UE swelling. Patient presents with L shoulder ROM limitations which she believes is from arthritis. She is being followed by Orthopedics. Full UE volumetric measurements taken today reveal a low percentage difference of 3% with the R UE > L UE. Patient wears a Juzo compression sleeve approximately 4 days per week but the hand piece less frequently. She does monitor for any increase in hand swelling. Patient reports that her compression garments are old, worn, and ill fitting and that she is interested in replacing the garments in the future. Will attempt to secure a vendor and verify insurance benefits prior to her next visit. Patient presents with swelling in the L lateral trunk/axilla. Provided patient with samples of compression products for the trunk and patient is interested in obtaining a full coverage compression bra and/or night garment for management of truncal swelling. Patient has a flexi-touch vaso-pneumatic devices which she uses on a daily basis. She reports that the garments do not seem to fit well and may be causing pockets of swelling in the L lateral trunk. Will contact the vendor, [id number], for refitting of the garments and home training in a future visit. ASSESSMENT:   Jace Yang is a 60 y.o. female who presents with L UE and truncal lymphedema following diagnosis and treatment for L breast cancer. Patient is in need of new compression products for management of swelling in the L arm and hand and would benefit from having compression products for management of swelling in the trunk. Patient will benefit from complete decongestive therapy (CDT) including: Manual lymphatic drainage (MLD) technique and kinesiotaping as appropriate. Due to patient's cancer status, the lymphedema has the potential to significantly worsen over time if it is not managed well. This care is medically necessary due to the infection risk with lymphedema and to improve functional activities. CDT is necessary to resolve swelling to allow patient to return to wearing normal clothes/footwear and prevent worsening of symptoms, such as infections and/or hospitalizations. Patient will be independent with home program strategies to allow improved ADL ability and mobility and to allow patient to return to greatest functional independence. Rehabilitation potential is considered to be Good. Factors which may influence rehabilitation potential include scarring, arthritis, prolonged swelling, and inconsistent insurance coverage of compression garments in the past per patient. Patient will benefit from 3 to 6 physical therapy visits over 12 weeks to optimize improvement in these areas. PLAN OF CARE:   Recommendations and Planned Interventions: Manual lymph drainage/decongestive therapy, Compression garment fitting/provision, Lymphedema therapeutic exercise, AROM/PROM/Strength/Coordination, Self-care training, Education in skin care and lymphedema precautions, Self-MLD education per home program and Caregiver education as needed    GOALS  Short term goals  Time frame: to be met by 8/17/2021  1. Patient will demonstrate knowledge of signs/symptoms of infections/cellulitis and be independent in skin care to prevent cellulitis.   2. Patient will demonstrate independence in lymphedema home program of therapeutic exercises/stretches to improve circulation and range of motion for ease of performing ADLs. 3.  Patient will participate in the selection process to allow ordering of home compression system (daytime, nighttime garments and pump as needed). Long term goals  Time frame: to be met by 10/8/2021  1. Patient will be independent with don/doff of compression system and use in order to prevent reaccumulation of fluid at discharge. 2.  Pt will be independent in self-MLD and show stable limb volumes showing decongestion and pt. ready for transition to independent restorative phase of lymphedema therapy. Patient has participated in goal setting and agrees to work toward plan of care. Patient was instructed to call if questions or concerns arise. Thank you for this referral.  Elisabeth Gauthier, PT, CLT   Time Calculation: 60 mins    TREATMENT PLAN EFFECTIVE DATES:   7/13/2021 TO 10/8/2021  I have read the above plan of care for Ronan Garrido. I certify the above prescribed services are required by this patient and are medically necessary. The above plan of care has been developed in conjunction with the lymphedema/physical therapist.     Physician Signature: ____________________________________Date:______________      Rain Hogan.  Gucci Whitley NP

## 2021-07-13 NOTE — PROGRESS NOTES
Statement of Medical Necessity  Fernie Slater 1965 Today's Date: 7/13/2021 ADILSON: [employer]   Payor: [address] / Plan: [address] / Product Type: Managed Care Medicaid /  ME: TBD  Refills: 2           Diagnosis  []   I97.2 Post-Mastectomy Lymphedema []   I87.2 Venous Insufficiency   [x]   I89.0 Lymphedema, other secondary L UE and truncal []   I83.019 Venous Stasis Ulcer LE, Right   []   I89.9 Unspecified Lymphatic Disorder []   I83.029 Venous Stasis Ulcer LE, Left   []   R60.9 Swelling not relieved by elevation []   Q82.0 Hereditary/ Congenital Lymphedema   []   C50.211 Malignant neoplasm of breast, Right []   G89.3  Cancer associated pain   [x]   C50.412 Malignant neoplasm of upper outer quadrant of L female breast []   L03.115 LE Cellulitis, Right   [x]  Z90.13 S/p bilateral mastectomy []   L03.116 LE Cellulitis, Left     Recommended Product for Diagnosis as noted above:  Units Upper Extremity Rt Lt Units Lower Extremity Rt Lt    Compression Multi-Layered Bandages    Compression Multi-Layered Bandages      Circ-Aid, Ready Wrap, Sigvaris Arm    Inelastic binders (Circ-Aid, Maribell)  []Foot   []Below Knee   []Knee   []Thigh      Circ-Aid Ready Wrap, Sigvaris hand    Fadi Galo, Leg, night use      Tribute Arm, night use    Tribute Leg, night use      Fadi Galo Arm, night use    Stevan Sleeve Leg/ Foot, night use      Vasopneumatic Compression Pump  []Arm []Arm w/Shoulder  []Arm w/Vest    Vasopneumatic Compression Pump  []Leg         []Trunk       []Pant     2 Gradiant Compression Sleeves & Gloves  Custom/ RM Arm:    [x]CCL1 [x]CCL2 []CCL3  Custom/ RM Glove: [x]CCL1 [x]CCL2 []CCL3      Gradient Compression Stockings  Custom/ RM Lower Extremity:   []CCL1       []CCL2         []CCL3   []Knee      []Thigh        []Full Length      Steavn sleeve arm w/ hand, night use    Tribute Wrap, night use     2 Compression Bra/vest/padding    Compression Vest          Compression Multi-Layered Bandages     The above patient was referred for treatment of Lymphedema due to the diagnosis indicated above. Lymphedema is a progressive and chronic condition. It may cause acute infections, muscle atrophy, discomfort, and connective tissue fibrosis with irreversible tissue damage, decreased ROM in the affected limb and diminished functional mobility possibly interfering with independence and ability to work. Recurrent infections and wound complications that commonly occur with Lymphedema often require hospitalization and extensive wound care, thus increasing medical costs. The patient has received complete decongestive therapy which includes manual lymphatic drainage, lymphedema specific exercises, compression bandaging, and hygiene/skin care. Goals of therapy are to reduce the edema and prevent re-accumulation of fluid with its complications. It is medically necessary for this patient to have daytime garments to control this condition. They will need 2 sets (one set to wear and one set to wash for adequate skin care and wearing time). Garments must be replaced every 4-6 months for effectiveness. There are no substitutes available that offer acceptable compression treatment for this Lymphedema patient. If further information is requested, please contact our certified lymphedema therapists at [phone].       [] Nicholas Perez PT, MSPT, CLT-KARAN [] LUDMILA AshR [x]  Zainab Crandall PT, CLT [] Francisco Casey PTA, CLT, CSIS    Printed MD Name  MD Signature  Date

## 2021-07-13 NOTE — PROGRESS NOTES
Jose Antonio Ornelas (: 1965) is a 60 y.o. female,  patient, here for evaluation of the following chief complaint(s)--see below:    Jose Antonio Ornelas is a 60 y.o. female who was seen by synchronous (real-time) audio-video technology on 2021. Consent: Jose Antonio Ornelas, who was seen by synchronous (real-time) audio-video technology, and/or her healthcare decision maker, is aware that this patient-initiated, Telehealth encounter on 2021 is a billable service, with coverage as determined by her insurance carrier. She is aware that she may receive a bill and has provided verbal consent to proceed: Yes. I was in the office while conducting this encounter. Assessment & Plan:   Diagnoses and all orders for this visit:    ?atypical infection  Less likely inflammatory  Too central to be radiation change       ICD-10-CM ICD-9-CM    1. Persistent dry cough  R05 786.2    2. Ground glass opacity present on imaging of lung  R91.8 793.19          results and schedule of future studies reviewed with patient  reviewed diet, exercise and weight  cardiovascular risk and specific lipid/LDL goals reviewed  reviewed medications and side effects in detail    azithro   If not better in 2 weeks, then pulmonary referral.    AVS:  [x]  Available to patient in MyChart after visit signed. []  Mailed to patient after visit. []  Not sent to patient after visit.       Future Appointments   Date Time Provider Preeti Joyce   7/15/2021  3:40 PM Wilbur Manning MD [facility] BS AMB   2021  1:45 PM Gia Waite, PT [facility]   8/10/2021  1:45 PM Gia Waite, PT [facility]   2021 10:30 AM Manuel Manriquez NP [facility] BS AMB   2021  9:30 AM Huang Caputo  N Arias Salmon BS AMB   2021  9:00 AM LALA RODRIGUEZ BS AMB   2021 10:00 AM MD CRISTHIAN Ruby BS AMB          Subjective:   Jose Antonio Ornelas was seen for:  Chief Complaint   Patient presents with    Follow-up     review of ct scans     Notes:  CT review    Cough for several weeks    Some discomfort from cough - isolated. Some chest tightness, no wheeze. Nursing screenings reviewed by provider at visit. Past medical, Social, and Family history reviewed  Medications reviewed and updated. Allergies   Allergen Reactions    Augmentin [Amoxicillin-Pot Clavulanate] Nausea and Vomiting       Prior to Admission medications    Medication Sig Start Date End Date Taking? Authorizing Provider   meloxicam (MOBIC) 7.5 mg tablet Take 7.5 mg by mouth daily. 7/1/21 7/31/21 Yes Provider, Historical   ipratropium (ATROVENT) 21 mcg (0.03 %) nasal spray  2/7/21  Yes Provider, Historical   EPINEPHrine (EPIPEN) 0.3 mg/0.3 mL injection  2/7/21  Yes Provider, Historical   traZODone (DESYREL) 50 mg tablet TAKE TWO TABLETS BY MOUTH EVERY NIGHT AT BEDTIME 6/24/21  Yes Flaquita Lenz NP   sertraline (ZOLOFT) 100 mg tablet Take 0.5 Tablets by mouth daily. 6/3/21  Yes Ced Ortiz MD   Synthroid 100 mcg tablet Take 1 Tab by mouth Daily (before breakfast). 2/9/21  Yes Ced Ortiz MD   calcium-cholecalciferol, d3, (CALCIUM 600 + D) 600-125 mg-unit tab Take  by mouth. Yes Provider, Historical   anastrozole (Arimidex) 1 mg tablet Take 1 mg by mouth daily. 7/23/20  Yes Dania Alvarenga NP   naloxone [facility]) 4 mg/actuation nasal spray Use 1 spray intranasally, then discard. Repeat with new spray every 2 min as needed for opioid overdose symptoms, alternating nostrils. 1/23/20  Yes Wai Rice III, MD   cholecalciferol (VITAMIN D3) 1,000 unit cap Take 5,000 Units by mouth daily. Yes Provider, Historical   liothyronine (CYTOMEL) 5 mcg tablet Take 5 mcg by mouth daily. Yes Other, MD Robert         ROS     A complete ROS was performed and negative except as noted in HPI     PHYSICAL EXAMINATION:    Vital Signs: There were no vitals taken for this visit.     Patient-Reported Vitals 4/1/2021 Patient-Reported Weight 200lb   Patient-Reported Pulse 78   Patient-Reported Temperature 97.0   Patient-Reported SpO2 100   Patient-Reported Systolic  202   Patient-Reported Diastolic 80         Constitutional: [x] Appears well-developed and well-nourished [x] No apparent distress      Mental status: [x] Alert and awake  [x] Oriented [x] Able to follow commands       Eyes:   EOM    [x]  Normal      Sclera  [x]  Normal              Discharge [x]  None visible       HENT: [x] Normocephalic, atraumatic    [x] Mouth/Throat: Mucous membranes are moist    External Ears [x] Normal      Neck: [x] No visualized mass     Pulmonary/Chest: [x] Respiratory effort normal   [x] No visualized signs of difficulty breathing or respiratory distress    Musculoskeletal:  [x] Normal range of motion of neck    Neurological:        [x] No Facial Asymmetry (Cranial nerve 7 motor function) (limited exam due to video visit)          [x] No gaze palsy     Skin:        [x] No significant exanthematous lesions or discoloration noted on facial skin             Psychiatric:       [x] Normal Affect       Other pertinent observable physical exam findings:  None. Prior labs reviewed. Reviewed images myself - JODEE central ground glass opacities         We discussed the expected course, resolution and complications of the diagnosis(es) in detail. Medication risks, benefits, costs, interactions, and alternatives were discussed as indicated. I advised her to contact the office if her condition worsens, changes or fails to improve as anticipated. She expressed understanding with the diagnosis(es) and plan. Vj Monzon is a 60 y.o. female who was evaluated by a video visit encounter for concerns as above. Vj Monzon is being evaluated by a Virtual Visit (video visit) encounter to address concerns as mentioned above. A caregiver was present when appropriate.   Due to this being a TeleHealth encounter (During [facility] public health emergency), evaluation of the following organ systems was limited: Vitals/Constitutional/EENT/Resp/CV/GI//MS/Neuro/Skin/Heme-Lymph-Imm. Pursuant to the emergency declaration under the [facility], [address] [place] [address] authority and the [employer] Act, this Virtual Visit was conducted with patient's (and/or legal guardian's) consent, to reduce the patient's risk of exposure to COVID-19 and provide necessary medical care. The patient (and/or legal guardian) has also been advised to contact this office for worsening conditions or problems, and seek emergency medical treatment and/or call 911 if deemed necessary. Patient identification was verified at the start of the visit: YES. Services were provided through a video synchronous discussion virtually to substitute for in-person clinic visit. Patient was located at their individual home (or other location as per patient preference). Provider was located in medical office. An electronic signature was used to authenticate this note.   -- Ludmila Walker MD

## 2021-07-13 NOTE — PROGRESS NOTES
VIRTUAL VISIT  Link sent to [email or web address]  Chief Complaint   Patient presents with    Follow-up     review of ct scans        Recent Travel Screening and Travel History documentation     Travel Screening     Question   Response    In the last month, have you been in contact with someone who was confirmed or suspected to have Coronavirus / COVID-19? No / Unsure    Have you had a COVID-19 viral test in the last 14 days? No    Do you have any of the following new or worsening symptoms? None of these    Have you traveled internationally or domestically in the last month? No      Travel History   Travel since 06/13/21     No documented travel since 06/13/21        Abuse Screening Questionnaire 9/20/2019   Do you ever feel afraid of your partner? N   Are you in a relationship with someone who physically or mentally threatens you? N   Is it safe for you to go home? Y            1. Have you been to the ER, urgent care clinic since your last visit? Hospitalized since your last visit? No    2. Have you seen or consulted any other health care providers outside of the [address] [place] [address] since your last visit? Include any pap smears or colon screening.  No     Health Maintenance Due   Topic Date Due    COVID-19 Vaccine (1) Never done    Colorectal Cancer Screening Combo  Never done    Breast Cancer Screen Mammogram  10/22/2020    Shingrix Vaccine Age 50> (2 of 2) 03/17/2021        Learning Assessment 9/14/2020   PRIMARY LEARNER Patient   HIGHEST LEVEL OF EDUCATION - PRIMARY LEARNER  -   BARRIERS PRIMARY LEARNER -   PRIMARY LANGUAGE ENGLISH   LEARNER PREFERENCE PRIMARY OTHER (COMMENT)     -     -     -     -   ANSWERED BY patient   RELATIONSHIP SELF

## 2021-07-15 ENCOUNTER — VIRTUAL VISIT (OUTPATIENT)
Dept: SLEEP MEDICINE | Age: 56
End: 2021-07-15

## 2021-07-22 ENCOUNTER — DOCUMENTATION ONLY (OUTPATIENT)
Dept: SURGERY | Age: 56
End: 2021-07-22

## 2021-07-22 NOTE — PROGRESS NOTES
Faxed signed statement of medical necessity and plan of care to DHAVAL Santos [phone]. Confirmation received.

## 2021-07-23 DIAGNOSIS — R91.8 GROUND GLASS OPACITY PRESENT ON IMAGING OF LUNG: ICD-10-CM

## 2021-07-23 DIAGNOSIS — R05.9 COUGH: Primary | ICD-10-CM

## 2021-07-27 ENCOUNTER — TELEPHONE (OUTPATIENT)
Dept: SLEEP MEDICINE | Age: 56
End: 2021-07-27

## 2021-07-27 ENCOUNTER — HOSPITAL ENCOUNTER (OUTPATIENT)
Dept: PHYSICAL THERAPY | Age: 56
Discharge: HOME | End: 2021-07-27
Payer: COMMERCIAL

## 2021-07-27 DIAGNOSIS — G47.33 OBSTRUCTIVE SLEEP APNEA (ADULT) (PEDIATRIC): Primary | ICD-10-CM

## 2021-07-27 PROCEDURE — 97140 MANUAL THERAPY 1/> REGIONS: CPT

## 2021-07-27 NOTE — PROGRESS NOTES
LYMPHEDEMA PT DAILY TREATMENT NOTE - [facility] -15    Patient Name: Armando Smith  Date:2021  : 1965  [x]  Patient  Verified  Payor: [address] [place] [address] Road / Plan: [address] / Product Type: Managed Care Medicaid /    In time: 2:00 pm Out time: 3:15 pm  Total Treatment Time (min): 75  Total Timed Codes (min): 75  1:1 Treatment Time ( [address] only): 75  Visit #: 2    Treatment Area: Lymphedema, not elsewhere classified [I89.0]    SUBJECTIVE  Pain Level (0-10 scale): 2/10 L breast and L knee  Any medication changes, allergies to medications, adverse drug reactions, diagnosis change, or new procedure performed?: [x] No    [] Yes (see summary sheet for update)  Subjective functional status/changes:   [] No changes reported  Patient reports that she tripped and fell [place]. She landed on her left knee and left breast. She was concerned that she may have damaged the breast reconstruction site and contacted the surgeon. He reassured her that was not likely the case and her breast is now feeling better. She has been using the vaso-pneumatic device two times per day and the swelling in the lateral trunk has improved. OBJECTIVE    0 min Therapeutic Exercise:  [] Rondall Chough Exercises [x] Remedial Lymphedema Exercise Program [] Axillary Web Exercise Program      [] Shoulder ROM Exercises   Rationale: Activate muscle pump to improve lymphatic fluid movement and decrease swelling to improve the patients ability to perform ADL and IADL skills and prevent worsening of swelling over time. 75 min Manual Therapy    Kinesiotaping: Patient has tolerated kinesiotape in the past without any adverse skin reaction. One Y piece of kinesiotape applied to address L lateral trunk/axillary swelling during the visit today. Education provided on proper removal of kinesiotape. Handout provided.           Manual Lymphatic Drainage (MLD):  Area to decongest: L UE and trunk   Sequence used and effectiveness: Secondary sequence for upper extremity with trunk involvement. Modifications were made to manual lymph drainage sequence to exclude cervical techniques secondary to thyroid disease and AAA and FAIZA s/p B mastectomy. Performed soft tissue mobilization to address adhesions to the L anterior trunk. No cording palpated during the visit today. Skin/wound care/debridement: Reviewed skin care principles:   self MLD with bathing and skin care. Skin care products. Upper/Lower Extremity Compression: Measured for the following compression products:    L UE and trunk     Style: Circular knit    Brand: Juzo Soft arm sleeve and hand piece and [employer] Wear Hugger Prima Compression bra     Type: Non-Custom: Size: arm sleeve size 4 max/long length and gauntlet size large and compression bra size X large. Vendor: [employer]: Return and reordering process (by bringing prior garments into the clinic). Educated patient to monitor for redness or pressure points on the skin. Patient will bring the garments to the clinic for fitting. Patient obtained a piece of Komprex 2 (approximate size 6 X 12 inches) during previous treatment and the foam is now worn and in need of replacement. Provided patient with a replacement piece of Komprex 2 and patient verbalizes understanding of use of product. Rationale: decrease pain, increase ROM, increase tissue extensibility and decrease edema  to improve the patients ability to better manage lymphedema during the restorative phase of care    0   Vasopneumatic Device:   Patient reports that she refitted the pump garments herself and the garments now fit better and the pump seems more effective. Patient reports using the pump for two hours per day for management of swelling in the L lateral trunk.  Discussed having at least 6 hours between pump sessions and to defer use of the pump with any shortness of breath, heart palpations, pain etc. Rationale: To improve lymphatic fluid movement and decrease swelling to improve the patients ability to perform ADL and IADL skills and prevent worsening of swelling over time. With   [] TE   [] TA   [x] MT   [] SC   [] other: Patient Education: [x] Review HEP    [x] MLD Patient Education Reviewed with patient, as well as demonstration and instruction during MLD portion of the session. Continued education in self MLD technique with bathing and skin care. Provided patient with the written instructions to follow. soft tissue mobilization of scar adhesions  [] Progressed/Changed HEP based on:   [] positioning   [x] Kinesiotape   [x] Skin care   [] wound care   [] other:   [x] compression product options  Patient / caregiver re-demonstrated bandaging. [] Yes  [] No  Compression bandaging/garment precautions reviewed: [x] Yes  [] No     Other Objective/Functional Measures: Girth measurements of the trunk and L arm and hand were taken today for sizing of compression products. Pain Level (0-10 scale) post treatment: 1/10      ASSESSMENT/Changes in Function:   Patient was experiencing pain in the L anterior trunk after suffering a fall approximately 2 weeks ago. The breast is now feeling better and patient reports that swelling in the L lateral trunk has improved since using the vaso-pneumatic device more consistently. Patient is in need of new compression garments for management of swelling in the L arm and trunk and she was measured for those today. Patient is aware that her insurance may not provide coverage for compression products for the trunk and she may be willing to pay a cash price if needed. Initiated education in self MLD today.      Patient will continue to benefit from skilled PT services to  address ROM deficits, address swelling, analyze and address soft tissue restrictions, analyze compression product fit and use, instruct in home lymphedema management program, measure for compression products and modify and progress therapeutic interventions to attain remaining goals. Progress towards goals / Updated goals:  Short term goals  Time frame: to be met by 8/17/2021  1. Patient will demonstrate knowledge of signs/symptoms of infections/cellulitis and be independent in skin care to prevent cellulitis. Goal in progress. 2.  Patient will demonstrate independence in lymphedema home program of therapeutic exercises/stretches to improve circulation and range of motion for ease of performing ADLs. Goal in progress. 3.  Patient will participate in the selection process to allow ordering of home compression system (daytime, nighttime garments and pump as needed). Goal in progress.      Long term goals  Time frame: to be met by 10/8/2021  1. Patient will be independent with don/doff of compression system and use in order to prevent reaccumulation of fluid at discharge. 2.  Pt will be independent in self-MLD and show stable limb volumes showing decongestion and pt. ready for transition to independent restorative phase of lymphedema therapy. Goal in progress.     PLAN  [x]  Upgrade activities as tolerated     [x]  Continue plan of care  []  Update interventions per flow sheet       []  Discharge due to:_  []  Other:_      Phyllistine Bumps, PT, CLT  7/27/2021

## 2021-07-27 NOTE — TELEPHONE ENCOUNTER
Pt is interested in rx for an alternate machine    Thank you for calling - we are doing our best to reach as many patients as possible, so this helps. Its important that you understand why there is a concern. We are receiving a lot of calls (as you can imagine). Please visit [facility] website (for patients portion) to ensure you are prepared for a conversation. Do you have internet access? If not, there is a toll-free number [phone]. Have you been contacted by [employer] or a medical equipment provider already? no      Have you already contacted your medical equipment provider to be added to the [facility] registry? no   Patients are able to register themselves. yes  Are you active or signed up for My[employer]fold? This is the fastest way for us to communicate additional information to you. Based on [facility] communication, you should stop using equipment. If you choose to continue to use your machine, it is at your own discretion. You should consider the risks of both continuing to use the device and not using the device to treat your sleep apnea. There is not a clear answer and this is a difficult situation. As for actions, you should register your device by phone or access the website listed above. If you chose to stop using your device you may benefit from avoiding sleeping flat or on you back, this can be done by elevating the head of the bed and using pillows or other positioning devices to promote side sleeping such as a slumber bumper or sleep noodle. In addition, it is recommended that you do not use any type of CPAP cleaning machines or harsh chemicals. We have heard that use of ozone or other cleaning devices may be contributing to this problem and therefore advise that you not use a cleaning device and instead follow the manufacturers cleaning instructions of mild soap and water for the tube, tank and mask.   Both [facility] and the FDA have noted that additional bacterial filters are not designed or intended for use with APAP devices and may cause device issues. Additionally, these filters do not resolve vapor or fume concerns from the foam in the [facility]ice device. I cannot advise you to stop or continue using therapy. There is limited information available currently.

## 2021-07-30 NOTE — TELEPHONE ENCOUNTER
Order attached  She should check with the [address] [place] [address] Road[address] and her insurance to see if they will cover the replacement PAP

## 2021-08-06 ENCOUNTER — TELEPHONE (OUTPATIENT)
Dept: CARDIOLOGY CLINIC | Age: 56
End: 2021-08-06

## 2021-08-06 NOTE — TELEPHONE ENCOUNTER
8/6/2021 at 1:07 spoke with patient she's undecided on staying with [facility] or transferring to [facility] if she decides to stay with [facility] she'll call to reschedule echo & same day appointment for November 2021 was scheduled with Dr. Garces on 11/2/2021

## 2021-08-10 ENCOUNTER — HOSPITAL ENCOUNTER (OUTPATIENT)
Dept: PHYSICAL THERAPY | Age: 56
Discharge: HOME | End: 2021-08-10
Payer: COMMERCIAL

## 2021-08-10 PROCEDURE — 97140 MANUAL THERAPY 1/> REGIONS: CPT

## 2021-08-10 NOTE — PROGRESS NOTES
LYMPHEDEMA PT 30 DAY TREATMENT NOTE - [facility] 2-15    Patient Name: Juanpablo Castro  Date:8/10/2021  : 1965  [x]  Patient  Verified  Payor: [address] [place] [address] Road / Plan: [address] / Product Type: Managed Care Medicaid /    In time: 1:45 pm Out time: 3:00 pm  Total Treatment Time (min): 75  Total Timed Codes (min): 75  1:1 Treatment Time ( [address] only): 75  Visit #: 3    Treatment Area: Lymphedema, not elsewhere classified [I89.0]    SUBJECTIVE  Pain Level (0-10 scale): Reports tenderness in the L breast from fall several weeks ago. Any medication changes, allergies to medications, adverse drug reactions, diagnosis change, or new procedure performed?: [x] No    [] Yes (see summary sheet for update)  Subjective functional status/changes:   [x] No changes reported    Patient reports that she received a call from the vendor, [employer], and her garment order has been submitted to insurance. She is hoping that her insurance will cover a compression bra. OBJECTIVE    0 min Therapeutic Exercise:  [] Melissa Chino Exercises [x] Remedial Lymphedema Exercise Program [] Axillary Web Exercise Program      [] Shoulder ROM Exercises   Rationale: Activate muscle pump to improve lymphatic fluid movement and decrease swelling to improve the patients ability to perform ADL and IADL skills and prevent worsening of swelling over time. 75 min Manual Therapy    Kinesiotaping: Patient tolerated kinesiotape applied last visit without any adverse skin reaction. Kinesiotape applied to the L  lower breast today:  scar cross taping on base tape. Education provided on proper removal of kinesiotape. Handout provided. Manual Lymphatic Drainage (MLD):  Area to decongest: L UE and trunk   Sequence used and effectiveness: Secondary sequence for upper extremity with trunk involvement.   Modifications were made to manual lymph drainage sequence to exclude cervical techniques secondary to thyroid disease. Performed soft tissue mobilization and fibrotic techniques to adhesions to the L anterior trunk. No cording palpated during the visit today. Skin/wound care/debridement: Reviewed skin care principles:   self MLD with bathing and skin care. Skin care products. Upper/Lower Extremity Compression: Measured for the following compression products during a previous visit:    L UE and trunk     Style: Circular knit    Brand: Juzo Soft arm sleeve and hand piece and [employer] W[employer] Hugger Prima Compression bra     Type: Non-Custom: Size: arm sleeve size 4 max/long length and gauntlet size large and compression bra size X large. Vendor: [employer]: Return and reordering process (by bringing prior garments into the clinic). Educated patient to monitor for redness or pressure points on the skin. Patient will bring the garments to the clinic for fitting once they are received. Provided patient with one piece of Komprex 2 (approximate size 6 X 12 inches) during a previous visit and provided patient with a chip bag this visit. Discussed placement and wear schedule with truncal compression and patient able to verbalize understanding of the instructions prior to leaving the clinic. Patient to defer using these products with any adverse skin reactions. Rationale: decrease pain, increase ROM, increase tissue extensibility and decrease edema  to improve the patients ability to better manage lymphedema during the restorative phase of care    0   Vasopneumatic Device:   Patient is using the Flexitouch vaso-pneumatic device one to two times per day for management of truncal swelling. Discussed having at least 6 hours between pump sessions and to defer use of the pump with any shortness of breath, heart palpations, pain etc.    Rationale:  To improve lymphatic fluid movement and decrease swelling to improve the patients ability to perform ADL and IADL skills and prevent worsening of swelling over time. With   [] TE   [] TA   [x] MT   [] SC   [] other: Patient Education: [x] Review HEP    [x] MLD Patient Education Reviewed with patient, as well as demonstration and instruction during MLD portion of the session. Continued education in self MLD technique with bathing and skin care. Provided patient with the written instructions to follow. soft tissue mobilization of scar adhesions  [] Progressed/Changed HEP based on:   [] positioning   [x] Kinesiotape   [x] Skin care   [] wound care   [] other:   [x] compression product options  Patient / caregiver re-demonstrated bandaging. [] Yes  [] No  Compression bandaging/garment precautions reviewed: [x] Yes  [] No     Other Objective/Functional Measures:   Girth measurements of the trunk:  Axilla: 103.5 cm  Mid breast: 107.5 cm  Waist: 103.0 cm  Hips: 130.0 cm    Pain Level (0-10 scale) post treatment: No reports of pain during the visit today. ASSESSMENT/Changes in Function:   Patient is doing well with her home program. She continues to have L lateral trunk swelling despite working on self MLD and using the vaso-pneumatic device daily. Today's treatment focused on MLD,  continued education in the self MLD packet, and techniques to soften the texture of the L breast/adhesions. Compression garments for the trunk and L UE have been ordered but insurance authorization is still pending. Patient will continue to benefit from skilled PT services to  address ROM deficits, address swelling, analyze and address soft tissue restrictions, analyze compression product fit and use, instruct in home lymphedema management program, measure for compression products and modify and progress therapeutic interventions to attain remaining goals. Progress towards goals / Updated goals:  Short term goals  Time frame: to be met by 8/17/2021, goals to be extended to be met by 10/8/21  1.   Patient will demonstrate knowledge of signs/symptoms of infections/cellulitis and be independent in skin care to prevent cellulitis. Goal in progress. 2.  Patient will demonstrate independence in lymphedema home program of therapeutic exercises/stretches to improve circulation and range of motion for ease of performing ADLs. Goal in progress. 3.  Patient will participate in the selection process to allow ordering of home compression system (daytime, nighttime garments and pump as needed). Goal in progress.      Long term goals  Time frame: to be met by 10/8/2021  1. Patient will be independent with don/doff of compression system and use in order to prevent reaccumulation of fluid at discharge. 2.  Pt will be independent in self-MLD and show stable limb volumes showing decongestion and pt. ready for transition to independent restorative phase of lymphedema therapy. Goal in progress.     PLAN  [x]  Upgrade activities as tolerated     [x]  Continue plan of care  []  Update interventions per flow sheet       []  Discharge due to:_  []  Other:_      Morelia Coats, PT, CLT  8/10/2021

## 2021-08-13 ENCOUNTER — TRANSCRIBE ORDER (OUTPATIENT)
Dept: SCHEDULING | Age: 56
End: 2021-08-13

## 2021-08-13 DIAGNOSIS — R93.89 ABNORMAL CT SCAN: Primary | ICD-10-CM

## 2021-08-24 ENCOUNTER — HOSPITAL ENCOUNTER (OUTPATIENT)
Dept: PHYSICAL THERAPY | Age: 56
Discharge: HOME | End: 2021-08-24
Payer: COMMERCIAL

## 2021-08-24 PROCEDURE — 97110 THERAPEUTIC EXERCISES: CPT

## 2021-08-24 PROCEDURE — 97140 MANUAL THERAPY 1/> REGIONS: CPT

## 2021-08-24 NOTE — PROGRESS NOTES
LYMPHEDEMA PT TREATMENT NOTE - [facility] -15    Patient Name: Michael Mejía  Date:2021  : 1965  [x]  Patient  Verified  Payor: [address] [place] [address] Road / Plan: [address] / Product Type: Managed Care Medicaid /    In time: 12:00 pm Out time: 1:15 pm  Total Treatment Time (min): 75  Total Timed Codes (min): 75  1:1 Treatment Time ( [address] only): 75  Visit #: 4    Treatment Area: Lymphedema, not elsewhere classified [I89.0]    SUBJECTIVE  Pain Level (0-10 scale): No pain reported today. Any medication changes, allergies to medications, adverse drug reactions, diagnosis change, or new procedure performed?: [x] No    [] Yes (see summary sheet for update)  Subjective functional status/changes:   [x] No changes reported    Patient reports that she received a call from the vendor, [employer], and insurance will not cover compression products for the trunk. She will pay a cash price for a compression bra. OBJECTIVE    10 min Therapeutic Exercise:  [x] Roberto Miller Exercises - patient was educated in the Roberto Miller routine this visit. [x] Remedial Lymphedema Exercise Program [] Axillary Web Exercise Program      [] Shoulder ROM Exercises   Rationale: Activate muscle pump to improve lymphatic fluid movement and decrease swelling to improve the patients ability to perform ADL and IADL skills and prevent worsening of swelling over time. 65 min Manual Therapy    Kinesiotaping: Patient tolerated kinesiotape applied last visit without any adverse skin reaction. Y piece of kinesiotape applied to the L lateral trunk posteriorly to the R axilla. Education provided on proper removal of kinesiotape. Handout provided. Manual Lymphatic Drainage (MLD):  Area to decongest: L UE and trunk   Sequence used and effectiveness: Secondary sequence for upper extremity with trunk involvement.   Modifications were made to manual lymph drainage sequence to exclude cervical techniques secondary to thyroid disease. Performed soft tissue mobilization and fibrotic techniques to adhesions to the L anterior trunk. Continued education in self MLD techniques. Skin/wound care/debridement: Reviewed skin care principles:   Skin care products. Upper/Lower Extremity Compression: Measured for the following compression products during a previous visit:    L UE and trunk     Style: Circular knit    Brand: Juzo Soft arm sleeve and hand piece and [employer] W[employer] Hugger Prima Compression bra     Type: Non-Custom: Size: arm sleeve size 4 max/long length and gauntlet size large and compression bra size X large. Vendor: [employer]: Return and reordering process (by bringing prior garments into the clinic). Educated patient to monitor for redness or pressure points on the skin. Compression products for the L arm and hand have been approved by insurance and patient is waiting on delivery. Compression products for the trunk will not be covered by insurance and patient voiced interest in paying a cash price for the [employer] Compression bra. The order will be submitted to the vendor, [employer]. Patient will bring all products to the clinic for fitting. Provided patient with one piece of Komprex 2 (approximate size 6 X 12 inches) and a chip bag during a previous visit. Patient has been educated in proper use of the products and reports wearing either the Komprex 2 or the chip bag for a few hours each day with her current bra. Patient to defer using these products with any adverse skin reactions. Rationale: decrease pain, increase ROM, increase tissue extensibility and decrease edema  to improve the patients ability to better manage lymphedema during the restorative phase of care    0   Vasopneumatic Device:   Patient is using the Flexitouch vaso-pneumatic device daily for management of swelling. Rationale:  To improve lymphatic fluid movement and decrease swelling to improve the patients ability to perform ADL and IADL skills and prevent worsening of swelling over time. With   [x] TE   [] TA   [x] MT   [] SC   [] other: Patient Education: [x] Review HEP  - educated in the Hope routine this visit. [x] MLD Patient Education Reviewed with patient, as well as demonstration and instruction during MLD portion of the session. Continued education in self MLD technique with bathing and skin care. Provided patient with the written instructions to follow. soft tissue mobilization of scar adhesions  [] Progressed/Changed HEP based on:   [] positioning   [x] Kinesiotape   [x] Skin care   [] wound care   [] other:   [x] compression product options for the trunk  Patient / caregiver re-demonstrated bandaging. [] Yes  [] No  Compression bandaging/garment precautions reviewed: [x] Yes  [] No     Other Objective/Functional Measures:   L UE volumetric measurements taken today were 3,162.32 ml compared to 2,992.49 ml on evaluation 7/13/21. Pain Level (0-10 scale) post treatment: No reports of pain during the visit today. ASSESSMENT/Changes in Function:   Patient has yet to receive compression products ordered during a previous visit. The vendor was contacted and patient will receive the compression products for the L UE within the next week. The compression products for the trunk were denied by insurance so patient is now willing to pay a cash price for the products. Patient will benefit from having a full coverage compression bra to wear daily as tolerated for management of continual swelling in the L lateral trunk. Patient met short term goal 1 and short term goal 2 this visit and is progressing toward all other goals.   She will continue to benefit from skilled PT services to  address ROM deficits, address swelling, analyze and address soft tissue restrictions, analyze compression product fit and use, instruct in home lymphedema management program, measure for compression products and modify and progress therapeutic interventions to attain remaining goals. Progress towards goals / Updated goals:  Short term goals  Time frame: to be met by 8/17/2021, goals to be extended to be met by 10/8/21  1. Patient will demonstrate knowledge of signs/symptoms of infections/cellulitis and be independent in skin care to prevent cellulitis. Goal met 8/24/21  2. Patient will demonstrate independence in lymphedema home program of therapeutic exercises/stretches to improve circulation and range of motion for ease of performing ADLs. Goal in progress. 3.  Patient will participate in the selection process to allow ordering of home compression system (daytime, nighttime garments and pump as needed). Compression products have all been ordered. Goal met 8/24/21     Long term goals  Time frame: to be met by 10/8/2021  1. Patient will be independent with don/doff of compression system and use in order to prevent reaccumulation of fluid at discharge. 2.  Pt will be independent in self-MLD and show stable limb volumes showing decongestion and pt. ready for transition to independent restorative phase of lymphedema therapy. Goal in progress.     PLAN  [x]  Upgrade activities as tolerated     [x]  Continue plan of care  []  Update interventions per flow sheet       []  Discharge due to:_  []  Other:_      Lizandro Rene, PT, CLT  8/24/2021

## 2021-08-26 NOTE — PROGRESS NOTES
[facility] [place] [facility] [place] [address], [place]: [phone]  F: [phone]    Reason for Visit:   Justin Fitzpatrick is a 62 y.o. female who is seen by synchronous (real-time) audio-video technology on 6/11/2020 for follow up of  Left breast cancer- ER+VT+HER2 greg neg    Treatment History:   · 6/19: Mammogram:  Left breast mass with skin thickening, 2 suspicious nodes. Mass measures 1.4 x 0.9 x 1.3  · 6/19: MRI breast: Multicentric left breast carcinoma. Non-masslike enhancement extends from the nipple to the posterior third, involves all quadrants, and measures 106 x 44 x 79 mm. · 5/28/19: Biopsy breast- IDC % % HER 2 negative, skin biopsy benign , node biopsy mostly adipose tissue with atypical cells . BRCA1 and 2 negative. CT and bone scan negative for metastasis. Mammaprint with insufficient tissue for testing. Repeat biopsy of axillary node 6/20/19 positive for metastatic disease- repeat mammaprint - high risk  · 7/19/19: cycle 1 neoadjuvant of dd AC-T  · 9/12/19: US of breast shows increased malignant microcalcification in the left breast, etiology included tx related necrosis vs extension of disease. Decreased size in left axillary LN. · 10/22/19: calcifications in the left upper outer quadrant are stable, calcifications in left axillary LN are not changed   · 1/21/20: b/l mastectomy   · 2/2020: Letrozole  · 5/2020:Completed RT    History of Present Illness:   Patient is a 62 y.o. seen for L breast cancer. She felt a sensation in the shower about May 2019. She also noted a lump and  an inverted nipple. She had a physician friend look at this who directed her to mammogram and recommended she see Dr. Linh Cutler. This led to imaging and diagnosis as above. She completed neoadjuvant ddACT followed by bilateral mastectomy on 1/21/20. Comes today for follow-up. Bilateral mastectomy was completed on 1/21/20. She had some skin burn and Pt resting. Denies concerns   blistering on RT, she notes that this has improved. She has noted an improvement in energy, working again. Hair is coming back. She wants to start walking again and getting exercise. She has some hot flashes but tolerable. She is doing PT. Has been helping with tightness and pain. Has a follow up with Dr. Sia Zaldivar in Sep. . She has met with rad onc. Energy is still low. Rare ETOH  She does not smoke. Father had colon cancer    Past Medical History:   Diagnosis Date    Anxiety     Arthritis     NECK    At risk for sleep apnea     GAGAN 5     Basal cell carcinoma of skin     FACE, CHEST, BACK    Breast cancer ([facility] [address] 2019    LEFT    History of seasonal allergies     Hx antineoplastic chemo     COMPLETED 12-3-19    Ill-defined condition     hx motion sickness    Motion sickness     Nausea & vomiting     PONV after thyroidectomy; Hx motion sickness    Squamous cell skin cancer     Dr. [employer] Plants Thyroid disease     H/ GOITER    Vertigo       Past Surgical History:   Procedure Laterality Date    HX BREAST RECONSTRUCTION Bilateral 2020    IMMEDIATE BILATERAL BREAST RECONSTRUCTION WITH TISSUE EXPANDERS AND ALLODERM GRAFTS performed by Nithin Obregon MD at [address] HX  SECTION      HX COLONOSCOPY      HX HEENT  2009    THYROIDECTOMY    HX KNEE ARTHROSCOPY Left     HX LUMBAR FUSION      HX MOHS PROCEDURES      x 4 times     HX SHOULDER ARTHROSCOPY Right     HX THYROIDECTOMY      HX VASCULAR ACCESS  2019    PORTACATH RIGHT CHEST      Social History     Tobacco Use    Smoking status: Never Smoker    Smokeless tobacco: Never Used   Substance Use Topics    Alcohol use:  Yes     Alcohol/week: 2.0 standard drinks     Types: 2 Glasses of wine per week      Family History   Problem Relation Age of Onset    Arthritis-osteo Mother     Cancer Father         Colon Cancer    No Known Problems Sister     No Known Problems Sister    [facility] Migraines Sister  Anesth Problems Neg Hx      Current Outpatient Medications   Medication Sig    letrozole (FEMARA) 2.5 mg tablet Take 1 Tab by mouth daily.  naloxone (NARCAN) 4 mg/actuation nasal spray Use 1 spray intranasally, then discard. Repeat with new spray every 2 min as needed for opioid overdose symptoms, alternating nostrils.  cyanocobalamin 1,000 mcg tablet Take 1,000 mcg by mouth daily.  gabapentin (NEURONTIN) 100 mg capsule Take 1 Cap by mouth three (3) times daily. Max Daily Amount: 300 mg. (Patient taking differently: Take 100 mg by mouth three (3) times daily as needed.)    LORazepam (ATIVAN) 1 mg tablet Take 1 Tab by mouth every four (4) hours as needed for Anxiety. Max Daily Amount: 6 mg.    ALPRAZolam (XANAX) 0.5 mg tablet Take 1 Tab by mouth two (2) times daily as needed for Anxiety. Max Daily Amount: 1 mg.  FOLIC ACID PO Take 1 Tab by mouth daily.  sertraline (ZOLOFT) 100 mg tablet Take 0.5 Tabs by mouth daily. (Patient taking differently: Take 50 mg by mouth nightly.)    traZODone (DESYREL) 50 mg tablet Take 2 Tabs by mouth nightly.  cholecalciferol (VITAMIN D3) 1,000 unit cap Take 5,000 Units by mouth daily.  liothyronine (CYTOMEL) 5 mcg tablet Take 5 mcg by mouth daily.  levothyroxine (SYNTHROID) 50 mcg tablet Take 50 mcg by mouth Daily (before breakfast). No current facility-administered medications for this visit. Allergies   Allergen Reactions    Augmentin [Amoxicillin-Pot Clavulanate] Nausea and Vomiting        Review of Systems: A complete review of systems was obtained, negative except as described above. Physical Exam:       Due to this being a TeleHealth evaluation, many elements of the physical examination are unable to be assessed.      Constitutional: Appears well-developed and well-nourished in no apparent distress   Mental status: Alert and awake, Oriented to person/place/time, Able to follow commands  Eyes: EOM normal, Sclera normal, No visible ocular discharge  HENT: Normocephalic, atraumatic; Mouth/Throat: Moist mucous membranes, External Ears normal  Neck: No visualized mass  Skin: No significant exanthematous lesions or discoloration noted on facial skin  Psychiatric: Normal affect, normal judgment/insight. No hallucinations       Results:     Lab Results   Component Value Date/Time    WBC 4.1 01/13/2020 11:27 AM    HGB 13.7 01/13/2020 11:27 AM    HCT 39.9 01/13/2020 11:27 AM    PLATELET 064 86/23/2146 11:27 AM    .0 (H) 01/13/2020 11:27 AM    ABS. NEUTROPHILS 2.1 01/13/2020 11:27 AM     Lab Results   Component Value Date/Time    Sodium 141 11/22/2019 02:48 PM    Potassium 3.8 11/22/2019 02:48 PM    Chloride 110 (H) 11/22/2019 02:48 PM    CO2 27 11/22/2019 02:48 PM    Glucose 109 (H) 11/22/2019 02:48 PM    BUN 9 11/22/2019 02:48 PM    Creatinine 0.59 11/22/2019 02:48 PM    GFR est AA >60 11/22/2019 02:48 PM    GFR est non-AA >60 11/22/2019 02:48 PM    Calcium 8.1 (L) 11/22/2019 02:48 PM     Lab Results   Component Value Date/Time    Bilirubin, total 0.3 11/22/2019 02:48 PM    ALT (SGPT) 30 11/22/2019 02:48 PM    Alk. phosphatase 56 11/22/2019 02:48 PM    Protein, total 5.6 (L) 11/22/2019 02:48 PM    Albumin 3.0 (L) 11/22/2019 02:48 PM    Globulin 2.6 11/22/2019 02:48 PM       Records reviewed and summarized above. Pathology report(s) reviewed above. 5/28/19  FINAL PATHOLOGIC DIAGNOSIS  1. Breast, left, core biopsy: In situ and invasive ductal carcinoma   Invasive carcinoma is nuclear grade 2 and measures up to 0.6 cm in greatest dimension on slide   Ductal carcinoma in situ is nuclear grade 3 with central necrosis   2. Soft tissue, left axilla, core biopsy: Adipose tissue with rare detached atypical cells   No lymph node tissue identified   See comment     Radiology report(s) reviewed above. US breast 9/12/19:  IMPRESSION:  1.  Increased malignant microcalcifications in the left breast. This could  represent treatment-related necrosis or extension of disease. 2. Extensive carcinoma seen on prior MRI is largely mammographically occult. 3. Decreased size of a metastatic left axillary lymph node with  microcalcifications. 4. BI-RADS Assessment Category 6: Known biopsy proven malignancy. Mammogram 10/22/19:  IMPRESSION:  1. BI-RADS 6: known left breast cancer. 2. Segmental pleomorphic calcifications in the left upper outer quadrant are  stable. Calcifications left axillary lymph node have not changed significantly. Patient was informed of the results. Bilateral mastectomy 1/3/20   FINAL PATHOLOGIC DIAGNOSIS   1. Right breast, prophylactic mastectomy:   Benign breast.   2. Left breast, modified radical mastectomy:   Invasive ductal carcinoma (see synoptic report). Ductal carcinoma in situ. Atypical lobular hyperplasia with associated macrocalcifications. Usual ductal hyperplasia with associated microcalcifications. Intraductal papilloma with usual ductal hyperplasia. INVASIVE CARCINOMA OF THE BREAST: Resection   SPECIMEN   Procedure: Total mastectomy   Specimen Laterality: Left   TUMOR   Histologic Type:  Invasive carcinoma of no special type (invasive   ductal carcinoma, not otherwise specified)   Glandular (Acinar) / Tubular Differentiation: Score 3   Nuclear Pleomorphism: Score 2   Mitotic Rate: Score 2   Overall Grade: Grade 2 (scores of 6 or 7)   Tumor Size: 2.5 mm (see Comment)   Ductal Carcinoma In Situ (DCIS): Present, solid type, nuclear grade 2   with associated microcalcifications   Tumor Extent   Skin: Uninvolved by tumor cells   Treatment Effect in the Breast: Probable or definite response to   presurgical therapy in the invasive carcinoma   Treatment Effect in the Lymph Nodes: Probable or definite response   to presurgical therapy in metastatic carcinoma   MARGINS   Invasive Carcinoma Margins: Uninvolved by invasive carcinoma   Distance from Closest Margin (Millimeters): Greater than: 10 mm   Closest Margin: Posterior DCIS Margins: Uninvolved by DCIS   Distance from Closest Margin (Millimeters): Greater than: 10 mm [facility] Patient Name: Rosa Carbajal Specimen #:    Patient Name: Rosa Carbajal Page 4 of 6 Printed: 01/31/20 9:32 AM SURGICAL PATHOLOGY REPORT   Closest Margin: Posterior   ER/MN/HER-2   Pending; results to be issued within addendum reports   LYMPH NODES   Regional Lymph Nodes: Involved by tumor cells   Number of Lymph Nodes with Macrometastases (> 2 mm): 2   Number of Lymph Nodes with Micrometastases (> 0.2 mm to 2 mm and   / or > 200 cells): 0   Extranodal Extension: Not identified   Number of Lymph Nodes Examined: 3   PATHOLOGIC STAGE CLASSIFICATION (pTNM, AJCC 8th Edition)   TNM Descriptors: y (post-treatment) m (multiple foci)   Primary Tumor (pT): pT1a   Regional Lymph Nodes (pN)   Category (pN): pN1a   3. Lymph nodes, left axillary dissection:   Two of three lymph nodes positive for metastatic carcinoma (2/3). 4. Excised tissue, right breast:   Fragments of skin and benign breast tissue. 5. Excised tissue, left breast:   Fragments of skin and benign breast tissue. Assessment:   1) Left breast Invasive ductal carcinoma    [id number]  GRADE 2  % MN 20% HER2 greg equivocal - FISH could not be done  Repeat biopsy of breast mass 6/24/19 - HER2 greg negative, mamma print indicates she has genomically high risk disease  S/p ddAC-T and bilateral mastectomy on 1/21/2020-[id number]  RT completed 5/2020  Been on Letrozole 2/2020- Planned 5-10 years      She is recovering from toxicities and doing well  ROS is reassuring  Letrozole with minor hot flashes  DEXA reviewed 5/2020 and is normal  Counseled on VD and Ca    Counseled on symptoms to call fall and NCCN based guidelines for surveillance  .     2) Hypothyroidism    3) Obesity    Plan:     · Continue  Letrozole 2.5 mg daily 5-10 years  · Vit D / Calcium doses reviewed  · DEXA 2022  · Follow with Dr. Reg May Sep 2020 and me in 6 months  · Reconstruction planned      RTC in 6 months     I appreciate the opportunity to participate in Ms. Rachelle Garcia's care. Signed By: Marilou Castellanos MD      I was in the office while conducting this encounter. The patient was at her home. Consent:  She and/or her healthcare decision maker is aware that this patient-initiated Telehealth encounter is a billable service, with coverage as determined by her insurance carrier. She is aware that she may receive a bill and has provided verbal consent to proceed: Yes    Pursuant to the emergency declaration under the [address] and the [facility], [place], [address] waiver authority and the [employer] and [employer] General Act, this Virtual  Visit was conducted, with patient's (and/or legal guardian's) consent, to reduce the patient's risk of exposure to COVID-19 and provide necessary medical care. Services were provided through a video synchronous discussion virtually to substitute for in-person visit.

## 2021-09-08 ENCOUNTER — HOSPITAL ENCOUNTER (OUTPATIENT)
Dept: PHYSICAL THERAPY | Age: 56
Discharge: HOME | End: 2021-09-08
Payer: COMMERCIAL

## 2021-09-08 PROCEDURE — 97140 MANUAL THERAPY 1/> REGIONS: CPT

## 2021-09-08 NOTE — PROGRESS NOTES
LYMPHEDEMA 30 DAY TREATMENT NOTE with DISCHARGE- [facility] 2-15    Patient Name: Filiberto Buck  Date:2021  : 1965  [x]  Patient  Verified  Payor: [address] [place] [address] Road / Plan: [address] / Product Type: Managed Care Medicaid /    In time: 12:00 pm Out time: 1:15 pm  Total Treatment Time (min): 75  Total Timed Codes (min): 75  1:1 Treatment Time ( [address] only): 75  Visit #: 5    Treatment Area: Lymphedema, not elsewhere classified [I89.0]    SUBJECTIVE  Pain Level (0-10 scale): 0/10  Any medication changes, allergies to medications, adverse drug reactions, diagnosis change, or new procedure performed?: [x] No    [] Yes (see summary sheet for update)  Subjective functional status/changes:   [x] No changes reported    Patient received the compression bra and has worn it daily since receiving it. The bra feels comfortable. OBJECTIVE    75 min Manual Therapy    Manual Lymphatic Drainage (MLD):  Area to decongest: L UE and trunk   Sequence used and effectiveness: Secondary sequence for upper extremity with trunk involvement with patient positioned in supine and R sidelying. Modifications were made to manual lymph drainage sequence to exclude cervical techniques secondary to thyroid disease. Performed soft tissue mobilization and fibrotic techniques to adhesions to the L anterior trunk. Continued education in self MLD techniques. Skin/wound care/debridement: Reviewed skin care principles:   Skin care products. Upper/Lower Extremity Compression: Patient arrived to the clinic wearing the [address] compression bra and the bra fits well and is comfortable to the patient. Provided patient with one piece of gray foam (6 X 12 inches) enclosed in Tricofix. Patient encouraged to wear the compression bra daily as well as the foam padding for a few hours each day for management of left truncal swelling.  Provided patient with sizing and vendor information if she wishes to purchase a second compression bra for wash and wear. Patient received the Juzo Soft arm sleeve and gauntlet from the vendor but did not bring the products to the clinic for fitting. Patient encouraged to wear the Juzo Soft arm sleeve and hand piece with high risk activities and/or daily as tolerated for management of swelling. L UE and trunk     Style: Circular knit    Brand: Juzo Soft arm sleeve and hand piece and [employer] Prima Compression bra     Type: Non-Custom: Size: arm sleeve size 4 max/long length and gauntlet size large and compression bra size X large. Vendor: [employer]: Return and reordering process (by bringing prior garments into the clinic). Educated patient to monitor for redness or pressure points on the skin. Provided patient with one piece of Komprex 2 (approximate size 6 X 12 inches) and a chip bag during a previous visit. Patient has been educated in proper use of the products. Patient to defer using these products with any adverse skin reactions. Provided patient with one roll of Idealbinde and educated patient in bandaging technique with/without foam padding for management of truncal swelling. Patient encouraged to bandage nightly or with any increase in truncal swelling. Rationale: decrease pain, increase ROM, increase tissue extensibility and decrease edema  to improve the patients ability to better manage lymphedema during the restorative phase of care    0   Vasopneumatic Device:   Patient is using the Flexitouch vaso-pneumatic device most days for management of swelling. Rationale: To improve lymphatic fluid movement and decrease swelling to improve the patients ability to perform ADL and IADL skills and prevent worsening of swelling over time.     With   [] TE   [] TA   [x] MT   [] SC   [] other: Patient Education: [x] Review HEP    [x] MLD Patient Education Reviewed with patient, as well as demonstration and instruction during MLD portion of the session. Continued education in self MLD technique with bathing and skin care. Provided patient with the written instructions to follow. soft tissue mobilization of scar adhesions  [] Progressed/Changed HEP based on:   [] positioning   [] Kinesiotape   [x] Skin care   [] wound care   [x] other: garment fitting and reordering process  [x] management of foam padding/wear schedule of compression products  Patient / caregiver re-demonstrated bandaging. [] Yes  [] No  Compression bandaging/garment precautions reviewed: [x] Yes  [] No     Other Objective/Functional Measures:   B UE volumetric measurements. Full UE volumetric measurements taken today reveal an increase of 129 ml in the L UE and an increase of 103 ml in the R UE since the initial evaluation 7/13/21. Percentage difference is 5% L UE > R UE. Girth measurements of the trunk:  Axilla: 103.0 cm today compared to 105.0 cm on evaluation. Chest: 107.0 cm today compared to 107.5 on evaluation. Waist: 103.0 cm today compared to 103.0 cm on evaluation. Hips: 130.0 cm today compared to 131.0 cm on evaluation. Pain Level (0-10 scale) post treatment: 0/10    ASSESSMENT/Changes in Function:   Patient has been seen for 5 visits in our clinic. The treatments focused mainly on manual lymphatic drainage and selection and fitting of compression products. Full UE volumetric measurements taken today reveal a minor increase in volumes in the arms since evaluation. Patient admits to sporadically wearing compression products and [place] of [place] Patient was willing to pay a cash price to obtain a compression bra since compression products for the trunk were not covered by her insurance. Patient has been educated in the benefit of daily self MLD and use of compression products and the vaso-pneumatic device for management of continual truncal swelling.  Provided patient with contact information for Mitch Reeder CMT, CLT, to continue with lymphatic massage for management of swelling if she wishes during the restorative phase of care. Patient has met all short term and long term goals set on evaluation. She is ready to be discharged to the restorative phase of care. Progress towards goals / Updated goals:  Short term goals  Time frame: to be met by 8/17/2021, goals to be extended to be met by 10/8/21  1. Patient will demonstrate knowledge of signs/symptoms of infections/cellulitis and be independent in skin care to prevent cellulitis. Goal met 8/24/21  2. Patient will demonstrate independence in lymphedema home program of therapeutic exercises/stretches to improve circulation and range of motion for ease of performing ADLs. Goal in progress. 3.  Patient will participate in the selection process to allow ordering of home compression system (daytime, nighttime garments and pump as needed). Goal met 8/24/21     Long term goals  Time frame: to be met by 10/8/2021  1. Patient will be independent with don/doff of compression system and use in order to prevent reaccumulation of fluid at discharge. Goal met 9/8/21. 2.  Pt will be independent in self-MLD and show stable limb volumes showing decongestion and pt. ready for transition to independent restorative phase of lymphedema therapy. Goal met 9/8/21. PLAN  []  Upgrade activities as tolerated     []  Continue plan of care  []  Update interventions per flow sheet       [x]  Discharge to the restorative phase of care.  Patient will call the clinic for follow up.   []  Other:_      Janelle Crane, PT, CLT  9/8/2021

## 2021-09-13 NOTE — PROGRESS NOTES
HISTORY OF PRESENT ILLNESS  Fernie Slater is a 60 y.o. female. HPI Established patient presents for follow-up to LEFT breast cancer. Denies breast mass, skin changes and pain. Continues to address LEFT chest and axillary lymphedema.     Breast cancer history -  Referring - [facility]  5/28/19 - LEFT breast IDC and DCIS,T2 N1, potential skin involvement, Er/Pr + Her 2 greg equivocal, Not enough invasive to send Her 2 greg for fish  6/3/19 - LEFT breast skin punch biopsy, benign. 6/20/19 - biopsies of LEFT breast and LEFT axilla.   Mammaprint high risk. 7/18/19 - port insertion  7/19/19 - started [facility] - Dr. Lenore Leyva  9/13/19 - 12/3/19 - Taxol infusions  1/21/20: b/l mastectomy - Dr. Abena Montana and Dr. Angel Head  2/2020: started letrozole - had side effect of significant joint pain; switched to anastrozole  6/2020: Completed RT - Dr. Che Figures  9/14/2020: bilateral breast implant exchange with Dr. Iraida Castellon history -   Possibly maternal grandmother with breast cancer but not sure? Father - colon cancer      Past Surgical History:   Procedure Laterality Date    HX BREAST RECONSTRUCTION Bilateral 1/21/2020    IMMEDIATE BILATERAL BREAST RECONSTRUCTION WITH TISSUE EXPANDERS AND ALLODERM GRAFTS performed by [facility]da [facility] at [facility]  2004    HX COLONOSCOPY      HX HEENT  2009    THYROIDECTOMY    HX KNEE ARTHROSCOPY Left     HX LUMBAR FUSION  1995    HX MOHS PROCEDURES      x 4 times     HX SHOULDER ARTHROSCOPY Right     HX THYROIDECTOMY  2014    HX VASCULAR ACCESS  2019    PORTACATH RIGHT CHEST         ROS    Physical Exam  Constitutional:       Appearance: Normal appearance. Chest:      Breasts:         Right: Absent. Left: Absent.           Comments: Chest wall s/p bilateral mastectomy with reconstruction  Musculoskeletal:      Comments: FROM - UE x 2  Mild lymphedema to LEFT chest wall and axilla   Lymphadenopathy:      Upper Body:      Right upper body: No supraclavicular or axillary adenopathy. Left upper body: No supraclavicular or axillary adenopathy. Neurological:      Mental Status: She is alert. Psychiatric:         Attention and Perception: Attention normal.         Mood and Affect: Mood normal.         Speech: Speech normal.         Behavior: Behavior normal.           Visit Vitals  BP (!) 147/77 (BP 1 Location: Right arm, BP Patient Position: Sitting, BP Cuff Size: Adult)   Wt 207 lb (93.9 kg)   BMI 32.42 kg/m²           ASSESSMENT and PLAN  Encounter Diagnoses   Name Primary?  Malignant neoplasm of upper-outer quadrant of left breast in female, estrogen receptor positive ([facility] [address] Yes    S/P mastectomy, bilateral     Lymphedema     History of breast cancer in female         Normal exam with no evidence of local recurrence. Continues on AI and has follow-up with Dr. Eva Vergara. Tolerating anastrozole much better. Saw Dr. Sachin Bolton in 8/2021. Considering additional surgery for prominent tissue under LEFT arm. Saw Stephanie Freeman for PT. Then saw Jefferson Montanez in lymphedema clinic, but has been released from that. RTC in 6 months or sooner PRN. She is comfortable with this plan. All questions answered and she stated understanding.           Total time spent for this patient - 20 minutes.

## 2021-09-14 ENCOUNTER — OFFICE VISIT (OUTPATIENT)
Dept: SURGERY | Age: 56
End: 2021-09-14
Payer: COMMERCIAL

## 2021-09-14 VITALS — SYSTOLIC BLOOD PRESSURE: 117 MMHG | DIASTOLIC BLOOD PRESSURE: 77 MMHG | WEIGHT: 207 LBS | BODY MASS INDEX: 32.42 KG/M2

## 2021-09-14 DIAGNOSIS — Z85.3 HISTORY OF BREAST CANCER IN FEMALE: ICD-10-CM

## 2021-09-14 DIAGNOSIS — I89.0 LYMPHEDEMA: ICD-10-CM

## 2021-09-14 DIAGNOSIS — C50.412 MALIGNANT NEOPLASM OF UPPER-OUTER QUADRANT OF LEFT BREAST IN FEMALE, ESTROGEN RECEPTOR POSITIVE (HCC): Primary | ICD-10-CM

## 2021-09-14 DIAGNOSIS — Z17.0 MALIGNANT NEOPLASM OF UPPER-OUTER QUADRANT OF LEFT BREAST IN FEMALE, ESTROGEN RECEPTOR POSITIVE (HCC): Primary | ICD-10-CM

## 2021-09-14 DIAGNOSIS — Z90.13 S/P MASTECTOMY, BILATERAL: ICD-10-CM

## 2021-09-14 PROCEDURE — 99213 OFFICE O/P EST LOW 20 MIN: CPT | Performed by: NURSE PRACTITIONER

## 2021-09-14 RX ORDER — PANTOPRAZOLE SODIUM 40 MG/1
TABLET, DELAYED RELEASE ORAL
COMMUNITY
Start: 2021-09-08

## 2021-09-14 NOTE — PATIENT INSTRUCTIONS

## 2021-09-16 NOTE — PROGRESS NOTES
Shukri Nielsen (: 1965) is a 60 y.o. female, established patient, here for evaluation of the following chief complaint(s): Other ([phone]) and Follow-up (sleep apnea)           Mancel Cramp, was evaluated through a synchronous (real-time) audio-video encounter. The patient (or guardian if applicable) is aware that this is a billable service. Verbal consent to proceed has been obtained within the past 12 months. The visit was conducted pursuant to the emergency declaration under the [address] [place] [address], [address] [place] [address] authority and the [email or web address] and [employer] General Act. Patient identification was verified, and a caregiver was present when appropriate. The patient was located in a state where the provider was credentialed to provide care. An electronic signature was used to authenticate this note.   -- Abbie Zapata

## 2021-09-22 ENCOUNTER — TELEPHONE (OUTPATIENT)
Dept: INTERNAL MEDICINE CLINIC | Age: 56
End: 2021-09-22

## 2021-09-22 NOTE — TELEPHONE ENCOUNTER
Patient have been having trouble with dizziness since Saturday. Patient feels like she cant stand for long period of time and have hard time sitting up. Patient is very nervous and concerned. Patient haven't really ate today and don't have bp cuff but bp was 117/77.  Patient was advised to go to a urgent care

## 2021-09-23 NOTE — TELEPHONE ENCOUNTER
[facility] with an [facility] and bladder infection and has started antibiotic Keflex BID for 10days. Patient advised she will follow up if not noticing improvement within a few days after starting the antibiotic. Patient was grateful for the return call to check on her and feels more confident about her current course of treatment after our discussion. Nothing else needed at this time.

## 2021-09-27 ENCOUNTER — HOSPITAL ENCOUNTER (OUTPATIENT)
Dept: CT IMAGING | Age: 56
Discharge: HOME | End: 2021-09-27
Attending: INTERNAL MEDICINE
Payer: COMMERCIAL

## 2021-09-27 DIAGNOSIS — R93.89 ABNORMAL CT SCAN: ICD-10-CM

## 2021-09-27 PROCEDURE — 71250 CT THORAX DX C-: CPT

## 2021-10-07 ENCOUNTER — TRANSCRIBE ORDER (OUTPATIENT)
Dept: SCHEDULING | Age: 56
End: 2021-10-07

## 2021-10-07 DIAGNOSIS — R93.89 ABNORMAL CT SCAN, CHEST: Primary | ICD-10-CM

## 2021-10-18 ENCOUNTER — OFFICE VISIT (OUTPATIENT)
Dept: INTERNAL MEDICINE CLINIC | Age: 56
End: 2021-10-18
Payer: COMMERCIAL

## 2021-10-18 VITALS
RESPIRATION RATE: 14 BRPM | SYSTOLIC BLOOD PRESSURE: 132 MMHG | HEIGHT: 67 IN | OXYGEN SATURATION: 96 % | BODY MASS INDEX: 33.16 KG/M2 | WEIGHT: 211.25 LBS | HEART RATE: 83 BPM | DIASTOLIC BLOOD PRESSURE: 85 MMHG | TEMPERATURE: 98 F

## 2021-10-18 DIAGNOSIS — R20.0 NUMBNESS AND TINGLING OF RIGHT THUMB: ICD-10-CM

## 2021-10-18 DIAGNOSIS — R20.2 NUMBNESS AND TINGLING OF RIGHT THUMB: ICD-10-CM

## 2021-10-18 DIAGNOSIS — R42 VERTIGO: Primary | ICD-10-CM

## 2021-10-18 DIAGNOSIS — Z23 NEEDS FLU SHOT: ICD-10-CM

## 2021-10-18 PROCEDURE — 99214 OFFICE O/P EST MOD 30 MIN: CPT | Performed by: INTERNAL MEDICINE

## 2021-10-18 PROCEDURE — 90471 IMMUNIZATION ADMIN: CPT | Performed by: INTERNAL MEDICINE

## 2021-10-18 PROCEDURE — 90686 IIV4 VACC NO PRSV 0.5 ML IM: CPT | Performed by: INTERNAL MEDICINE

## 2021-10-18 RX ORDER — ZINC GLUCONATE 10 MG
LOZENGE ORAL DAILY
COMMUNITY

## 2021-10-18 RX ORDER — MECLIZINE HYDROCHLORIDE 25 MG/1
25 TABLET ORAL
Qty: 30 TABLET | Refills: 1 | Status: SHIPPED | OUTPATIENT
Start: 2021-10-18 | End: 2021-10-28

## 2021-10-18 NOTE — PROGRESS NOTES
Lorrayne Dakin (: 1965) is a 60 y.o. female, established patient, here for evaluation of the following chief complaint(s):  Chief Complaint   Patient presents with    Dizziness     Patient occurs dizzy spells occur randomly, started in Sept. Patient has to move slowly. Patient has concerns with ear problems.  Hand Pain     Patient reports right hand, thumb finger pain, occuring for the past couple of months. Assessment and Plan:       ICD-10-CM ICD-9-CM    1. Vertigo  R42 780.4 meclizine (ANTIVERT) 25 mg tablet   2. Numbness and tingling of right thumb  R20.0 782.0     R20.2     3. Needs flu shot  Z23 V04.81 INFLUENZA VIRUS VAC QUAD,SPLIT,PRESV FREE SYRINGE IM       1. Referral(s) and referral coordination reviewed with patient at visit. Medication(s), management and follow-up based on response reviewed at visit. 2.  Has seen Dr. Jessica Blancas for shoulder in past--reviewed could see for hand eval PRN. 3.  Immunization(s) reviewed and updated at visit. Follow-up and Dispositions    · Return if symptoms worsen or fail to improve. reviewed medications and side effects in detail    For additional documentation of information and/or recommendations discussed this visit, please see notes in instructions. Plan and evaluation (above) reviewed with pt at visit  Patient voiced understanding of plan and provided with time to ask/review questions. After Visit Summary (AVS) provided to pt after visit with additional instructions as needed/reviewed. Future Appointments   Date Time Provider Preeti Joyce   10/25/2021 10:30 AM Cornelia Yin MD [facility] BS AMB   10/27/2021 10:45 AM Diandra Bradley  [facility]  BS AMB   2022 10:00 AM [facility] [place] [facility] [address] [facility]    3/15/2022 11:00 AM Ruth Shultz NP [facility] BS AMB   --Updated future visits after patient check-out.       History of Present Illness:     Notes (nursing/rooming note copied below in italics):  As above    PCP: Chris Saenz MD    LOV with PCP Jan 2021. Had vertigo once years ago. She notes this was prior to seeing Dr. Geno Blum. Seen by [facility] in Sept--they treated with cephalexin for UTI and ear infection--fluid in right, but pt thinks may have been bilat. She felt like better with cephalexin, then returned after 12 days. She has not seen ENT. She is somewhat dizzy today. Has not had problems with heart rhythm. At times she will feel like she cannot maintain gait. She feels symptoms in ears bilat. She has no allergy symptoms--just PNDrip medication which is script. She is continuing with allergy shots currently. Notes right hand numbness from her thumb CMC joint. At times thumb is slightly numb. Thinks not related to texting. Has had for months, but not interested at this time in seeing provider to evaluate. She has other providers related to her prior cancer, and prefers to not do at this time until sees ENT to evaluate. Nursing screenings reviewed by provider at visit. Prior to Admission medications    Medication Sig Start Date End Date Taking? Authorizing Provider   magnesium 250 mg tab Take  by mouth daily. Yes Provider, Historical   pantoprazole (PROTONIX) 40 mg tablet  9/8/21  Yes Provider, Historical   ipratropium (ATROVENT) 21 mcg (0.03 %) nasal spray as needed. 2/7/21  Yes Provider, Historical   EPINEPHrine (EPIPEN) 0.3 mg/0.3 mL injection  2/7/21  Yes Provider, Historical   traZODone (DESYREL) 50 mg tablet TAKE TWO TABLETS BY MOUTH EVERY NIGHT AT BEDTIME 6/24/21  Yes Haile Irwin NP   sertraline (ZOLOFT) 100 mg tablet Take 0.5 Tablets by mouth daily. 6/3/21  Yes Chris Saenz MD   Synthroid 100 mcg tablet Take 1 Tab by mouth Daily (before breakfast). 2/9/21  Yes Chris Saenz MD   calcium-cholecalciferol, d3, (CALCIUM 600 + D) 600-125 mg-unit tab Take  by mouth.    Yes Provider, Historical   anastrozole (Arimidex) 1 mg tablet Take 1 mg by mouth daily. 7/23/20  Yes Dania Alvarenga NP   naloxone [facility]) 4 mg/actuation nasal spray Use 1 spray intranasally, then discard. Repeat with new spray every 2 min as needed for opioid overdose symptoms, alternating nostrils. 1/23/20  Yes Chau Rice III, MD   cholecalciferol (VITAMIN D3) 1,000 unit cap Take 5,000 Units by mouth daily. Yes Provider, Blayne   liothyronine (CYTOMEL) 5 mcg tablet Take 5 mcg by mouth daily. Yes Other, MD ARIAS Jones    Vitals:    10/18/21 1206   BP: 132/85   Pulse: 83   Resp: 14   Temp: 98 °F (36.7 °C)   TempSrc: Oral   SpO2: 96%   Weight: 211 lb 4 oz (95.8 kg)   Height: 5' 7\" (1.702 m)   PainSc:   0 - No pain      Body mass index is 33.09 kg/m². Physical Exam:     Physical Exam  Vitals and nursing note reviewed. Constitutional:       General: She is not in acute distress. Appearance: Normal appearance. She is well-developed. She is not diaphoretic. HENT:      Head: Normocephalic and atraumatic. Right Ear: Tympanic membrane, ear canal and external ear normal.      Left Ear: Ear canal and external ear normal.      Ears:      Comments: Left TM thickened with slightly irregular/splayed Light Reflex. Findings reviewed with pt at visit. Mouth/Throat:      Mouth: Mucous membranes are moist.   Eyes:      General: No scleral icterus. Right eye: No discharge. Left eye: No discharge. Conjunctiva/sclera: Conjunctivae normal.   Cardiovascular:      Rate and Rhythm: Normal rate and regular rhythm. Pulses: Normal pulses. Heart sounds: Normal heart sounds. No murmur heard. No friction rub. No gallop. Pulmonary:      Effort: Pulmonary effort is normal. No respiratory distress. Breath sounds: Normal breath sounds. No stridor. No wheezing, rhonchi or rales. Abdominal:      General: Bowel sounds are normal. There is no distension. Palpations: Abdomen is soft. Tenderness: There is no abdominal tenderness.  There is no guarding. Musculoskeletal:         General: No swelling, tenderness, deformity or signs of injury. Cervical back: Neck supple. No rigidity. No muscular tenderness. Right lower leg: No edema. Left lower leg: No edema. Comments: No swelling/pain/numbness of right hand. No pain over right thumb or carpo-mecatacarpal joint right thumb. Lymphadenopathy:      Cervical: No cervical adenopathy. Skin:     General: Skin is warm. Coloration: Skin is not jaundiced or pale. Findings: No bruising, erythema or rash. Comments: LUE in stocking due to lymphedema. Neurological:      General: No focal deficit present. Mental Status: She is alert. Motor: No abnormal muscle tone. Coordination: Coordination normal.      Gait: Gait normal.   Psychiatric:         Mood and Affect: Mood normal.         Behavior: Behavior normal.         Thought Content: Thought content normal.         Judgment: Judgment normal.         An electronic signature was used to authenticate this note.   -- Gray Gaines MD

## 2021-10-18 NOTE — PROGRESS NOTES
RM 17    Chief Complaint   Patient presents with    Dizziness     Patient occurs dizzy spells occur randomly, started in Sept. Patient has to move slowly. Patient has concerns with ear problems.  Hand Pain     Patient reports right hand, thumb finger pain, occuring for the past couple of months. 1. Have you been to the ER, urgent care clinic since your last visit? Hospitalized since your last visit? No Sept 2021, [facility], dizziness     2. Have you seen or consulted any other health care providers outside of the [address] [place] [address] since your last visit? Include any pap smears or colon screening. No    Health Maintenance Due   Topic Date Due    COVID-19 Vaccine (1) Never done    Cervical cancer screen  Never done    Colorectal Cancer Screening Combo  Never done    Breast Cancer Screen Mammogram  10/22/2020    Shingrix Vaccine Age 50> (2 of 2) 03/17/2021    Flu Vaccine (1) 09/01/2021       Abuse Screening Questionnaire 10/18/2021   Do you ever feel afraid of your partner? N   Are you in a relationship with someone who physically or mentally threatens you? N   Is it safe for you to go home?  Y       3 most recent PHQ Screens 10/18/2021   Little interest or pleasure in doing things Not at all   Feeling down, depressed, irritable, or hopeless Not at all   Total Score PHQ 2 0       Learning Assessment 9/14/2021   PRIMARY LEARNER Patient   HIGHEST LEVEL OF EDUCATION - PRIMARY LEARNER  -   BARRIERS PRIMARY LEARNER -   PRIMARY LANGUAGE ENGLISH   LEARNER PREFERENCE PRIMARY DEMONSTRATION     -     -     -     -   ANSWERED BY patient   RELATIONSHIP SELF

## 2021-10-18 NOTE — PATIENT INSTRUCTIONS
1.  Please follow the following instructions to process/authorize your referral, if needed:    Referrals processing  Please verify with your insurance IF you need referral authorization submitted. For insurance plans which require this, please follow the following steps. FAILURE TO DO SO MAY RESULT IN INABILITY TO SEE THE SPECIALIST YOU HAVE BEEN REFERRED TO (once you are scheduled to see them). 1. Call and schedule appointment with specialist  2. Call our clinic and leave message with provider name, and date of appointment  3. We will then submit the referral to your insurance. This process takes 2-5 business days. If you have questions about scheduling or authorizing referral, you can review with our referral coordinator. You can review with her today if available/if you have time, or you can call to review once you have made your referral/appointment. If you are not sure if you need referral authorizations, please review with the referral coordinator(s), either prior to or after you have made the appointment, as reviewed. 2.  If needed, you can see an ENT provider ([facility] Throat specialists) at [facility], or [facility]:    --[facility]:  Phone: [phone]    --[facility]:  Phone: [phone]. You can see Dr. Natali Fenton for the right thumb symptoms--as reviewed--or a hand specialist with that group if he cannot evaluate.

## 2021-10-20 DIAGNOSIS — F32.A DEPRESSION, UNSPECIFIED DEPRESSION TYPE: ICD-10-CM

## 2021-10-20 DIAGNOSIS — F32.A ANXIETY AND DEPRESSION: ICD-10-CM

## 2021-10-20 DIAGNOSIS — G47.00 INSOMNIA, UNSPECIFIED TYPE: ICD-10-CM

## 2021-10-20 DIAGNOSIS — F41.9 ANXIETY AND DEPRESSION: ICD-10-CM

## 2021-10-20 RX ORDER — TRAZODONE HYDROCHLORIDE 50 MG/1
TABLET ORAL
Qty: 180 TABLET | Refills: 1 | Status: SHIPPED | OUTPATIENT
Start: 2021-10-20 | End: 2022-03-19

## 2021-10-20 RX ORDER — SERTRALINE HYDROCHLORIDE 100 MG/1
50 TABLET, FILM COATED ORAL DAILY
Qty: 45 TABLET | Refills: 1 | Status: SHIPPED | OUTPATIENT
Start: 2021-10-20 | End: 2022-04-18

## 2021-10-20 NOTE — TELEPHONE ENCOUNTER
Last visit 10/18/2021 MD Adam He   Next appointment Nothing scheduled   Previous refill encounter(s)   06/24/2021 Desyrel #60 with 2 refills,  06/03/2021 Zoloft #15 with 5 refills. Requested Prescriptions     Pending Prescriptions Disp Refills    sertraline (ZOLOFT) 100 mg tablet 45 Tablet 1     Sig: Take 0.5 Tablets by mouth daily.     traZODone (DESYREL) 50 mg tablet 180 Tablet 1     Sig: TAKE TWO TABLETS BY MOUTH EVERY NIGHT AT BEDTIME

## 2021-10-21 DIAGNOSIS — Z17.0 MALIGNANT NEOPLASM OF UPPER-OUTER QUADRANT OF LEFT BREAST IN FEMALE, ESTROGEN RECEPTOR POSITIVE (HCC): ICD-10-CM

## 2021-10-21 DIAGNOSIS — C50.412 MALIGNANT NEOPLASM OF UPPER-OUTER QUADRANT OF LEFT BREAST IN FEMALE, ESTROGEN RECEPTOR POSITIVE (HCC): ICD-10-CM

## 2021-10-21 RX ORDER — ANASTROZOLE 1 MG/1
1 TABLET ORAL DAILY
Qty: 30 TABLET | Refills: 3 | Status: SHIPPED | OUTPATIENT
Start: 2021-10-21 | End: 2022-01-17 | Stop reason: SDUPTHER

## 2021-10-27 ENCOUNTER — OFFICE VISIT (OUTPATIENT)
Dept: ONCOLOGY | Age: 56
End: 2021-10-27
Payer: COMMERCIAL

## 2021-10-27 VITALS
SYSTOLIC BLOOD PRESSURE: 124 MMHG | TEMPERATURE: 98.2 F | HEART RATE: 80 BPM | DIASTOLIC BLOOD PRESSURE: 80 MMHG | BODY MASS INDEX: 33.36 KG/M2 | OXYGEN SATURATION: 95 % | WEIGHT: 213 LBS | RESPIRATION RATE: 18 BRPM

## 2021-10-27 DIAGNOSIS — E88.09 AROMATASE DEFICIENCY: Primary | ICD-10-CM

## 2021-10-27 DIAGNOSIS — C50.412 MALIGNANT NEOPLASM OF UPPER-OUTER QUADRANT OF LEFT BREAST IN FEMALE, ESTROGEN RECEPTOR POSITIVE (HCC): ICD-10-CM

## 2021-10-27 DIAGNOSIS — Z17.0 MALIGNANT NEOPLASM OF UPPER-OUTER QUADRANT OF LEFT BREAST IN FEMALE, ESTROGEN RECEPTOR POSITIVE (HCC): ICD-10-CM

## 2021-10-27 PROCEDURE — 99214 OFFICE O/P EST MOD 30 MIN: CPT | Performed by: INTERNAL MEDICINE

## 2021-10-27 NOTE — PROGRESS NOTES
Yannick Murillo is a 60 y.o. female    Chief Complaint   Patient presents with    Follow-up     Left breast cancer- ER+DC+HER2 greg neg       1. Have you been to the ER, urgent care clinic since your last visit? Hospitalized since your last visit? No    2. Have you seen or consulted any other health care providers outside of the [address] [place] [address] since your last visit? Include any pap smears or colon screening. Yes, Pulmonary. Rotation Flap Text: The defect edges were debeveled with a #15 scalpel blade.  Given the location of the defect, shape of the defect and the proximity to free margins a rotation flap was deemed most appropriate.  Using a sterile surgical marker, an appropriate rotation flap was drawn incorporating the defect and placing the expected incisions within the relaxed skin tension lines where possible.    The area thus outlined was incised deep to adipose tissue with a #15 scalpel blade.  The skin margins were undermined to an appropriate distance in all directions utilizing iris scissors.

## 2021-10-27 NOTE — PROGRESS NOTES
[facility] at [address] [place] [address] Misty Chinchlila, [address], [place]: [phone]  F: [phone]    Reason for Visit:   Melvin Mena is a 60 y.o. female who is seen by synchronous (real-time) audio-video technology on 10/27/2021 for follow up of  Left breast cancer- ER+NC+HER2 greg neg    Treatment History:   · 6/19: Mammogram:  Left breast mass with skin thickening, 2 suspicious nodes. Mass measures 1.4 x 0.9 x 1.3  · 6/19: MRI breast: Multicentric left breast carcinoma. Non-masslike enhancement extends from the nipple to the posterior third, involves all quadrants, and measures 106 x 44 x 79 mm. · 5/28/19: Biopsy breast- IDC % % HER 2 negative, skin biopsy benign , node biopsy mostly adipose tissue with atypical cells . BRCA1 and 2 negative. CT and bone scan negative for metastasis. Mammaprint with insufficient tissue for testing. Repeat biopsy of axillary node 6/20/19 positive for metastatic disease- repeat mammaprint - high risk  · 7/19/19: cycle 1 neoadjuvant of dd AC-T  · 9/12/19: US of breast shows increased malignant microcalcification in the left breast, etiology included tx related necrosis vs extension of disease. Decreased size in left axillary LN. · 10/22/19: calcifications in the left upper outer quadrant are stable, calcifications in left axillary LN are not changed   · 1/21/20: b/l mastectomy   · 2/2020: Letrozole then switched to Anastrazole due to side effects  · 5/2020:Completed RT    History of Present Illness:   Patient is a 60 y.o. seen for L breast cancer. She felt a sensation in the shower about May 2019. She also noted a lump and  an inverted nipple. She had a physician friend look at this who directed her to mammogram and recommended she see Dr. Mabel Goodwin. This led to imaging and diagnosis as above. She completed neoadjuvant ddACT followed by bilateral mastectomy on 1/21/20. On letrozole then switched to Anastrazole.      She feels well.   She had vertigo earlier this ear. Sees ENT. This is much better. She has gained weight, she continues to have Left sided lymphedema, she is thinking of having liposuction. She is following with Dr. Miranda Due for abnormal CT . She no longer has Cough which was thought to have GERD. Has had normal PFTs. She has rare arthralgias. She has no new HAs. Rare ETOH  She does not smoke. Father had colon cancer    Past Medical History:   Diagnosis Date    Acquired hypothyroidism 3/4/2021    Anxiety     Arthritis     NECK    At risk for sleep apnea     GAGAN 5     Basal cell carcinoma of skin     FACE, CHEST, BACK    Breast cancer ([facility] 2019    LEFT    H/O total mastectomy     Heart attack ([facility] 2019    History of seasonal allergies     Hx antineoplastic chemo     COMPLETED 12-3-19    Ill-defined condition     hx motion sickness    Motion sickness     Nausea & vomiting     PONV after thyroidectomy;  Hx motion sickness    Squamous cell skin cancer     Dr. Alena Scruggs Thyroid disease     H/ GOITER    Vertigo       Past Surgical History:   Procedure Laterality Date    HX BREAST RECONSTRUCTION Bilateral 2020    IMMEDIATE BILATERAL BREAST RECONSTRUCTION WITH TISSUE EXPANDERS AND ALLODERM GRAFTS performed by Cyndy Roberts MD at [address] HX  SECTION      HX COLONOSCOPY      HX HEENT  2009    THYROIDECTOMY    HX KNEE ARTHROSCOPY Left     HX LUMBAR FUSION      HX MOHS PROCEDURES      x 4 times     HX SHOULDER ARTHROSCOPY Right     HX THYROIDECTOMY      HX VASCULAR ACCESS  2019    PORTACATH RIGHT CHEST      Social History     Tobacco Use    Smoking status: Never Smoker    Smokeless tobacco: Never Used   Substance Use Topics    Alcohol use: Yes     Comment: socially      Family History   Problem Relation Age of Onset   Danelle Resendez Arthritis-osteo Mother     Cancer Father         Colon Cancer    No Known Problems Sister     No Known Problems Sister     Migraines Sister     Anesth Problems Neg Hx      Current Outpatient Medications   Medication Sig    anastrozole (Arimidex) 1 mg tablet Take 1 mg by mouth daily.  sertraline (ZOLOFT) 100 mg tablet Take 0.5 Tablets by mouth daily.  traZODone (DESYREL) 50 mg tablet TAKE TWO TABLETS BY MOUTH EVERY NIGHT AT BEDTIME    magnesium 250 mg tab Take  by mouth daily.  meclizine (ANTIVERT) 25 mg tablet Take 1 Tablet by mouth three (3) times daily as needed for Dizziness for up to 10 days.  pantoprazole (PROTONIX) 40 mg tablet     ipratropium (ATROVENT) 21 mcg (0.03 %) nasal spray as needed.  EPINEPHrine (EPIPEN) 0.3 mg/0.3 mL injection     Synthroid 100 mcg tablet Take 1 Tab by mouth Daily (before breakfast).  calcium-cholecalciferol, d3, (CALCIUM 600 + D) 600-125 mg-unit tab Take  by mouth.  naloxone (NARCAN) 4 mg/actuation nasal spray Use 1 spray intranasally, then discard. Repeat with new spray every 2 min as needed for opioid overdose symptoms, alternating nostrils.  cholecalciferol (VITAMIN D3) 1,000 unit cap Take 5,000 Units by mouth daily.  liothyronine (CYTOMEL) 5 mcg tablet Take 5 mcg by mouth daily. No current facility-administered medications for this visit. Allergies   Allergen Reactions    Augmentin [Amoxicillin-Pot Clavulanate] Nausea and Vomiting     Not an allergy. Review of Systems: A complete review of systems was obtained, negative except as described above.     Physical Exam:       General appearance - alert, well appearing, and in no distress  Lymphatics - no palpable lymphadenopathy, Left arm lymphedema  Chest wall: No masses, ulcers, skin changes  Abdomen - soft, nontender, nondistended, no masses or organomegaly, bowel sounds present  Extremities - peripheral pulses normal, no pedal edema, no clubbing or cyanosis  Skin - normal coloration and turgor, no new rashes, no suspicious skin lesions noted    ECOG PS: 0        Results:     Lab Results Component Value Date/Time    WBC 5.9 06/23/2021 01:37 PM    HGB 14.3 06/23/2021 01:37 PM    HCT 42.0 06/23/2021 01:37 PM    PLATELET 236 51/26/8044 01:37 PM     (H) 06/23/2021 01:37 PM    ABS. NEUTROPHILS 3.5 06/23/2021 01:37 PM     Lab Results   Component Value Date/Time    Sodium 137 01/20/2021 01:57 PM    Potassium 4.3 01/20/2021 01:57 PM    Chloride 104 01/20/2021 01:57 PM    CO2 30 01/20/2021 01:57 PM    Glucose 103 (H) 01/20/2021 01:57 PM    BUN 12 01/20/2021 01:57 PM    Creatinine 0.62 01/20/2021 01:57 PM    GFR est AA >60 01/20/2021 01:57 PM    GFR est non-AA >60 01/20/2021 01:57 PM    Calcium 9.6 01/20/2021 01:57 PM     Lab Results   Component Value Date/Time    Bilirubin, total 0.4 01/20/2021 01:57 PM    ALT (SGPT) 25 01/20/2021 01:57 PM    Alk. phosphatase 114 01/20/2021 01:57 PM    Protein, total 6.9 01/20/2021 01:57 PM    Albumin 4.0 01/20/2021 01:57 PM    Globulin 2.9 01/20/2021 01:57 PM       Records reviewed and summarized above. Pathology report(s) reviewed above. 5/28/19  FINAL PATHOLOGIC DIAGNOSIS  1. Breast, left, core biopsy: In situ and invasive ductal carcinoma   Invasive carcinoma is nuclear grade 2 and measures up to 0.6 cm in greatest dimension on slide   Ductal carcinoma in situ is nuclear grade 3 with central necrosis   2. Soft tissue, left axilla, core biopsy: Adipose tissue with rare detached atypical cells   No lymph node tissue identified   See comment     Radiology report(s) reviewed above. US breast 9/12/19:  IMPRESSION:  1. Increased malignant microcalcifications in the left breast. This could  represent treatment-related necrosis or extension of disease. 2. Extensive carcinoma seen on prior MRI is largely mammographically occult. 3. Decreased size of a metastatic left axillary lymph node with  microcalcifications. 4. BI-RADS Assessment Category 6: Known biopsy proven malignancy. Mammogram 10/22/19:  IMPRESSION:  1. BI-RADS 6: known left breast cancer.   2. Segmental pleomorphic calcifications in the left upper outer quadrant are  stable. Calcifications left axillary lymph node have not changed significantly. Patient was informed of the results. Bilateral mastectomy 1/3/20   FINAL PATHOLOGIC DIAGNOSIS   1. Right breast, prophylactic mastectomy:   Benign breast.   2. Left breast, modified radical mastectomy:   Invasive ductal carcinoma (see synoptic report). Ductal carcinoma in situ. Atypical lobular hyperplasia with associated macrocalcifications. Usual ductal hyperplasia with associated microcalcifications. Intraductal papilloma with usual ductal hyperplasia. INVASIVE CARCINOMA OF THE BREAST: Resection   SPECIMEN   Procedure: Total mastectomy   Specimen Laterality: Left   TUMOR   Histologic Type:  Invasive carcinoma of no special type (invasive   ductal carcinoma, not otherwise specified)   Glandular (Acinar) / Tubular Differentiation: Score 3   Nuclear Pleomorphism: Score 2   Mitotic Rate: Score 2   Overall Grade: Grade 2 (scores of 6 or 7)   Tumor Size: 2.5 mm (see Comment)   Ductal Carcinoma In Situ (DCIS): Present, solid type, nuclear grade 2   with associated microcalcifications   Tumor Extent   Skin: Uninvolved by tumor cells   Treatment Effect in the Breast: Probable or definite response to   presurgical therapy in the invasive carcinoma   Treatment Effect in the Lymph Nodes: Probable or definite response   to presurgical therapy in metastatic carcinoma   MARGINS   Invasive Carcinoma Margins: Uninvolved by invasive carcinoma   Distance from Closest Margin (Millimeters): Greater than: 10 mm   Closest Margin: Posterior   DCIS Margins: Uninvolved by DCIS   Distance from Closest Margin (Millimeters): Greater than: 10 mm [employer] Patient Name: Khadijah Knight Specimen #:    Patient Name: Khadijah Knight Page 4 of 6 Printed: 01/31/20 9:32 AM SURGICAL PATHOLOGY REPORT   Closest Margin: Posterior   ER/MN/HER-2 Pending; results to be issued within addendum reports   LYMPH NODES   Regional Lymph Nodes: Involved by tumor cells   Number of Lymph Nodes with Macrometastases (> 2 mm): 2   Number of Lymph Nodes with Micrometastases (> 0.2 mm to 2 mm and   / or > 200 cells): 0   Extranodal Extension: Not identified   Number of Lymph Nodes Examined: 3   PATHOLOGIC STAGE CLASSIFICATION (pTNM, AJCC 8th Edition)   TNM Descriptors: y (post-treatment) m (multiple foci)   Primary Tumor (pT): pT1a   Regional Lymph Nodes (pN)   Category (pN): pN1a   3. Lymph nodes, left axillary dissection:   Two of three lymph nodes positive for metastatic carcinoma (2/3). 4. Excised tissue, right breast:   Fragments of skin and benign breast tissue. 5. Excised tissue, left breast:   Fragments of skin and benign breast tissue. Assessment:   1) Left breast Invasive ductal carcinoma    [id number]  GRADE 2  % MO 20% HER2 greg equivocal - FISH could not be done  Repeat biopsy of breast mass 6/24/19 - HER2 greg negative, mamma print indicates she has genomically high risk disease  S/p ddAC-T and bilateral mastectomy on 1/21/2020-[id number]  RT completed 5/2020  Intolerant to Letrozole that was started 2/2020- switched to [address]  She had a normal ECHO in 12/2020  No clinical evidence of recurrence today   DEXA 5/2020 and is normal, due 5/2022  Counseled on VD and Ca    Counseled on symptoms to call and NCCN based guidelines for surveillance  . 2) Hypothyroidism    3) Obesity    4) Cough  Resolved , On PPI    5) Left arm Lymphedema    6) Ground glass opacities  Per Dr. Vani Mcbride:     · Continue  Anastrazole daily 5 years  · Vit D / Calcium doses reviewed  · DEXA 2022  · Follow with Dr. Catherine Barrera as scheduled  · Follow with Dr. Mor Cobb        RTC in 6 months     I appreciate the opportunity to participate in Ms. Rachelle Garcia's care.       Signed By: Xiomara Valencia MD

## 2021-11-08 NOTE — PROGRESS NOTES
Biopsy specimens for LEFT breast mass and LEFT axillary lymph node sent to [facility]--called for STAT pick-up, spoke with Edy Harris, specimen marked STAT and emailed Beatriz Weiner to notify. Libtayo Pregnancy And Lactation Text: This medication is contraindicated in pregnancy and when breast feeding.

## 2021-12-28 ENCOUNTER — OFFICE VISIT (OUTPATIENT)
Dept: INTERNAL MEDICINE CLINIC | Age: 56
End: 2021-12-28
Payer: COMMERCIAL

## 2021-12-28 VITALS
OXYGEN SATURATION: 95 % | WEIGHT: 217.8 LBS | RESPIRATION RATE: 18 BRPM | BODY MASS INDEX: 34.18 KG/M2 | SYSTOLIC BLOOD PRESSURE: 139 MMHG | DIASTOLIC BLOOD PRESSURE: 87 MMHG | HEART RATE: 74 BPM | HEIGHT: 67 IN | TEMPERATURE: 97.5 F

## 2021-12-28 DIAGNOSIS — Z01.818 PRE-OP EXAMINATION: ICD-10-CM

## 2021-12-28 DIAGNOSIS — E55.9 VITAMIN D DEFICIENCY: ICD-10-CM

## 2021-12-28 DIAGNOSIS — C50.912 CARCINOMA OF LEFT FEMALE BREAST, UNSPECIFIED ESTROGEN RECEPTOR STATUS, UNSPECIFIED SITE OF BREAST (HCC): ICD-10-CM

## 2021-12-28 DIAGNOSIS — E03.9 ACQUIRED HYPOTHYROIDISM: ICD-10-CM

## 2021-12-28 DIAGNOSIS — I89.0 LYMPHEDEMA: Primary | ICD-10-CM

## 2021-12-28 PROCEDURE — 99214 OFFICE O/P EST MOD 30 MIN: CPT | Performed by: INTERNAL MEDICINE

## 2021-12-28 NOTE — PROGRESS NOTES
HPI:  established patient  Presents for f/u pre-op    Planned breast reconstruction due to radiation changes and lymphedema    No new complaints    No resp illness    No prior bleeding or anesthesia problems except for nausea assoc with anesthesia - controlled with zofran in the past.      Past medical, Social, and Family history reviewed    Prior to Admission medications    Medication Sig Start Date End Date Taking? Authorizing Provider   anastrozole (Arimidex) 1 mg tablet Take 1 mg by mouth daily. 10/21/21  Yes Rachael Alejandre NP   sertraline (ZOLOFT) 100 mg tablet Take 0.5 Tablets by mouth daily. 10/20/21  Yes Luz Rios MD   traZODone (DESYREL) 50 mg tablet TAKE TWO TABLETS BY MOUTH EVERY NIGHT AT BEDTIME 10/20/21  Yes Luz Rios MD   magnesium 250 mg tab Take  by mouth daily. Yes Provider, Historical   pantoprazole (PROTONIX) 40 mg tablet  9/8/21  Yes Provider, Historical   ipratropium (ATROVENT) 21 mcg (0.03 %) nasal spray as needed. 2/7/21  Yes Provider, Historical   EPINEPHrine (EPIPEN) 0.3 mg/0.3 mL injection  2/7/21  Yes Provider, Historical   Synthroid 100 mcg tablet Take 1 Tab by mouth Daily (before breakfast). 2/9/21  Yes Luz Rios MD   calcium-cholecalciferol, d3, (CALCIUM 600 + D) 600-125 mg-unit tab Take  by mouth. Yes Provider, Historical   cholecalciferol (VITAMIN D3) 1,000 unit cap Take 5,000 Units by mouth daily. Yes Provider, Historical   liothyronine (CYTOMEL) 5 mcg tablet Take 5 mcg by mouth daily. Yes Other, MD Robert   naloxone (NARCAN) 4 mg/actuation nasal spray Use 1 spray intranasally, then discard. Repeat with new spray every 2 min as needed for opioid overdose symptoms, alternating nostrils. Patient not taking: Reported on 12/28/2021 1/23/20   Janell Rice MD          ROS  Complete ROS reviewed and negative or stable except as noted in HPI. Physical Exam  Vitals and nursing note reviewed.    Constitutional:       General: She is not in acute distress. HENT:      Head: Normocephalic and atraumatic. Eyes:      General: No scleral icterus. Pupils: Pupils are equal, round, and reactive to light. Cardiovascular:      Rate and Rhythm: Normal rate and regular rhythm. Heart sounds: Normal heart sounds. Pulmonary:      Effort: Pulmonary effort is normal. No respiratory distress. Breath sounds: Normal breath sounds. No wheezing or rales. Abdominal:      General: There is no distension. Palpations: Abdomen is soft. Tenderness: There is no abdominal tenderness. Musculoskeletal:         General: Normal range of motion. Cervical back: Normal range of motion and neck supple. Skin:     General: Skin is warm. Findings: No rash. Neurological:      Mental Status: She is alert and oriented to person, place, and time. Motor: No abnormal muscle tone. Prior labs reviewed. Assessment/Plan:    ICD-10-CM ICD-9-CM    1. Lymphedema  I89.0 457.1    2. Carcinoma of left female breast, unspecified estrogen receptor status, unspecified site of [facility]  C50.912 174.9    3. Pre-op examination  Z01.818 V72.84    4. Acquired hypothyroidism  E03.9 244.9    5. Vitamin D deficiency  E55.9 268.9      Follow-up and Dispositions    · Return in about 2 months (around 2/28/2022), or if symptoms worsen or fail to improve, for cholesterol, thyroid, wellness visit. results and schedule of future studies reviewed with patient  reviewed diet, exercise and weight   reviewed medications and side effects in detail    Stable for procedure  Proceed as planned  Document filled out to be faxed - original given to pt. An electronic signature was used to authenticate this note.   -- Marga Leventhal, MD

## 2021-12-28 NOTE — PROGRESS NOTES
Rm    Chief Complaint   Patient presents with    Pre-auth     Breast reduction surgery        Visit Vitals  /87 (BP 1 Location: Right upper arm, BP Patient Position: Sitting, BP Cuff Size: Large adult)   Pulse 74   Temp 97.5 °F (36.4 °C) (Oral)   Resp 18   Ht 5' 7\" (1.702 m)   Wt 217 lb 12.8 oz (98.8 kg)   SpO2 95%   BMI 34.11 kg/m²        1. Have you been to the ER, urgent care clinic since your last visit? Hospitalized since your last visit? No    2. Have you seen or consulted any other health care providers outside of the [address] [place] [address] since your last visit? Include any pap smears or colon screening. No     Health Maintenance Due   Topic Date Due    COVID-19 Vaccine (1) Never done    Cervical cancer screen  Never done    Colorectal Cancer Screening Combo  Never done    Breast Cancer Screen Mammogram  10/22/2020    Shingrix Vaccine Age 50> (2 of 2) 03/17/2021        3 most recent PHQ Screens 12/28/2021   Little interest or pleasure in doing things Not at all   Feeling down, depressed, irritable, or hopeless Not at all   Total Score PHQ 2 0        Fall Risk Assessment, last 12 mths 9/20/2019   Able to walk? Yes   Fall in past 12 months?  No       Learning Assessment 9/14/2021   PRIMARY LEARNER Patient   HIGHEST LEVEL OF EDUCATION - PRIMARY LEARNER  -   BARRIERS PRIMARY LEARNER -   PRIMARY LANGUAGE ENGLISH   LEARNER PREFERENCE PRIMARY DEMONSTRATION     -     -     -     -   ANSWERED BY patient   RELATIONSHIP SELF

## 2022-01-10 NOTE — PROGRESS NOTES
[address] [place]. [place], [address]   Tel.  [phone]   Fax. [address]   [place], [address] [facility]   Tel.  [phone]   Fax. [phone] [address]   Tel.  [phone]   Fax. [phone] (: 1965) is a 60 y.o. female, established patient, seen for positive airway pressure follow-up, she was last seen by Dr. Christopher Tomas on 7/15/2021, prior notes reviewed in detail. Home sleep test 4/10/2021 showed AHI of 23/hr with a lowest SpO2 of 82%. She is seen today for follow up. ASSESSMENT/PLAN:    ICD-10-CM ICD-9-CM    1. GAGAN (obstructive sleep apnea)  G47.33 327.23 AMB SUPPLY ORDER   2. BMI 34.0-34.9,adult  Z68.34 V85.34      AHI = 23 (). On APAP :  5-10 cmH2O. Set up 2021. She is adherent with PAP therapy and PAP continues to benefit patient and remains necessary for control of her sleep apnea. 1. Sleep Apnea - Continue on current pressures    * Supplies ordered - nasal mask and heated tubing    Orders Placed This Encounter    AMB SUPPLY ORDER     Diagnosis: (G47.33) GAGAN (obstructive sleep apnea)  (primary encounter diagnosis)     Replacement Supplies for Positive Airway Pressure Therapy Device:   Duration of need: 99 months.  Nasal Pillows Combo Mask (Replace) 2 per month.  Nasal Pillows (Replace) 2 per month.  Nasal Cushion (Replace) 2 per month.  Nasal Interface Mask 1 every 3 months.  Headgear 1 every 6 months.  Positive Airway Pressure chinstrap 1 every 6 months.  Tubing with heating element 1 every 3 months.  Filter(s) Disposable 2 per month.  Filter(s) Non-Disposable 1 every 6 months. .   [address] [facility]ifier (Replace) 1 every 6 months. VANITA Garner-BC NPI: [id number]    Electronically signed.  Date:- 22     *  Counseling was provided regarding the importance of regular PAP use with emphasis on ensuring sufficient total sleep time, proper sleep hygiene, and safe driving. * Re-enforced proper and regular cleaning for the device. * She was asked to contact our office for any problems regarding PAP therapy. 2. Recommended a dedicated weight loss program through appropriate diet and exercise regimen as significant weight reduction has been shown to reduce severity of obstructive sleep apnea. SUBJECTIVE/OBJECTIVE:    She  is seen today for follow up on PAP device and reports no problems using the device. The following concerns reviewed:    Drowsiness no Problems exhaling no   Snoring no Forget to put on no   Mask Comfortable yes Can't fall asleep no   Dry Mouth no Mask falls off no   Air Leaking no Frequent awakenings no     She admits that her sleep has significantly improved on PAP therapy using nasal mask and heated tubing. Review of device download indicated:  Auto pressure: 5-10 cmH2O; Max Pressure: 9.9 cmH2O; 95th percentile pressure: 9.7 cmH2O;  95th Percentile Leak: 27.2 L/min  % Used Days >= 4 hours: 83. Avg hours used:  6 hours 37 minutes. Therapy Apnea Index averaged over PAP use: 3.8 /hr which reflects significantly improved sleep breathing condition. [place] Sleepiness Score: 5 and Modified F.O.S.Q. Score Total / 2: 17 which reflects improved sleep quality over therapy time. Sleep Review of Systems: notable for Negative difficulty falling asleep; Negative awakenings at night; Negative early morning headaches; Negative memory problems; Negative concentration issues;  Negative chest pain; Negative shortness of breath; Negative significant joint pain at night; Negative significant muscle pain at night; Negative rashes or itching; Negative heartburn; Negative significant mood issues; 0 afternoon naps per week    Vitals reported by patient   Patient-Reported Vitals 1/11/2022   Patient-Reported Weight 215lb   Patient-Reported Pulse 80   Patient-Reported Temperature 97.5   Patient-Reported SpO2 98   Patient-Reported Systolic  876   Patient-Reported Diastolic 80      Calculated BMI 34    Physical Exam completed by visual and auditory observation of patient with verbal input from patient. General:   Alert, oriented, not in acute distress   Eyes:  Anicteric Sclerae; no obvious strabismus   Nose:  No obvious nasal septum deviation    Neck:   Midline trachea, no visible mass   Chest/Lungs:  Respiratory effort normal, no visualized signs of difficulty breathing or respiratory distress   CVS:  No JVD   Extremities:  No obvious rashes noted on face, neck, or hands   Neuro:  No facial asymmetry, no focal deficits; no obvious tremor    Psych:  Normal affect,  normal countenance     Jasper Bates is being evaluated by a Virtual Visit (video visit) encounter to address concerns as mentioned above. A caregiver was present when appropriate. Due to this being a TeleHealth encounter (During AELBV-05 public health emergency), evaluation of the following organ systems was limited: Vitals/Constitutional/EENT/Resp/CV/GI//MS/Neuro/Skin/Heme-Lymph-Imm. Pursuant to the emergency declaration under the [address] [place] [address] authority and the [employer] and [employer] Act, this Virtual Visit was conducted with patient's (and/or legal guardian's) consent, to reduce the patient's risk of exposure to COVID-19 and provide necessary medical care. Patient identification was verified at the start of the visit: YES using name and date of birth. Patient's phone number [phone] (home)  was confirmed for accuracy. She gives permission for messages regarding results and appointments to be left at that number. Services were provided through a video synchronous discussion virtually to substitute for in-person clinic visit.   I was in the office while conducting this encounter, patient located at their home or alternate location of their choice. On this date 01/11/2022 I have spent 20 minutes reviewing previous notes, test results and face to face with the patient discussing the diagnosis and importance of compliance with the treatment plan as well as documenting on the day of the visit. An electronic signature was used to authenticate this note.     -- Cody Ortiz NP, [facility]  01/11/22

## 2022-01-11 ENCOUNTER — DOCUMENTATION ONLY (OUTPATIENT)
Dept: SLEEP MEDICINE | Age: 57
End: 2022-01-11

## 2022-01-11 ENCOUNTER — VIRTUAL VISIT (OUTPATIENT)
Dept: SLEEP MEDICINE | Age: 57
End: 2022-01-11
Payer: COMMERCIAL

## 2022-01-11 DIAGNOSIS — G47.33 OSA (OBSTRUCTIVE SLEEP APNEA): Primary | ICD-10-CM

## 2022-01-11 PROCEDURE — 99213 OFFICE O/P EST LOW 20 MIN: CPT | Performed by: NURSE PRACTITIONER

## 2022-01-11 NOTE — PATIENT INSTRUCTIONS
[address] [facility] [place]. [place], [address]  Tel.  [phone]  Fax. 100 [facility] [address]  [place], [address] [facility]  Tel.  [phone]  Fax. [phone] [address]  Tel.  [phone]  Fax. [phone]     Learning About CPAP for Sleep Apnea  What is CPAP? CPAP is a small machine that you use at home every night while you sleep. It increases air pressure in your throat to keep your airway open. When you have sleep apnea, this can help you sleep better so you feel much better. CPAP stands for \"continuous positive airway pressure. \"  The CPAP machine will have one of the following:  · A mask that covers your nose and mouth  · Prongs that fit into your nose  · A mask that covers your nose only, the most common type. This type is called NCPAP. The N stands for \"nasal.\"  Why is it done? CPAP is usually the best treatment for obstructive sleep apnea. It is the first treatment choice and the most widely used. Your doctor may suggest CPAP if you have:  · Moderate to severe sleep apnea. · Sleep apnea and coronary artery disease (CAD) or heart failure. How does it help? · CPAP can help you have more normal sleep, so you feel less sleepy and more alert during the daytime. · CPAP may help keep heart failure or other heart problems from getting worse. · NCPAP may help lower your blood pressure. · If you use CPAP, your bed partner may also sleep better because you are not snoring or restless. What are the side effects? Some people who use CPAP have:  · A dry or stuffy nose and a sore throat. · Irritated skin on the face. · Sore eyes. · Bloating. If you have any of these problems, work with your doctor to fix them. Here are some things you can try:  · Be sure the mask or nasal prongs fit well. · See if your doctor can adjust the pressure of your CPAP. · If your nose is dry, try a humidifier.   · If your nose is runny or stuffy, try decongestant medicine or a steroid nasal spray. If these things do not help, you might try a different type of machine. Some machines have air pressure that adjusts on its own. Others have air pressures that are different when you breathe in than when you breathe out. This may reduce discomfort caused by too much pressure in your nose. Where can you learn more? Go to Cuff-Protect.be  Enter Stanislav Kwabena in the search box to learn more about \"Learning About CPAP for Sleep Apnea. \"   © 7182-3722 Healthwise, Incorporated. Care instructions adapted under license by [facility] [employer] (which disclaims liability or warranty for this information). This care instruction is for use with your licensed healthcare professional. If you have questions about a medical condition or this instruction, always ask your healthcare professional. [address] any warranty or liability for your use of this information. Content Version: 1.1.56925; Last Revised: January 11, 2010  PROPER SLEEP HYGIENE    What to avoid  · Do not have drinks with caffeine, such as coffee or black tea, for 8 hours before bed. · Do not smoke or use other types of tobacco near bedtime. Nicotine is a stimulant and can keep you awake. · Avoid drinking alcohol late in the evening, because it can cause you to wake in the middle of the night. · Do not eat a big meal close to bedtime. If you are hungry, eat a light snack. · Do not drink a lot of water close to bedtime, because the need to urinate may wake you up during the night. · Do not read or watch TV in bed. Use the bed only for sleeping and sexual activity. What to try  · Go to bed at the same time every night, and wake up at the same time every morning. Do not take naps during the day. · Keep your bedroom quiet, dark, and cool. · Get regular exercise, but not within 3 to 4 hours of your bedtime. .  · Sleep on a comfortable pillow and mattress.   · If watching the clock makes you anxious, turn it facing away from you so you cannot see the time. · If you worry when you lie down, start a worry book. Well before bedtime, write down your worries, and then set the book and your concerns aside. · Try meditation or other relaxation techniques before you go to bed. · If you cannot fall asleep, get up and go to another room until you feel sleepy. Do something relaxing. Repeat your bedtime routine before you go to bed again. · Make your house quiet and calm about an hour before bedtime. Turn down the lights, turn off the TV, log off the computer, and turn down the volume on music. This can help you relax after a busy day. Drowsy Driving: The [facility] cites drowsiness as a causing factor in more than 962,486 police reported crashes annually, resulting in 76,000 injuries and 1,500 deaths. Other surveys suggest 55% of people polled have driven while drowsy in the past year, 23% had fallen asleep but not crashed, 3% crashed, and 2% had and accident due to drowsy driving. Who is at risk? Young Drivers: One study of drowsy driving accidents states that 55% of the drivers were under 25 years. Of those, 75% were male. Shift Workers and Travelers: People who work overnight or travel across time zones frequently are at higher risk of experiencing Circadian Rhythm Disorders. They are trying to work and function when their body is programed to sleep. Sleep Deprived: Lack of sleep has a serious impact on your ability to pay attention or focus on a task. Consistently getting less than the average of 8 hours your body needs creates partial or cumulative sleep deprivation. Untreated Sleep Disorders: Sleep Apnea, Narcolepsy, R.L.S., and other sleep disorders (untreated) prevent a person from getting enough restful sleep. This leads to excessive daytime sleepiness and increases the risk for drowsy driving accidents by up to 7 times.   Medications / Alcohol: Even over the counter medications can cause drowsiness. Medications that impair a drivers attention should have a warning label. Alcohol naturally makes you sleepy and on its own can cause accidents. Combined with excessive drowsiness its effects are amplified. Signs of Drowsy Driving:   * You don't remember driving the last few miles   * You may drift out of your nayeli   * You are unable to focus and your thoughts wander   * You may yawn more often than normal   * You have difficulty keeping your eyes open / nodding off   * Missing traffic signs, speeding, or tailgating  Prevention-   Good sleep hygiene, lifestyle and behavioral choices have the most impact on drowsy driving. There is no substitute for sleep and the average person requires 8 hours nightly. If you find yourself driving drowsy, stop and sleep. Consider the sleep hygiene tips provided during your visit as well. Medication Refill Policy: Refills for all medications require 1 week advance notice. Please have your pharmacy fax a refill request. We are unable to fax, or call in \"controled substance\" medications and you will need to pick these prescriptions up from our office. [employer] Activation    Thank you for requesting access to [employer]. Please follow the instructions below to securely access and download your online medical record. [employer] allows you to send messages to your doctor, view your test results, renew your prescriptions, schedule appointments, and more. How Do I Sign Up? 1. In your internet browser, go to [email or web address]. 2. Click on the First Time User? Click Here link in the Sign In box. You will see the New Member Sign Up page. 3. Enter your [employer] Access Code exactly as it appears below. You will not need to use this code after youve completed the sign-up process. If you do not sign up before the expiration date, you must request a new code. [employer] Access Code:  Activation code not generated  Current [address] Status: Active (This is the date your [address] access code will )    4. Enter the last four digits of your Social Security Number (xxxx) and Date of Birth (mm/dd/yyyy) as indicated and click Submit. You will be taken to the next sign-up page. 5. Create a Canaryt ID. This will be your [address] login ID and cannot be changed, so think of one that is secure and easy to remember. 6. Create a [address] password. You can change your password at any time. 7. Enter your Password Reset Question and Answer. This can be used at a later time if you forget your password. 8. Enter your e-mail address. You will receive e-mail notification when new information is available in [address] 9. Click Sign Up. You can now view and download portions of your medical record. 10. Click the Download Summary menu link to download a portable copy of your medical information. Additional Information    If you have questions, please call [phone]. Remember, [address] is NOT to be used for urgent needs. For medical emergencies, dial 911.

## 2022-01-17 DIAGNOSIS — C50.412 MALIGNANT NEOPLASM OF UPPER-OUTER QUADRANT OF LEFT BREAST IN FEMALE, ESTROGEN RECEPTOR POSITIVE (HCC): ICD-10-CM

## 2022-01-17 DIAGNOSIS — Z17.0 MALIGNANT NEOPLASM OF UPPER-OUTER QUADRANT OF LEFT BREAST IN FEMALE, ESTROGEN RECEPTOR POSITIVE (HCC): ICD-10-CM

## 2022-01-17 RX ORDER — ANASTROZOLE 1 MG/1
1 TABLET ORAL DAILY
Qty: 30 TABLET | Refills: 12 | Status: SHIPPED | OUTPATIENT
Start: 2022-01-17

## 2022-01-17 RX ORDER — ANASTROZOLE 1 MG/1
TABLET ORAL
Qty: 60 TABLET | OUTPATIENT
Start: 2022-01-17

## 2022-02-08 ENCOUNTER — HOSPITAL ENCOUNTER (OUTPATIENT)
Dept: CT IMAGING | Age: 57
Discharge: HOME | End: 2022-02-08
Attending: INTERNAL MEDICINE
Payer: COMMERCIAL

## 2022-02-08 DIAGNOSIS — R93.89 ABNORMAL CT SCAN, CHEST: ICD-10-CM

## 2022-02-08 PROCEDURE — 71250 CT THORAX DX C-: CPT

## 2022-03-14 NOTE — PROGRESS NOTES
HISTORY OF PRESENT ILLNESS  Rob Alfonso is a 60 y.o. female. HPI Established patient presents for follow-up to LEFT breast cancer.  Denies breast mass, skin changes and pain.  Continues to address LEFT chest and axillary lymphedema.       Breast cancer history -  Referring - [facility] at [facility]  5/28/19 - LEFT breast IDC and DCIS,T2 N1, potential skin involvement, Er/Pr + Her 2 greg equivocal, Not enough invasive to send Her 2 greg for fish  6/3/19 - LEFT breast skin punch biopsy, benign. 6/20/19 - biopsies of LEFT breast and LEFT axilla.   Mammaprint high risk. 7/18/19 - port insertion  7/19/19 - started AC - Dr. Hayes Leader  9/13/19 - 12/3/19 - Taxol infusions  1/21/20: Meeta Tuckre- Dr. Abida Fraser and Dr. Chad Chávez  2/2020: started [place]- had side effect of significant joint pain; switched to anastrozole  6/2020: Completed RT - Dr. Omi Dacosta  9/14/2020: bilateral breast implant exchange with Dr. Chad Chávez        Family history -   Possibly maternal grandmother with breast cancer but not sure? Father - colon cancer    OB History    No obstetric history on file. Past Surgical History:   Procedure Laterality Date    HX BREAST RECONSTRUCTION Bilateral 1/21/2020    IMMEDIATE BILATERAL BREAST RECONSTRUCTION WITH TISSUE EXPANDERS AND ALLODERM GRAFTS performed by Vivia Goldberg, MD at [facility]  2004    HX COLONOSCOPY      HX HEENT  2009    THYROIDECTOMY    HX KNEE ARTHROSCOPY Left     HX LUMBAR FUSION  1995    HX MOHS PROCEDURES      x 4 times     HX SHOULDER ARTHROSCOPY Right     HX THYROIDECTOMY  2014    HX VASCULAR ACCESS  2019    PORTACATH RIGHT CHEST         ROS    Physical Exam  Constitutional:       Appearance: Normal appearance. Chest:   Breasts:      Right: Absent. No axillary adenopathy or supraclavicular adenopathy. Left: Absent. No axillary adenopathy or supraclavicular adenopathy.         Comments: Chest wall s/p BILATERAL mastectomy with reconstruction. LEFT reconstructed breast much tighter than RIGHT. LEFT axilla/lateral breast - excess tissue. Musculoskeletal:      Comments: FROM - UE x 2   Lymphadenopathy:      Upper Body:      Right upper body: No supraclavicular or axillary adenopathy. Left upper body: No supraclavicular or axillary adenopathy. Neurological:      Mental Status: She is alert. Psychiatric:         Attention and Perception: Attention normal.         Mood and Affect: Mood normal.         Speech: Speech normal.         Behavior: Behavior normal.         Visit Vitals  /68 (BP 1 Location: Right upper arm, BP Patient Position: Sitting, BP Cuff Size: Adult)   Pulse 83   Ht 5' 7\" (1.702 m)   Wt 217 lb (98.4 kg)   BMI 33.99 kg/m²         ASSESSMENT and PLAN    ICD-10-CM ICD-9-CM    1. Malignant neoplasm of upper-outer quadrant of left breast in female, estrogen receptor positive (HCC)  C50.412 174.4     Z17.0 V86.0    2. S/P mastectomy, bilateral  Z90.13 V45.71    3. History of breast cancer in female  Z85.3 V10.3    4. Lymphedema  I89.0 457.1         Normal exam with no evidence of local recurrence.    Continues on AI and has follow-up with Dr. Froylan Hammans anastrozole. Saw Dr. Trenton Monroe - having additional surgery for prominent tissue under LEFT arm. Currently has pneumonia and needs that to resolve before she is able to have the surgery. Saw Skyla Amador for PT.  Then saw Preston Sears in lymphedema clinic, but has been released from that. Has compression garments and home pump. RTC in 1 year or sooner PRN. She is comfortable with this plan.  All questions answered and she stated understanding.              Total time spent for this patient - 20 minutes.

## 2022-03-15 ENCOUNTER — OFFICE VISIT (OUTPATIENT)
Dept: SURGERY | Age: 57
End: 2022-03-15
Payer: COMMERCIAL

## 2022-03-15 VITALS
BODY MASS INDEX: 34.06 KG/M2 | HEIGHT: 67 IN | DIASTOLIC BLOOD PRESSURE: 68 MMHG | SYSTOLIC BLOOD PRESSURE: 122 MMHG | HEART RATE: 83 BPM | WEIGHT: 217 LBS

## 2022-03-15 DIAGNOSIS — Z17.0 MALIGNANT NEOPLASM OF UPPER-OUTER QUADRANT OF LEFT BREAST IN FEMALE, ESTROGEN RECEPTOR POSITIVE (HCC): Primary | ICD-10-CM

## 2022-03-15 DIAGNOSIS — C50.412 MALIGNANT NEOPLASM OF UPPER-OUTER QUADRANT OF LEFT BREAST IN FEMALE, ESTROGEN RECEPTOR POSITIVE (HCC): Primary | ICD-10-CM

## 2022-03-15 DIAGNOSIS — Z90.13 S/P MASTECTOMY, BILATERAL: ICD-10-CM

## 2022-03-15 DIAGNOSIS — Z85.3 HISTORY OF BREAST CANCER IN FEMALE: ICD-10-CM

## 2022-03-15 DIAGNOSIS — I89.0 LYMPHEDEMA: ICD-10-CM

## 2022-03-15 PROCEDURE — 99213 OFFICE O/P EST LOW 20 MIN: CPT | Performed by: NURSE PRACTITIONER

## 2022-03-15 RX ORDER — SULFAMETHOXAZOLE AND TRIMETHOPRIM 800; 160 MG/1; MG/1
TABLET ORAL
COMMUNITY
Start: 2022-02-10 | End: 2022-04-13 | Stop reason: ALTCHOICE

## 2022-03-15 NOTE — PATIENT INSTRUCTIONS

## 2022-03-17 ENCOUNTER — TRANSCRIBE ORDER (OUTPATIENT)
Dept: SCHEDULING | Age: 57
End: 2022-03-17

## 2022-03-17 DIAGNOSIS — J84.89 BOOP (BRONCHIOLITIS OBLITERANS WITH ORGANIZING PNEUMONIA) (HCC): Primary | ICD-10-CM

## 2022-03-18 DIAGNOSIS — G47.00 INSOMNIA, UNSPECIFIED TYPE: ICD-10-CM

## 2022-03-18 PROBLEM — E66.09 CLASS 1 OBESITY DUE TO EXCESS CALORIES WITHOUT SERIOUS COMORBIDITY WITH BODY MASS INDEX (BMI) OF 30.0 TO 30.9 IN ADULT: Status: ACTIVE | Noted: 2019-06-20

## 2022-03-18 PROBLEM — E03.9 ACQUIRED HYPOTHYROIDISM: Status: ACTIVE | Noted: 2021-03-04

## 2022-03-18 PROBLEM — E66.811 CLASS 1 OBESITY DUE TO EXCESS CALORIES WITHOUT SERIOUS COMORBIDITY WITH BODY MASS INDEX (BMI) OF 30.0 TO 30.9 IN ADULT: Status: ACTIVE | Noted: 2019-06-20

## 2022-03-19 PROBLEM — C50.912 CARCINOMA OF LEFT BREAST (HCC): Status: ACTIVE | Noted: 2020-01-21

## 2022-03-19 PROBLEM — Z17.0 MALIGNANT NEOPLASM OF UPPER-OUTER QUADRANT OF LEFT BREAST IN FEMALE, ESTROGEN RECEPTOR POSITIVE (HCC): Status: ACTIVE | Noted: 2019-06-20

## 2022-03-19 PROBLEM — C50.412 MALIGNANT NEOPLASM OF UPPER-OUTER QUADRANT OF LEFT BREAST IN FEMALE, ESTROGEN RECEPTOR POSITIVE (HCC): Status: ACTIVE | Noted: 2019-06-20

## 2022-03-19 PROBLEM — G62.9 NEUROPATHY: Status: ACTIVE | Noted: 2019-12-03

## 2022-03-19 RX ORDER — TRAZODONE HYDROCHLORIDE 50 MG/1
TABLET ORAL
Qty: 180 TABLET | Refills: 1 | Status: SHIPPED | OUTPATIENT
Start: 2022-03-19 | End: 2022-09-19 | Stop reason: SDUPTHER

## 2022-03-20 PROBLEM — Z98.890 STATUS POST SURGERY: Status: ACTIVE | Noted: 2020-01-21

## 2022-03-23 ENCOUNTER — HOSPITAL ENCOUNTER (OUTPATIENT)
Dept: CT IMAGING | Age: 57
Discharge: HOME | End: 2022-03-23
Attending: INTERNAL MEDICINE
Payer: COMMERCIAL

## 2022-03-23 DIAGNOSIS — J84.89 BOOP (BRONCHIOLITIS OBLITERANS WITH ORGANIZING PNEUMONIA) (HCC): ICD-10-CM

## 2022-03-23 PROCEDURE — 71250 CT THORAX DX C-: CPT

## 2022-03-28 ENCOUNTER — HOSPITAL ENCOUNTER (OUTPATIENT)
Dept: MAMMOGRAPHY | Age: 57
Discharge: HOME | End: 2022-03-28
Attending: INTERNAL MEDICINE
Payer: COMMERCIAL

## 2022-03-28 DIAGNOSIS — E88.09 AROMATASE DEFICIENCY: ICD-10-CM

## 2022-03-28 DIAGNOSIS — C50.412 MALIGNANT NEOPLASM OF UPPER-OUTER QUADRANT OF LEFT BREAST IN FEMALE, ESTROGEN RECEPTOR POSITIVE (HCC): ICD-10-CM

## 2022-03-28 DIAGNOSIS — Z17.0 MALIGNANT NEOPLASM OF UPPER-OUTER QUADRANT OF LEFT BREAST IN FEMALE, ESTROGEN RECEPTOR POSITIVE (HCC): ICD-10-CM

## 2022-03-28 PROCEDURE — 77080 DXA BONE DENSITY AXIAL: CPT

## 2022-03-30 NOTE — DISCHARGE INSTRUCTIONS
Breast Biopsy Discharge Instructions    PAIN CONTROL     You may have mild discomfort; take 1-2 Tylenol every 4-6 hours as needed.  Do not take aspirin containing products or anti-inflammatory medications (Advil, Aleve, Motrin, Ibuprofen, etc.) for 24 hours.  Wearing a comfortable bra for support may help with discomfort. WOUND CARE      A small amount of bleeding or bruising at the biopsy site is normal. Watch for signs and symptoms of infections: skin warm to touch, yellowish drainage from wound, fever or severe pain.  Place an ice pack over the site hourly, 20-30 at a time for a few hours today.  The clear dressing is water resistant (you may shower, but do not allow the dressing to become saturated). You may remove the dressing in 48 hours. The steri-strips (small pieces of tape covering the incision) will fall off on their own in a few days. If the clear dressing irritates your skin or begins to peel off, you may remove it. Remember, if you remove the clear dressing before 48 hours, you should not get the site wet.  If at any point you have EXCESSIVE BLEEDING (saturating the gauze under the clear dressing) OR pain please call:    Daytime 7:30am-5:00pm: [facility] [phone]    After Hours:    [phone] (ask to speak to a radiologist)              or [address] [facility] [phone]    ACTIVITY     Do not participate in any strenuous activities for 24 hours (exercise, sports, housework, etc.).  You may resume work (light duty only) for the first 24 hours.  No heavy lifting with the arm on the affected side of your body. ADDITIONAL INSTRUCTIONS    We will call you with results on Thursday May 30 in the afternoon.       YOUR TREATMENT TEAM TODAY WAS    MD: Mana  RN: Shine Doe  Radiology Tech: Morro Shearer No

## 2022-04-13 ENCOUNTER — TELEPHONE (OUTPATIENT)
Dept: INTERNAL MEDICINE CLINIC | Age: 57
End: 2022-04-13

## 2022-04-13 ENCOUNTER — OFFICE VISIT (OUTPATIENT)
Dept: INTERNAL MEDICINE CLINIC | Age: 57
End: 2022-04-13
Payer: COMMERCIAL

## 2022-04-13 VITALS
DIASTOLIC BLOOD PRESSURE: 81 MMHG | HEART RATE: 92 BPM | HEIGHT: 67 IN | TEMPERATURE: 98.6 F | SYSTOLIC BLOOD PRESSURE: 113 MMHG | OXYGEN SATURATION: 94 % | WEIGHT: 219 LBS | BODY MASS INDEX: 34.37 KG/M2

## 2022-04-13 DIAGNOSIS — G47.33 OSA ON CPAP: ICD-10-CM

## 2022-04-13 DIAGNOSIS — R73.9 HYPERGLYCEMIA: ICD-10-CM

## 2022-04-13 DIAGNOSIS — Z00.00 WELL WOMAN EXAM (NO GYNECOLOGICAL EXAM): Primary | ICD-10-CM

## 2022-04-13 DIAGNOSIS — L58.1 LATE RADIATION DERMATITIS: ICD-10-CM

## 2022-04-13 DIAGNOSIS — Z12.11 COLON CANCER SCREENING: ICD-10-CM

## 2022-04-13 DIAGNOSIS — E78.5 HYPERLIPIDEMIA, UNSPECIFIED HYPERLIPIDEMIA TYPE: ICD-10-CM

## 2022-04-13 DIAGNOSIS — E03.9 ACQUIRED HYPOTHYROIDISM: ICD-10-CM

## 2022-04-13 DIAGNOSIS — C50.912 CARCINOMA OF LEFT FEMALE BREAST, UNSPECIFIED ESTROGEN RECEPTOR STATUS, UNSPECIFIED SITE OF BREAST (HCC): ICD-10-CM

## 2022-04-13 DIAGNOSIS — Z01.818 PRE-OP EXAMINATION: ICD-10-CM

## 2022-04-13 DIAGNOSIS — I89.0 LYMPHEDEMA: ICD-10-CM

## 2022-04-13 DIAGNOSIS — Z99.89 OSA ON CPAP: ICD-10-CM

## 2022-04-13 DIAGNOSIS — E55.9 VITAMIN D DEFICIENCY: ICD-10-CM

## 2022-04-13 PROCEDURE — 99396 PREV VISIT EST AGE 40-64: CPT | Performed by: INTERNAL MEDICINE

## 2022-04-13 RX ORDER — MOMETASONE FUROATE 1 MG/G
OINTMENT TOPICAL DAILY
Qty: 45 G | Refills: 1 | Status: SHIPPED | OUTPATIENT
Start: 2022-04-13

## 2022-04-13 NOTE — TELEPHONE ENCOUNTER
Spoke to pharmacy at [facility]. First shingle shot 1/20/21 and second shot 6/17/21. Added to pt chart.

## 2022-04-13 NOTE — TELEPHONE ENCOUNTER
Request for PAP results faxed to Dr. Felisa Boeck office. Transmission log received and placed in scanning.

## 2022-04-13 NOTE — PROGRESS NOTES
RM 14  Pt is fasting    Chief Complaint   Patient presents with    Pre-op Exam     surgery 5/10/22 at [facility] [address] with dr. Shameka Cheung, date could change     Complete Physical    Skin Problem     redness across the skin and tenderness on and around breasts. blackening around the nose. possilbe radiation recall. Visit Vitals  /81   Pulse 92   Temp 98.6 °F (37 °C)   Ht 5' 7\" (1.702 m)   Wt 219 lb (99.3 kg)   SpO2 94%   BMI 34.30 kg/m²       3 most recent PHQ Screens 4/13/2022   Little interest or pleasure in doing things Not at all   Feeling down, depressed, irritable, or hopeless Not at all   Total Score PHQ 2 0         1. Have you been to the ER, urgent care clinic since your last visit? Hospitalized since your last visit? No    2. Have you seen or consulted any other health care providers outside of the [address] [place] [address] since your last visit? Include any pap smears or colon screening. Cardiology at [facility] cardio Dr. Rissa Membreno. pulm associates Dr. Rissa Mercado Maintenance Due   Topic Date Due    COVID-19 Vaccine (1) Never done    Cervical cancer screen  Never done    Colorectal Cancer Screening Combo  Never done    Breast Cancer Screen Mammogram  10/22/2020    Shingrix Vaccine Age 50> (2 of 2) 03/17/2021       Learning Assessment 3/15/2022   PRIMARY LEARNER Patient   HIGHEST LEVEL OF EDUCATION - PRIMARY LEARNER  -   BARRIERS PRIMARY LEARNER -   PRIMARY LANGUAGE ENGLISH   LEARNER PREFERENCE PRIMARY DEMONSTRATION     -     -     -     -   ANSWERED BY patient   RELATIONSHIP SELF         AVS  education, follow up, and recommendations provided and addressed with patient.   services used to advise patient -no

## 2022-04-13 NOTE — PROGRESS NOTES
Subjective:   60 y.o. female for Well Woman Check. Her gyne and breast care is done elsewhere by her Ob-Gyne physician. Past medical, Social, and Family history reviewed  Medications reviewed and updated. ROS: Feeling generally well. No TIA's or unusual headaches, no dysphagia. No prolonged cough. No dyspnea or chest pain on exertion. No abdominal pain, change in bowel habits, black or bloody stools. No urinary tract symptoms. No new or unusual musculoskeletal symptoms. Specific concerns today:   Plan for lymphedema surgery per Dr. Huang Raw s/p mastectomy for breast Ca. Recent dx of organizing pneumonia and on abx and prednisone taper. Completed pred course. Feeling better. Some skin concerns which may be radiation related as well   +prior dx radiation recall dermatitis  New warm, red, slightly raised rash across chest.    Objective: The patient appears well, alert, oriented x 3, in no distress. Visit Vitals  /81   Pulse 92   Temp 98.6 °F (37 °C)   Ht 5' 7\" (1.702 m)   Wt 219 lb (99.3 kg)   SpO2 94%   BMI 34.30 kg/m²     ENT normal.  Neck supple. No adenopathy or thyromegaly. BRITTANY. Lungs are clear, good air entry, no wheezes, rhonchi or rales. S1 and S2 normal, no murmurs, regular rate and rhythm. Abdomen soft without tenderness, guarding, mass or organomegaly. Extremities show no edema, normal peripheral pulses. Neurological is normal, no focal findings. Breast and Pelvic exams are deferred. Erythematous, warm, minimally tender eruption across anterior chest.    Assessment/Plan:   Well Woman  follow low fat diet, routine labs ordered, call if any problems    ICD-10-CM ICD-9-CM    1. Well woman exam (no gynecological exam)  Z00.00 V70.0     [V70.0]   2. Carcinoma of left female breast, unspecified estrogen receptor status, unspecified site of breast ([facility] [address]  C50.912 174.9    3. Lymphedema  I89.0 457.1    4. Pre-op examination  Z01.818 V72.84    5.  Acquired hypothyroidism  E03.9 244.9 TSH 3RD GENERATION      T4, FREE      METABOLIC PANEL, COMPREHENSIVE      TSH 3RD GENERATION      T4, FREE      METABOLIC PANEL, COMPREHENSIVE   6. Vitamin D deficiency  E55.9 268.9 VITAMIN D, 25 HYDROXY      VITAMIN D, 25 HYDROXY   7. GAGAN on CPAP  G47.33 327.23 CBC WITH AUTOMATED DIFF    Z99.89 V46.8 CBC WITH AUTOMATED DIFF   8. Late radiation dermatitis  L58.1 692.82 mometasone (ELOCON) 0.1 % ointment   9. Hyperglycemia  R73.9 790.29 HEMOGLOBIN A1C WITH EAG      HEMOGLOBIN A1C WITH EAG   10. Hyperlipidemia, unspecified hyperlipidemia type  E78.5 272.4 LIPID PANEL      LIPID PANEL   11. Colon cancer screening  Z12.11 V76.51 COLOGUARD TEST (FECAL DNA COLORECTAL CANCER SCREENING)     Follow-up and Dispositions    · Return in about 1 year (around 4/13/2023), or if symptoms worsen or fail to improve, for wellness visit. results and schedule of future studies reviewed with patient  reviewed diet, exercise and weight  cardiovascular risk and specific lipid/LDL goals reviewed  reviewed medications and side effects in detail     Topical steroid   See breast provider re: rash for further rec's  If skin condition improves then surgery OK as scheduled.   Fasting labs  Get GYN records for PAP  cologuard for colon screening

## 2022-04-13 NOTE — TELEPHONE ENCOUNTER
Please obtain PAP results from Dr. Dakotah Crandall    Also, get shingles vaccine record from [facility].

## 2022-04-14 LAB
25(OH)D3 SERPL-MCNC: 38.1 NG/ML (ref 30–100)
ALBUMIN SERPL-MCNC: 3.9 G/DL (ref 3.5–5)
ALBUMIN/GLOB SERPL: 1.3 {RATIO} (ref 1.1–2.2)
ALP SERPL-CCNC: 76 U/L (ref 45–117)
ALT SERPL-CCNC: 32 U/L (ref 12–78)
ANION GAP SERPL CALC-SCNC: 9 MMOL/L (ref 5–15)
AST SERPL-CCNC: 12 U/L (ref 15–37)
BASOPHILS # BLD: 0 K/UL (ref 0–0.1)
BASOPHILS NFR BLD: 0 % (ref 0–1)
BILIRUB SERPL-MCNC: 0.7 MG/DL (ref 0.2–1)
BUN SERPL-MCNC: 10 MG/DL (ref 6–20)
BUN/CREAT SERPL: 12 (ref 12–20)
CALCIUM SERPL-MCNC: 9.4 MG/DL (ref 8.5–10.1)
CHLORIDE SERPL-SCNC: 102 MMOL/L (ref 97–108)
CHOLEST SERPL-MCNC: 239 MG/DL
CO2 SERPL-SCNC: 27 MMOL/L (ref 21–32)
CREAT SERPL-MCNC: 0.83 MG/DL (ref 0.55–1.02)
DIFFERENTIAL METHOD BLD: ABNORMAL
EOSINOPHIL # BLD: 0.1 K/UL (ref 0–0.4)
EOSINOPHIL NFR BLD: 2 % (ref 0–7)
ERYTHROCYTE [DISTWIDTH] IN BLOOD BY AUTOMATED COUNT: 13.1 % (ref 11.5–14.5)
EST. AVERAGE GLUCOSE BLD GHB EST-MCNC: 97 MG/DL
GLOBULIN SER CALC-MCNC: 3.1 G/DL (ref 2–4)
GLUCOSE SERPL-MCNC: 102 MG/DL (ref 65–100)
HBA1C MFR BLD: 5 % (ref 4–5.6)
HCT VFR BLD AUTO: 41.2 % (ref 35–47)
HDLC SERPL-MCNC: 89 MG/DL
HDLC SERPL: 2.7 {RATIO} (ref 0–5)
HGB BLD-MCNC: 13.4 G/DL (ref 11.5–16)
IMM GRANULOCYTES # BLD AUTO: 0.1 K/UL (ref 0–0.04)
IMM GRANULOCYTES NFR BLD AUTO: 1 % (ref 0–0.5)
LDLC SERPL CALC-MCNC: 120 MG/DL (ref 0–100)
LYMPHOCYTES # BLD: 0.8 K/UL (ref 0.8–3.5)
LYMPHOCYTES NFR BLD: 11 % (ref 12–49)
MCH RBC QN AUTO: 35.3 PG (ref 26–34)
MCHC RBC AUTO-ENTMCNC: 32.5 G/DL (ref 30–36.5)
MCV RBC AUTO: 108.4 FL (ref 80–99)
MONOCYTES # BLD: 0.6 K/UL (ref 0–1)
MONOCYTES NFR BLD: 8 % (ref 5–13)
NEUTS SEG # BLD: 5.7 K/UL (ref 1.8–8)
NEUTS SEG NFR BLD: 78 % (ref 32–75)
NRBC # BLD: 0 K/UL (ref 0–0.01)
NRBC BLD-RTO: 0 PER 100 WBC
PLATELET # BLD AUTO: 176 K/UL (ref 150–400)
PMV BLD AUTO: 10.1 FL (ref 8.9–12.9)
POTASSIUM SERPL-SCNC: 3.9 MMOL/L (ref 3.5–5.1)
PROT SERPL-MCNC: 7 G/DL (ref 6.4–8.2)
RBC # BLD AUTO: 3.8 M/UL (ref 3.8–5.2)
RBC MORPH BLD: ABNORMAL
RBC MORPH BLD: ABNORMAL
SODIUM SERPL-SCNC: 138 MMOL/L (ref 136–145)
T4 FREE SERPL-MCNC: 1 NG/DL (ref 0.8–1.5)
TRIGL SERPL-MCNC: 150 MG/DL (ref ?–150)
TSH SERPL DL<=0.05 MIU/L-ACNC: 3.22 UIU/ML (ref 0.36–3.74)
VLDLC SERPL CALC-MCNC: 30 MG/DL
WBC # BLD AUTO: 7.3 K/UL (ref 3.6–11)

## 2022-04-14 NOTE — PROGRESS NOTES
LDL cholesterol is lower at 120. Continue to limit saturated fat in the diet to lower this further. No med needed at this time. HDL >60 also protective. Increased neutrophils likely related with the radiation skin reaction.   Other labs are normal or stable and at goal.

## 2022-04-18 DIAGNOSIS — F32.A ANXIETY AND DEPRESSION: ICD-10-CM

## 2022-04-18 DIAGNOSIS — F41.9 ANXIETY AND DEPRESSION: ICD-10-CM

## 2022-04-18 DIAGNOSIS — F32.A DEPRESSION, UNSPECIFIED DEPRESSION TYPE: ICD-10-CM

## 2022-04-18 RX ORDER — SERTRALINE HYDROCHLORIDE 100 MG/1
TABLET, FILM COATED ORAL
Qty: 45 TABLET | Refills: 1 | Status: SHIPPED | OUTPATIENT
Start: 2022-04-18 | End: 2022-05-20

## 2022-04-27 ENCOUNTER — OFFICE VISIT (OUTPATIENT)
Dept: ONCOLOGY | Age: 57
End: 2022-04-27
Payer: COMMERCIAL

## 2022-04-27 VITALS
BODY MASS INDEX: 35.24 KG/M2 | DIASTOLIC BLOOD PRESSURE: 75 MMHG | HEART RATE: 92 BPM | RESPIRATION RATE: 18 BRPM | TEMPERATURE: 97.6 F | SYSTOLIC BLOOD PRESSURE: 113 MMHG | WEIGHT: 225 LBS | OXYGEN SATURATION: 96 %

## 2022-04-27 DIAGNOSIS — E88.09 AROMATASE DEFICIENCY: Primary | ICD-10-CM

## 2022-04-27 DIAGNOSIS — E66.09 CLASS 1 OBESITY DUE TO EXCESS CALORIES WITHOUT SERIOUS COMORBIDITY WITH BODY MASS INDEX (BMI) OF 30.0 TO 30.9 IN ADULT: ICD-10-CM

## 2022-04-27 DIAGNOSIS — Z17.0 MALIGNANT NEOPLASM OF UPPER-OUTER QUADRANT OF LEFT BREAST IN FEMALE, ESTROGEN RECEPTOR POSITIVE (HCC): ICD-10-CM

## 2022-04-27 DIAGNOSIS — C50.412 MALIGNANT NEOPLASM OF UPPER-OUTER QUADRANT OF LEFT BREAST IN FEMALE, ESTROGEN RECEPTOR POSITIVE (HCC): ICD-10-CM

## 2022-04-27 PROCEDURE — 99214 OFFICE O/P EST MOD 30 MIN: CPT | Performed by: INTERNAL MEDICINE

## 2022-04-27 NOTE — PROGRESS NOTES
Ifeanyi Vickers is a 60 y.o. female    Chief Complaint   Patient presents with    Follow-up      Left breast cancer- ER+MO+HER2 greg neg       1. Have you been to the ER, urgent care clinic since your last visit? Hospitalized since your last visit? No    2. Have you seen or consulted any other health care providers outside of the [address] [place] [address] since your last visit? Include any pap smears or colon screening.  No

## 2022-04-27 NOTE — PROGRESS NOTES
[facility] at [address], [address] [place] [address] Road, [place]: [phone]  F: [phone]    Reason for Visit:   Reymundo Dinh is a 60 y.o. female who is seen by synchronous (real-time) audio-video technology on 4/27/2022 for follow up of  Left breast cancer- ER+NH+HER2 greg neg    Treatment History:   · 6/19: Mammogram:  Left breast mass with skin thickening, 2 suspicious nodes. Mass measures 1.4 x 0.9 x 1.3  · 6/19: MRI breast: Multicentric left breast carcinoma. Non-masslike enhancement extends from the nipple to the posterior third, involves all quadrants, and measures 106 x 44 x 79 mm. · 5/28/19: Biopsy breast- IDC % % HER 2 negative, skin biopsy benign , node biopsy mostly adipose tissue with atypical cells . BRCA1 and 2 negative. CT and bone scan negative for metastasis. Mammaprint with insufficient tissue for testing. Repeat biopsy of axillary node 6/20/19 positive for metastatic disease- repeat mammaprint - high risk  · 7/19/19: cycle 1 neoadjuvant of dd AC-T  · 9/12/19: US of breast shows increased malignant microcalcification in the left breast, etiology included tx related necrosis vs extension of disease. Decreased size in left axillary LN. · 10/22/19: calcifications in the left upper outer quadrant are stable, calcifications in left axillary LN are not changed   · 1/21/20: b/l mastectomy   · 2/2020: Letrozole then switched to Anastrazole due to side effects  · 5/2020:Completed RT    History of Present Illness:   Patient is a 60 y.o. seen for L breast cancer. She felt a sensation in the shower about May 2019. She also noted a lump and  an inverted nipple. She had a physician friend look at this who directed her to mammogram and recommended she see Dr. Wilfredo Call. This led to imaging and diagnosis as above. She completed neoadjuvant ddACT followed by bilateral mastectomy on 1/21/20. On letrozole then switched to Anastrazole.      She feels well.    She has gained > 15 lb and this is bothersome to her as she has been really watching what she eats. She has been taking B12 for years, rarely drinks ETOH. She has had normal VD levels. She sees Endocrinology. She is on Synthyroid. She does have GAGAN and is untreated. Feels sleepy. She has had no peristent HA. She was on prednisone for months and only stopped 2 weeks ago for Dr. Shakira Wahl. Rare ETOH  She does not smoke. Father had colon cancer    Past Medical History:   Diagnosis Date    Acquired hypothyroidism 3/4/2021    Anxiety     Arthritis     NECK    At risk for sleep apnea     GAGAN 5     Basal cell carcinoma of skin     FACE, CHEST, BACK    Breast cancer ([facility] [address] 2019    LEFT    H/O total mastectomy     Heart attack ([facility] [address] 2019    History of seasonal allergies     Hx antineoplastic chemo     COMPLETED 12-3-19    Ill-defined condition     hx motion sickness    Motion sickness     Nausea & vomiting     PONV after thyroidectomy;  Hx motion sickness    Squamous cell skin cancer     Dr. Gauri Lacy Thyroid disease     H/ GOITER    Vertigo       Past Surgical History:   Procedure Laterality Date    HX BREAST RECONSTRUCTION Bilateral 2020    IMMEDIATE BILATERAL BREAST RECONSTRUCTION WITH TISSUE EXPANDERS AND ALLODERM GRAFTS performed by Jesus Freeman MD at [address] HX  SECTION      HX COLONOSCOPY      HX HEENT  2009    THYROIDECTOMY    HX KNEE ARTHROSCOPY Left     HX LUMBAR FUSION      HX MOHS PROCEDURES      x 4 times     HX SHOULDER ARTHROSCOPY Right     HX THYROIDECTOMY      HX VASCULAR ACCESS  2019    PORTACATH RIGHT CHEST      Social History     Tobacco Use    Smoking status: Never Smoker    Smokeless tobacco: Never Used   Substance Use Topics    Alcohol use: Yes     Comment: socially      Family History   Problem Relation Age of Onset    OSTEOARTHRITIS Mother     Cancer Father         Colon Cancer    No Known Problems Sister     No Known Problems Sister     Migraines Sister     Anesth Problems Neg Hx      Current Outpatient Medications   Medication Sig    sertraline (ZOLOFT) 100 mg tablet TAKE 1/2 TABLET BY MOUTH DAILY    traZODone (DESYREL) 50 mg tablet TAKE TWO TABLETS BY MOUTH EVERY NIGHT AT BEDTIME    anastrozole (Arimidex) 1 mg tablet Take 1 mg by mouth daily.  magnesium 250 mg tab Take  by mouth daily.  pantoprazole (PROTONIX) 40 mg tablet     ipratropium (ATROVENT) 21 mcg (0.03 %) nasal spray as needed.  EPINEPHrine (EPIPEN) 0.3 mg/0.3 mL injection     calcium-cholecalciferol, d3, (CALCIUM 600 + D) 600-125 mg-unit tab Take  by mouth.  naloxone (NARCAN) 4 mg/actuation nasal spray Use 1 spray intranasally, then discard. Repeat with new spray every 2 min as needed for opioid overdose symptoms, alternating nostrils.  cholecalciferol (VITAMIN D3) 1,000 unit cap Take 5,000 Units by mouth daily.  liothyronine (CYTOMEL) 5 mcg tablet Take 5 mcg by mouth daily.  mometasone (ELOCON) 0.1 % ointment Apply  to affected area daily.  Synthroid 100 mcg tablet Take 1 Tab by mouth Daily (before breakfast). (Patient not taking: Reported on 4/27/2022)     No current facility-administered medications for this visit. Allergies   Allergen Reactions    Augmentin [Amoxicillin-Pot Clavulanate] Nausea and Vomiting     Not an allergy. Review of Systems: A complete review of systems was obtained, negative except as described above.     Physical Exam:       General appearance - alert, well appearing, and in no distress  Lymphatics - no palpable lymphadenopathy  Chest wall: No masses, ulcers, skin changes  Abdomen - soft, nontender, nondistended, no masses or organomegaly, bowel sounds present  Extremities - peripheral pulses normal, no pedal edema, no clubbing or cyanosis  Skin - normal coloration and turgor, no new rashes, no suspicious skin lesions noted    ECOG PS: 0        Results:     Lab Results   Component Value Date/Time    WBC 7.3 04/13/2022 11:50 AM    HGB 13.4 04/13/2022 11:50 AM    HCT 41.2 04/13/2022 11:50 AM    PLATELET 788 87/22/5922 11:50 AM    .4 (H) 04/13/2022 11:50 AM    ABS. NEUTROPHILS 5.7 04/13/2022 11:50 AM     Lab Results   Component Value Date/Time    Sodium 138 04/13/2022 11:50 AM    Potassium 3.9 04/13/2022 11:50 AM    Chloride 102 04/13/2022 11:50 AM    CO2 27 04/13/2022 11:50 AM    Glucose 102 (H) 04/13/2022 11:50 AM    BUN 10 04/13/2022 11:50 AM    Creatinine 0.83 04/13/2022 11:50 AM    GFR est AA >60 04/13/2022 11:50 AM    GFR est non-AA >60 04/13/2022 11:50 AM    Calcium 9.4 04/13/2022 11:50 AM     Lab Results   Component Value Date/Time    Bilirubin, total 0.7 04/13/2022 11:50 AM    ALT (SGPT) 32 04/13/2022 11:50 AM    Alk. phosphatase 76 04/13/2022 11:50 AM    Protein, total 7.0 04/13/2022 11:50 AM    Albumin 3.9 04/13/2022 11:50 AM    Globulin 3.1 04/13/2022 11:50 AM       Records reviewed and summarized above. Pathology report(s) reviewed above. 5/28/19  FINAL PATHOLOGIC DIAGNOSIS  1. Breast, left, core biopsy: In situ and invasive ductal carcinoma   Invasive carcinoma is nuclear grade 2 and measures up to 0.6 cm in greatest dimension on slide   Ductal carcinoma in situ is nuclear grade 3 with central necrosis   2. Soft tissue, left axilla, core biopsy: Adipose tissue with rare detached atypical cells   No lymph node tissue identified   See comment     Radiology report(s) reviewed above. US breast 9/12/19:  IMPRESSION:  1. Increased malignant microcalcifications in the left breast. This could  represent treatment-related necrosis or extension of disease. 2. Extensive carcinoma seen on prior MRI is largely mammographically occult. 3. Decreased size of a metastatic left axillary lymph node with  microcalcifications. 4. BI-RADS Assessment Category 6: Known biopsy proven malignancy. Mammogram 10/22/19:  IMPRESSION:  1.  BI-RADS 6: known left breast cancer. 2. Segmental pleomorphic calcifications in the left upper outer quadrant are  stable. Calcifications left axillary lymph node have not changed significantly. Patient was informed of the results. Bilateral mastectomy 1/3/20   FINAL PATHOLOGIC DIAGNOSIS   1. Right breast, prophylactic mastectomy:   Benign breast.   2. Left breast, modified radical mastectomy:   Invasive ductal carcinoma (see synoptic report). Ductal carcinoma in situ. Atypical lobular hyperplasia with associated macrocalcifications. Usual ductal hyperplasia with associated microcalcifications. Intraductal papilloma with usual ductal hyperplasia. INVASIVE CARCINOMA OF THE BREAST: Resection   SPECIMEN   Procedure: Total mastectomy   Specimen Laterality: Left   TUMOR   Histologic Type:  Invasive carcinoma of no special type (invasive   ductal carcinoma, not otherwise specified)   Glandular (Acinar) / Tubular Differentiation: Score 3   Nuclear Pleomorphism: Score 2   Mitotic Rate: Score 2   Overall Grade: Grade 2 (scores of 6 or 7)   Tumor Size: 2.5 mm (see Comment)   Ductal Carcinoma In Situ (DCIS): Present, solid type, nuclear grade 2   with associated microcalcifications   Tumor Extent   Skin: Uninvolved by tumor cells   Treatment Effect in the Breast: Probable or definite response to   presurgical therapy in the invasive carcinoma   Treatment Effect in the Lymph Nodes: Probable or definite response   to presurgical therapy in metastatic carcinoma   MARGINS   Invasive Carcinoma Margins: Uninvolved by invasive carcinoma   Distance from Closest Margin (Millimeters): Greater than: 10 mm   Closest Margin: Posterior   DCIS Margins: Uninvolved by DCIS   Distance from Closest Margin (Millimeters): Greater than: 10 mm [employer] Patient Name: Swapna Baez Specimen #:    Patient Name: Swapna Baez Page 4 of 6 Printed: 01/31/20 9:32 AM SURGICAL PATHOLOGY REPORT   Closest Margin: Posterior ER/WI/HER-2   Pending; results to be issued within addendum reports   LYMPH NODES   Regional Lymph Nodes: Involved by tumor cells   Number of Lymph Nodes with Macrometastases (> 2 mm): 2   Number of Lymph Nodes with Micrometastases (> 0.2 mm to 2 mm and   / or > 200 cells): 0   Extranodal Extension: Not identified   Number of Lymph Nodes Examined: 3   PATHOLOGIC STAGE CLASSIFICATION (pTNM, AJCC 8th Edition)   TNM Descriptors: y (post-treatment) m (multiple foci)   Primary Tumor (pT): pT1a   Regional Lymph Nodes (pN)   Category (pN): pN1a   3. Lymph nodes, left axillary dissection:   Two of three lymph nodes positive for metastatic carcinoma (2/3). 4. Excised tissue, right breast:   Fragments of skin and benign breast tissue. 5. Excised tissue, left breast:   Fragments of skin and benign breast tissue. Assessment:   1) Left breast Invasive ductal carcinoma    [id number]  GRADE 2  % WI 20% HER2 greg equivocal - FISH could not be done  Repeat biopsy of breast mass 6/24/19 - HER2 greg negative, mamma print indicates she has genomically high risk disease  S/p ddAC-T and bilateral mastectomy on 1/21/2020-[id number]  RT completed 5/2020  Intolerant to Letrozole that was started 2/2020- switched to Anastrazole Planned 5-years    Tolerating well  No clinical evidence of recurrence today   She has debilitating fatigue. DEXA 3/2022 is normal, due 3/2024  Counseled on VD and Ca    Counseled on symptoms to call and NCCN based guidelines for surveillance  .   2) Hypothyroidism    3) Obesity    4) Cough  Resolved, CT chest 3/2022 reviewed and reassuring    5) Left arm Lymphedema    6) Ground glass opacities  Per Dr. Nicolas Aguirre, resolved    7) Fatigue  Multifactorial  She did come off steroids, she also has GAGAN  She will get these addressed    8) Macrocytosis  Will follow   Brother just got dx with Hemochromatosis  She takes B12 and will also take FA        Plan:     · Continue  Anastrazole daily 5 years  · Vit D / Calcium doses reviewed  · DEXA 2024  · GAGAN to be addressed  · Discuss fatigue with Endocrinology  · FA daily  · Follow with Dr. Payam Morrison as scheduled  · Follow with Dr. Saritha Langley        RTC in 6 months WITH CBC    I appreciate the opportunity to participate in Ms. Rachelle Garcia's care.       Signed By: Baltazar Bose MD

## 2022-04-29 ENCOUNTER — PATIENT MESSAGE (OUTPATIENT)
Dept: INTERNAL MEDICINE CLINIC | Age: 57
End: 2022-04-29

## 2022-05-14 LAB
BASOPHILS # BLD AUTO: 0 X10E3/UL (ref 0–0.2)
BASOPHILS NFR BLD AUTO: 1 %
EOSINOPHIL # BLD AUTO: 0.2 X10E3/UL (ref 0–0.4)
EOSINOPHIL NFR BLD AUTO: 3 %
ERYTHROCYTE [DISTWIDTH] IN BLOOD BY AUTOMATED COUNT: 12.3 % (ref 11.7–15.4)
HCT VFR BLD AUTO: 43.5 % (ref 34–46.6)
HGB BLD-MCNC: 14.2 G/DL (ref 11.1–15.9)
IMM GRANULOCYTES # BLD AUTO: 0 X10E3/UL (ref 0–0.1)
IMM GRANULOCYTES NFR BLD AUTO: 1 %
LYMPHOCYTES # BLD AUTO: 2.2 X10E3/UL (ref 0.7–3.1)
LYMPHOCYTES NFR BLD AUTO: 37 %
MCH RBC QN AUTO: 34.2 PG (ref 26.6–33)
MCHC RBC AUTO-ENTMCNC: 32.6 G/DL (ref 31.5–35.7)
MCV RBC AUTO: 105 FL (ref 79–97)
MONOCYTES # BLD AUTO: 0.6 X10E3/UL (ref 0.1–0.9)
MONOCYTES NFR BLD AUTO: 10 %
NEUTROPHILS # BLD AUTO: 3 X10E3/UL (ref 1.4–7)
NEUTROPHILS NFR BLD AUTO: 48 %
PLATELET # BLD AUTO: 220 X10E3/UL (ref 150–450)
RBC # BLD AUTO: 4.15 X10E6/UL (ref 3.77–5.28)
VIT B12 SERPL-MCNC: 1869 PG/ML (ref 232–1245)
WBC # BLD AUTO: 6.1 X10E3/UL (ref 3.4–10.8)

## 2022-05-16 ENCOUNTER — TELEPHONE (OUTPATIENT)
Dept: ONCOLOGY | Age: 57
End: 2022-05-16

## 2022-05-16 NOTE — TELEPHONE ENCOUNTER
[address] pt HIPAA verified x2. Informed pt that per Dr. Rebekah Waddell she reviewed her labs and No anemia and B12 levels are not low. Pt voiced understanding and wanted to know if she should stop taking her B12 supplement since her levels are high now. I confirmed with Dr. Rebekah Waddell and yes she can stop taking the B12. Pt voiced understanding and has no further questions or concerns at this time.

## 2022-05-17 DIAGNOSIS — F41.9 ANXIETY AND DEPRESSION: ICD-10-CM

## 2022-05-17 DIAGNOSIS — F32.A DEPRESSION, UNSPECIFIED DEPRESSION TYPE: ICD-10-CM

## 2022-05-17 DIAGNOSIS — F32.A ANXIETY AND DEPRESSION: ICD-10-CM

## 2022-05-20 RX ORDER — SERTRALINE HYDROCHLORIDE 100 MG/1
TABLET, FILM COATED ORAL
Qty: 45 TABLET | Refills: 1 | Status: SHIPPED | OUTPATIENT
Start: 2022-05-20

## 2022-06-22 ENCOUNTER — TRANSCRIBE ORDER (OUTPATIENT)
Dept: GENERAL RADIOLOGY | Age: 57
End: 2022-06-22

## 2022-06-22 ENCOUNTER — HOSPITAL ENCOUNTER (OUTPATIENT)
Dept: GENERAL RADIOLOGY | Age: 57
Discharge: HOME | End: 2022-06-22
Attending: INTERNAL MEDICINE
Payer: COMMERCIAL

## 2022-06-22 DIAGNOSIS — J84.89 ORGANIZED PNEUMONIA (HCC): ICD-10-CM

## 2022-06-22 DIAGNOSIS — J84.89 ORGANIZED PNEUMONIA (HCC): Primary | ICD-10-CM

## 2022-06-22 PROCEDURE — 71046 X-RAY EXAM CHEST 2 VIEWS: CPT

## 2022-09-19 DIAGNOSIS — G47.00 INSOMNIA, UNSPECIFIED TYPE: ICD-10-CM

## 2022-09-19 NOTE — TELEPHONE ENCOUNTER
Last visit 04/13/2022 MD Nik Luong   Next appointment Nothing scheduled   Previous refill encounter(s)   03/19/2022 Desyrel #[id number] with 1 refill.      For Pharmacy Admin Tracking Only    Intervention Detail: New Rx: 1, reason: Patient Preference  Time Spent (min): 5      Requested Prescriptions     Pending Prescriptions Disp Refills    traZODone (DESYREL) 50 mg tablet 180 Tablet 0     Sig: TAKE TWO TABLETS BY MOUTH EVERY NIGHT AT BEDTIME

## 2022-09-22 RX ORDER — TRAZODONE HYDROCHLORIDE 50 MG/1
TABLET ORAL
Qty: 180 TABLET | Refills: 0 | Status: SHIPPED | OUTPATIENT
Start: 2022-09-22

## 2022-11-10 ENCOUNTER — OFFICE VISIT (OUTPATIENT)
Dept: ONCOLOGY | Age: 57
End: 2022-11-10
Payer: COMMERCIAL

## 2022-11-10 VITALS
TEMPERATURE: 98.3 F | HEART RATE: 77 BPM | WEIGHT: 221 LBS | SYSTOLIC BLOOD PRESSURE: 124 MMHG | BODY MASS INDEX: 34.61 KG/M2 | DIASTOLIC BLOOD PRESSURE: 80 MMHG | RESPIRATION RATE: 18 BRPM | OXYGEN SATURATION: 94 %

## 2022-11-10 DIAGNOSIS — C50.412 MALIGNANT NEOPLASM OF UPPER-OUTER QUADRANT OF LEFT BREAST IN FEMALE, ESTROGEN RECEPTOR POSITIVE (HCC): Primary | ICD-10-CM

## 2022-11-10 DIAGNOSIS — C50.912 CARCINOMA OF LEFT FEMALE BREAST, UNSPECIFIED ESTROGEN RECEPTOR STATUS, UNSPECIFIED SITE OF BREAST (HCC): ICD-10-CM

## 2022-11-10 DIAGNOSIS — Z17.0 MALIGNANT NEOPLASM OF UPPER-OUTER QUADRANT OF LEFT BREAST IN FEMALE, ESTROGEN RECEPTOR POSITIVE (HCC): Primary | ICD-10-CM

## 2022-11-10 PROCEDURE — 99214 OFFICE O/P EST MOD 30 MIN: CPT | Performed by: INTERNAL MEDICINE

## 2022-11-10 NOTE — PROGRESS NOTES
[facility] [place] [facility] [place] [address] [address], [address], [place]: [phone]  F: [phone]    Reason for Visit:   Con Johansen is a 59 y.o. female who is seen by synchronous (real-time) audio-video technology on 11/10/2022 for follow up of  Left breast cancer- ER+WV+HER2 greg neg    Treatment History:   6/19: Mammogram:  Left breast mass with skin thickening, 2 suspicious nodes. Mass measures 1.4 x 0.9 x 1.3  6/19: MRI breast: Multicentric left breast carcinoma. Non-masslike enhancement extends from the nipple to the posterior third, involves all quadrants, and measures 106 x 44 x 79 mm.  5/28/19: Biopsy breast- IDC % % HER 2 negative, skin biopsy benign , node biopsy mostly adipose tissue with atypical cells . BRCA1 and 2 negative. CT and bone scan negative for metastasis. Mammaprint with insufficient tissue for testing. Repeat biopsy of axillary node 6/20/19 positive for metastatic disease- repeat mammaprint - high risk  7/19/19: cycle 1 neoadjuvant of dd AC-T  9/12/19: US of breast shows increased malignant microcalcification in the left breast, etiology included tx related necrosis vs extension of disease. Decreased size in left axillary LN.    10/22/19: calcifications in the left upper outer quadrant are stable, calcifications in left axillary LN are not changed   1/21/20: b/l mastectomy   2/2020: Letrozole then switched to Anastrazole due to side effects  5/2020:Completed RT    History of Present Illness:   Patient is a 59 y.o. seen for L breast cancer. She felt a sensation in the shower about May 2019. She also noted a lump and  an inverted nipple. She had a physician friend look at this who directed her to mammogram and recommended she see Dr. Tommy Talbot. This led to imaging and diagnosis as above. She completed neoadjuvant ddACT followed by bilateral mastectomy on 1/21/20. On letrozole then switched to Anastrazole. She feels well.     She has had shoulder pain bilaterally and gets cortisone shots. She has stable weight. She is happy. She has no new HA. She has been taking B12 for years, rarely drinks ETOH. Rare ETOH  She does not smoke. Father had colon cancer    Past Medical History:   Diagnosis Date    Acquired hypothyroidism 3/4/2021    Anxiety     Arthritis     NECK    At risk for sleep apnea     GAGAN 5     Basal cell carcinoma of skin     FACE, CHEST, BACK    Breast cancer ([facility] [address] 2019    LEFT    H/O total mastectomy     Heart attack ([facility] [address] 2019    History of seasonal allergies     Hx antineoplastic chemo     COMPLETED 12-3-19    Ill-defined condition     hx motion sickness    Motion sickness     Nausea & vomiting     PONV after thyroidectomy;  Hx motion sickness    Squamous cell skin cancer     Dr. Cari Chopra    Thyroid disease     H/ GOITER    Vertigo       Past Surgical History:   Procedure Laterality Date    HX BREAST RECONSTRUCTION Bilateral 2020    IMMEDIATE BILATERAL BREAST RECONSTRUCTION WITH TISSUE EXPANDERS AND ALLODERM GRAFTS performed by Teresa Zhang MD at [facility] 11    HX  SECTION      HX COLONOSCOPY      HX HEENT  2009    THYROIDECTOMY    HX KNEE ARTHROSCOPY Left     HX LUMBAR FUSION      HX MOHS PROCEDURES      x 4 times     HX SHOULDER ARTHROSCOPY Right     HX THYROIDECTOMY  2014    HX VASCULAR ACCESS  2019    PORTACATH RIGHT CHEST      Social History     Tobacco Use    Smoking status: Never    Smokeless tobacco: Never   Substance Use Topics    Alcohol use: Yes     Comment: socially      Family History   Problem Relation Age of Onset    OSTEOARTHRITIS Mother     Cancer Father         Colon Cancer    No Known Problems Sister     No Known Problems Sister     Migraines Sister     Anesth Problems Neg Hx      Current Outpatient Medications   Medication Sig    traZODone (DESYREL) 50 mg tablet TAKE TWO TABLETS BY MOUTH EVERY NIGHT AT BEDTIME    sertraline (ZOLOFT) 100 mg tablet TAKE 1/2 TABLET BY MOUTH DAILY    anastrozole (Arimidex) 1 mg tablet Take 1 mg by mouth daily. magnesium 250 mg tab Take  by mouth daily. pantoprazole (PROTONIX) 40 mg tablet     ipratropium (ATROVENT) 21 mcg (0.03 %) nasal spray as needed. EPINEPHrine (EPIPEN) 0.3 mg/0.3 mL injection     Synthroid 100 mcg tablet Take 1 Tab by mouth Daily (before breakfast). calcium-cholecalciferol, d3, (CALCIUM 600 + D) 600-125 mg-unit tab Take  by mouth.    naloxone (NARCAN) 4 mg/actuation nasal spray Use 1 spray intranasally, then discard. Repeat with new spray every 2 min as needed for opioid overdose symptoms, alternating nostrils. cholecalciferol (VITAMIN D3) 25 mcg (1,000 unit) cap Take 5,000 Units by mouth daily. liothyronine (CYTOMEL) 5 mcg tablet Take 5 mcg by mouth daily. mometasone (ELOCON) 0.1 % ointment Apply  to affected area daily. No current facility-administered medications for this visit. Allergies   Allergen Reactions    Augmentin [Amoxicillin-Pot Clavulanate] Nausea and Vomiting     Not an allergy. Review of Systems: A complete review of systems was obtained, negative except as described above. Physical Exam:       General appearance - alert, well appearing, and in no distress  Lymphatics - no palpable lymphadenopathy  Chest wall: No masses, ulcers, skin changes  Abdomen - soft, nontender, nondistended, no masses or organomegaly, bowel sounds present  Extremities - peripheral pulses normal, no pedal edema, no clubbing or cyanosis  Skin - normal coloration and turgor, no new rashes, no suspicious skin lesions noted    ECOG PS: 0        Results:     Lab Results   Component Value Date/Time    WBC 6.1 05/13/2022 11:11 AM    HGB 14.2 05/13/2022 11:11 AM    HCT 43.5 05/13/2022 11:11 AM    PLATELET 602 34/47/4799 11:11 AM     (H) 05/13/2022 11:11 AM    ABS.  NEUTROPHILS 3.0 05/13/2022 11:11 AM     Lab Results   Component Value Date/Time    Sodium 138 04/13/2022 11:50 AM Potassium 3.9 04/13/2022 11:50 AM    Chloride 102 04/13/2022 11:50 AM    CO2 27 04/13/2022 11:50 AM    Glucose 102 (H) 04/13/2022 11:50 AM    BUN 10 04/13/2022 11:50 AM    Creatinine 0.83 04/13/2022 11:50 AM    GFR est AA >60 04/13/2022 11:50 AM    GFR est non-AA >60 04/13/2022 11:50 AM    Calcium 9.4 04/13/2022 11:50 AM     Lab Results   Component Value Date/Time    Bilirubin, total 0.7 04/13/2022 11:50 AM    ALT (SGPT) 32 04/13/2022 11:50 AM    Alk. phosphatase 76 04/13/2022 11:50 AM    Protein, total 7.0 04/13/2022 11:50 AM    Albumin 3.9 04/13/2022 11:50 AM    Globulin 3.1 04/13/2022 11:50 AM       Records reviewed and summarized above. Pathology report(s) reviewed above. 5/28/19  FINAL PATHOLOGIC DIAGNOSIS  1. Breast, left, core biopsy: In situ and invasive ductal carcinoma   Invasive carcinoma is nuclear grade 2 and measures up to 0.6 cm in greatest dimension on slide   Ductal carcinoma in situ is nuclear grade 3 with central necrosis   2. Soft tissue, left axilla, core biopsy: Adipose tissue with rare detached atypical cells   No lymph node tissue identified   See comment     Radiology report(s) reviewed above. US breast 9/12/19:  IMPRESSION:  1. Increased malignant microcalcifications in the left breast. This could  represent treatment-related necrosis or extension of disease. 2. Extensive carcinoma seen on prior MRI is largely mammographically occult. 3. Decreased size of a metastatic left axillary lymph node with  microcalcifications. 4. BI-RADS Assessment Category 6: Known biopsy proven malignancy. Mammogram 10/22/19:  IMPRESSION:  1. BI-RADS 6: known left breast cancer. 2. Segmental pleomorphic calcifications in the left upper outer quadrant are  stable. Calcifications left axillary lymph node have not changed significantly. Patient was informed of the results. Bilateral mastectomy 1/3/20   FINAL PATHOLOGIC DIAGNOSIS   1.  Right breast, prophylactic mastectomy:   Benign breast.   2. Left breast, modified radical mastectomy:   Invasive ductal carcinoma (see synoptic report). Ductal carcinoma in situ. Atypical lobular hyperplasia with associated macrocalcifications. Usual ductal hyperplasia with associated microcalcifications. Intraductal papilloma with usual ductal hyperplasia. INVASIVE CARCINOMA OF THE BREAST: Resection   SPECIMEN   Procedure: Total mastectomy   Specimen Laterality: Left   TUMOR   Histologic Type:  Invasive carcinoma of no special type (invasive   ductal carcinoma, not otherwise specified)   Glandular (Acinar) / Tubular Differentiation: Score 3   Nuclear Pleomorphism: Score 2   Mitotic Rate: Score 2   Overall Grade: Grade 2 (scores of 6 or 7)   Tumor Size: 2.5 mm (see Comment)   Ductal Carcinoma In Situ (DCIS): Present, solid type, nuclear grade 2   with associated microcalcifications   Tumor Extent   Skin: Uninvolved by tumor cells   Treatment Effect in the Breast: Probable or definite response to   presurgical therapy in the invasive carcinoma   Treatment Effect in the Lymph Nodes: Probable or definite response   to presurgical therapy in metastatic carcinoma   MARGINS   Invasive Carcinoma Margins: Uninvolved by invasive carcinoma   Distance from Closest Margin (Millimeters): Greater than: 10 mm   Closest Margin: Posterior   DCIS Margins: Uninvolved by DCIS   Distance from Closest Margin (Millimeters): Greater than: 10 mm [employer] Patient Name: Dewey Morales Specimen #:    Patient Name: Dewey Morales Page 4 of 6 Printed: 01/31/20 9:32 AM SURGICAL PATHOLOGY REPORT   Closest Margin: Posterior   ER/AZ/HER-2   Pending; results to be issued within addendum reports   LYMPH NODES   Regional Lymph Nodes: Involved by tumor cells   Number of Lymph Nodes with Macrometastases (> 2 mm): 2   Number of Lymph Nodes with Micrometastases (> 0.2 mm to 2 mm and   / or > 200 cells): 0   Extranodal Extension: Not identified   Number of Lymph Nodes Examined: 3   PATHOLOGIC STAGE CLASSIFICATION (pTNM, AJCC 8th Edition)   TNM Descriptors: y (post-treatment) m (multiple foci)   Primary Tumor (pT): pT1a   Regional Lymph Nodes (pN)   Category (pN): pN1a   3. Lymph nodes, left axillary dissection:   Two of three lymph nodes positive for metastatic carcinoma (2/3). 4. Excised tissue, right breast:   Fragments of skin and benign breast tissue. 5. Excised tissue, left breast:   Fragments of skin and benign breast tissue. Assessment:   1) Left breast Invasive ductal carcinoma    [id number]  GRADE 2  % ID 20% HER2 greg equivocal - FISH could not be done  Repeat biopsy of breast mass 6/24/19 - HER2 greg negative, mamma print indicates she has genomically high risk disease  S/p ddAC-T and bilateral mastectomy on 1/21/2020-[id number]  RT completed 5/2020  Intolerant to Letrozole that was started 2/2020- switched to Anastrazole Planned 5-years    Tolerating well  No clinical evidence of recurrence today   She has debilitating fatigue. DEXA 3/2022 is normal, due 3/2024  Counseled on VD and Ca    Counseled on symptoms to call and NCCN based guidelines for surveillance  . 2) Hypothyroidism    3) Obesity    4) Cough  Resolved, CT chest 3/2022 reviewed and reassuring    5) Left arm Lymphedema    6) Ground glass opacities  Per Dr. Mirela Aguiar, resolved    7) Fatigue  Multifactorial  She did come off steroids, she also has GAGAN  She will get these addressed    8) Macrocytosis  Will follow   Brother just got dx with Hemochromatosis, 2 sisters also have hemochromatosis  She takes B12 and will also take FA        Plan:     Continue  Anastrazole daily 5 years  Vit D / Calcium doses reviewed  DEXA 2024  FA daily  Follow with Dr. Parrish Frey as scheduled  CBC, CMP, Ferritin, VD, TSH,  iron profile , HFE- Call with results         RTC in 6 months     I appreciate the opportunity to participate in MsWendy Rachelle Garcia's care.       Signed By: Sylvie Escobar MD

## 2022-11-22 ENCOUNTER — PATIENT MESSAGE (OUTPATIENT)
Dept: INTERNAL MEDICINE CLINIC | Age: 57
End: 2022-11-22

## 2022-11-22 DIAGNOSIS — F32.A DEPRESSION, UNSPECIFIED DEPRESSION TYPE: ICD-10-CM

## 2022-11-22 DIAGNOSIS — F41.9 ANXIETY AND DEPRESSION: ICD-10-CM

## 2022-11-22 DIAGNOSIS — F32.A ANXIETY AND DEPRESSION: ICD-10-CM

## 2022-11-22 LAB
25(OH)D3+25(OH)D2 SERPL-MCNC: 56.7 NG/ML (ref 30–100)
ALBUMIN SERPL-MCNC: 4.2 G/DL (ref 3.8–4.9)
ALBUMIN/GLOB SERPL: 1.6 {RATIO} (ref 1.2–2.2)
ALP SERPL-CCNC: 126 IU/L (ref 44–121)
ALT SERPL-CCNC: 26 IU/L (ref 0–32)
AST SERPL-CCNC: 22 IU/L (ref 0–40)
BASOPHILS # BLD AUTO: 0 X10E3/UL (ref 0–0.2)
BASOPHILS NFR BLD AUTO: 1 %
BILIRUB SERPL-MCNC: 0.3 MG/DL (ref 0–1.2)
BUN SERPL-MCNC: 12 MG/DL (ref 6–24)
BUN/CREAT SERPL: 13 (ref 9–23)
CALCIUM SERPL-MCNC: 9.4 MG/DL (ref 8.7–10.2)
CHLORIDE SERPL-SCNC: 101 MMOL/L (ref 96–106)
CO2 SERPL-SCNC: 23 MMOL/L (ref 20–29)
CREAT SERPL-MCNC: 0.89 MG/DL (ref 0.57–1)
EGFR: 75 ML/MIN/1.73
EOSINOPHIL # BLD AUTO: 0.1 X10E3/UL (ref 0–0.4)
EOSINOPHIL NFR BLD AUTO: 1 %
ERYTHROCYTE [DISTWIDTH] IN BLOOD BY AUTOMATED COUNT: 12.3 % (ref 11.7–15.4)
FERRITIN SERPL-MCNC: 109 NG/ML (ref 15–150)
GLOBULIN SER CALC-MCNC: 2.6 G/DL (ref 1.5–4.5)
GLUCOSE SERPL-MCNC: 103 MG/DL (ref 70–99)
HCT VFR BLD AUTO: 38.9 % (ref 34–46.6)
HFE GENE MUT ANL BLD/T: NORMAL
HGB BLD-MCNC: 13.7 G/DL (ref 11.1–15.9)
IMM GRANULOCYTES # BLD AUTO: 0 X10E3/UL (ref 0–0.1)
IMM GRANULOCYTES NFR BLD AUTO: 0 %
IRON SATN MFR SERPL: 37 % (ref 15–55)
IRON SERPL-MCNC: 117 UG/DL (ref 27–159)
LYMPHOCYTES # BLD AUTO: 1.8 X10E3/UL (ref 0.7–3.1)
LYMPHOCYTES NFR BLD AUTO: 24 %
MCH RBC QN AUTO: 34.7 PG (ref 26.6–33)
MCHC RBC AUTO-ENTMCNC: 35.2 G/DL (ref 31.5–35.7)
MCV RBC AUTO: 99 FL (ref 79–97)
MONOCYTES # BLD AUTO: 0.6 X10E3/UL (ref 0.1–0.9)
MONOCYTES NFR BLD AUTO: 8 %
NEUTROPHILS # BLD AUTO: 5 X10E3/UL (ref 1.4–7)
NEUTROPHILS NFR BLD AUTO: 66 %
PLATELET # BLD AUTO: 233 X10E3/UL (ref 150–450)
POTASSIUM SERPL-SCNC: 4.4 MMOL/L (ref 3.5–5.2)
PROT SERPL-MCNC: 6.8 G/DL (ref 6–8.5)
RBC # BLD AUTO: 3.95 X10E6/UL (ref 3.77–5.28)
SODIUM SERPL-SCNC: 139 MMOL/L (ref 134–144)
TIBC SERPL-MCNC: 315 UG/DL (ref 250–450)
TSH SERPL DL<=0.005 MIU/L-ACNC: 0.7 UIU/ML (ref 0.45–4.5)
UIBC SERPL-MCNC: 198 UG/DL (ref 131–425)
WBC # BLD AUTO: 7.6 X10E3/UL (ref 3.4–10.8)

## 2022-11-25 RX ORDER — SERTRALINE HYDROCHLORIDE 100 MG/1
50 TABLET, FILM COATED ORAL DAILY
Qty: 45 TABLET | Refills: 1 | Status: SHIPPED | OUTPATIENT
Start: 2022-11-25

## 2022-11-25 RX ORDER — LEVOTHYROXINE SODIUM 100 UG/1
100 TABLET ORAL
Qty: 30 TABLET | Refills: 5 | Status: SHIPPED | OUTPATIENT
Start: 2022-11-25

## 2022-12-07 DIAGNOSIS — G47.00 INSOMNIA, UNSPECIFIED TYPE: ICD-10-CM

## 2022-12-08 RX ORDER — TRAZODONE HYDROCHLORIDE 50 MG/1
TABLET ORAL
Qty: 180 TABLET | Refills: 0 | Status: SHIPPED | OUTPATIENT
Start: 2022-12-08

## 2023-01-11 ENCOUNTER — OFFICE VISIT (OUTPATIENT)
Dept: INTERNAL MEDICINE CLINIC | Age: 58
End: 2023-01-11
Payer: COMMERCIAL

## 2023-01-11 VITALS
WEIGHT: 223.3 LBS | DIASTOLIC BLOOD PRESSURE: 79 MMHG | BODY MASS INDEX: 35.05 KG/M2 | SYSTOLIC BLOOD PRESSURE: 117 MMHG | RESPIRATION RATE: 20 BRPM | HEIGHT: 67 IN | OXYGEN SATURATION: 96 % | HEART RATE: 91 BPM | TEMPERATURE: 97.1 F

## 2023-01-11 DIAGNOSIS — F51.01 PRIMARY INSOMNIA: ICD-10-CM

## 2023-01-11 DIAGNOSIS — Z76.89 ESTABLISHING CARE WITH NEW DOCTOR, ENCOUNTER FOR: Primary | ICD-10-CM

## 2023-01-11 DIAGNOSIS — Z71.89 ACP (ADVANCE CARE PLANNING): ICD-10-CM

## 2023-01-11 DIAGNOSIS — I89.0 LYMPHEDEMA: ICD-10-CM

## 2023-01-11 DIAGNOSIS — F33.42 RECURRENT MAJOR DEPRESSIVE DISORDER, IN FULL REMISSION (HCC): ICD-10-CM

## 2023-01-11 DIAGNOSIS — C50.912 CARCINOMA OF LEFT FEMALE BREAST, UNSPECIFIED ESTROGEN RECEPTOR STATUS, UNSPECIFIED SITE OF BREAST (HCC): ICD-10-CM

## 2023-01-11 DIAGNOSIS — G47.33 OSA ON CPAP: ICD-10-CM

## 2023-01-11 DIAGNOSIS — E03.9 ACQUIRED HYPOTHYROIDISM: ICD-10-CM

## 2023-01-11 DIAGNOSIS — Z99.89 OSA ON CPAP: ICD-10-CM

## 2023-01-11 PROCEDURE — 99214 OFFICE O/P EST MOD 30 MIN: CPT | Performed by: STUDENT IN AN ORGANIZED HEALTH CARE EDUCATION/TRAINING PROGRAM

## 2023-01-11 RX ORDER — MULTIVITAMIN
1 TABLET ORAL DAILY
COMMUNITY

## 2023-01-11 RX ORDER — GLUCOSAM/CHONDRO/HERB 149/HYAL 750-100 MG
1 TABLET ORAL 3 TIMES DAILY
COMMUNITY

## 2023-01-11 NOTE — ASSESSMENT & PLAN NOTE
Cont trazadone QHS. She usually takes 50mg. She sleeps for a rather long period of time, I asked her to try 25mg trazadone. Her sleep time may be related to anastrazole since it coincides with starting the med.

## 2023-01-11 NOTE — PROGRESS NOTES
Alejandra Galvan is a 59y.o. year old female who is a new patient to me today (01/11/23). She was previous followed by Dr Rony Clemons. Assessment & Plan:   1. Establishing care with new doctor, encounter for  2. Acquired hypothyroidism  Assessment & Plan:  She is on synthroid and cytomel, previously followed by endocrinology but can't return due to insurance issue. She is stable on current regimen. Will continue for now. I asked her to establish with another endo so we we have someone to turn to if her TSH were to fluctuate and her meds need adjustment. I am ok with prescribing the meds as long as she is stable    Orders:  -     REFERRAL TO ENDOCRINOLOGY  3. Recurrent major depressive disorder, in full remission ([facility] [address]  Assessment & Plan:  Controlled, cont sertraline   4. ACP (advance care planning)  -     REFERRAL TO ACP CLINICAL SPECIALIST  5. Primary insomnia  Assessment & Plan:  Cont trazadone QHS. She usually takes 50mg. She sleeps for a rather long period of time, I asked her to try 25mg trazadone. Her sleep time may be related to anastrazole since it coincides with starting the med. 6. Carcinoma of left female breast, unspecified estrogen receptor status, unspecified site of breast [facility])  Assessment & Plan:  Followed by Dr Faviola Knox. On aromatase inhibitor. 7. Lymphedema  Assessment & Plan:     8.  GAGAN on CPAP  Assessment & Plan:  Encouraged compliance with PAP therapy        Health Maintenance   Flu vaccine: states she got this year at [facility]  COVID vaccine: discussed bivalent booster   Tetanus vaccine:  Shingles vaccine:  Pneumonia vaccine:  Cervical cancer screening:  Breast cancer screening: no longer recommended s/p mastectomy   Colon cancer screening: cologuard 4/2022 q3yr  DEXA:  Lung cancer screening:  Hep C:  Lipid:  DM: 4/2022 A1c 5.0  Healthcare decision maker: would like for this to be her life partner and her sister, not her Jamie Acosta (sons are of age and her parents are still alive)  ACP: will refer      RTC: 6 mo annual with labs    Subjective:   Kenisha Lindquist was seen today for Establish Care    Hypothyroidism  - she had goiter and thyroid was removed   - synthroid and cytomel  - she was seeing Dr Wilbur Mandel but insurance coverage changed and she can no longer seeing her. Depression  - she started on prozac in her 25s, she was having fatigue at the time and it helped. She tried going down on sertraline dose 2 years ago and felt very irritable  - sertraline    Insomnia  - takes trazadone 50mg most nights  - she is having trouble getting up in the morning. She goes to sleep at 10 or 11 and sleeps 12 hours. Has been tired since starting anastrozole. Breast cancer L breast  - dx 2019, treated with neoadjuvant chemo, BL mastectomy, radiation, aromotase inhibitor  - Dr Faviola Knox  - anastrozole - feels like she has a lot of side effects from this medication  - complicated by L arm lymphedema - Dr Kimberly Fraire did reconstruction and lymph. She wears the lymphedema sleeve and also has a machine. GAGAN   - admits she has not been very compliant with CPAP    Pulm - organizing PNA, thinks this was related to radiation she received. She was on prednisone for a while. Had radiation recall, which was a form of dermatits. This has been treated with steroids in the past (topical mometasone)      Care Team:  Jen Lang - Dr Estrellita Singh    Review of Systems   All other systems reviewed and are negative.     PMHx    Patient Active Problem List   Diagnosis Code    Malignant neoplasm of upper-outer quadrant of left breast in female, estrogen receptor positive ([facility] [address] C50.412, Z17.0    Class 1 obesity due to excess calories without serious comorbidity with body mass index (BMI) of 30.0 to 30.9 in adult E66.09, Z68.30    Neuropathy G62.9    Status post surgery Z98.890    Carcinoma of left breast ([facility] [address] C50.912    Acquired hypothyroidism E03.9    GAGAN on CPAP G47.33, Z99.89    Recurrent major depressive disorder, in full remission ([facility] F33.42    Primary insomnia F51.01    Lymphedema I89.0       Prior to Admission medications    Medication Sig Start Date End Date Taking? Authorizing Provider   multivitamin with folic acid (Daily-Farhan, with folic acid,) 213 mcg tab tablet Take 1 Tablet by mouth daily. Yes Provider, Historical   omega 3-DHA-EPA-fish oil (Fish OiL) 1,000 mg (120 mg-180 mg) capsule Take 1 Capsule by mouth three (3) times daily. Yes Provider, Historical   traZODone (DESYREL) 50 mg tablet TAKE TWO TABLETS BY MOUTH EVERY NIGHT AT BEDTIME 12/8/22  Yes Flaco SAENZ NP   Synthroid 100 mcg tablet Take 1 Tablet by mouth Daily (before breakfast). 11/25/22  Yes Kiersten Ruiz NP   sertraline (ZOLOFT) 100 mg tablet Take 0.5 Tablets by mouth daily. 11/25/22  Yes Kiersten Ruiz NP   anastrozole (Arimidex) 1 mg tablet Take 1 mg by mouth daily. 1/17/22  Yes Viry Kapadia NP   MAGNESIUM PO Take 500 mg by mouth daily. Yes Provider, Historical   pantoprazole (PROTONIX) 40 mg tablet Take 40 mg by mouth daily. 9/8/21  Yes Provider, Historical   ipratropium (ATROVENT) 21 mcg (0.03 %) nasal spray as needed. 2/7/21  Yes Provider, Historical   EPINEPHrine (EPIPEN) 0.3 mg/0.3 mL injection  2/7/21  Yes Provider, Historical   calcium-cholecalciferol, d3, (CALCIUM 600 + D) 600-125 mg-unit tab Take 600 mg by mouth two (2) times a day. Yes Provider, Historical   naloxone (NARCAN) 4 mg/actuation nasal spray Use 1 spray intranasally, then discard. Repeat with new spray every 2 min as needed for opioid overdose symptoms, alternating nostrils. 1/23/20  Yes Walter Rice III, MD   cholecalciferol (VITAMIN D3) 25 mcg (1,000 unit) cap Take 5,000 Units by mouth daily. Yes Provider, Historical   liothyronine (CYTOMEL) 5 mcg tablet Take 5 mcg by mouth daily.    Yes Other, MD Robert   FA-B Com&C-Rice Bran-Coreen Hips 400-500 mcg-mg tab 1 tablet  1/11/23  Provider, Historical   mometasone (ELOCON) 0.1 % ointment Apply  to affected area daily. 4/13/22   Presley Woods MD       The following sections were reviewed & updated as appropriate: Problem List, Allergies, PMH, PSH, FH, and SH. Objective:   Visit Vitals  /79   Pulse 91   Temp 97.1 °F (36.2 °C) (Temporal)   Resp 20   Ht 5' 7\" (1.702 m)   Wt 223 lb 4.8 oz (101.3 kg)   SpO2 96%   BMI 34.97 kg/m²       Physical Exam  Eyes:      Extraocular Movements: Extraocular movements intact. Cardiovascular:      Rate and Rhythm: Normal rate and regular rhythm. Heart sounds: No murmur heard. Pulmonary:      Effort: Pulmonary effort is normal. No respiratory distress. Abdominal:      General: Bowel sounds are normal.      Palpations: Abdomen is soft. Musculoskeletal:      Right lower leg: No edema. Left lower leg: No edema. Neurological:      General: No focal deficit present. Mental Status: She is alert.    Psychiatric:         Mood and Affect: Mood normal.         Behavior: Behavior normal.            Symone Ford MD

## 2023-01-11 NOTE — ASSESSMENT & PLAN NOTE
She is on synthroid and cytomel, previously followed by endocrinology but can't return due to insurance issue. She is stable on current regimen. Will continue for now. I asked her to establish with another endo so we we have someone to turn to if her TSH were to fluctuate and her meds need adjustment.  I am ok with prescribing the meds as long as she is stable

## 2023-01-12 ENCOUNTER — PATIENT OUTREACH (OUTPATIENT)
Dept: CASE MANAGEMENT | Age: 58
End: 2023-01-12

## 2023-01-12 NOTE — ACP (ADVANCE CARE PLANNING)
Advance Care Planning   Ambulatory ACP Specialist Patient Outreach    Date:  1/12/2023    ACP Specialist:  Zulema Delgado    Outreach call to patient in follow-up to ACP Specialist referral from:  Mike Garcia MD    [x] PCP  [] Provider   [] Ambulatory Care Management [] Other     For:                  [x] Advance Directive Assistance              [] Complete Portable DNR order              [] Complete POST/MOST              [] Code Status Discussion             [] Discuss Goals of Care             [] Early ACP Decision-Making              [] Other (Specify)    Date Referral Received:  1/11/2023    Today's Outreach:  [x] First   [] Second  [] Third       Third outreach made by: [] Phone  [] Email / mail    [] TastemakerXt     Intervention:  [] Spoke with Patient   [x] Left VM requesting return call      Outcome:     Attempted ACP outreach - no answer. Left VM requesting return call regarding referral received by ACP team from physician's office. Will attempt outreach again in one week if return call not received. Next Step:   [] ACP scheduled conversation  [x] Outreach again in one week               [] Email / Mail ACP Info Sheets  [] Email / Mail Advance Directive   [] Closing referral.  Routing closure to referring provider/staff and to ACP Specialist . [] Closure letter mailed to patient with invitation to contact ACP Specialist if / when ready.   Thank you for this referral.

## 2023-01-13 ENCOUNTER — PATIENT OUTREACH (OUTPATIENT)
Dept: CASE MANAGEMENT | Age: 58
End: 2023-01-13

## 2023-01-13 NOTE — ACP (ADVANCE CARE PLANNING)
Advance Care Planning   Ambulatory ACP Specialist Patient Outreach    Date:  1/13/2023    ACP Specialist:  Patricia Ham    Outreach call to patient in follow-up to ACP Specialist referral from:  Les Banks MD    [x] PCP  [] Provider   [] Ambulatory Care Management [] Other     For:                  [x] Advance Directive Assistance              [] Complete Portable DNR order              [] Complete POST/MOST              [] Code Status Discussion             [] Discuss Goals of Care             [] Early ACP Decision-Making              [] Other (Specify)    Date Referral Received:  1/11/2023    Today's Outreach:  [] First   [x] Second  [] Third       Third outreach made by: [] Phone  [] Email / mail    [] Flyrhart     Intervention:  [] Spoke with Patient   [x] Left VM requesting return call      Outcome:  Patient attempted to return ACP outreach call when ACP Coordinator was unavailable. ACP Coordinator attempted to return patient's call - no answer. VM left requesting return call. Next Step:   [] ACP scheduled conversation  [x] Outreach again in one week               [] Email / Mail ACP Info Sheets  [] Email / Mail Advance Directive   [] Closing referral.  Routing closure to referring provider/staff and to ACP Specialist . [] Closure letter mailed to patient with invitation to contact ACP Specialist if / when ready.   Thank you for this referral.

## 2023-01-19 ENCOUNTER — DOCUMENTATION ONLY (OUTPATIENT)
Dept: CASE MANAGEMENT | Age: 58
End: 2023-01-19

## 2023-01-19 NOTE — ACP (ADVANCE CARE PLANNING)
Advance Care Planning   Ambulatory ACP Specialist Patient Outreach    Date:  1/19/2023    ACP Specialist:  Jefferson Bowles LCSW    Outreach call to patient in follow-up to ACP Specialist referral from:    [x] PCP  [] Provider   [] Ambulatory Care Management [] Other     For:                  [x] Advance Directive Assistance              [] Complete Portable DNR order              [] Complete POST/MOST              [] Code Status Discussion             [] Discuss Goals of Care             [] Early ACP Decision-Making              [] Other (Specify)    Date Referral Received:1/11/2023    Today's Outreach:  [] First   [x] Second  [] Third       Third outreach made by: [x] Phone  [] Email / mail    [] MyChart     Intervention:  [] Spoke with Patient   [x] Left VM requesting return call      Outcome:Left message on voicemail to remind pt of upcoming ACP appt scheduled for 1/2/2023. Next Step:   [x] ACP scheduled conversation  [] Outreach again in one week               [] Email / Mail ACP Info Sheets  [] Email / Mail Advance Directive   [] Closing referral.  Routing closure to referring provider/staff and to ACP Specialist . [] Closure letter mailed to patient with invitation to contact ACP Specialist if / when ready.   Thank you for this referral.  Jefferson Bowles LCSW

## 2023-01-25 ENCOUNTER — DOCUMENTATION ONLY (OUTPATIENT)
Dept: CASE MANAGEMENT | Age: 58
End: 2023-01-25

## 2023-01-25 NOTE — ACP (ADVANCE CARE PLANNING)
Advance Care Planning     Advance Care Planning Clinical Specialist  Conversation Note      Date of ACP Conversation: 01/25/23    Conversation Conducted with:  Patient with Decision Making Capacity    ACP Clinical Specialist: Galileo Trujillo, [facility] Decision Maker:    Current Designated Health Care Decision Maker:     Primary Decision Maker: [facility] - Life Partner - [phone]    Secondary Decision Maker: Kavon Green - [facility] - [phone]    Care Preferences    Hospitalization: \"If your health worsens and it becomes clear that your chance of recovery is unlikely, what would your preference be regarding hospitalization? \"    Choice:  [x]  The patient wants hospitalization  []  The patient prefers comfort-focused treatment without hospitalization. Ventilation: \"If you were in your present state of health and suddenly became very ill and were unable to breathe on your own, what would your preference be about the use of a ventilator (breathing machine) if it were available to you? \"      If patient would desire the use of a ventilator (breathing machine), answer \"yes\", if not \"no\":yes    \"If your health worsens and it becomes clear that your chance of recovery is unlikely, what would your preference be about the use of a ventilator (breathing machine) if it were available to you? \"     Would the patient desire the use of a ventilator (breathing machine)? YES  \"I would like to be on the ventilator for awhile (maybe 14 days or so) to see if any improvement. \"    Resuscitation  \"CPR works best to restart the heart when there is a sudden event, like a heart attack, in someone who is otherwise healthy. Unfortunately, CPR does not typically restart the heart for people who have serious health conditions or who are very sick. \"    \"In the event your heart stopped as a result of an underlying serious health condition, would you want attempts to be made to restart your heart (answer \"yes\" for attempt to resuscitate) or would you prefer a natural death (answer \"no\" for do not attempt to resuscitate)? \" yes      [] Yes  [] No   Educated Patient / Enid Jenkins regarding differences between Advance Directives and portable DNR orders. Length of ACP Conversation in minutes:  45 minutes. Pt was openly discussing her cancer treatment and how it was affecting her ability to perform certain tasks, etc. \"I just don't have the energy as I had before taking the current medication, etc. Discussion also occurred re: need to complete an AMD if she wishes her Life Partner to be the Agent to make decisions. They have two adult children but Life Partner has not officially adopted them as children. She stated,\" I want my sister to be the successor Agent because my prior agent may need assistance in making difficult decisions. Conversation Outcomes:  [x] ACP discussion completed  [] Existing advance directive reviewed with patient; no changes to patient's previously recorded wishes   [x] New Advance Directive completed   [] Portable Do Not Resuscitate prepared for Provider review and signature  [] POLST/POST/MOLST/MOST prepared for Provider review and signature      Follow-up plan:    [] Schedule follow-up conversation to continue planning  [] Referred individual to Provider for additional questions/concerns   [x] Advised patient/agent/surrogate to review completed ACP document and update if needed with changes in condition, patient preferences or care setting     [x] This note routed to one or more involved healthcare providers    01/25/2023: Pt being alert and oriented at the time of this conversation elected to complete an AMD indicating her desire to have comfort measures if she was actively dying stating comfort care and no pain would be her wished but if neurologically compromised she would like a chance with active treatment to see if any improvement. \"People do make improvements. \"  AMD prepared via uTest and sent to signatures. Once a signed copy has been received, it will be scanned into the chart.   Juan Diego Burnett LCSW

## 2023-02-15 DIAGNOSIS — C50.412 MALIGNANT NEOPLASM OF UPPER-OUTER QUADRANT OF LEFT BREAST IN FEMALE, ESTROGEN RECEPTOR POSITIVE (HCC): ICD-10-CM

## 2023-02-15 DIAGNOSIS — Z17.0 MALIGNANT NEOPLASM OF UPPER-OUTER QUADRANT OF LEFT BREAST IN FEMALE, ESTROGEN RECEPTOR POSITIVE (HCC): ICD-10-CM

## 2023-02-15 RX ORDER — ANASTROZOLE 1 MG/1
1 TABLET ORAL DAILY
Qty: 30 TABLET | Refills: 12 | Status: SHIPPED | OUTPATIENT
Start: 2023-02-15

## 2023-02-15 NOTE — TELEPHONE ENCOUNTER
Oral Hormone therapy     Feng Gates is a  59 y. o.female  diagnosed with breast cancer . Ms. Adis Domínguez is being treated with anastrozole.      Medication name: anastrozole    Dose:  1 mg   Frequency: daily    Ordering provider: Aung Rascon MD  Start date: 2/2020        Last OV 11/10/22  Next OV 5/10/23    Refill sent to Sylvain Ennis PHARMD, [facility], [address] [place] [address]    Program: Medical Group  CPA in place: Yes  Recommendation Provided To: Patient/Caregiver: 1 via Telephone  Intervention Detail: Refill(s) Provided  Intervention Accepted By: Patient/Caregiver: 1    Time Spent (min): 10

## 2023-02-23 NOTE — H&P
HISTORY OF PRESENT ILLNESS  Michael Hdez is a 62 y.o. female. HPI ESTABLISHED patient for follow-up of LEFT breast cancer, here to discuss surgery after having last Taxol infusion on 12/3/19. She states she feels well other than bone aches at times.      Breast cancer-  5/28/19 - 46 yo female with left breast IDC and DCIS,T2 N1, potential skin involvement, Er/Pr + Her 2 greg equivocal, Not enough invasive to send Her 2 greg for fish  6/3/19 - LEFT breast skin punch biopsy, benign. 6/20/19 - biopsies of LEFT breast and LEFT axilla. I called patient to touch base with her. Biopsy Dr. Anthony Ward did node +, Er/pr+ her 2 greg negative, specimen went to [facility] due to medicaid,  Mammaprint high risk. 7/18/19 - port insertion  7/19/19 - started [facility]  9/13/19 - 12/3/19 - Taxol infusions     MRI Results (most recent):       Results from [place] encounter on 12/06/19   MRI BREAST BI W WO CONT     Narrative Examination: Bilateral breast MRI with contrast     HISTORY: History of left breast cancer with known left axillary metastases. Normal left skin biopsy.     COMPARISON: Mammograms 2019, 2016, MRI June 2019     TECHNIQUE:  Bilateral breast MRI was performed using a dedicated breast coil without  compression with the patient in the prone position. Precontrast T1-weighted  images with fat suppression were obtained followed by bolus injection of 9 mL  Gadavist. Postcontrast dynamic and high-resolution images were acquired. T2-weighted axial imaging with fat suppression was also performed. The images  were analyzed using CAD analysis, enhancement curves, digital subtraction, and 2  and 3 dimensional reconstructions.     FINDINGS:  There is minimal background parenchymal enhancement. The breast tissue is heterogeneously dense, which could obscure detection of  small masses (approximately 51-75% glandular).       Right breast:  No suspicious enhancement.  Susceptibility artifact in the right superior breast  from the patient's chest port.     There is no suspicious axillary or internal mammary chain lymphadenopathy.     Left breast:  In the left breast upper outer quadrant there is decreased clumped segmental  nonmass enhancement centered at 9-10 o'clock middle depth which has decreased  both in extent and the degree of enhancement. It measures approximately 4.9 x  2.2 x 1.8 cm (AP x TV x cc), previously 10.6 x 4.4 x 7.9 cm. It begins  approximately 5.5 cm from the nipple. No evidence of chest wall or skin  involvement.     The previously enlarged left axillary nodes have normalized. No axillary or  internal mammary adenopathy.        Impression IMPRESSION:     Right Breast:  1. BI-RADS Assessment Category 1: Negative.        Left Breast:  1. BI-RADS Assessment Category 6: Known biopsy proven malignancy- Appropriate  action should be taken. Known left breast malignancy has decreased  significantly. 2. Normalization of left axillary lymph nodes.        RECOMMENDATIONS:  Continued surgical and oncologic management.     A negative breast MRI examination speaks strongly against invasive cancer down  to a detection threshold of 3 to 5 mm but may not detect some lower grade or in  situ carcinomas. Therefore, routine clinical and mammographic followup are  recommended.     A summary portfolio has been created in PACS.           Review of Systems   All other systems reviewed and are negative.        Physical Exam  Vitals signs and nursing note reviewed. Chest:      Breasts: Breasts are symmetrical.         Right: No inverted nipple, mass, nipple discharge, skin change or tenderness. Left: No inverted nipple, mass, nipple discharge, skin change or tenderness. Lymphadenopathy:      Cervical: No cervical adenopathy.          ASSESSMENT and PLAN      ICD-10-CM ICD-9-CM     1. Breast cancer metastasized to axillary lymph node, left ([facility]) C50.912 174. 9       C77.3 196. 3        61 yo female Stage 3 Breast ca. She has responded well on mri and exam to chemo  Plan is bilateral mastectomies, left alnd recon by Dr. Mindy Barrera  She will need post mastectomy xrt   Will refer to Dr. Janae Armstrong and back to Dr. Mindy Barrera.   The risk and procedure were discussed. Risks include bleeding, bruising, scar, infection, need for more surgery and lymphedema. She understood wished to proceed. 3 (mild pain)

## 2023-03-16 ENCOUNTER — OFFICE VISIT (OUTPATIENT)
Dept: SURGERY | Age: 58
End: 2023-03-16
Payer: COMMERCIAL

## 2023-03-16 DIAGNOSIS — Z85.3 HISTORY OF BREAST CANCER IN FEMALE: ICD-10-CM

## 2023-03-16 DIAGNOSIS — I89.0 LYMPHEDEMA: ICD-10-CM

## 2023-03-16 DIAGNOSIS — Z17.0 MALIGNANT NEOPLASM OF UPPER-OUTER QUADRANT OF LEFT BREAST IN FEMALE, ESTROGEN RECEPTOR POSITIVE (HCC): Primary | ICD-10-CM

## 2023-03-16 DIAGNOSIS — C50.412 MALIGNANT NEOPLASM OF UPPER-OUTER QUADRANT OF LEFT BREAST IN FEMALE, ESTROGEN RECEPTOR POSITIVE (HCC): Primary | ICD-10-CM

## 2023-03-16 DIAGNOSIS — Z90.13 S/P MASTECTOMY, BILATERAL: ICD-10-CM

## 2023-03-16 PROCEDURE — 99213 OFFICE O/P EST LOW 20 MIN: CPT | Performed by: NURSE PRACTITIONER

## 2023-03-16 NOTE — PROGRESS NOTES
HISTORY OF PRESENT ILLNESS  Divya Avila is a 59 y.o. female. HPI  Established patient presents for follow-up to LEFT breast cancer. Denies breast mass, skin changes and pain. Continues to address LEFT chest and axillary lymphedema. Breast cancer history -  Referring - [address] [facility] at [facility]  19 - LEFT breast IDC and DCIS,T2 N1, potential skin involvement, Er/Pr + Her 2 greg equivocal, Not enough invasive to send Her 2 greg for fish  6/3/19 - LEFT breast skin punch biopsy, benign. 19 - biopsies of LEFT breast and LEFT axilla. Mammaprint high risk. 19 - port insertion  19 - started [facility] - Dr. Daysi Zapata  19 - 12/3/19 - Taxol infusions  20: BILATERAL mastectomy - Dr. Loree Canavan and Dr. Rosales Yip  2020: started letrozole - had side effect of significant joint pain; switched to anastrozole  2020: Completed RT - Dr. Davis Marina  2020: bilateral breast implant exchange with Dr. Rosales Yip        Family history -   Possibly maternal grandmother with breast cancer but not sure? Father - colon cancer      Past Surgical History:   Procedure Laterality Date    HX BREAST RECONSTRUCTION Bilateral 2020    IMMEDIATE BILATERAL BREAST RECONSTRUCTION WITH TISSUE EXPANDERS AND ALLODERM GRAFTS performed by Jadyn Sheriff MD at [place] [address]    HX  SECTION      HX COLONOSCOPY      HX HEENT  2009    THYROIDECTOMY    HX KNEE ARTHROSCOPY Left     HX LUMBAR FUSION      HX MOHS PROCEDURES      x 4 times     HX SHOULDER ARTHROSCOPY Right     HX THYROIDECTOMY      HX VASCULAR ACCESS  2019    PORTACATH RIGHT CHEST         ROS    Physical Exam  Constitutional:       Appearance: Normal appearance. Chest:   Breasts:     Right: Absent. Left: Absent. Musculoskeletal:      Comments: FROM - UE x 2  LEFT arm - lymphedema - increased from last visit  LEFT shoulder - orthopedic issues   Lymphadenopathy:      Upper Body:      Right upper body: No supraclavicular or axillary adenopathy. Left upper body: No supraclavicular or axillary adenopathy. Neurological:      Mental Status: She is alert. Psychiatric:         Attention and Perception: Attention normal.         Mood and Affect: Mood normal.         Speech: Speech normal.         Behavior: Behavior normal.       Visit Vitals  Ht (P) 5' 7\" (1.702 m)   Wt (P) 223 lb (101.2 kg)   BMI (P) 34.93 kg/m²       ASSESSMENT and PLAN    ICD-10-CM ICD-9-CM    1. Malignant neoplasm of upper-outer quadrant of left breast in female, estrogen receptor positive (HCC)  C50.412 174.4     Z17.0 V86.0       2. S/P mastectomy, bilateral  Z90.13 V45.71       3. History of breast cancer in female  Z85.3 V10.3       4. Lymphedema  I89.0 457.1           Normal exam with no evidence of local recurrence. Continues on AI and has follow-up with Dr. Payton Lucas. Tolerating anastrozole. Saw Dr. Darling Pascal - had additional surgery for prominent tissue under LEFT arm. Saw Saurabh Tran for PT. Then saw Enma Sewell in lymphedema clinic, but has been released from that. Has compression garments and home pump. Feels that lymphedema is worsening. Will follow-up with lymphedema clinic. RTC in 1 year or sooner PRN. She is comfortable with this plan. All questions answered and she stated understanding. Total time spent for this patient - 20 minutes.

## 2023-03-20 ENCOUNTER — TRANSCRIBE ORDER (OUTPATIENT)
Dept: SCHEDULING | Age: 58
End: 2023-03-20

## 2023-03-20 DIAGNOSIS — M75.02 ADHESIVE CAPSULITIS OF LEFT SHOULDER: ICD-10-CM

## 2023-03-20 DIAGNOSIS — S46.012A TRAUMATIC TEAR OF LEFT ROTATOR CUFF, UNSPECIFIED TEAR EXTENT, INITIAL ENCOUNTER: Primary | ICD-10-CM

## 2023-04-05 ENCOUNTER — HOSPITAL ENCOUNTER (OUTPATIENT)
Dept: MRI IMAGING | Age: 58
End: 2023-04-05
Attending: ORTHOPAEDIC SURGERY
Payer: COMMERCIAL

## 2023-04-05 PROCEDURE — 73221 MRI JOINT UPR EXTREM W/O DYE: CPT

## 2023-04-14 ENCOUNTER — HOSPITAL ENCOUNTER (OUTPATIENT)
Dept: NUCLEAR MEDICINE | Age: 58
Discharge: HOME | End: 2023-04-14
Attending: NURSE PRACTITIONER
Payer: COMMERCIAL

## 2023-04-14 DIAGNOSIS — Z17.0 MALIGNANT NEOPLASM OF UPPER-OUTER QUADRANT OF LEFT BREAST IN FEMALE, ESTROGEN RECEPTOR POSITIVE (HCC): ICD-10-CM

## 2023-04-14 DIAGNOSIS — C50.412 MALIGNANT NEOPLASM OF UPPER-OUTER QUADRANT OF LEFT BREAST IN FEMALE, ESTROGEN RECEPTOR POSITIVE (HCC): ICD-10-CM

## 2023-04-14 PROCEDURE — A9503 TC99M MEDRONATE: HCPCS

## 2023-04-23 DIAGNOSIS — M75.02 ADHESIVE CAPSULITIS OF LEFT SHOULDER: ICD-10-CM

## 2023-04-23 DIAGNOSIS — S46.012A TRAUMATIC TEAR OF LEFT ROTATOR CUFF, UNSPECIFIED TEAR EXTENT, INITIAL ENCOUNTER: Primary | ICD-10-CM

## 2023-04-26 DIAGNOSIS — Z85.3 HISTORY OF BREAST CANCER IN FEMALE: ICD-10-CM

## 2023-04-26 DIAGNOSIS — I89.0 LYMPHEDEMA: Primary | ICD-10-CM

## 2023-04-28 NOTE — PROGRESS NOTES
Asaf Reynoso is a 59 y.o. female    Chief Complaint   Patient presents with    Follow-up      Left breast cancer- ER+MD+HER2 greg neg       1. Have you been to the ER, urgent care clinic since your last visit? Hospitalized since your last visit? No    2. Have you seen or consulted any other health care providers outside of the [address] [place] [address] since your last visit? Include any pap smears or colon screening.  No on lantus 30 units at bedtime and humalog 14 units tid with meals  given npo status now will keep on iss  once patient eating again will keep on lowered dosage of home regimen given sepsis  holding home Farxiga and ozempic on lantus 30 units at bedtime and humalog 14 units tid with meals  given npo status now will keep on iss  once patient eating again will keep on lowered dosage of home regimen given sepsis  starting lantus 20 units qhs and humalog 7 units tid and ISS  holding home Farxiga and ozempic

## 2023-05-10 ENCOUNTER — OFFICE VISIT (OUTPATIENT)
Age: 58
End: 2023-05-10
Payer: COMMERCIAL

## 2023-05-10 VITALS
TEMPERATURE: 97.3 F | WEIGHT: 221 LBS | BODY MASS INDEX: 34.61 KG/M2 | RESPIRATION RATE: 18 BRPM | SYSTOLIC BLOOD PRESSURE: 126 MMHG | HEART RATE: 91 BPM | DIASTOLIC BLOOD PRESSURE: 79 MMHG | OXYGEN SATURATION: 96 %

## 2023-05-10 DIAGNOSIS — Z51.81 ENCOUNTER FOR MONITORING AROMATASE INHIBITOR THERAPY: ICD-10-CM

## 2023-05-10 DIAGNOSIS — C50.412 MALIGNANT NEOPLASM OF UPPER-OUTER QUADRANT OF LEFT FEMALE BREAST, UNSPECIFIED ESTROGEN RECEPTOR STATUS (HCC): Primary | ICD-10-CM

## 2023-05-10 DIAGNOSIS — Z79.811 ENCOUNTER FOR MONITORING AROMATASE INHIBITOR THERAPY: ICD-10-CM

## 2023-05-10 PROCEDURE — 99213 OFFICE O/P EST LOW 20 MIN: CPT | Performed by: INTERNAL MEDICINE

## 2023-05-10 RX ORDER — ESOMEPRAZOLE MAGNESIUM 40 MG/1
40 FOR SUSPENSION ORAL 2 TIMES DAILY
COMMUNITY

## 2023-05-10 RX ORDER — MELOXICAM 7.5 MG/1
1 TABLET ORAL DAILY
COMMUNITY
Start: 2023-03-21

## 2023-05-10 RX ORDER — LANOLIN ALCOHOL/MO/W.PET/CERES
CREAM (GRAM) TOPICAL EVERY 24 HOURS
COMMUNITY

## 2023-05-10 RX ORDER — DOXYCYCLINE HYCLATE 100 MG/1
CAPSULE ORAL
COMMUNITY
Start: 2023-05-04

## 2023-05-10 NOTE — PROGRESS NOTES
[facility] [place] at [place] [address] [address], [address], [place]: [phone]  F: [phone]    Reason for Visit:   Joi Sethi is a 59 y.o. female who is seen for follow up of  Left breast cancer- ER+ME+HER2 yina neg    Treatment History:   6/19: Mammogram:  Left breast mass with skin thickening, 2 suspicious nodes. Mass measures 1.4 x 0.9 x 1.3  6/19: MRI breast: Multicentric left breast carcinoma. Non-masslike enhancement extends from the nipple to the posterior third, involves all quadrants, and measures 106 x 44 x 79 mm.  5/28/19: Biopsy breast- IDC % % HER 2 negative, skin biopsy benign , node biopsy mostly adipose tissue with atypical cells . BRCA1 and 2 negative. CT and bone scan negative for metastasis. Mammaprint with insufficient tissue for testing. Repeat biopsy of axillary node 6/20/19 positive for metastatic disease- repeat mammaprint - high risk  7/19/19: cycle 1 neoadjuvant of dd AC-T  9/12/19: US of breast shows increased malignant microcalcification in the left breast, etiology included tx related necrosis vs extension of disease. Decreased size in left axillary LN.    10/22/19: calcifications in the left upper outer quadrant are stable, calcifications in left axillary LN are not changed   1/21/20: b/l mastectomy   2/2020: Letrozole then switched to Anastrazole due to side effects  5/2020:Completed RT    History of Present Illness:   Patient is a 59 y.o. seen for L breast cancer. She felt a sensation in the shower about May 2019. She also noted a lump and  an inverted nipple. She had a physician friend look at this who directed her to mammogram and recommended she see Dr. Tori Ontiveros. This led to imaging and diagnosis as above. She completed neoadjuvant ddACT followed by bilateral mastectomy on 1/21/20. On letrozole then switched to Anastrazole. Reports hot flashes, fatigue, and some body aches. She will have shoulder replacement soon.

## 2023-05-10 NOTE — PROGRESS NOTES
Yadiel Santos is a 59 y.o. female    Chief Complaint   Patient presents with    Follow-up      Left breast cancer- ER+VT+HER2 yina neg       1. Have you been to the ER, urgent care clinic since your last visit? Hospitalized since your last visit? Yes, [facility]    2. Have you seen or consulted any other health care providers outside of the [address] [place] [address] since your last visit? Include any pap smears or colon screening.  No

## 2023-05-24 ENCOUNTER — HOSPITAL ENCOUNTER (OUTPATIENT)
Facility: HOSPITAL | Age: 58
Discharge: HOME | End: 2023-05-27
Payer: COMMERCIAL

## 2023-05-24 VITALS
HEART RATE: 87 BPM | BODY MASS INDEX: 33.91 KG/M2 | HEIGHT: 68 IN | SYSTOLIC BLOOD PRESSURE: 142 MMHG | TEMPERATURE: 97.7 F | DIASTOLIC BLOOD PRESSURE: 81 MMHG | WEIGHT: 223.77 LBS

## 2023-05-24 LAB
ABO + RH BLD: NORMAL
ANION GAP SERPL CALC-SCNC: 4 MMOL/L (ref 5–15)
BLOOD GROUP ANTIBODIES SERPL: NORMAL
BUN SERPL-MCNC: 11 MG/DL (ref 6–20)
BUN/CREAT SERPL: 13 (ref 12–20)
CALCIUM SERPL-MCNC: 8.9 MG/DL (ref 8.5–10.1)
CHLORIDE SERPL-SCNC: 104 MMOL/L (ref 97–108)
CO2 SERPL-SCNC: 30 MMOL/L (ref 21–32)
CREAT SERPL-MCNC: 0.86 MG/DL (ref 0.55–1.02)
ERYTHROCYTE [DISTWIDTH] IN BLOOD BY AUTOMATED COUNT: 11.9 % (ref 11.5–14.5)
EST. AVERAGE GLUCOSE BLD GHB EST-MCNC: 105 MG/DL
GLUCOSE SERPL-MCNC: 140 MG/DL (ref 65–100)
HBA1C MFR BLD: 5.3 % (ref 4–5.6)
HCT VFR BLD AUTO: 41.3 % (ref 35–47)
HGB BLD-MCNC: 14 G/DL (ref 11.5–16)
INR PPP: 1 (ref 0.9–1.1)
MCH RBC QN AUTO: 34.8 PG (ref 26–34)
MCHC RBC AUTO-ENTMCNC: 33.9 G/DL (ref 30–36.5)
MCV RBC AUTO: 102.7 FL (ref 80–99)
NRBC # BLD: 0 K/UL (ref 0–0.01)
NRBC BLD-RTO: 0 PER 100 WBC
PLATELET # BLD AUTO: 211 K/UL (ref 150–400)
PMV BLD AUTO: 10.2 FL (ref 8.9–12.9)
POTASSIUM SERPL-SCNC: 3.9 MMOL/L (ref 3.5–5.1)
PROTHROMBIN TIME: 10.2 SEC (ref 9–11.1)
RBC # BLD AUTO: 4.02 M/UL (ref 3.8–5.2)
SODIUM SERPL-SCNC: 138 MMOL/L (ref 136–145)
SPECIMEN EXP DATE BLD: NORMAL
WBC # BLD AUTO: 5.7 K/UL (ref 3.6–11)

## 2023-05-24 PROCEDURE — 80048 BASIC METABOLIC PNL TOTAL CA: CPT

## 2023-05-24 PROCEDURE — 85027 COMPLETE CBC AUTOMATED: CPT

## 2023-05-24 PROCEDURE — 86900 BLOOD TYPING SEROLOGIC ABO: CPT

## 2023-05-24 PROCEDURE — 36415 COLL VENOUS BLD VENIPUNCTURE: CPT

## 2023-05-24 PROCEDURE — 83036 HEMOGLOBIN GLYCOSYLATED A1C: CPT

## 2023-05-24 PROCEDURE — 85610 PROTHROMBIN TIME: CPT

## 2023-05-24 PROCEDURE — 86850 RBC ANTIBODY SCREEN: CPT

## 2023-05-24 PROCEDURE — 86901 BLOOD TYPING SEROLOGIC RH(D): CPT

## 2023-05-24 RX ORDER — SERTRALINE HYDROCHLORIDE 100 MG/1
TABLET, FILM COATED ORAL
Qty: 45 TABLET | Refills: 0 | Status: SHIPPED | OUTPATIENT
Start: 2023-05-24

## 2023-05-24 RX ORDER — PHENOL 1.4 %
1 AEROSOL, SPRAY (ML) MUCOUS MEMBRANE DAILY
COMMUNITY

## 2023-05-24 RX ORDER — LEVOTHYROXINE SODIUM 0.1 MG/1
100 TABLET ORAL
COMMUNITY

## 2023-05-24 RX ORDER — CHLORAL HYDRATE 500 MG
CAPSULE ORAL 2 TIMES DAILY
COMMUNITY

## 2023-05-24 ASSESSMENT — PAIN DESCRIPTION - LOCATION: LOCATION: SHOULDER

## 2023-05-24 ASSESSMENT — PAIN SCALES - GENERAL: PAINLEVEL_OUTOF10: 6

## 2023-05-24 ASSESSMENT — PAIN DESCRIPTION - ORIENTATION: ORIENTATION: LEFT

## 2023-05-24 NOTE — TELEPHONE ENCOUNTER
Requested Prescriptions     Pending Prescriptions Disp Refills    sertraline (ZOLOFT) 100 MG tablet [Pharmacy Med Name: SERTRALINE  MG TABLET] 45 tablet      Sig: TAKE 1/2 TABLET BY MOUTH DAILY       Allergies: Allergies   Allergen Reactions    Amoxicillin-Pot Clavulanate Nausea And Vomiting     She is not sure if this is an allergy        Next appt with pcp 7/11/23    Current Outpatient Medications   Medication Instructions    anastrozole (ARIMIDEX) 1 MG tablet Oral, DAILY    Calcium Carbonate-Vitamin D 600-3. 125 MG-MCG TABS Oral, 2 TIMES DAILY    cyanocobalamin 100 MCG tablet EVERY 24 HOURS    doxycycline hyclate (VIBRAMYCIN) 100 MG capsule No dose, route, or frequency recorded. EPINEPHrine (EPIPEN) 0.3 MG/0.3ML SOAJ injection ceived the following from [facility]: Outside name: EPINEPHrine (EPIPEN) 0.3 mg/0.3 mL injection    esomeprazole Magnesium (NEXIUM) 40 mg, Oral, 2 TIMES DAILY    folic acid (FOLVITE) 843 MCG tablet EVERY 24 HOURS    ipratropium (ATROVENT) 0.03 % nasal spray PRN    levothyroxine (SYNTHROID) 100 MCG tablet Oral, DAILY BEFORE BREAKFAST    liothyronine (CYTOMEL) 5 MCG tablet Oral, DAILY    MAGNESIUM PO Oral, DAILY    meloxicam (MOBIC) 7.5 MG tablet 1 tablet, Oral, DAILY    metroNIDAZOLE (METROCREAM) 0.75 % cream No dose, route, or frequency recorded. mometasone (ELOCON) 0.1 % ointment Topical, DAILY    naloxone 4 MG/0.1ML LIQD nasal spray Use 1 spray intranasally, then discard. Repeat with new spray every 2 min as needed for opioid overdose symptoms, alternating nostrils.     pantoprazole (PROTONIX) 40 MG tablet Oral, DAILY    sertraline (ZOLOFT) 100 MG tablet Oral, DAILY    traZODone (DESYREL) 50 MG tablet TAKE TWO TABLETS BY MOUTH EVERY NIGHT AT BEDTIME    vitamin D 25 MCG (1000 UT) CAPS Oral, DAILY       Signed by Arslan Brown CCT  05/24/23  9:35 AM'

## 2023-05-24 NOTE — PERIOP NOTE
[facility] INSTRUCTIONS  ORTHOPAEDIC    Surgery Date:   06/02/2023    Your surgeon's office or [facility] staff will call you between 4 PM- 8 PM the day before surgery with your arrival time. If your surgery is on a Monday, you will receive a call the preceding Friday. Please report to [facility] Patient Access/Admitting on the 1st floor. Bring your insurance card, photo identification, and any copayment (if applicable). If you are going home the same day of your surgery, you must have a responsible adult to drive you home. You need to have a responsible adult to stay with you the first 24 hours after surgery and you should not drive a car for 24 hours following your surgery. Do NOT eat any solid foods after midnight the night before surgery including candy, mints or gum. You may drink clear liquids from midnight until 1 hour prior to arrival time. You may drink up to 12 ounces at one time every 4 hours. Do NOT drink alcohol or smoke 24 hours before surgery. STOP smoking for 14 days prior as it helps with breathing and healing after surgery. If your arrival time is 3pm or later, you may eat a light breakfast before 8am (toast, bagel-no butter, black coffee, plain tea, fruit juice-no pulp) Please note special instructions, if applicable, below for medications. If you are being admitted to the hospital,please leave personal belongings/luggage in your car until you have an assigned hospital room number. Please wear comfortable clothes. Wear your glasses instead of contacts. We ask that all money, jewelry and valuables be left at home. Wear no make up, particularly mascara, the day of surgery. All body piercings, rings, and jewelry need to be removed and left at home. Please remove any nail polish or artificial nails from your fingernails. Please wear your hair loose or down. Please no pony-tails, buns, or any metal hair accessories.  If you shower the morning of surgery, please do

## 2023-05-25 LAB
BACTERIA SPEC CULT: NORMAL
BACTERIA SPEC CULT: NORMAL
EKG ATRIAL RATE: 78 BPM
EKG DIAGNOSIS: NORMAL
EKG P AXIS: 55 DEGREES
EKG P-R INTERVAL: 184 MS
EKG Q-T INTERVAL: 388 MS
EKG QRS DURATION: 94 MS
EKG QTC CALCULATION (BAZETT): 442 MS
EKG R AXIS: -17 DEGREES
EKG T AXIS: 20 DEGREES
EKG VENTRICULAR RATE: 78 BPM
SERVICE CMNT-IMP: NORMAL

## 2023-05-25 NOTE — PERIOP NOTE
PAT Nurse Practitioner   Pre-Operative Chart Review/Assessment:-ORTHOPEDIC                Patient Name:  Maci Lopez                                                           Age:   59 y.o.    :  1965     Today's Date:  2023     Date of PAT:   23      Date of Surgery:    23      Procedure(s):  Left Total Shoulder Arthroplasty     Surgeon:   Dr. Denisa Camacho                       PLAN:      1)  PCP:  Ashanti Oconnell MD      2)  Cardiac Clearance:  Pt followed by Dr. Yamilet Ward). LOV 22. Condition stable, no changes made to current regimen. OV note and ECHO on chart and scanned under media. 3)  Diabetic Treatment Consult:  Not indicated. A1c-5.3      4)  Sleep Apnea evaluation:   +FELECIA dx. Pt uses CPAP.        5) Treatment for MRSA/Staph Aureus:  Neg      6) Additional Concerns:  PONV, hx of L breast CA s/p robin mastectomy, anxiety                Vital Signs:        Vitals:    23 1357   BP: (!) 142/81   Pulse: 87   Temp: 97.7 °F (36.5 °C)             Body mass index is 34.53 kg/m².         ____________________________________________  PAST MEDICAL HISTORY  Past Medical History:   Diagnosis Date    Anxiety     Arthritis     NECK    Basal cell carcinoma of skin     FACE, CHEST, BACK    Breast cancer ([address] 2019    LEFT    Goiter     Motion sickness     Organizing pneumonia ([facility] [address]     felt to be radiation induced    PONV (postoperative nausea and vomiting)     NO LONGER A PROBLE,    Radiation dermatitis     RADIATION RECALL    Sleep apnea     CPAP    Squamous cell skin cancer     Dr. Noble Pillai    Vertigo       ____________________________________________  PAST SURGICAL HISTORY  Past Surgical History:   Procedure Laterality Date    BREAST RECONSTRUCTION Bilateral 2020    IMMEDIATE BILATERAL BREAST RECONSTRUCTION WITH TISSUE EXPANDERS AND ALLODERM GRAFTS performed by Alina Mistry MD at [address] [place] [address]  2004    COLONOSCOPY      KNEE

## 2023-05-25 NOTE — PERIOP NOTE
URINE SPECIMEN NOT OBTAINED WHILE PT IN PAT. VOICE MAIL LEFT @3:15 5/24 FOR PT TO COME BACK TO PAT TO GIVE URINE SPECIMEN. ASKED PT TO CALL PAT BACK.

## 2023-05-26 ENCOUNTER — HOSPITAL ENCOUNTER (OUTPATIENT)
Facility: HOSPITAL | Age: 58
Setting detail: RECURRING SERIES
Discharge: HOME | End: 2023-05-29
Payer: COMMERCIAL

## 2023-05-26 PROCEDURE — 97760 ORTHOTIC MGMT&TRAING 1ST ENC: CPT

## 2023-05-26 PROCEDURE — 97161 PT EVAL LOW COMPLEX 20 MIN: CPT

## 2023-05-30 ENCOUNTER — HOSPITAL ENCOUNTER (OUTPATIENT)
Facility: HOSPITAL | Age: 58
Discharge: HOME | End: 2023-06-02
Payer: COMMERCIAL

## 2023-05-30 LAB
APPEARANCE UR: CLEAR
BACTERIA URNS QL MICRO: ABNORMAL /HPF
BILIRUB UR QL: NEGATIVE
COLOR UR: ABNORMAL
EPITH CASTS URNS QL MICRO: ABNORMAL /LPF
GLUCOSE UR STRIP.AUTO-MCNC: NEGATIVE MG/DL
HGB UR QL STRIP: ABNORMAL
KETONES UR QL STRIP.AUTO: ABNORMAL MG/DL
LEUKOCYTE ESTERASE UR QL STRIP.AUTO: ABNORMAL
NITRITE UR QL STRIP.AUTO: NEGATIVE
OTHER: ABNORMAL
PH UR STRIP: 5.5 (ref 5–8)
PROT UR STRIP-MCNC: ABNORMAL MG/DL
RBC #/AREA URNS HPF: ABNORMAL /HPF (ref 0–5)
SP GR UR REFRACTOMETRY: 1.02 (ref 1–1.03)
URINE CULTURE IF INDICATED: ABNORMAL
UROBILINOGEN UR QL STRIP.AUTO: 0.2 EU/DL (ref 0.2–1)
WBC URNS QL MICRO: ABNORMAL /HPF (ref 0–4)

## 2023-05-30 PROCEDURE — 81001 URINALYSIS AUTO W/SCOPE: CPT

## 2023-06-01 ENCOUNTER — ANESTHESIA EVENT (OUTPATIENT)
Facility: HOSPITAL | Age: 58
End: 2023-06-01
Payer: COMMERCIAL

## 2023-06-01 RX ORDER — SERTRALINE HYDROCHLORIDE 100 MG/1
TABLET, FILM COATED ORAL
Qty: 45 TABLET | Refills: 0 | OUTPATIENT
Start: 2023-06-01

## 2023-06-02 ENCOUNTER — HOSPITAL ENCOUNTER (OUTPATIENT)
Facility: HOSPITAL | Age: 58
Discharge: HOME | End: 2023-06-02
Attending: ORTHOPAEDIC SURGERY | Admitting: ORTHOPAEDIC SURGERY
Payer: COMMERCIAL

## 2023-06-02 ENCOUNTER — ANESTHESIA (OUTPATIENT)
Facility: HOSPITAL | Age: 58
End: 2023-06-02
Payer: COMMERCIAL

## 2023-06-02 VITALS
DIASTOLIC BLOOD PRESSURE: 74 MMHG | HEIGHT: 68 IN | SYSTOLIC BLOOD PRESSURE: 119 MMHG | BODY MASS INDEX: 33.91 KG/M2 | RESPIRATION RATE: 15 BRPM | WEIGHT: 223.77 LBS | OXYGEN SATURATION: 97 % | HEART RATE: 71 BPM | TEMPERATURE: 97.9 F

## 2023-06-02 DIAGNOSIS — M19.012 PRIMARY OSTEOARTHRITIS OF LEFT SHOULDER: Primary | ICD-10-CM

## 2023-06-02 PROCEDURE — 6360000002 HC RX W HCPCS: Performed by: ANESTHESIOLOGY

## 2023-06-02 PROCEDURE — 7100000000 HC PACU RECOVERY - FIRST 15 MIN: Performed by: ORTHOPAEDIC SURGERY

## 2023-06-02 PROCEDURE — 64415 NJX AA&/STRD BRCH PLXS IMG: CPT | Performed by: ANESTHESIOLOGY

## 2023-06-02 PROCEDURE — C1713 ANCHOR/SCREW BN/BN,TIS/BN: HCPCS | Performed by: ORTHOPAEDIC SURGERY

## 2023-06-02 PROCEDURE — 3600000015 HC SURGERY LEVEL 5 ADDTL 15MIN: Performed by: ORTHOPAEDIC SURGERY

## 2023-06-02 PROCEDURE — 2500000003 HC RX 250 WO HCPCS: Performed by: ORTHOPAEDIC SURGERY

## 2023-06-02 PROCEDURE — 3700000000 HC ANESTHESIA ATTENDED CARE: Performed by: ORTHOPAEDIC SURGERY

## 2023-06-02 PROCEDURE — 2580000003 HC RX 258: Performed by: ANESTHESIOLOGY

## 2023-06-02 PROCEDURE — 7100000011 HC PHASE II RECOVERY - ADDTL 15 MIN: Performed by: ORTHOPAEDIC SURGERY

## 2023-06-02 PROCEDURE — 7100000001 HC PACU RECOVERY - ADDTL 15 MIN: Performed by: ORTHOPAEDIC SURGERY

## 2023-06-02 PROCEDURE — C9290 INJ, BUPIVACAINE LIPOSOME: HCPCS | Performed by: ANESTHESIOLOGY

## 2023-06-02 PROCEDURE — 2500000003 HC RX 250 WO HCPCS: Performed by: ANESTHESIOLOGY

## 2023-06-02 PROCEDURE — 6370000000 HC RX 637 (ALT 250 FOR IP): Performed by: ANESTHESIOLOGY

## 2023-06-02 PROCEDURE — C1776 JOINT DEVICE (IMPLANTABLE): HCPCS | Performed by: ORTHOPAEDIC SURGERY

## 2023-06-02 PROCEDURE — 2709999900 HC NON-CHARGEABLE SUPPLY: Performed by: ORTHOPAEDIC SURGERY

## 2023-06-02 PROCEDURE — 7100000010 HC PHASE II RECOVERY - FIRST 15 MIN: Performed by: ORTHOPAEDIC SURGERY

## 2023-06-02 PROCEDURE — 3600000005 HC SURGERY LEVEL 5 BASE: Performed by: ORTHOPAEDIC SURGERY

## 2023-06-02 PROCEDURE — 3700000001 HC ADD 15 MINUTES (ANESTHESIA): Performed by: ORTHOPAEDIC SURGERY

## 2023-06-02 DEVICE — ECLIPSE CAGE SCREW M 35MM: Type: IMPLANTABLE DEVICE | Site: SHOULDER | Status: FUNCTIONAL

## 2023-06-02 DEVICE — ARTHREX ECLIPSE HUMERAL HEAD, 47/18
Type: IMPLANTABLE DEVICE | Site: SHOULDER | Status: FUNCTIONAL
Brand: ARTHREX®

## 2023-06-02 DEVICE — COMPONENT GLEN FIX SM SHLDR UNIVERS VAULTLOCK: Type: IMPLANTABLE DEVICE | Site: SHOULDER | Status: FUNCTIONAL

## 2023-06-02 DEVICE — ECLIPSE TRUNION 45MM, SLOTTED, TPS CAP
Type: IMPLANTABLE DEVICE | Site: SHOULDER | Status: FUNCTIONAL
Brand: ARTHREX®

## 2023-06-02 DEVICE — SMARTSET HIGH PERFORMANCE MV MEDIUM VISCOSITY BONE CEMENT 40G
Type: IMPLANTABLE DEVICE | Site: SHOULDER | Status: FUNCTIONAL
Brand: SMARTSET

## 2023-06-02 RX ORDER — ACETAMINOPHEN 500 MG
1000 TABLET ORAL ONCE
Status: COMPLETED | OUTPATIENT
Start: 2023-06-02 | End: 2023-06-02

## 2023-06-02 RX ORDER — SUCCINYLCHOLINE/SOD CL,ISO/PF 200MG/10ML
SYRINGE (ML) INTRAVENOUS PRN
Status: DISCONTINUED | OUTPATIENT
Start: 2023-06-02 | End: 2023-06-02 | Stop reason: SDUPTHER

## 2023-06-02 RX ORDER — HYDRALAZINE HYDROCHLORIDE 20 MG/ML
10 INJECTION INTRAMUSCULAR; INTRAVENOUS
Status: DISCONTINUED | OUTPATIENT
Start: 2023-06-02 | End: 2023-06-02 | Stop reason: HOSPADM

## 2023-06-02 RX ORDER — SODIUM CHLORIDE 0.9 % (FLUSH) 0.9 %
5-40 SYRINGE (ML) INJECTION PRN
Status: DISCONTINUED | OUTPATIENT
Start: 2023-06-02 | End: 2023-06-02 | Stop reason: HOSPADM

## 2023-06-02 RX ORDER — ONDANSETRON 2 MG/ML
4 INJECTION INTRAMUSCULAR; INTRAVENOUS
Status: DISCONTINUED | OUTPATIENT
Start: 2023-06-02 | End: 2023-06-02 | Stop reason: HOSPADM

## 2023-06-02 RX ORDER — SODIUM CHLORIDE 0.9 % (FLUSH) 0.9 %
5-40 SYRINGE (ML) INJECTION EVERY 12 HOURS SCHEDULED
Status: DISCONTINUED | OUTPATIENT
Start: 2023-06-02 | End: 2023-06-02 | Stop reason: HOSPADM

## 2023-06-02 RX ORDER — ROPIVACAINE HYDROCHLORIDE 5 MG/ML
30 INJECTION, SOLUTION EPIDURAL; INFILTRATION; PERINEURAL ONCE
Status: DISCONTINUED | OUTPATIENT
Start: 2023-06-02 | End: 2023-06-02 | Stop reason: HOSPADM

## 2023-06-02 RX ORDER — PROPOFOL 10 MG/ML
INJECTION, EMULSION INTRAVENOUS PRN
Status: DISCONTINUED | OUTPATIENT
Start: 2023-06-02 | End: 2023-06-02 | Stop reason: SDUPTHER

## 2023-06-02 RX ORDER — SODIUM CHLORIDE, SODIUM LACTATE, POTASSIUM CHLORIDE, CALCIUM CHLORIDE 600; 310; 30; 20 MG/100ML; MG/100ML; MG/100ML; MG/100ML
INJECTION, SOLUTION INTRAVENOUS CONTINUOUS
Status: DISCONTINUED | OUTPATIENT
Start: 2023-06-02 | End: 2023-06-02 | Stop reason: HOSPADM

## 2023-06-02 RX ORDER — OXYCODONE HYDROCHLORIDE 5 MG/1
5 TABLET ORAL EVERY 6 HOURS PRN
Qty: 41 TABLET | Refills: 0 | Status: SHIPPED | OUTPATIENT
Start: 2023-06-02 | End: 2023-06-09

## 2023-06-02 RX ORDER — ONDANSETRON 2 MG/ML
4 INJECTION INTRAMUSCULAR; INTRAVENOUS AS NEEDED
Status: DISCONTINUED | OUTPATIENT
Start: 2023-06-02 | End: 2023-06-02 | Stop reason: HOSPADM

## 2023-06-02 RX ORDER — OXYCODONE HYDROCHLORIDE 5 MG/1
5 TABLET ORAL
Status: DISCONTINUED | OUTPATIENT
Start: 2023-06-02 | End: 2023-06-02 | Stop reason: HOSPADM

## 2023-06-02 RX ORDER — BUPIVACAINE HYDROCHLORIDE 5 MG/ML
INJECTION, SOLUTION EPIDURAL; INTRACAUDAL
Status: COMPLETED | OUTPATIENT
Start: 2023-06-02 | End: 2023-06-02

## 2023-06-02 RX ORDER — LIDOCAINE HYDROCHLORIDE 20 MG/ML
INJECTION, SOLUTION EPIDURAL; INFILTRATION; INTRACAUDAL; PERINEURAL PRN
Status: DISCONTINUED | OUTPATIENT
Start: 2023-06-02 | End: 2023-06-02 | Stop reason: SDUPTHER

## 2023-06-02 RX ORDER — MIDAZOLAM HYDROCHLORIDE 2 MG/2ML
2 INJECTION, SOLUTION INTRAMUSCULAR; INTRAVENOUS
Status: COMPLETED | OUTPATIENT
Start: 2023-06-02 | End: 2023-06-02

## 2023-06-02 RX ORDER — HYDROMORPHONE HYDROCHLORIDE 1 MG/ML
0.5 INJECTION, SOLUTION INTRAMUSCULAR; INTRAVENOUS; SUBCUTANEOUS EVERY 5 MIN PRN
Status: DISCONTINUED | OUTPATIENT
Start: 2023-06-02 | End: 2023-06-02 | Stop reason: HOSPADM

## 2023-06-02 RX ORDER — PHENYLEPHRINE HCL IN 0.9% NACL 0.4MG/10ML
SYRINGE (ML) INTRAVENOUS PRN
Status: DISCONTINUED | OUTPATIENT
Start: 2023-06-02 | End: 2023-06-02 | Stop reason: SDUPTHER

## 2023-06-02 RX ORDER — FENTANYL CITRATE 50 UG/ML
INJECTION, SOLUTION INTRAMUSCULAR; INTRAVENOUS PRN
Status: DISCONTINUED | OUTPATIENT
Start: 2023-06-02 | End: 2023-06-02 | Stop reason: SDUPTHER

## 2023-06-02 RX ORDER — PROCHLORPERAZINE EDISYLATE 5 MG/ML
5 INJECTION INTRAMUSCULAR; INTRAVENOUS
Status: DISCONTINUED | OUTPATIENT
Start: 2023-06-02 | End: 2023-06-02 | Stop reason: HOSPADM

## 2023-06-02 RX ORDER — FENTANYL CITRATE 50 UG/ML
100 INJECTION, SOLUTION INTRAMUSCULAR; INTRAVENOUS
Status: COMPLETED | OUTPATIENT
Start: 2023-06-02 | End: 2023-06-02

## 2023-06-02 RX ORDER — SODIUM CHLORIDE, SODIUM LACTATE, POTASSIUM CHLORIDE, CALCIUM CHLORIDE 600; 310; 30; 20 MG/100ML; MG/100ML; MG/100ML; MG/100ML
INJECTION, SOLUTION INTRAVENOUS CONTINUOUS PRN
Status: DISCONTINUED | OUTPATIENT
Start: 2023-06-02 | End: 2023-06-02 | Stop reason: SDUPTHER

## 2023-06-02 RX ORDER — LIDOCAINE HYDROCHLORIDE 10 MG/ML
1 INJECTION, SOLUTION EPIDURAL; INFILTRATION; INTRACAUDAL; PERINEURAL
Status: DISCONTINUED | OUTPATIENT
Start: 2023-06-02 | End: 2023-06-02 | Stop reason: HOSPADM

## 2023-06-02 RX ORDER — SODIUM CHLORIDE 9 MG/ML
INJECTION, SOLUTION INTRAVENOUS PRN
Status: DISCONTINUED | OUTPATIENT
Start: 2023-06-02 | End: 2023-06-02 | Stop reason: HOSPADM

## 2023-06-02 RX ORDER — DEXAMETHASONE SODIUM PHOSPHATE 4 MG/ML
INJECTION, SOLUTION INTRA-ARTICULAR; INTRALESIONAL; INTRAMUSCULAR; INTRAVENOUS; SOFT TISSUE PRN
Status: DISCONTINUED | OUTPATIENT
Start: 2023-06-02 | End: 2023-06-02 | Stop reason: SDUPTHER

## 2023-06-02 RX ORDER — FENTANYL CITRATE 50 UG/ML
25 INJECTION, SOLUTION INTRAMUSCULAR; INTRAVENOUS EVERY 5 MIN PRN
Status: DISCONTINUED | OUTPATIENT
Start: 2023-06-02 | End: 2023-06-02 | Stop reason: HOSPADM

## 2023-06-02 RX ORDER — NEOSTIGMINE METHYLSULFATE 1 MG/ML
INJECTION, SOLUTION INTRAVENOUS PRN
Status: DISCONTINUED | OUTPATIENT
Start: 2023-06-02 | End: 2023-06-02 | Stop reason: SDUPTHER

## 2023-06-02 RX ORDER — KETOROLAC TROMETHAMINE 30 MG/ML
INJECTION, SOLUTION INTRAMUSCULAR; INTRAVENOUS PRN
Status: DISCONTINUED | OUTPATIENT
Start: 2023-06-02 | End: 2023-06-02 | Stop reason: SDUPTHER

## 2023-06-02 RX ORDER — ROCURONIUM BROMIDE 10 MG/ML
INJECTION, SOLUTION INTRAVENOUS PRN
Status: DISCONTINUED | OUTPATIENT
Start: 2023-06-02 | End: 2023-06-02 | Stop reason: SDUPTHER

## 2023-06-02 RX ORDER — GLYCOPYRROLATE 0.2 MG/ML
INJECTION INTRAMUSCULAR; INTRAVENOUS PRN
Status: DISCONTINUED | OUTPATIENT
Start: 2023-06-02 | End: 2023-06-02 | Stop reason: SDUPTHER

## 2023-06-02 RX ADMIN — GLYCOPYRROLATE 0.4 MG: 0.2 INJECTION INTRAMUSCULAR; INTRAVENOUS at 10:13

## 2023-06-02 RX ADMIN — Medication 3 MG: at 10:13

## 2023-06-02 RX ADMIN — SODIUM CHLORIDE, POTASSIUM CHLORIDE, SODIUM LACTATE AND CALCIUM CHLORIDE: 600; 310; 30; 20 INJECTION, SOLUTION INTRAVENOUS at 07:22

## 2023-06-02 RX ADMIN — CEFTRIAXONE SODIUM 1000 MG: 1 INJECTION, POWDER, FOR SOLUTION INTRAMUSCULAR; INTRAVENOUS at 08:26

## 2023-06-02 RX ADMIN — Medication 140 MG: at 08:19

## 2023-06-02 RX ADMIN — BUPIVACAINE HYDROCHLORIDE 15 ML: 5 INJECTION, SOLUTION EPIDURAL; INTRACAUDAL; PERINEURAL at 07:24

## 2023-06-02 RX ADMIN — SODIUM CHLORIDE, POTASSIUM CHLORIDE, SODIUM LACTATE AND CALCIUM CHLORIDE: 600; 310; 30; 20 INJECTION, SOLUTION INTRAVENOUS at 09:35

## 2023-06-02 RX ADMIN — MIDAZOLAM 2 MG: 1 INJECTION INTRAMUSCULAR; INTRAVENOUS at 07:27

## 2023-06-02 RX ADMIN — KETOROLAC TROMETHAMINE 30 MG: 30 INJECTION, SOLUTION INTRAMUSCULAR at 09:39

## 2023-06-02 RX ADMIN — FENTANYL CITRATE 100 MCG: 0.05 INJECTION, SOLUTION INTRAMUSCULAR; INTRAVENOUS at 07:27

## 2023-06-02 RX ADMIN — TRANEXAMIC ACID 1 G: 100 INJECTION, SOLUTION INTRAVENOUS at 08:38

## 2023-06-02 RX ADMIN — PROPOFOL 200 MG: 10 INJECTION, EMULSION INTRAVENOUS at 08:19

## 2023-06-02 RX ADMIN — ONDANSETRON HYDROCHLORIDE 4 MG: 2 INJECTION, SOLUTION INTRAMUSCULAR; INTRAVENOUS at 10:15

## 2023-06-02 RX ADMIN — DEXAMETHASONE SODIUM PHOSPHATE 4 MG: 4 INJECTION, SOLUTION INTRAMUSCULAR; INTRAVENOUS at 08:19

## 2023-06-02 RX ADMIN — ACETAMINOPHEN 1000 MG: 500 TABLET ORAL at 07:22

## 2023-06-02 RX ADMIN — Medication 80 MCG: at 08:57

## 2023-06-02 RX ADMIN — BUPIVACAINE 15 ML: 13.3 INJECTION, SUSPENSION, LIPOSOMAL INFILTRATION at 07:24

## 2023-06-02 RX ADMIN — ROCURONIUM BROMIDE 10 MG: 10 SOLUTION INTRAVENOUS at 08:19

## 2023-06-02 RX ADMIN — Medication 80 MCG: at 08:53

## 2023-06-02 RX ADMIN — FENTANYL CITRATE 50 MCG: 50 INJECTION, SOLUTION INTRAMUSCULAR; INTRAVENOUS at 08:20

## 2023-06-02 RX ADMIN — LIDOCAINE HYDROCHLORIDE 100 MG: 20 INJECTION, SOLUTION EPIDURAL; INFILTRATION; INTRACAUDAL; PERINEURAL at 08:19

## 2023-06-02 RX ADMIN — FENTANYL CITRATE 50 MCG: 50 INJECTION, SOLUTION INTRAMUSCULAR; INTRAVENOUS at 08:38

## 2023-06-02 RX ADMIN — PHENYLEPHRINE HYDROCHLORIDE 40 MCG/MIN: 10 INJECTION INTRAVENOUS at 08:59

## 2023-06-02 RX ADMIN — SODIUM CHLORIDE, POTASSIUM CHLORIDE, SODIUM LACTATE AND CALCIUM CHLORIDE: 600; 310; 30; 20 INJECTION, SOLUTION INTRAVENOUS at 08:15

## 2023-06-02 ASSESSMENT — PAIN SCALES - GENERAL: PAINLEVEL_OUTOF10: 0

## 2023-06-02 ASSESSMENT — PAIN - FUNCTIONAL ASSESSMENT
PAIN_FUNCTIONAL_ASSESSMENT: NONE - DENIES PAIN
PAIN_FUNCTIONAL_ASSESSMENT: NONE - DENIES PAIN

## 2023-06-02 NOTE — DISCHARGE INSTRUCTIONS
You received Toradol at 9:39 AM. You shouldn't take NSAIDs, over-the-counter (OTC) medications, such as aspirin, naproxen (Aleve), and ibuprofen (Advil, Motrin), until 3:39pm.     Total Shoulder Replacement  Post-op Discharge Instructions  Dr. Crain Precise    Diet  Regular diet or usual diet as at home. Drink plenty of fluids. Eat foods high in fiber and protein and low in fat. Avoid alcoholic beverages. Avoid smoking. Activities  You are going home a well person, so be as active as possible. Walk with your sling on as tolerated. During the daytime, get up every hour and take a brief walk. Exercises  Perform your exercise routine 3 times a day as instructed by the physical therapist.  Gradually increase the repetitions of the exercises. You may place an ice bag on your shoulder for 5-10 minutes after exercising. You should walk daily, each time lengthening your walking distance as your strength improves. Medications  Take a multivitamin with iron once a day for a month. Take a stool softener daily (such as Senokot-S or Colace) to prevent constipation while you are taking iron and pain medication. If constipation occurs, take a laxative (such as Dulcolax tablets, Milk of Magnesia, or suppository). For pain control you have been given:      Oxycodone 5mg tablets. Take 1 tablet every 4-6 hours as needed for pain. Take pain medication with food. You will find that you will decrease the amount you use as your pain lessens. You may take ibuprofen/alleve and tylenol with your pain medication. As soon as you are comfortable enough you should wean off of your pain medication and just take tylenol and Ibuprofen. Incision Care    You may take a shower  when you get home. Your dressing is waterproof. That will be removed 1 week after surgery. After removal of the dressing, cover it with a dry dressing. You may continue to shower if the wound is dry.   Please thoroughly dry your incision after

## 2023-06-02 NOTE — FLOWSHEET NOTE
06/02/23 2563   Family Communication    Relationship to Patient Partner    Phone Number Yari Boone [phone]   Family/Significant Other Update Other (comment)   Delivery Origin Nurse   Message Disposition Other (comment)   Family Communication   Family Update Message Other (comment)     Attempted to update partner, but the phone immediately went to voicemail and no message was left.

## 2023-06-02 NOTE — PERIOP NOTE
Surgifoam Absorbable Gelatin Sponge Size 12-7 used during the procedure  Ref # [id number]  Lot # [id number]  Exp.  Date 2/8/2027

## 2023-06-02 NOTE — H&P
Verner Solid is a 59 y.o. female. Pain Score: 7   Chief Complaint: Follow-up of the Left Shoulder    HISTORY OF PRESENT OF ILLNESS:  Manjit Underwood is a 59 y.o. female who presents for F/U of the left shoulder pain with DJD. At her last visit we were surprised given the severity of her pain with her x-ray findings and so got an MRI to evaluate her rotator cuff as well as other structures. She comes back to discuss that. She is very limited by left shoulder really can not do much with that at all. She is very frustrated by that. . The Pt states that the right shoulder scope provided relief. She states that she has had injections, however they no longer provide relief. The Pt states that she has difficulty holding items, and will likely drop them. She states that she continues to have lymphedema discomfort as well, and goes to a clinic.      Past Medical History:   Diagnosis Date   Anxiety   Cancer   GERD (gastroesophageal reflux disease)   Hyperlipidemia   Sleep apnea     Past Surgical History:   Procedure Laterality Date   KNEE SURGERY   MASTECTOMY   NO RELEVANT SURGERIES   SHOULDER SURGERY   SPINE SURGERY     Family History   Problem Relation Age of Onset   Clotting disorder Father   Diabetes Neg Hx   Anesthesia problems Neg Hx   Coronary artery disease Neg Hx     Social History     Tobacco Use   Smoking status: Never   Smokeless tobacco: Never   Substance Use Topics   Alcohol use: Yes   Drug use: Never     Review of Systems   4/10/2023    Constitutional: Unexplained: Negative  Genitourinary: Frequent Urination: Negative  HEENT: Vision Loss: Negative  Neurological: Memory Loss: Negative  Integumentary: Rash: Negative  Cardiovascular: Palpatations: Negative  Hematologic: Bruises/Bleeds Easily: Negative  Gastrointestinal: Constipation: Negative  Immunological: Seasonal Allergies: Positive  Musculoskeletal: Joint Pain: Positive  Objective:     Vitals:   04/10/23 1114   Weight: 200 lb   Height: 5' 7\"

## 2023-06-02 NOTE — ANESTHESIA POSTPROCEDURE EVALUATION
Department of Anesthesiology  Postprocedure Note    Patient: Melani Martínez  MRN: [id number]  YOB: 1965  Date of evaluation: 6/2/2023      Procedure Summary     Date: 06/02/23 Room / Location: [facility] MAIN OR 19 / [facility] MAIN OR    Anesthesia Start: 0813 Anesthesia Stop: 8888    Procedure: LEFT TOTAL SHOULDER ARTHROPLASTY (GEN W/EXPAREL BLOCK) (Left: Shoulder) Diagnosis:       Primary osteoarthritis of left shoulder      (Primary osteoarthritis of left shoulder [M19.012])    Providers: Kelley Archuleta MD (Jody) Responsible Provider: Sanchez Watkins MD    Anesthesia Type: General ASA Status: 3          Anesthesia Type: General    Mychal Phase I: Mychal Score: 10    Mychal Phase II: Mychal Score: 10      Anesthesia Post Evaluation    Patient location during evaluation: PACU  Patient participation: complete - patient participated  Level of consciousness: responsive to verbal stimuli and sleepy but conscious  Airway patency: patent  Complications: no  Cardiovascular status: blood pressure returned to baseline  Respiratory status: acceptable  Hydration status: stable  Comments: +Post-Anesthesia Evaluation and Assessment    Patient: Melani Martínez MRN: [id number]  SSN: [id number]   YOB: 1965  Age: 59 y.o. Sex: female          Cardiovascular Function/Vital Signs    /74   Pulse 71   Temp 97.9 °F (36.6 °C) (Axillary)   Resp 15   Ht 1.715 m (5' 7.5\")   Wt 101.5 kg (223 lb 12.3 oz)   SpO2 97%   BMI 34.53 kg/m²     Patient is status post Procedure(s):  LEFT TOTAL SHOULDER ARTHROPLASTY (GEN W/EXPAREL BLOCK). Nausea/Vomiting: Controlled. Postoperative hydration reviewed and adequate. Pain:      Managed. Neurological Status: At baseline. Mental Status and Level of Consciousness: Arousable. Pulmonary Status:       Adequate oxygenation and airway patent. Complications related to anesthesia: None    Post-anesthesia assessment completed. No concerns.     I have

## 2023-06-02 NOTE — ANESTHESIA PRE PROCEDURE
ROM: full  Mouth opening: > = 3 FB   Dental: normal exam   (+) caps      Pulmonary:Negative Pulmonary ROS and normal exam  breath sounds clear to auscultation  (+) sleep apnea:                             Cardiovascular:Negative CV ROS  Exercise tolerance: good (>4 METS),           Rhythm: regular  Rate: normal                    Neuro/Psych:   Negative Neuro/Psych ROS  (+) depression/anxiety             GI/Hepatic/Renal: Neg GI/Hepatic/Renal ROS            Endo/Other: Negative Endo/Other ROS   (+) hypothyroidism: arthritis:., malignancy/cancer. Abdominal: normal exam            Vascular: negative vascular ROS. Other Findings:           Anesthesia Plan      general     ASA 3       Induction: intravenous. MIPS: Postoperative opioids intended and Prophylactic antiemetics administered. Anesthetic plan and risks discussed with patient. Use of blood products discussed with patient whom consented to blood products.    Plan discussed with CRNA and surgical team.    Attending anesthesiologist reviewed and agrees with Preprocedure content      Post-op pain plan if not by surgeon: single peripheral nerve block            Britney Reyna MD   6/2/2023

## 2023-06-02 NOTE — ANESTHESIA PROCEDURE NOTES
Peripheral Block    Patient location during procedure: pre-op  Reason for block: post-op pain management and at surgeon's request  Start time: 6/2/2023 7:24 AM  End time: 6/2/2023 7:36 AM  Staffing  Performed: anesthesiologist   Anesthesiologist: Gilles Santa MD  Preanesthetic Checklist  Completed: patient identified, IV checked, site marked, risks and benefits discussed, surgical/procedural consents, equipment checked, pre-op evaluation, timeout performed, anesthesia consent given, oxygen available, monitors applied/VS acknowledged and fire risk safety assessment completed and verbalized  Peripheral Block   Patient position: supine  Prep: ChloraPrep  Provider prep: mask and sterile gloves  Patient monitoring: cardiac monitor, continuous pulse ox, frequent blood pressure checks, IV access and oxygen  Block type: Brachial plexus  Supraclavicular  Laterality: left  Injection technique: single-shot  Guidance: ultrasound guided  Local infiltration: ropivacaine  Infiltration strength: 0.5 %  Local infiltration: ropivacaine    Needle   Needle type: insulated echogenic nerve stimulator needle   Needle gauge: 21 G  Needle localization: anatomical landmarks and ultrasound guidance  Needle length: 10 cm  Assessment   Injection assessment: negative aspiration for heme, no paresthesia on injection, local visualized surrounding nerve on ultrasound and no intravascular symptoms  Paresthesia pain: none  Slow fractionated injection: yes  Hemodynamics: stableno  Outcomes: uncomplicated    Medications Administered  bupivacaine liposome (EXPAREL) injection 1.3% - Perineural   15 mL - 6/2/2023 7:24:00 AM  bupivacaine (MARCAINE) PF injection 0.5% - Perineural   15 mL - 6/2/2023 7:24:00 AM

## 2023-06-02 NOTE — OP NOTE
Stemless Total Shoulder Arthroplasty with Biceps Tenodesis Procedure Note      Patient: Yana Joshi MRN: [id number]  SSN: [id number]    YOB: 1965  Age: 59 y.o. Sex: female      DATE OF SURGERY:  June 2, 2023    Indications: This is a 59 y.o. female who presents with left shoulder DJD. The patient was admitted for surgery as conservative measures have failed. Pre-operative Diagnosis:  Primary osteoarthritis of left shoulder    Post-operative Diagnosis: Primary osteoarthritis of left shoulder    Procedure: left total shoulder arthroplasty with biceps tenodesis    Surgeon: Khushboo Gray MD     Anesthesia: General    Specimens: * No specimens in log *    Implants:   Implant Name Type Inv. Item Serial No.  Lot No. LRB No. Used Action   CEMENT BNE 40GM FULL DOSE PMMA W/O GENT M VISC N RADPQ LNG - SNA  CEMENT BNE 40GM FULL DOSE PMMA W/O GENT M VISC N RADPQ LNG NA [employer]- [id number] Left 1 Implanted   ECLIPSE TRUNION 45MM SLOTTED TPS CAP - SNA  ECLIPSE TRUNION 45MM SLOTTED TPS CAP NA [employer] [id number] Left 1 Implanted   EXLIPSE CAGE SCREW MEDIUM SHOULDER IMPLANT   NA [employer] [id number] Left 1 Implanted   UNIVERS VAULTLOCK GLENOID, SMALL SHOULDER IMPLANT   NA [employer] [id number] Left 1 Implanted   ARTHREX ECLIPSE HUMERAL HEAD 47/18 - SNA  ARTHREX ECLIPSE HUMERAL HEAD 47/18 NA [address] [employer] [id number] Left 1 Implanted       Estimated Blood Loss: 100cc    Procedure Details: Following identification of the patient, The procedure was described to the patient including the risks benefits and possible complications, and the patient signed the informed consent. The patient was taken to the Operative Suite. Following an interscalene block for postoperative pain control, induction of general anesthesia, and 1 gram of intravenous Ceftriaxone, the patient was very carefully positioned in the beachchair fashion.   Care was taken to pad both dependent lower

## 2023-06-02 NOTE — PROGRESS NOTES
Discharge instructions reviewed with patient and family using teachback . All questions have been answered. Prescriptions sent to [address] [place] [address]. Vital signs stable, pain appropriately managed. Patient wheeled off the unit with PACU staff.

## 2023-07-20 ENCOUNTER — OFFICE VISIT (OUTPATIENT)
Age: 58
End: 2023-07-20
Payer: MEDICAID

## 2023-07-20 VITALS
HEIGHT: 68 IN | BODY MASS INDEX: 33.28 KG/M2 | SYSTOLIC BLOOD PRESSURE: 122 MMHG | HEART RATE: 86 BPM | DIASTOLIC BLOOD PRESSURE: 79 MMHG | WEIGHT: 219.6 LBS

## 2023-07-20 DIAGNOSIS — E89.0 POSTSURGICAL HYPOTHYROIDISM: ICD-10-CM

## 2023-07-20 DIAGNOSIS — E89.0 POSTSURGICAL HYPOTHYROIDISM: Primary | ICD-10-CM

## 2023-07-20 PROCEDURE — 99204 OFFICE O/P NEW MOD 45 MIN: CPT | Performed by: INTERNAL MEDICINE

## 2023-07-20 RX ORDER — LEVOTHYROXINE SODIUM 100 UG/1
100 TABLET ORAL DAILY
COMMUNITY

## 2023-07-20 RX ORDER — LEVOTHYROXINE SODIUM 0.1 MG/1
100 TABLET ORAL DAILY
COMMUNITY
End: 2023-07-20

## 2023-07-20 NOTE — PROGRESS NOTES
Clavulanate Nausea And Vomiting     She is not sure if this is an allergy      Family History   Problem Relation Age of Onset    Osteoarthritis Mother     Dementia Mother     Thyroid Disease Mother         thyroidectomy for a goiter    Colon Cancer Father         80s    Deep Vein Thrombosis Father     Migraines Sister     Thyroid Disease Sister     Hemochromatosis Sister     No Known Problems Sister     Hemochromatosis Brother     Osteoarthritis Brother     No Known Problems Son     No Known Problems Son     Breast Cancer Neg Hx     Anesth Problems Neg Hx      Social History     Socioeconomic History    Marital status: Single     Spouse name: Not on file    Number of children: Not on file    Years of education: Not on file    Highest education level: Not on file   Occupational History    Not on file   Tobacco Use    Smoking status: Never    Smokeless tobacco: Never   Vaping Use    Vaping Use: Never used   Substance and Sexual Activity    Alcohol use: Not Currently     Comment: mixed drinks 4-6 per week    Drug use: No    Sexual activity: Not on file   Other Topics Concern    Not on file   Social History Narrative    Lives near [place] with her partner and 2 sons (ages 24 and 23). Never . Self employed as a landlord. Likes to redo furniture and being by the water and boating. Social Determinants of Health     Financial Resource Strain: Not on file   Food Insecurity: Not on file   Transportation Needs: Not on file   Physical Activity: Not on file   Stress: Not on file   Social Connections: Not on file   Intimate Partner Violence: Not on file   Housing Stability: Not on file     Review of Systems: PER HPI    Physical Examination:  Blood pressure 122/79, pulse 86, height 5' 7.5\" (1.715 m), weight 219 lb 9.6 oz (99.6 kg).   General: pleasant, no distress, good eye contact  HEENT: no exopthalmos, no periorbital edema, no scleral/conjunctival injection, EOMI, no lid lag or stare  Neck: supple, well healed

## 2023-07-20 NOTE — PATIENT INSTRUCTIONS
1) Your last 3 TSH (thyroid test) values have been at goal between 0.5-2.0 so we'll repeat your labs today to see if you need a change in your dose but if everything is stable I'm happy to keep the doses the same and you can let me know when you need refills. 2) I will send you a message through [employer] with your lab results. This may not be until the week of 7/31/23 if I don't get back to you tomorrow. 3)  I will plan on seeing you back in one year. If your weight goes up or down by more than 10 lbs, let me know and I'm happy to check labs sooner to see if a dose change is needed.

## 2023-07-21 LAB
T4 FREE SERPL-MCNC: 1.2 NG/DL (ref 0.8–1.5)
TSH SERPL DL<=0.05 MIU/L-ACNC: 0.49 UIU/ML (ref 0.36–3.74)

## 2023-07-22 LAB — T3 SERPL-MCNC: 136 NG/DL (ref 71–180)

## 2023-08-17 RX ORDER — LIOTHYRONINE SODIUM 5 UG/1
5 TABLET ORAL
Qty: 90 TABLET | Refills: 3 | Status: SHIPPED | OUTPATIENT
Start: 2023-08-17

## 2023-08-17 RX ORDER — LEVOTHYROXINE SODIUM 100 UG/1
100 TABLET ORAL DAILY
Qty: 90 TABLET | Refills: 3 | Status: SHIPPED | OUTPATIENT
Start: 2023-08-17

## 2023-09-11 ENCOUNTER — PATIENT MESSAGE (OUTPATIENT)
Age: 58
End: 2023-09-11

## 2023-09-12 RX ORDER — SERTRALINE HYDROCHLORIDE 100 MG/1
50 TABLET, FILM COATED ORAL DAILY
Qty: 45 TABLET | Refills: 0 | Status: CANCELLED | OUTPATIENT
Start: 2023-09-12

## 2023-09-19 ENCOUNTER — TELEPHONE (OUTPATIENT)
Age: 58
End: 2023-09-19

## 2023-09-20 NOTE — TELEPHONE ENCOUNTER
Adelaide,    Please fax her PA for her levoxyl now that I have completed her form based on the [employer] exchange below: We were not able to complete the prior authorization online and have to fax a form to your insurance and will do this first thing in the morning and hopefully we'll have a response in the next 24 hours. Are you able to ask the pharmacy if you can pay cash for 5 pills as levoxyl is very cheap paying cash until we can get this sorted out? Let me know when you have a chance.    ===View-only below this line===      ----- Message -----       From:[facility] \"Maria M\"       Sent:9/19/2023  2:07 PM EDT         To:Dr. Salas Search issue    My insurance company requires more information to refill my Levoxyl. My pharmacy says that they have reached out but still do not have authorization. Could you please fix this? I took my last one this morning. Thank you!

## 2023-09-21 RX ORDER — SERTRALINE HYDROCHLORIDE 100 MG/1
50 TABLET, FILM COATED ORAL DAILY
Qty: 45 TABLET | Refills: 0 | Status: CANCELLED | OUTPATIENT
Start: 2023-09-21

## 2023-09-21 NOTE — TELEPHONE ENCOUNTER
Last appointment: 01/11/2023 MD Nelida Ron   Next appointment: 10/09/2023 MD Sophie Bassett   Previous refill encounter(s):   05/24/2023 Zoloft #45 - prescribed by Dr. Luis Miguel Pascual.      For Pharmacy Admin Tracking Only    Program: Medication Refill  Intervention Detail: New Rx: 1, reason: Patient Preference  Time Spent (min): 5      Requested Prescriptions     Pending Prescriptions Disp Refills    sertraline (ZOLOFT) 100 MG tablet 45 tablet 0     Sig: Take 0.5 tablets by mouth daily

## 2023-09-26 RX ORDER — LEVOTHYROXINE SODIUM 100 UG/1
100 TABLET ORAL DAILY
Qty: 30 TABLET | Refills: 11 | Status: SHIPPED | OUTPATIENT
Start: 2023-09-26

## 2023-09-26 NOTE — TELEPHONE ENCOUNTER
Placed on hold for 15 min then call disconnected. Contacted [facility] again and spoke with Adilene Hardy in the PA department and was told the following:    Originally the authorization was denied because pt tried to fill a 90 day supply prescription. Adilene Hardy at [facility] says pt has to fill one more 30 day supply of Levoxyl before they will cover the full 90 day supply. He stated the insurance says pt has to fill two 30 day supply prescriptions then after that she can fill the 90 day supply prescription. Pt has to fill Levoxyl for 30 day supply one more time and then the 90 day supply can be filled. Called and LVM for pt to call back, also sent [employer] message.

## 2023-09-26 NOTE — TELEPHONE ENCOUNTER
We had the following [employer] exchange:    I will send in one for 30 days right now and let's see if this goes through. Thanks!  ===View-only below this line===      ----- Message -----       From:Danyell Davis \"Maria M\"       Sent:2023  6:06 PM EDT         To:User Message Message List    Subject:Levoxyl    Do I need to have a new prescription that says 30 days? The one that I have says 80.      ----- Message -----       From:VELMA SUH       DRK  3:27 PM EDT         To:Danyell Davis \"Maria M\"    Subject:Levoxyl    Good afternoon  I was finally able to get to the bottom of the prior authorization issue. Originally the authorization was denied because you tried to fill a 90 day prescription. Chris Ruiz at [facility] says you have to fill one more 30 day supply of Levoxyl before they will cover the full 90 day supply. He states your insurance says you have to fill two 30 day supply prescriptions then after that you can fill the 90 day prescription. I apologize if this is confusing. So you can fill the Levoxyl for a 30 day supply today and your next refill should be a 90 day supply.

## 2023-10-09 ENCOUNTER — TELEPHONE (OUTPATIENT)
Age: 58
End: 2023-10-09

## 2023-10-09 ENCOUNTER — OFFICE VISIT (OUTPATIENT)
Age: 58
End: 2023-10-09
Payer: MEDICAID

## 2023-10-09 VITALS
SYSTOLIC BLOOD PRESSURE: 147 MMHG | WEIGHT: 219.2 LBS | TEMPERATURE: 97.7 F | OXYGEN SATURATION: 96 % | HEIGHT: 68 IN | DIASTOLIC BLOOD PRESSURE: 92 MMHG | HEART RATE: 78 BPM | BODY MASS INDEX: 33.22 KG/M2

## 2023-10-09 DIAGNOSIS — Z23 ENCOUNTER FOR IMMUNIZATION: ICD-10-CM

## 2023-10-09 DIAGNOSIS — R05.8 RECURRENT COUGH: ICD-10-CM

## 2023-10-09 DIAGNOSIS — D75.89 MACROCYTOSIS: ICD-10-CM

## 2023-10-09 DIAGNOSIS — E55.9 HYPOVITAMINOSIS D: ICD-10-CM

## 2023-10-09 DIAGNOSIS — F33.42 MAJOR DEPRESSIVE DISORDER, RECURRENT, IN FULL REMISSION (HCC): Primary | ICD-10-CM

## 2023-10-09 DIAGNOSIS — Z00.00 WELL ADULT HEALTH CHECK: ICD-10-CM

## 2023-10-09 DIAGNOSIS — K21.9 GASTROESOPHAGEAL REFLUX DISEASE WITHOUT ESOPHAGITIS: ICD-10-CM

## 2023-10-09 DIAGNOSIS — E03.9 ACQUIRED HYPOTHYROIDISM: ICD-10-CM

## 2023-10-09 PROCEDURE — 99214 OFFICE O/P EST MOD 30 MIN: CPT | Performed by: INTERNAL MEDICINE

## 2023-10-09 PROCEDURE — 90715 TDAP VACCINE 7 YRS/> IM: CPT | Performed by: INTERNAL MEDICINE

## 2023-10-09 PROCEDURE — 90674 CCIIV4 VAC NO PRSV 0.5 ML IM: CPT | Performed by: INTERNAL MEDICINE

## 2023-10-09 RX ORDER — ESOMEPRAZOLE MAGNESIUM 40 MG/1
40 FOR SUSPENSION ORAL 2 TIMES DAILY
Qty: 30 PACKET | Refills: 1 | Status: CANCELLED | OUTPATIENT
Start: 2023-10-09

## 2023-10-09 RX ORDER — ESOMEPRAZOLE MAGNESIUM 40 MG/1
40 FOR SUSPENSION ORAL 2 TIMES DAILY
COMMUNITY
End: 2023-10-09

## 2023-10-09 RX ORDER — ESOMEPRAZOLE MAGNESIUM 40 MG/1
40 CAPSULE, DELAYED RELEASE ORAL DAILY PRN
Qty: 90 CAPSULE | Refills: 1 | Status: SHIPPED | OUTPATIENT
Start: 2023-10-09

## 2023-10-09 SDOH — ECONOMIC STABILITY: INCOME INSECURITY: HOW HARD IS IT FOR YOU TO PAY FOR THE VERY BASICS LIKE FOOD, HOUSING, MEDICAL CARE, AND HEATING?: NOT HARD AT ALL

## 2023-10-09 SDOH — ECONOMIC STABILITY: HOUSING INSECURITY
IN THE LAST 12 MONTHS, WAS THERE A TIME WHEN YOU DID NOT HAVE A STEADY PLACE TO SLEEP OR SLEPT IN A SHELTER (INCLUDING NOW)?: NO

## 2023-10-09 SDOH — ECONOMIC STABILITY: FOOD INSECURITY: WITHIN THE PAST 12 MONTHS, THE FOOD YOU BOUGHT JUST DIDN'T LAST AND YOU DIDN'T HAVE MONEY TO GET MORE.: NEVER TRUE

## 2023-10-09 SDOH — ECONOMIC STABILITY: FOOD INSECURITY: WITHIN THE PAST 12 MONTHS, YOU WORRIED THAT YOUR FOOD WOULD RUN OUT BEFORE YOU GOT MONEY TO BUY MORE.: NEVER TRUE

## 2023-10-09 NOTE — TELEPHONE ENCOUNTER
PA for Esomeprazole Magnesium 40MG dr capsules was sent to Ins plan today and came back saying This request has received a Favorable outcome.

## 2023-10-09 NOTE — PROGRESS NOTES
Paulino Hurley (: 1965) is a 59 y.o. female, established patient, here for evaluation of the following chief complaint(s):  Chief Complaint   Patient presents with    Establish Care     Pt denied any question or concern today. Cough       Assessment and Plan:      Diagnosis Orders   1. Major depressive disorder, recurrent, in full remission (HCC)  sertraline (ZOLOFT) 50 MG tablet    CBC with Auto Differential    Comprehensive Metabolic Panel      2. Acquired hypothyroidism        3. Gastroesophageal reflux disease without esophagitis  esomeprazole (NEXIUM) 40 MG delayed release capsule    MINDY Paulino MD, Pulmonology, [place] ([facility])    External Referral To Gastroenterology    CBC with Auto Differential    Comprehensive Metabolic Panel      4. Recurrent cough  MINDY Paulino MD, Pulmonology, [place] ([facility])    External Referral To Gastroenterology      5. Encounter for immunization  Tdap, BOOSTRIX, (age 8 yrs+), IM    Influenza, FLUCELVAX, (age 10 mo+), IM, PF, 0.5 mL      6. Well adult health check  CBC with Auto Differential    Comprehensive Metabolic Panel    Lipid Panel    Hemoglobin A1C    Vitamin B12 & Folate      7. Macrocytosis  Vitamin B12 & Folate      8. Hypovitaminosis D  Vitamin D 25 Hydroxy          1,6:  Lab monitoring reviewed. Continue current medications pending lab results/review. Refill(s) and management reviewed. 2:  Mgt with specialist.    3-4:  Medication(s), management and follow-up based on response reviewed at visit. Reviewed typical course of illness, duration of symptoms, and exam findings. Referral(s) and referral coordination reviewed with patient at visit. 5:  Immunization(s) reviewed and updated at visit.    6,8:  Lab monitoring reviewed. 7:  Lab evaluation reviewed.       Return in about 3 months (around 2024), or if symptoms worsen or fail to improve, for medication follow-up, Blood Pressure follow-up, referral follow-up,

## 2023-10-10 LAB
25(OH)D3+25(OH)D2 SERPL-MCNC: 37.1 NG/ML (ref 30–100)
BASOPHILS # BLD AUTO: 0 X10E3/UL (ref 0–0.2)
BASOPHILS NFR BLD AUTO: 1 %
CHOLEST SERPL-MCNC: 221 MG/DL (ref 100–199)
EOSINOPHIL # BLD AUTO: 0.2 X10E3/UL (ref 0–0.4)
EOSINOPHIL NFR BLD AUTO: 3 %
ERYTHROCYTE [DISTWIDTH] IN BLOOD BY AUTOMATED COUNT: 12.4 % (ref 11.7–15.4)
FOLATE SERPL-MCNC: 6.4 NG/ML
HBA1C MFR BLD: 5.4 % (ref 4.8–5.6)
HCT VFR BLD AUTO: 46.1 % (ref 34–46.6)
HDLC SERPL-MCNC: 56 MG/DL
HGB BLD-MCNC: 15.6 G/DL (ref 11.1–15.9)
IMM GRANULOCYTES # BLD AUTO: 0 X10E3/UL (ref 0–0.1)
IMM GRANULOCYTES NFR BLD AUTO: 0 %
LDLC SERPL CALC-MCNC: 137 MG/DL (ref 0–99)
LYMPHOCYTES # BLD AUTO: 1.7 X10E3/UL (ref 0.7–3.1)
LYMPHOCYTES NFR BLD AUTO: 27 %
MCH RBC QN AUTO: 34.3 PG (ref 26.6–33)
MCHC RBC AUTO-ENTMCNC: 33.8 G/DL (ref 31.5–35.7)
MCV RBC AUTO: 101 FL (ref 79–97)
MONOCYTES # BLD AUTO: 0.5 X10E3/UL (ref 0.1–0.9)
MONOCYTES NFR BLD AUTO: 8 %
NEUTROPHILS # BLD AUTO: 3.8 X10E3/UL (ref 1.4–7)
NEUTROPHILS NFR BLD AUTO: 61 %
PLATELET # BLD AUTO: 221 X10E3/UL (ref 150–450)
RBC # BLD AUTO: 4.55 X10E6/UL (ref 3.77–5.28)
TRIGL SERPL-MCNC: 159 MG/DL (ref 0–149)
VIT B12 SERPL-MCNC: 1697 PG/ML (ref 232–1245)
VLDLC SERPL CALC-MCNC: 28 MG/DL (ref 5–40)
WBC # BLD AUTO: 6.3 X10E3/UL (ref 3.4–10.8)

## 2023-10-11 LAB
ALBUMIN SERPL-MCNC: 4.6 G/DL (ref 3.8–4.9)
ALBUMIN/GLOB SERPL: 2 {RATIO} (ref 1.2–2.2)
ALP SERPL-CCNC: 109 IU/L (ref 44–121)
ALT SERPL-CCNC: 48 IU/L (ref 0–32)
AST SERPL-CCNC: 35 IU/L (ref 0–40)
BILIRUB SERPL-MCNC: 0.5 MG/DL (ref 0–1.2)
BUN SERPL-MCNC: 11 MG/DL (ref 8–27)
BUN/CREAT SERPL: 14 (ref 12–28)
CALCIUM SERPL-MCNC: 9.7 MG/DL (ref 8.7–10.3)
CHLORIDE SERPL-SCNC: 99 MMOL/L (ref 96–106)
CO2 SERPL-SCNC: 23 MMOL/L (ref 20–29)
CREAT SERPL-MCNC: 0.78 MG/DL (ref 0.57–1)
GLOBULIN SER CALC-MCNC: 2.3 G/DL (ref 1.5–4.5)
GLUCOSE SERPL-MCNC: 109 MG/DL (ref 70–99)
POTASSIUM SERPL-SCNC: 4.8 MMOL/L (ref 3.5–5.2)
PROT SERPL-MCNC: 6.9 G/DL (ref 6–8.5)
SODIUM SERPL-SCNC: 138 MMOL/L (ref 134–144)

## 2023-11-07 ENCOUNTER — HOSPITAL ENCOUNTER (OUTPATIENT)
Facility: HOSPITAL | Age: 58
Discharge: HOME | End: 2023-11-10
Attending: INTERNAL MEDICINE
Payer: MEDICAID

## 2023-11-07 DIAGNOSIS — R05.3 CHRONIC COUGH: ICD-10-CM

## 2023-11-07 PROCEDURE — 71250 CT THORAX DX C-: CPT

## 2023-11-09 ENCOUNTER — OFFICE VISIT (OUTPATIENT)
Age: 58
End: 2023-11-09
Payer: MEDICAID

## 2023-11-09 VITALS
HEART RATE: 82 BPM | HEIGHT: 68 IN | TEMPERATURE: 97.7 F | DIASTOLIC BLOOD PRESSURE: 87 MMHG | RESPIRATION RATE: 18 BRPM | OXYGEN SATURATION: 96 % | SYSTOLIC BLOOD PRESSURE: 132 MMHG | WEIGHT: 220 LBS | BODY MASS INDEX: 33.34 KG/M2

## 2023-11-09 DIAGNOSIS — Z79.899 LONG TERM USE OF DRUG: ICD-10-CM

## 2023-11-09 DIAGNOSIS — E89.0 POSTSURGICAL HYPOTHYROIDISM: ICD-10-CM

## 2023-11-09 DIAGNOSIS — C50.412 MALIGNANT NEOPLASM OF UPPER-OUTER QUADRANT OF LEFT FEMALE BREAST, UNSPECIFIED ESTROGEN RECEPTOR STATUS (HCC): Primary | ICD-10-CM

## 2023-11-09 PROCEDURE — 99213 OFFICE O/P EST LOW 20 MIN: CPT | Performed by: INTERNAL MEDICINE

## 2024-01-09 ENCOUNTER — OFFICE VISIT (OUTPATIENT)
Age: 59
End: 2024-01-09
Payer: MEDICAID

## 2024-01-09 VITALS
TEMPERATURE: 98.3 F | OXYGEN SATURATION: 97 % | HEIGHT: 68 IN | HEART RATE: 89 BPM | RESPIRATION RATE: 20 BRPM | DIASTOLIC BLOOD PRESSURE: 84 MMHG | WEIGHT: 223 LBS | SYSTOLIC BLOOD PRESSURE: 132 MMHG | BODY MASS INDEX: 33.8 KG/M2

## 2024-01-09 DIAGNOSIS — R05.8 RECURRENT COUGH: ICD-10-CM

## 2024-01-09 DIAGNOSIS — R03.0 ELEVATED BP WITHOUT DIAGNOSIS OF HYPERTENSION: ICD-10-CM

## 2024-01-09 DIAGNOSIS — R63.5 UNINTENDED WEIGHT GAIN: ICD-10-CM

## 2024-01-09 DIAGNOSIS — J20.9 BRONCHITIS WITH BRONCHOSPASM: Primary | ICD-10-CM

## 2024-01-09 DIAGNOSIS — B33.8 RSV INFECTION: ICD-10-CM

## 2024-01-09 LAB
EXP DATE SOLUTION: NORMAL
EXP DATE SWAB: NORMAL
EXPIRATION DATE: NORMAL
INFLUENZA A ANTIGEN, POC: NEGATIVE
INFLUENZA B ANTIGEN, POC: NEGATIVE
LOT NUMBER POC: NORMAL
LOT NUMBER SOLUTION: NORMAL
LOT NUMBER SWAB: NORMAL
RSV RNA, POC: POSITIVE
SARS-COV-2 RNA, POC: NEGATIVE
VALID INTERNAL CONTROL, POC: NEGATIVE
VALID INTERNAL CONTROL, POC: YES

## 2024-01-09 PROCEDURE — 87634 RSV DNA/RNA AMP PROBE: CPT | Performed by: INTERNAL MEDICINE

## 2024-01-09 PROCEDURE — 99214 OFFICE O/P EST MOD 30 MIN: CPT | Performed by: INTERNAL MEDICINE

## 2024-01-09 PROCEDURE — PBSHW AMB POC RSV: Performed by: INTERNAL MEDICINE

## 2024-01-09 PROCEDURE — PBSHW AMB POC COVID-19 COV: Performed by: INTERNAL MEDICINE

## 2024-01-09 PROCEDURE — PBSHW AMB POC INFLUENZA A  AND B REAL-TIME RT-PCR: Performed by: INTERNAL MEDICINE

## 2024-01-09 PROCEDURE — 87635 SARS-COV-2 COVID-19 AMP PRB: CPT | Performed by: INTERNAL MEDICINE

## 2024-01-09 PROCEDURE — 87502 INFLUENZA DNA AMP PROBE: CPT | Performed by: INTERNAL MEDICINE

## 2024-01-09 RX ORDER — BENZONATATE 200 MG/1
200 CAPSULE ORAL 3 TIMES DAILY PRN
Qty: 30 CAPSULE | Refills: 0 | Status: SHIPPED | OUTPATIENT
Start: 2024-01-09 | End: 2024-01-19

## 2024-01-09 RX ORDER — CEFDINIR 300 MG/1
300 CAPSULE ORAL 2 TIMES DAILY
Qty: 20 CAPSULE | Refills: 0 | Status: SHIPPED | OUTPATIENT
Start: 2024-01-09 | End: 2024-01-19

## 2024-01-09 RX ORDER — ALBUTEROL SULFATE 90 UG/1
2 AEROSOL, METERED RESPIRATORY (INHALATION) EVERY 4 HOURS PRN
Qty: 8 G | Refills: 1 | Status: SHIPPED | OUTPATIENT
Start: 2024-01-09

## 2024-01-09 RX ORDER — INHALER, ASSIST DEVICES
SPACER (EA) MISCELLANEOUS
Qty: 1 EACH | Refills: 1 | Status: SHIPPED | OUTPATIENT
Start: 2024-01-09

## 2024-01-09 ASSESSMENT — PATIENT HEALTH QUESTIONNAIRE - PHQ9
2. FEELING DOWN, DEPRESSED OR HOPELESS: 0
9. THOUGHTS THAT YOU WOULD BE BETTER OFF DEAD, OR OF HURTING YOURSELF: 0
4. FEELING TIRED OR HAVING LITTLE ENERGY: 0
SUM OF ALL RESPONSES TO PHQ QUESTIONS 1-9: 0
8. MOVING OR SPEAKING SO SLOWLY THAT OTHER PEOPLE COULD HAVE NOTICED. OR THE OPPOSITE, BEING SO FIGETY OR RESTLESS THAT YOU HAVE BEEN MOVING AROUND A LOT MORE THAN USUAL: 0
7. TROUBLE CONCENTRATING ON THINGS, SUCH AS READING THE NEWSPAPER OR WATCHING TELEVISION: 0
3. TROUBLE FALLING OR STAYING ASLEEP: 0
SUM OF ALL RESPONSES TO PHQ9 QUESTIONS 1 & 2: 0
5. POOR APPETITE OR OVEREATING: 0
6. FEELING BAD ABOUT YOURSELF - OR THAT YOU ARE A FAILURE OR HAVE LET YOURSELF OR YOUR FAMILY DOWN: 0
SUM OF ALL RESPONSES TO PHQ QUESTIONS 1-9: 0
SUM OF ALL RESPONSES TO PHQ QUESTIONS 1-9: 0
1. LITTLE INTEREST OR PLEASURE IN DOING THINGS: 0
SUM OF ALL RESPONSES TO PHQ QUESTIONS 1-9: 0
10. IF YOU CHECKED OFF ANY PROBLEMS, HOW DIFFICULT HAVE THESE PROBLEMS MADE IT FOR YOU TO DO YOUR WORK, TAKE CARE OF THINGS AT HOME, OR GET ALONG WITH OTHER PEOPLE: 0

## 2024-01-09 NOTE — PATIENT INSTRUCTIONS
Results for orders placed or performed in visit on 01/09/24   AMB POC COVID-19 COV   Result Value Ref Range    SARS-COV-2 RNA, POC Negative     Lot number swab      EXP date swab      Lot number solution      EXP date solution      LOT NUMBER POC      EXPIRATION DATE     AMB POC INFLUENZA A  AND B REAL-TIME RT-PCR   Result Value Ref Range    Valid Internal Control, POC negative     Influenza A Antigen, POC Negative     Influenza B Antigen, POC Negative    AMB POC RSV   Result Value Ref Range    Valid Internal Control, POC yes     RSV RNA, POC positive          Please review alternate inhaler/controller, rescue albuterol use and possible use of nebulizer for cough with pulmonary appointment this afternoon.

## 2024-01-09 NOTE — PROGRESS NOTES
Room:  I have reviewed all needed documentation in preparation for visit. Verified patient by name and date of birth  Chief Complaint   Patient presents with    Follow-up     3 month       Vitals:    01/09/24 1054   BP: (!) 143/86   Pulse: 89   Resp: 20   Temp: 98.3 °F (36.8 °C)   TempSrc: Oral   SpO2: 97%   Weight: 101.2 kg (223 lb)   Height: 1.715 m (5' 7.5\")        Health Maintenance Due   Topic Date Due    HIV screen  Never done    Respiratory Syncytial Virus (RSV) Pregnant or age 60 yrs+ (1 - 1-dose 60+ series) Never done    COVID-19 Vaccine (4 - 2023-24 season) 09/01/2023    Depression Monitoring  01/11/2024        1. \"Have you been to the ER, urgent care clinic since your last visit?  Hospitalized since your last visit?\" No     2. \"Have you seen or consulted any other health care providers outside of the [facility] since your last visit?\" No      3. For patients aged 45-75: Has the patient had a colonoscopy / FIT/ Cologuard? Yes      If the patient is female:    4. For patients aged 40-74: Has the patient had a mammogram within the past 2 years? No       5. For patients aged 21-65: Has the patient had a pap smear? No         
(GERD/reflux, heartburn or cough) 10/9/23  Yes Mark Anthony Sidhu MD   LEVOXYL 100 MCG tablet Take 1 tablet by mouth Daily BRAND MEDICALLY NECESSARY 9/26/23  Yes Prasanth Galeano MD   liothyronine (CYTOMEL) 5 MCG tablet Take 1 tablet by mouth every morning (before breakfast) 8/17/23  Yes Prasanth Galeano MD   Omega-3 Fatty Acids (FISH OIL) 1000 MG capsule Take by mouth 2 times daily   Yes Lucian Tong MD   calcium carbonate 600 MG TABS tablet Take 1 tablet by mouth in the morning and at bedtime   Yes Lucian Tong MD   cyanocobalamin 100 MCG tablet every 24 hours   Yes Lucian Tong MD   folic acid (FOLVITE) 400 MCG tablet every 24 hours   Yes Lucian Tong MD   MAGNESIUM PO Take 500 mg by mouth daily   Yes Automatic Reconciliation, Ar   anastrozole (ARIMIDEX) 1 MG tablet Take by mouth nightly 2/15/23  Yes Automatic Reconciliation, Ar   traZODone (DESYREL) 50 MG tablet TAKE TWO TABLETS BY MOUTH EVERY NIGHT AT BEDTIME 12/8/22  Yes Automatic Reconciliation, Ar        Vitals:    01/09/24 1054 01/09/24 1134   BP: (!) 143/86 132/84   Site:  Right Upper Arm   Position:  Sitting   Cuff Size:  Medium Adult   Pulse: 89    Resp: 20    Temp: 98.3 °F (36.8 °C)    TempSrc: Oral    SpO2: 97%    Weight: 101.2 kg (223 lb)    Height: 1.715 m (5' 7.5\")      Body mass index is 34.41 kg/m².    Physical Exam:     Physical Exam  Vitals and nursing note reviewed.   Constitutional:       General: She is not in acute distress.     Appearance: Normal appearance. She is not ill-appearing.   HENT:      Head: Normocephalic and atraumatic.      Mouth/Throat:      Mouth: Mucous membranes are moist.   Eyes:      General:         Right eye: No discharge.         Left eye: No discharge.      Conjunctiva/sclera: Conjunctivae normal.   Cardiovascular:      Rate and Rhythm: Normal rate and regular rhythm.      Pulses: Normal pulses.      Heart sounds: Normal heart sounds. No murmur heard.     No friction rub.

## 2024-03-14 RX ORDER — ANASTROZOLE 1 MG/1
1 TABLET ORAL
Qty: 30 TABLET | Refills: 2 | Status: SHIPPED | OUTPATIENT
Start: 2024-03-14 | End: 2024-06-12

## 2024-04-01 ENCOUNTER — HOSPITAL ENCOUNTER (OUTPATIENT)
Age: 59
Discharge: HOME | End: 2024-04-04
Payer: MEDICAID

## 2024-04-01 DIAGNOSIS — C50.412 MALIGNANT NEOPLASM OF UPPER-OUTER QUADRANT OF LEFT FEMALE BREAST, UNSPECIFIED ESTROGEN RECEPTOR STATUS (HCC): ICD-10-CM

## 2024-04-01 PROCEDURE — 77080 DXA BONE DENSITY AXIAL: CPT

## 2024-04-09 ENCOUNTER — OFFICE VISIT (OUTPATIENT)
Age: 59
End: 2024-04-09
Payer: MEDICAID

## 2024-04-09 VITALS
OXYGEN SATURATION: 96 % | TEMPERATURE: 97.5 F | WEIGHT: 222.2 LBS | RESPIRATION RATE: 19 BRPM | HEART RATE: 91 BPM | HEIGHT: 68 IN | SYSTOLIC BLOOD PRESSURE: 132 MMHG | BODY MASS INDEX: 33.68 KG/M2 | DIASTOLIC BLOOD PRESSURE: 87 MMHG

## 2024-04-09 DIAGNOSIS — E03.9 ACQUIRED HYPOTHYROIDISM: ICD-10-CM

## 2024-04-09 DIAGNOSIS — R03.0 ELEVATED BP WITHOUT DIAGNOSIS OF HYPERTENSION: Primary | ICD-10-CM

## 2024-04-09 DIAGNOSIS — R63.5 UNINTENDED WEIGHT GAIN: ICD-10-CM

## 2024-04-09 DIAGNOSIS — R05.8 RECURRENT COUGH: ICD-10-CM

## 2024-04-09 PROCEDURE — 99214 OFFICE O/P EST MOD 30 MIN: CPT | Performed by: INTERNAL MEDICINE

## 2024-04-09 SDOH — ECONOMIC STABILITY: FOOD INSECURITY: WITHIN THE PAST 12 MONTHS, THE FOOD YOU BOUGHT JUST DIDN'T LAST AND YOU DIDN'T HAVE MONEY TO GET MORE.: NEVER TRUE

## 2024-04-09 SDOH — ECONOMIC STABILITY: INCOME INSECURITY: HOW HARD IS IT FOR YOU TO PAY FOR THE VERY BASICS LIKE FOOD, HOUSING, MEDICAL CARE, AND HEATING?: NOT HARD AT ALL

## 2024-04-09 SDOH — ECONOMIC STABILITY: FOOD INSECURITY: WITHIN THE PAST 12 MONTHS, YOU WORRIED THAT YOUR FOOD WOULD RUN OUT BEFORE YOU GOT MONEY TO BUY MORE.: NEVER TRUE

## 2024-04-09 ASSESSMENT — PATIENT HEALTH QUESTIONNAIRE - PHQ9
SUM OF ALL RESPONSES TO PHQ QUESTIONS 1-9: 0
1. LITTLE INTEREST OR PLEASURE IN DOING THINGS: NOT AT ALL
SUM OF ALL RESPONSES TO PHQ QUESTIONS 1-9: 0
10. IF YOU CHECKED OFF ANY PROBLEMS, HOW DIFFICULT HAVE THESE PROBLEMS MADE IT FOR YOU TO DO YOUR WORK, TAKE CARE OF THINGS AT HOME, OR GET ALONG WITH OTHER PEOPLE: NOT DIFFICULT AT ALL
5. POOR APPETITE OR OVEREATING: NOT AT ALL
4. FEELING TIRED OR HAVING LITTLE ENERGY: NOT AT ALL
SUM OF ALL RESPONSES TO PHQ QUESTIONS 1-9: 0
8. MOVING OR SPEAKING SO SLOWLY THAT OTHER PEOPLE COULD HAVE NOTICED. OR THE OPPOSITE, BEING SO FIGETY OR RESTLESS THAT YOU HAVE BEEN MOVING AROUND A LOT MORE THAN USUAL: NOT AT ALL
6. FEELING BAD ABOUT YOURSELF - OR THAT YOU ARE A FAILURE OR HAVE LET YOURSELF OR YOUR FAMILY DOWN: NOT AT ALL
SUM OF ALL RESPONSES TO PHQ QUESTIONS 1-9: 0
9. THOUGHTS THAT YOU WOULD BE BETTER OFF DEAD, OR OF HURTING YOURSELF: NOT AT ALL
3. TROUBLE FALLING OR STAYING ASLEEP: NOT AT ALL
SUM OF ALL RESPONSES TO PHQ9 QUESTIONS 1 & 2: 0
2. FEELING DOWN, DEPRESSED OR HOPELESS: NOT AT ALL
7. TROUBLE CONCENTRATING ON THINGS, SUCH AS READING THE NEWSPAPER OR WATCHING TELEVISION: NOT AT ALL

## 2024-04-09 NOTE — PROGRESS NOTES
Rm: 16    Chief Complaint   Patient presents with    Follow-up        1. Have you been to the ER, urgent care clinic since your last visit?  Hospitalized since your last visit?no    2. Have you seen or consulted any other health care providers outside of the [facility] since your last visit?  Include any pap smears or colon screening. no        Social Determinants of Health     Tobacco Use: Low Risk  (4/9/2024)    Patient History     Smoking Tobacco Use: Never     Smokeless Tobacco Use: Never     Passive Exposure: Not on file   Alcohol Use: Not on file   Financial Resource Strain: Low Risk  (4/9/2024)    Overall Financial Resource Strain (CARDIA)     Difficulty of Paying Living Expenses: Not hard at all   Food Insecurity: No Food Insecurity (4/9/2024)    Hunger Vital Sign     Worried About Running Out of Food in the Last Year: Never true     Ran Out of Food in the Last Year: Never true   Transportation Needs: Unknown (4/9/2024)    PRAPARE - Transportation     Lack of Transportation (Medical): Not on file     Lack of Transportation (Non-Medical): No   Physical Activity: Not on file   Stress: Not on file   Social Connections: Not on file   Intimate Partner Violence: Not At Risk (10/6/2023)    Humiliation, Afraid, Rape, and Kick questionnaire     Fear of Current or Ex-Partner: No     Emotionally Abused: No     Physically Abused: No     Sexually Abused: No   Depression: Not at risk (4/9/2024)    PHQ-2     PHQ-2 Score: 0   Housing Stability: Unknown (4/9/2024)    Housing Stability Vital Sign     Unable to Pay for Housing in the Last Year: Not on file     Number of Places Lived in the Last Year: Not on file     Unstable Housing in the Last Year: No   Interpersonal Safety: Not At Risk (10/6/2023)    Humiliation, Afraid, Rape, and Kick questionnaire     Fear of Current or Ex-Partner: No     Emotionally Abused: No     Physically Abused: No     Sexually Abused: No   Utilities: Not on file

## 2024-04-09 NOTE — PROGRESS NOTES
Danyell Dumont (: 1965) is a 58 y.o. female, established patient, here for evaluation of the following chief complaint(s):  Chief Complaint   Patient presents with    Follow-up       Assessment and Plan:      Diagnosis Orders   1. Elevated BP without diagnosis of hypertension        2. Recurrent cough        3. Unintended weight gain        4. Acquired hypothyroidism            1:  Monitoring off BP meds reviewed.  Since she has follow-up with [facility] providers, plan follow-up for BP monitoring here in 6mo reviewed.  Reviewed sooner visit if values elevated with follow-up with specialists below.    2:  Following with pulmonary for mgt.    3:  Monitoring with oncology.    4:  Management/following with specialist.      Return in about 6 months (around 10/9/2024) for yearly physical, referral follow-up, fasting labs.  reviewed medications and side effects in detail    Plan and evaluation (above) reviewed with pt at visit  Patient voiced understanding of plan and provided with time to ask/review questions.  After Visit Summary (AVS) provided to pt after visit with additional instructions as needed/reviewed.      Future Appointments   Date Time Provider Department Center   2024 11:30 AM Tabatha Cr MD [facility] BS AMB   2024 10:30 AM Prasanth Galeano MD RDE [place] [id number] BS AMB   10/10/2024 10:00 AM Mark Anthony Sidhu MD [facility] AMB   --Updated future visits after patient check-out.      On this date 2024 I have spent 31 minutes reviewing previous notes, test results and face to face with the patient discussing the diagnosis and importance of compliance with the treatment plan as well as documenting on the day of the visit.      History of Present Illness:     Notes (nursing/rooming note copied below in italics):  As above and in nursing note.      Here for BP follow-up.  LOV here was 2024.  She had pulmonary appt that afternoon for cough.    She had already seen cardiology and no BP mgt

## 2024-05-08 ENCOUNTER — OFFICE VISIT (OUTPATIENT)
Age: 59
End: 2024-05-08
Payer: MEDICAID

## 2024-05-08 VITALS
DIASTOLIC BLOOD PRESSURE: 87 MMHG | BODY MASS INDEX: 34.1 KG/M2 | TEMPERATURE: 97.7 F | HEART RATE: 93 BPM | WEIGHT: 221 LBS | OXYGEN SATURATION: 96 % | RESPIRATION RATE: 16 BRPM | SYSTOLIC BLOOD PRESSURE: 126 MMHG

## 2024-05-08 DIAGNOSIS — C50.412 MALIGNANT NEOPLASM OF UPPER-OUTER QUADRANT OF LEFT FEMALE BREAST, UNSPECIFIED ESTROGEN RECEPTOR STATUS (HCC): Primary | ICD-10-CM

## 2024-05-08 PROCEDURE — 99213 OFFICE O/P EST LOW 20 MIN: CPT | Performed by: INTERNAL MEDICINE

## 2024-05-08 NOTE — PROGRESS NOTES
Danyell Dumont is a 58 y.o. female  Chief Complaint   Patient presents with    Follow-up     Left breast cancer- ER+NM+HER2 yina neg     1. Have you been to the ER, urgent care clinic since your last visit?  Hospitalized since your last visit?No    2. Have you seen or consulted any other health care providers outside of the [facility] since your last visit?  Include any pap smears or colon screening. No.     
  Extranodal Extension: Not identified   Number of Lymph Nodes Examined: 3   PATHOLOGIC STAGE CLASSIFICATION (pTNM, AJCC 8th Edition)   TNM Descriptors: y (post-treatment) m (multiple foci)   Primary Tumor (pT): pT1a   Regional Lymph Nodes (pN)   Category (pN): pN1a   3. Lymph nodes, left axillary dissection:   Two of three lymph nodes positive for metastatic carcinoma (2/3).   4. Excised tissue, right breast:   Fragments of skin and benign breast tissue.   5. Excised tissue, left breast:   Fragments of skin and benign breast tissue.     Assessment:   1) Left breast Invasive ductal carcinoma    [id number]  GRADE 2  % ID 20% HER2 yina equivocal - FISH could not be done  Repeat biopsy of breast mass 6/24/19 - HER2 yina negative, mamma print indicates she has genomically high risk disease  S/p ddAC-T and bilateral mastectomy on 1/21/2020-[id number]  RT completed 5/2020  Intolerant to Letrozole that was started 2/2020- switched to Anastrazole Planned 5-years  `  No clinical evidence of recurrence today   She has fatigue and hot flashes, hot flashes grade 2, on zoloft already.   DEXA 4/2024 normal  Counseled on VD and Ca    Counseled on symptoms to call and NCCN based guidelines for surveillance  .  2) Hypothyroidism    3) Obesity    4) Cough  Resolved, CT chest 3/2022 reviewed and reassuring    5) Left arm Lymphedema  Fu with lymphedema clinic     6) Ground glass opacities  Per Dr. Rose, resolved    7) Fatigue  Multifactorial      8) Macrocytosis  Will follow   Brother just got dx with Hemochromatosis, 2 sisters also have hemochromatosis  She takes B12 and will also take FA        Plan:     Continue  Anastrazole daily 5 years ( 2/2025). Does not want to get switched to aromasin.   Vit D / Calcium   DEXA reviewed   FA daily  Follow with Dr. Elizalde as scheduled      RTC in Feb 2025    I appreciate the opportunity to participate in Ms. Danyell Dumont's care.      Signed By: Tabatha Cr MD

## 2024-05-09 ENCOUNTER — TELEPHONE (OUTPATIENT)
Age: 59
End: 2024-05-09

## 2024-05-09 NOTE — TELEPHONE ENCOUNTER
I submitted a prior authorization for Levoxyl through [employer] and am currently waiting a response from the system.

## 2024-05-16 NOTE — TELEPHONE ENCOUNTER
Response was not received. Resubmitted on [email or web address] and per insurance:  \"Available without authorization.\"

## 2024-06-12 DIAGNOSIS — C50.412 MALIGNANT NEOPLASM OF UPPER-OUTER QUADRANT OF LEFT FEMALE BREAST, UNSPECIFIED ESTROGEN RECEPTOR STATUS (HCC): Primary | ICD-10-CM

## 2024-06-12 RX ORDER — ANASTROZOLE 1 MG/1
1 TABLET ORAL NIGHTLY
Qty: 30 TABLET | Refills: 8 | Status: SHIPPED | OUTPATIENT
Start: 2024-06-12

## 2024-06-12 NOTE — TELEPHONE ENCOUNTER
Oral Hormone therapy     Danyell Dumont is a  58 y.o.female  diagnosed with breast cancer . Ms. Dumont is being treated with Anastrozole.     Medication name: Anastrozole    Dose:  1 mg   Frequency: daily    Ordering provider: Tabatha Cr MD  Start date: 2/2020        Last OV 5/8/24  Next OV 2/13/25    Refill for Anastrozole sent to pharmacy on file       Meseret Flores, JENNIFERD, BCOP, BCPS    For Pharmacy Admin Tracking Only    Program: Medical Group  CPA in place:  Yes  Recommendation Provided To: Patient/Caregiver: 1 via Telephone  Intervention Detail: Refill(s) Provided  Intervention Accepted By: Patient/Caregiver: 1    Time Spent (min): 10

## 2024-07-02 DIAGNOSIS — E89.0 POSTSURGICAL HYPOTHYROIDISM: ICD-10-CM

## 2024-07-16 ENCOUNTER — OFFICE VISIT (OUTPATIENT)
Age: 59
End: 2024-07-16
Payer: MEDICAID

## 2024-07-16 VITALS
SYSTOLIC BLOOD PRESSURE: 134 MMHG | HEIGHT: 67 IN | WEIGHT: 220 LBS | RESPIRATION RATE: 16 BRPM | HEART RATE: 86 BPM | DIASTOLIC BLOOD PRESSURE: 88 MMHG | BODY MASS INDEX: 34.53 KG/M2

## 2024-07-16 VITALS — BODY MASS INDEX: 34.53 KG/M2 | HEIGHT: 67 IN | WEIGHT: 220 LBS

## 2024-07-16 DIAGNOSIS — I89.0 LYMPHEDEMA, NOT ELSEWHERE CLASSIFIED: ICD-10-CM

## 2024-07-16 DIAGNOSIS — E89.0 POSTSURGICAL HYPOTHYROIDISM: ICD-10-CM

## 2024-07-16 DIAGNOSIS — Z17.0 MALIGNANT NEOPLASM OF UPPER-OUTER QUADRANT OF LEFT BREAST IN FEMALE, ESTROGEN RECEPTOR POSITIVE (HCC): Primary | ICD-10-CM

## 2024-07-16 DIAGNOSIS — E89.0 POSTSURGICAL HYPOTHYROIDISM: Primary | ICD-10-CM

## 2024-07-16 DIAGNOSIS — Z90.13 STATUS POST BILATERAL MASTECTOMY: ICD-10-CM

## 2024-07-16 DIAGNOSIS — Z85.3 PERSONAL HISTORY OF MALIGNANT NEOPLASM OF BREAST: ICD-10-CM

## 2024-07-16 DIAGNOSIS — C50.412 MALIGNANT NEOPLASM OF UPPER-OUTER QUADRANT OF LEFT BREAST IN FEMALE, ESTROGEN RECEPTOR POSITIVE (HCC): Primary | ICD-10-CM

## 2024-07-16 PROCEDURE — 99213 OFFICE O/P EST LOW 20 MIN: CPT | Performed by: NURSE PRACTITIONER

## 2024-07-16 PROCEDURE — 99214 OFFICE O/P EST MOD 30 MIN: CPT | Performed by: INTERNAL MEDICINE

## 2024-07-16 RX ORDER — BUDESONIDE AND FORMOTEROL FUMARATE DIHYDRATE 160; 4.5 UG/1; UG/1
AEROSOL RESPIRATORY (INHALATION)
COMMUNITY
Start: 2024-06-13

## 2024-07-16 NOTE — PROGRESS NOTES
Ema in lymphedema clinic, but has been released from that.  Has compression garments and home pump.  Feels like lymphedema is stable with sleeve.  Follow-up with lymphedema clinic PRN.    RTC in 1 year or sooner PRN. She is comfortable with this plan.  All questions answered and she stated understanding.        Total time spent for this patient - 20 minutes.

## 2024-07-16 NOTE — PATIENT INSTRUCTIONS
1) Keep taking your current doses of levoxyl and cytomel until your labs are back.   I will send you a message through [employer] with your lab results.    2) You will see me back in one year in our [place] office at [place].  The address is [address] [place] [address] [address].  [place] [phone] in the [facility] ([facility])

## 2024-07-16 NOTE — PROGRESS NOTES
Identified pt with two pt identifiers(name and ). Reviewed record in preparation for visit and have obtained necessary documentation. All patient medications has been reviewed.  Chief Complaint   Patient presents with    Thyroid Problem    Follow-up            No data to display                   No data to display                Health Maintenance Due   Topic    HIV screen     Respiratory Syncytial Virus (RSV) Pregnant or age 60 yrs+ (1 - 1-dose 60+ series)    COVID-19 Vaccine ( season)     Health Maintenance Review: Patient reminded of \"due or due soon\" health maintenance. I have asked the patient to contact his/her primary care provider (PCP) for follow-up on his/her health maintenance.        Wt Readings from Last 3 Encounters:   24 99.8 kg (220 lb)   24 100.2 kg (221 lb)   24 100.8 kg (222 lb 3.2 oz)     Temp Readings from Last 3 Encounters:   24 97.7 °F (36.5 °C)   24 97.5 °F (36.4 °C) (Oral)   24 98.3 °F (36.8 °C) (Oral)     BP Readings from Last 3 Encounters:   24 (!) 152/96   24 126/87   24 132/87     Pulse Readings from Last 3 Encounters:   24 86   24 93   24 91       \"Have you been to the ER, urgent care clinic since your last visit?  Hospitalized since your last visit?\"    NO    “Have you seen or consulted any other health care providers outside of [facility] since your last visit?”    NO    Click Here for Release of Records Request

## 2024-07-16 NOTE — PROGRESS NOTES
Chief Complaint   Patient presents with    Thyroid Problem    Follow-up     History of Present Illness: Danyell Dumont is a 58 y.o. female here for follow up of thyroid.  Weight up 1 lbs since last visit in 7/23.  She was a thrown a surprise 58th birthday at her sister's house.  No major change to health since last visit.  Will be seeing her breast surgeon today for some pain on the left lateral side of her chest that has been persistent for the past year.  It used to be worsened with coughing and had bronchitis during the year but has not had any coughing recently aside from on occasion and the pain still persists.  Bowels are regular.  No hair loss or brittle nails.  Tends to stay hot on the arimidex but unchanged.  No anterior chest pain or palpitations.  Did have a recent stress test that was normal.  No tremors.  Overall energy is pretty good.  Will be off the arimidex in 2/25.  Compliant with levoxyl and cytomel together in the morning at least 30 min before food and coffee with just water and hasn't missed any doses.  Takes the nexium about 3x/week and this was started in 10/23 by Dr. Sidhu. Takes the calcium at lunch and at night.        Current Outpatient Medications   Medication Sig    SYMBICORT 160-4.5 MCG/ACT AERO     anastrozole (ARIMIDEX) 1 MG tablet TAKE ONE TABLET BY MOUTH ONCE NIGHTLY    albuterol sulfate HFA (VENTOLIN HFA) 108 (90 Base) MCG/ACT inhaler Inhale 2 puffs into the lungs every 4 hours as needed (Cough) Use prior to bed and in AM as directed/needed.    Spacer/Aero-Holding Chambers (POCKET SPACER) DAVID Use as directed with albuterol inhaler.    sertraline (ZOLOFT) 50 MG tablet Take 1 tablet by mouth daily    esomeprazole (NEXIUM) 40 MG delayed release capsule Take 1 capsule by mouth daily as needed (GERD/reflux, heartburn or cough)    LEVOXYL 100 MCG tablet Take 1 tablet by mouth Daily BRAND MEDICALLY NECESSARY    liothyronine (CYTOMEL) 5 MCG tablet Take 1 tablet by mouth every

## 2024-07-17 LAB
T3 SERPL-MCNC: 118 NG/DL (ref 71–180)
T4 FREE SERPL-MCNC: 1.1 NG/DL (ref 0.82–1.77)
TSH SERPL DL<=0.005 MIU/L-ACNC: 2.74 UIU/ML (ref 0.45–4.5)

## 2024-07-18 RX ORDER — LEVOTHYROXINE SODIUM 112 UG/1
112 TABLET ORAL DAILY
Qty: 30 TABLET | Refills: 11 | Status: SHIPPED | OUTPATIENT
Start: 2024-07-18

## 2024-07-25 DIAGNOSIS — G47.00 INSOMNIA, UNSPECIFIED TYPE: Primary | ICD-10-CM

## 2024-07-27 RX ORDER — TRAZODONE HYDROCHLORIDE 50 MG/1
TABLET ORAL
Qty: 60 TABLET | Refills: 3 | Status: SHIPPED | OUTPATIENT
Start: 2024-07-27

## 2024-07-27 NOTE — TELEPHONE ENCOUNTER
Refill request(s) approved--trazodone.      Last refill as below:      Disp Refills Start End     traZODone (DESYREL) 50 mg tablet 180 Tablet 0 12/8/2022 --    Sig: TAKE TWO TABLETS BY MOUTH EVERY NIGHT AT BEDTIME    Sent to pharmacy as: traZODone 50 mg tablet (DESYREL)    E-Prescribing Status: Sent to pharmacy (12/8/2022  3:52 AM EST)    Above refill with SYEDA Salazar.    Date on current script in med list is 12-8-22.    Listed as current med with me with last visit Jan 2024.      Requested Prescriptions     Signed Prescriptions Disp Refills    traZODone (DESYREL) 50 MG tablet 60 tablet 3     Sig: TAKE TWO TABLETS BY MOUTH EVERY NIGHT AT BEDTIME as needed.     Authorizing Provider: ISSAC TOTH

## 2024-08-07 DIAGNOSIS — K21.9 GASTROESOPHAGEAL REFLUX DISEASE WITHOUT ESOPHAGITIS: ICD-10-CM

## 2024-08-07 RX ORDER — ESOMEPRAZOLE MAGNESIUM 40 MG/1
CAPSULE, DELAYED RELEASE ORAL
Qty: 90 CAPSULE | Refills: 1 | Status: SHIPPED | OUTPATIENT
Start: 2024-08-07

## 2024-08-08 RX ORDER — LIOTHYRONINE SODIUM 5 UG/1
5 TABLET ORAL
Qty: 90 TABLET | Refills: 3 | Status: SHIPPED | OUTPATIENT
Start: 2024-08-08

## 2024-08-08 NOTE — TELEPHONE ENCOUNTER
Refill request(s) approved--esomeprazole--as prior.    Refill protocol details (computer-generated) reviewed, as available.      Last refill 10-9-23 for #90 with 1 refill.    Requested Prescriptions     Signed Prescriptions Disp Refills    esomeprazole (NEXIUM) 40 MG delayed release capsule 90 capsule 1     Sig: TAKE 1 CAPSULE BY MOUTH DAILY AS NEEDED FOR GERD/REFLUX/HEARTHBURN OR COUGH     Authorizing Provider: ISSAC TOTH

## 2024-08-29 DIAGNOSIS — E89.0 POSTSURGICAL HYPOTHYROIDISM: ICD-10-CM

## 2024-09-02 DIAGNOSIS — F33.42 MAJOR DEPRESSIVE DISORDER, RECURRENT, IN FULL REMISSION (HCC): ICD-10-CM

## 2024-09-04 NOTE — TELEPHONE ENCOUNTER
Duplicate request: Too soon   10/09/2023 Zoloft 50 mg #90 with 3 refills was sent to [facility] #[id number].     For Pharmacy Admin Tracking Only    Program: Medication Refill  Intervention Detail: Discontinued Rx: 1, reason: Duplicate Therapy  Time Spent (min): 5  Requested Prescriptions     Pending Prescriptions Disp Refills    sertraline (ZOLOFT) 50 MG tablet [Pharmacy Med Name: SERTRALINE HCL 50 MG TABLET] 90 tablet 3     Sig: TAKE 1 TABLET BY MOUTH DAILY

## 2024-09-18 DIAGNOSIS — F33.42 MAJOR DEPRESSIVE DISORDER, RECURRENT, IN FULL REMISSION (HCC): ICD-10-CM

## 2024-10-10 ENCOUNTER — OFFICE VISIT (OUTPATIENT)
Age: 59
End: 2024-10-10
Payer: MEDICAID

## 2024-10-10 VITALS
BODY MASS INDEX: 35.03 KG/M2 | OXYGEN SATURATION: 96 % | RESPIRATION RATE: 16 BRPM | SYSTOLIC BLOOD PRESSURE: 119 MMHG | HEIGHT: 67 IN | TEMPERATURE: 97.5 F | WEIGHT: 223.2 LBS | HEART RATE: 92 BPM | DIASTOLIC BLOOD PRESSURE: 79 MMHG

## 2024-10-10 DIAGNOSIS — Z23 ENCOUNTER FOR IMMUNIZATION: ICD-10-CM

## 2024-10-10 DIAGNOSIS — F33.42 MAJOR DEPRESSIVE DISORDER, RECURRENT, IN FULL REMISSION (HCC): ICD-10-CM

## 2024-10-10 DIAGNOSIS — E03.9 ACQUIRED HYPOTHYROIDISM: ICD-10-CM

## 2024-10-10 DIAGNOSIS — H04.201 EYE TEARING, RIGHT: ICD-10-CM

## 2024-10-10 DIAGNOSIS — G47.00 INSOMNIA, UNSPECIFIED TYPE: ICD-10-CM

## 2024-10-10 DIAGNOSIS — K21.9 GASTROESOPHAGEAL REFLUX DISEASE WITHOUT ESOPHAGITIS: ICD-10-CM

## 2024-10-10 DIAGNOSIS — Z00.00 WELL WOMAN EXAM (NO GYNECOLOGICAL EXAM): Primary | ICD-10-CM

## 2024-10-10 PROCEDURE — 99214 OFFICE O/P EST MOD 30 MIN: CPT | Performed by: INTERNAL MEDICINE

## 2024-10-10 PROCEDURE — 99396 PREV VISIT EST AGE 40-64: CPT | Performed by: INTERNAL MEDICINE

## 2024-10-10 PROCEDURE — 90661 CCIIV3 VAC ABX FR 0.5 ML IM: CPT | Performed by: INTERNAL MEDICINE

## 2024-10-10 ASSESSMENT — PATIENT HEALTH QUESTIONNAIRE - PHQ9
SUM OF ALL RESPONSES TO PHQ QUESTIONS 1-9: 0
1. LITTLE INTEREST OR PLEASURE IN DOING THINGS: NOT AT ALL
SUM OF ALL RESPONSES TO PHQ QUESTIONS 1-9: 0
2. FEELING DOWN, DEPRESSED OR HOPELESS: NOT AT ALL
SUM OF ALL RESPONSES TO PHQ9 QUESTIONS 1 & 2: 0
SUM OF ALL RESPONSES TO PHQ QUESTIONS 1-9: 0
SUM OF ALL RESPONSES TO PHQ QUESTIONS 1-9: 0
10. IF YOU CHECKED OFF ANY PROBLEMS, HOW DIFFICULT HAVE THESE PROBLEMS MADE IT FOR YOU TO DO YOUR WORK, TAKE CARE OF THINGS AT HOME, OR GET ALONG WITH OTHER PEOPLE: NOT DIFFICULT AT ALL
9. THOUGHTS THAT YOU WOULD BE BETTER OFF DEAD, OR OF HURTING YOURSELF: NOT AT ALL
6. FEELING BAD ABOUT YOURSELF - OR THAT YOU ARE A FAILURE OR HAVE LET YOURSELF OR YOUR FAMILY DOWN: NOT AT ALL
5. POOR APPETITE OR OVEREATING: NOT AT ALL
2. FEELING DOWN, DEPRESSED OR HOPELESS: NOT AT ALL
3. TROUBLE FALLING OR STAYING ASLEEP: NOT AT ALL
SUM OF ALL RESPONSES TO PHQ QUESTIONS 1-9: 0
4. FEELING TIRED OR HAVING LITTLE ENERGY: NOT AT ALL
1. LITTLE INTEREST OR PLEASURE IN DOING THINGS: NOT AT ALL
8. MOVING OR SPEAKING SO SLOWLY THAT OTHER PEOPLE COULD HAVE NOTICED. OR THE OPPOSITE, BEING SO FIGETY OR RESTLESS THAT YOU HAVE BEEN MOVING AROUND A LOT MORE THAN USUAL: NOT AT ALL
SUM OF ALL RESPONSES TO PHQ9 QUESTIONS 1 & 2: 0
SUM OF ALL RESPONSES TO PHQ QUESTIONS 1-9: 0
SUM OF ALL RESPONSES TO PHQ9 QUESTIONS 1 & 2: 0
1. LITTLE INTEREST OR PLEASURE IN DOING THINGS: NOT AT ALL
2. FEELING DOWN, DEPRESSED OR HOPELESS: NOT AT ALL
SUM OF ALL RESPONSES TO PHQ QUESTIONS 1-9: 0
7. TROUBLE CONCENTRATING ON THINGS, SUCH AS READING THE NEWSPAPER OR WATCHING TELEVISION: NOT AT ALL
SUM OF ALL RESPONSES TO PHQ QUESTIONS 1-9: 0

## 2024-10-10 NOTE — PROGRESS NOTES
After obtaining consent, and per orders of Dr. Sidhu, injection of Flucelvax given in Right deltoid by Criselda Huston LPN. Patient instructed to remain in clinic for 20 minutes afterwards, and to report any adverse reaction to me immediately.    
Patient position slight left side up. Patient prepped and draped per unit standard. Safety straps applied: Yes
Procedure start
RM:17    Chief Complaint   Patient presents with    Annual Exam     Pt would like to discuss her R eye for tearing       Fasting Yes    Vitals:    10/10/24 1012 10/10/24 1015   BP: 96/65 119/79   Site: Right Upper Arm Right Upper Arm   Position: Sitting Sitting   Cuff Size: Large Adult Large Adult   Pulse: 92 92   Resp: 16    Temp: 97.5 °F (36.4 °C)    TempSrc: Oral    SpO2: 96%    Weight: 101.2 kg (223 lb 3.2 oz)    Height: 1.702 m (5' 7\")              No data to display                \"Have you been to the ER, urgent care clinic since your last visit?  Hospitalized since your last visit?\"    NO    “Have you seen or consulted any other health care providers outside of [facility] since your last visit?”    NO            Click Here for Release of Records Request   AVS  education, follow up, and recommendations provided and addressed with patient.  services used to advise patient no  
MD Lucian   folic acid (FOLVITE) 400 MCG tablet every 24 hours   Yes Provider, MD Lucian   MAGNESIUM PO Take 500 mg by mouth daily   Yes Automatic Reconciliation, Ar   anastrozole (ARIMIDEX) 1 MG tablet TAKE ONE TABLET BY MOUTH ONCE NIGHTLY  Patient not taking: Reported on 10/10/2024 6/12/24   Tabatha Cr MD      Initially indicated/anastrazole checked as not taking, but pt notes taking through Feb 2025, then will have completed therapy.    No changes or refills of meds needed today.    Not suzan supplements but planning to re-start.      Vitals:    10/10/24 1012 10/10/24 1015   BP: 96/65 119/79   Site: Right Upper Arm Right Upper Arm   Position: Sitting Sitting   Cuff Size: Large Adult Large Adult   Pulse: 92 92   Resp: 16    Temp: 97.5 °F (36.4 °C)    TempSrc: Oral    SpO2: 96%    Weight: 101.2 kg (223 lb 3.2 oz)    Height: 1.702 m (5' 7\")      Body mass index is 34.96 kg/m².    Physical Exam:     Physical Exam  Vitals and nursing note reviewed.   Constitutional:       General: She is not in acute distress.     Appearance: Normal appearance. She is not ill-appearing.   HENT:      Head: Normocephalic and atraumatic.      Right Ear: Tympanic membrane, ear canal and external ear normal.      Left Ear: Tympanic membrane, ear canal and external ear normal.      Mouth/Throat:      Mouth: Mucous membranes are moist.   Eyes:      General:         Right eye: No discharge.         Left eye: No discharge.      Conjunctiva/sclera: Conjunctivae normal.   Cardiovascular:      Rate and Rhythm: Normal rate and regular rhythm.      Pulses: Normal pulses.      Heart sounds: Normal heart sounds. No murmur heard.     No friction rub. No gallop.   Pulmonary:      Effort: Pulmonary effort is normal. No respiratory distress.      Breath sounds: Normal breath sounds. No stridor. No wheezing, rhonchi or rales.   Abdominal:      General: Bowel sounds are normal. There is no distension.      Palpations: Abdomen is soft. There

## 2024-10-11 LAB
ALBUMIN SERPL-MCNC: 4.5 G/DL (ref 3.9–4.9)
ALP SERPL-CCNC: 109 IU/L (ref 44–121)
ALT SERPL-CCNC: 102 IU/L (ref 0–32)
AST SERPL-CCNC: 58 IU/L (ref 0–40)
BASOPHILS # BLD AUTO: 0 X10E3/UL (ref 0–0.2)
BASOPHILS NFR BLD AUTO: 1 %
BILIRUB SERPL-MCNC: 0.4 MG/DL (ref 0–1.2)
BUN SERPL-MCNC: 14 MG/DL (ref 8–27)
BUN/CREAT SERPL: 16 (ref 12–28)
CALCIUM SERPL-MCNC: 9.7 MG/DL (ref 8.7–10.3)
CHLORIDE SERPL-SCNC: 99 MMOL/L (ref 96–106)
CHOLEST SERPL-MCNC: 240 MG/DL (ref 100–199)
CO2 SERPL-SCNC: 23 MMOL/L (ref 20–29)
CREAT SERPL-MCNC: 0.89 MG/DL (ref 0.57–1)
EOSINOPHIL # BLD AUTO: 0.1 X10E3/UL (ref 0–0.4)
EOSINOPHIL NFR BLD AUTO: 2 %
ERYTHROCYTE [DISTWIDTH] IN BLOOD BY AUTOMATED COUNT: 12.1 % (ref 11.7–15.4)
GLOBULIN SER CALC-MCNC: 2.2 G/DL (ref 1.5–4.5)
GLUCOSE SERPL-MCNC: 102 MG/DL (ref 70–99)
HBA1C MFR BLD: 5.3 % (ref 4.8–5.6)
HCT VFR BLD AUTO: 43.4 % (ref 34–46.6)
HDLC SERPL-MCNC: 52 MG/DL
HGB BLD-MCNC: 14.5 G/DL (ref 11.1–15.9)
IMM GRANULOCYTES # BLD AUTO: 0 X10E3/UL (ref 0–0.1)
IMM GRANULOCYTES NFR BLD AUTO: 1 %
LDLC SERPL CALC-MCNC: 143 MG/DL (ref 0–99)
LYMPHOCYTES # BLD AUTO: 1.9 X10E3/UL (ref 0.7–3.1)
LYMPHOCYTES NFR BLD AUTO: 29 %
MCH RBC QN AUTO: 35.3 PG (ref 26.6–33)
MCHC RBC AUTO-ENTMCNC: 33.4 G/DL (ref 31.5–35.7)
MCV RBC AUTO: 106 FL (ref 79–97)
MONOCYTES # BLD AUTO: 0.6 X10E3/UL (ref 0.1–0.9)
MONOCYTES NFR BLD AUTO: 9 %
NEUTROPHILS # BLD AUTO: 3.9 X10E3/UL (ref 1.4–7)
NEUTROPHILS NFR BLD AUTO: 58 %
PLATELET # BLD AUTO: 219 X10E3/UL (ref 150–450)
POTASSIUM SERPL-SCNC: 4.7 MMOL/L (ref 3.5–5.2)
PROT SERPL-MCNC: 6.7 G/DL (ref 6–8.5)
RBC # BLD AUTO: 4.11 X10E6/UL (ref 3.77–5.28)
SODIUM SERPL-SCNC: 140 MMOL/L (ref 134–144)
TRIGL SERPL-MCNC: 248 MG/DL (ref 0–149)
VLDLC SERPL CALC-MCNC: 45 MG/DL (ref 5–40)
WBC # BLD AUTO: 6.6 X10E3/UL (ref 3.4–10.8)

## 2024-10-14 ENCOUNTER — TELEPHONE (OUTPATIENT)
Age: 59
End: 2024-10-14

## 2024-10-14 NOTE — TELEPHONE ENCOUNTER
PA for esomeprazole (NEXIUM) 40 MG delayed release capsule was sent to Ins plan and cane back saying           Prior authorization approved  Payer: [place] [employer] [place] - Managed Medicaid Case ID: [id number]  Note from payer: PA Case: [id number], Status: Approved, Coverage Starts on: 10/14/2024 12:00:00 AM, Coverage Ends on: 10/14/2025 12:00:00 AM.  Approval Details    Authorized from October 14, 2024 to October 14, 2025  Electronic appeal: Not supported  Prior auth initiated by: [facility] [id number] - [place], [place] - [address] - P [phone] - F [phone]    [phone]

## 2024-11-04 ENCOUNTER — TELEMEDICINE (OUTPATIENT)
Age: 59
End: 2024-11-04
Payer: MEDICAID

## 2024-11-04 DIAGNOSIS — R79.89 ELEVATED LFTS: Primary | ICD-10-CM

## 2024-11-04 PROCEDURE — 99214 OFFICE O/P EST MOD 30 MIN: CPT | Performed by: INTERNAL MEDICINE

## 2024-11-04 ASSESSMENT — PATIENT HEALTH QUESTIONNAIRE - PHQ9
SUM OF ALL RESPONSES TO PHQ QUESTIONS 1-9: 0
SUM OF ALL RESPONSES TO PHQ9 QUESTIONS 1 & 2: 0
7. TROUBLE CONCENTRATING ON THINGS, SUCH AS READING THE NEWSPAPER OR WATCHING TELEVISION: NOT AT ALL
6. FEELING BAD ABOUT YOURSELF - OR THAT YOU ARE A FAILURE OR HAVE LET YOURSELF OR YOUR FAMILY DOWN: NOT AT ALL
9. THOUGHTS THAT YOU WOULD BE BETTER OFF DEAD, OR OF HURTING YOURSELF: NOT AT ALL
SUM OF ALL RESPONSES TO PHQ QUESTIONS 1-9: 0
2. FEELING DOWN, DEPRESSED OR HOPELESS: NOT AT ALL
SUM OF ALL RESPONSES TO PHQ QUESTIONS 1-9: 0
8. MOVING OR SPEAKING SO SLOWLY THAT OTHER PEOPLE COULD HAVE NOTICED. OR THE OPPOSITE, BEING SO FIGETY OR RESTLESS THAT YOU HAVE BEEN MOVING AROUND A LOT MORE THAN USUAL: NOT AT ALL
SUM OF ALL RESPONSES TO PHQ QUESTIONS 1-9: 0
3. TROUBLE FALLING OR STAYING ASLEEP: NOT AT ALL
1. LITTLE INTEREST OR PLEASURE IN DOING THINGS: NOT AT ALL
4. FEELING TIRED OR HAVING LITTLE ENERGY: NOT AT ALL
10. IF YOU CHECKED OFF ANY PROBLEMS, HOW DIFFICULT HAVE THESE PROBLEMS MADE IT FOR YOU TO DO YOUR WORK, TAKE CARE OF THINGS AT HOME, OR GET ALONG WITH OTHER PEOPLE: NOT DIFFICULT AT ALL
5. POOR APPETITE OR OVEREATING: NOT AT ALL

## 2024-11-04 NOTE — PROGRESS NOTES
RM: VV  Chief Complaint   Patient presents with    Discuss Labs      There were no vitals filed for this visit.   FASTING: No  Have you been to the ER, urgent care clinic since your last visit?  Hospitalized since your last visit?\"    NO  “Have you seen or consulted any other health care providers outside of [facility] since your last visit?”    NO    Click Here for Release of Records Request

## 2024-11-04 NOTE — PROGRESS NOTES
2024    TELEHEALTH EVALUATION -- Audio/Visual    ASSESSMENT/PLAN:   Diagnosis Orders   1. Elevated LFTs  Hepatitis A Antibody, Total    Hepatitis B Surface Antigen    Hepatitis B Surface Antibody    Hepatitis B Core Antibody, Total    Ceruloplasmin    Ferritin    Iron and TIBC    Hepatitis C Antibody    Alpha-1-Antitrypsin    Anti-smooth muscle antibody    KARLENE by IFA w/Reflex    Hepatic Function Panel    [facility] - Marck Lopez MD, HepatologyTeo ([address])          1:  Lab evaluation reviewed.    Referral(s) and referral coordination reviewed with patient at visit.      Return in about 10 days (around 2024) for lab review.  lab results and schedule of future lab studies reviewed with patient  reviewed medications and side effects in detail    Plan and evaluation (above) reviewed with pt at visit  Patient voiced understanding of plan and provided with time to ask/review questions.  After Visit Summary (AVS) provided to pt after visit with additional instructions as needed/reviewed.      AVS:  [x]  Available to patient in [facility] after visit signed.  []  Mailed to patient after visit.  []  Not sent to patient after visit.      Future Appointments   Date Time Provider Department Center   2025 11:30 AM Tabatha Cr MD [facility] BS AMB   2025  2:15 PM GUY  1 [place] Im   2025 10:00 AM [facility]   2025 10:30 AM Prasanth Galeano MD RDE [facility] BS AMB   --Updated future visits after patient check-out.  --Seen on 24 for lab review as scheduled/planned.      HPI:    Danyell Dumont (:  1965) has requested an audio/video evaluation for the following concern(s):  Chief Complaint   Patient presents with    Discuss Labs       Reviewed questions about labs during visit.    She had reviewed with oncology and didn't think anastrazole causing mild LFT elevations.      Component      Latest Ref Rng 2021 2022 11/15/2022 10/9/2023

## 2024-11-06 ENCOUNTER — CLINICAL DOCUMENTATION (OUTPATIENT)
Age: 59
End: 2024-11-06

## 2024-11-06 NOTE — PROGRESS NOTES
11/6/2024 0818: New pt referral received. Referred by Mark Anthony Sidhu MD at [facility]. Records in [facility]. Dx: Elevated LFT's. Routine appt

## 2024-11-07 LAB
A1AT SERPL-MCNC: 105 MG/DL (ref 101–187)
ALBUMIN SERPL-MCNC: 4.4 G/DL (ref 3.9–4.9)
ALP SERPL-CCNC: 117 IU/L (ref 44–121)
ALT SERPL-CCNC: 53 IU/L (ref 0–32)
AST SERPL-CCNC: 35 IU/L (ref 0–40)
BILIRUB DIRECT SERPL-MCNC: 0.17 MG/DL (ref 0–0.4)
BILIRUB SERPL-MCNC: 0.6 MG/DL (ref 0–1.2)
CERULOPLASMIN SERPL-MCNC: 30.2 MG/DL (ref 19–39)
FERRITIN SERPL-MCNC: 193 NG/ML (ref 15–150)
HAV AB SER QL IA: NEGATIVE
HBV CORE AB SERPL QL IA: NEGATIVE
HBV SURFACE AB SER QL: NON REACTIVE
HBV SURFACE AG SERPL QL IA: NEGATIVE
HCV IGG SERPL QL IA: NON REACTIVE
IRON SATN MFR SERPL: 41 % (ref 15–55)
IRON SERPL-MCNC: 141 UG/DL (ref 27–139)
PROT SERPL-MCNC: 6.9 G/DL (ref 6–8.5)
TIBC SERPL-MCNC: 347 UG/DL (ref 250–450)
UIBC SERPL-MCNC: 206 UG/DL (ref 118–369)

## 2024-11-08 LAB — SMA IGG SER-ACNC: 2 UNITS (ref 0–19)

## 2024-11-09 LAB
ANA SER QL IF: NEGATIVE
LABORATORY COMMENT REPORT: NORMAL

## 2024-11-14 ENCOUNTER — TELEMEDICINE (OUTPATIENT)
Age: 59
End: 2024-11-14
Payer: MEDICAID

## 2024-11-14 DIAGNOSIS — G47.00 INSOMNIA, UNSPECIFIED TYPE: ICD-10-CM

## 2024-11-14 DIAGNOSIS — R79.89 ELEVATED LFTS: Primary | ICD-10-CM

## 2024-11-14 DIAGNOSIS — Z83.49 FH: HEMOCHROMATOSIS: ICD-10-CM

## 2024-11-14 PROCEDURE — 99214 OFFICE O/P EST MOD 30 MIN: CPT | Performed by: INTERNAL MEDICINE

## 2024-11-14 RX ORDER — TRAZODONE HYDROCHLORIDE 50 MG/1
50-100 TABLET, FILM COATED ORAL NIGHTLY PRN
Qty: 180 TABLET | Refills: 3 | Status: SHIPPED | OUTPATIENT
Start: 2024-11-14

## 2024-11-14 SDOH — ECONOMIC STABILITY: FOOD INSECURITY: WITHIN THE PAST 12 MONTHS, THE FOOD YOU BOUGHT JUST DIDN'T LAST AND YOU DIDN'T HAVE MONEY TO GET MORE.: NEVER TRUE

## 2024-11-14 SDOH — ECONOMIC STABILITY: INCOME INSECURITY: HOW HARD IS IT FOR YOU TO PAY FOR THE VERY BASICS LIKE FOOD, HOUSING, MEDICAL CARE, AND HEATING?: NOT HARD AT ALL

## 2024-11-14 SDOH — ECONOMIC STABILITY: FOOD INSECURITY: WITHIN THE PAST 12 MONTHS, YOU WORRIED THAT YOUR FOOD WOULD RUN OUT BEFORE YOU GOT MONEY TO BUY MORE.: NEVER TRUE

## 2024-11-14 ASSESSMENT — PATIENT HEALTH QUESTIONNAIRE - PHQ9
1. LITTLE INTEREST OR PLEASURE IN DOING THINGS: NOT AT ALL
6. FEELING BAD ABOUT YOURSELF - OR THAT YOU ARE A FAILURE OR HAVE LET YOURSELF OR YOUR FAMILY DOWN: NOT AT ALL
SUM OF ALL RESPONSES TO PHQ QUESTIONS 1-9: 0
2. FEELING DOWN, DEPRESSED OR HOPELESS: NOT AT ALL
SUM OF ALL RESPONSES TO PHQ QUESTIONS 1-9: 0
10. IF YOU CHECKED OFF ANY PROBLEMS, HOW DIFFICULT HAVE THESE PROBLEMS MADE IT FOR YOU TO DO YOUR WORK, TAKE CARE OF THINGS AT HOME, OR GET ALONG WITH OTHER PEOPLE: NOT DIFFICULT AT ALL
4. FEELING TIRED OR HAVING LITTLE ENERGY: NOT AT ALL
9. THOUGHTS THAT YOU WOULD BE BETTER OFF DEAD, OR OF HURTING YOURSELF: NOT AT ALL
SUM OF ALL RESPONSES TO PHQ QUESTIONS 1-9: 0
SUM OF ALL RESPONSES TO PHQ QUESTIONS 1-9: 0
8. MOVING OR SPEAKING SO SLOWLY THAT OTHER PEOPLE COULD HAVE NOTICED. OR THE OPPOSITE, BEING SO FIGETY OR RESTLESS THAT YOU HAVE BEEN MOVING AROUND A LOT MORE THAN USUAL: NOT AT ALL
5. POOR APPETITE OR OVEREATING: NOT AT ALL
3. TROUBLE FALLING OR STAYING ASLEEP: NOT AT ALL
7. TROUBLE CONCENTRATING ON THINGS, SUCH AS READING THE NEWSPAPER OR WATCHING TELEVISION: NOT AT ALL
SUM OF ALL RESPONSES TO PHQ9 QUESTIONS 1 & 2: 0

## 2024-11-14 NOTE — PROGRESS NOTES
RM:  Chief Complaint   Patient presents with    Discuss Labs      There were no vitals filed for this visit.   FASTING: No  Have you been to the ER, urgent care clinic since your last visit?  Hospitalized since your last visit?\"    NO  “Have you seen or consulted any other health care providers outside of [facility] since your last visit?”    NO    Click Here for Release of Records Request

## 2024-11-14 NOTE — PROGRESS NOTES
11/14/2024    TELEHEALTH EVALUATION -- Audio/Visual    ASSESSMENT/PLAN:   Diagnosis Orders   1. Elevated LFTs  Hepatic Function Panel    Iron and TIBC    Ferritin    US ABDOMEN COMPLETE      2. FH: hemochromatosis  Hepatic Function Panel    Iron and TIBC    Ferritin    US ABDOMEN COMPLETE      3. Insomnia, unspecified type  traZODone (DESYREL) 50 MG tablet          1-2:  Future Lab monitoring reviewed.  Plan repeat prior to Hepatology evaluation Feb 2025.    US scheduling reviewed--scheduled as below, prior to liver referral/visit.    3:  Refill(s) and management reviewed.      Requested Prescriptions     Signed Prescriptions Disp Refills    traZODone (DESYREL) 50 MG tablet 180 tablet 3     Sig: Take 1-2 tablets by mouth nightly as needed for Sleep TAKE TWO TABLETS BY MOUTH EVERY NIGHT AT BEDTIME as needed.       Return if symptoms worsen or fail to improve--plan after liver referral, or April 2025, for medication follow-up, referral follow-up.  lab results and schedule of future lab studies reviewed with patient  reviewed medications and side effects in detail    For additional documentation of information and/or recommendations discussed this visit, please see notes in instructions.    Plan and evaluation (above) reviewed with pt at visit  Patient voiced understanding of plan and provided with time to ask/review questions.  After Visit Summary (AVS) provided to pt after visit with additional instructions as needed/reviewed.      AVS:  [x]  [facility] signed.  []  Mailed to patient after visit.  []  Not sent to patient after visit.      Future Appointments   Date Time Provider Department Center   2/13/2025 11:30 AM Tabatha Cr MD [facility] BS AMB   2/17/2025  2:15 PM GUY US 1 SARAH [place] Img   2/24/2025 10:00 AM Tammy Cota MD [facility] BS AMB   7/16/2025 10:30 AM Prasanth Galeano MD RDE [facility] BS AMB   --Updated future visits after patient check-out.  --Planned 6mo from

## 2024-11-14 NOTE — PATIENT INSTRUCTIONS
You can call [phone] for Central Scheduling.  They can help you schedule the abdominal ultrasound.  You will typically be called in 2-3 business days to schedule.  If you have problems scheduling, please call here to speak with someone in our clinic.

## 2025-02-09 DIAGNOSIS — K21.9 GASTROESOPHAGEAL REFLUX DISEASE WITHOUT ESOPHAGITIS: ICD-10-CM

## 2025-02-10 LAB — LEFT VENTRICULAR EJECTION FRACTION, EXTERNAL: NORMAL

## 2025-02-10 NOTE — TELEPHONE ENCOUNTER
Last appointment: 11/14/2024 Virtual visit MD Sidhu   Next appointment: Nothing scheduled   Previous refill encounter(s):   08/07/2024 Nexium #90 with 1 refill.     For Pharmacy Admin Tracking Only    Program: Medication Refill  Intervention Detail: New Rx: 1, reason: Patient Preference  Time Spent (min): 5    Requested Prescriptions     Pending Prescriptions Disp Refills    esomeprazole (NEXIUM) 40 MG delayed release capsule [Pharmacy Med Name: ESOMEPRAZOLE MAG DR 40 MG CAP] 90 capsule 0     Sig: TAKE ONE CAPSULE BY MOUTH DAILY AS NEEDED FOR GERD/REFLUX/HEARTBURN OR COUGH

## 2025-02-11 RX ORDER — ESOMEPRAZOLE MAGNESIUM 40 MG/1
CAPSULE, DELAYED RELEASE ORAL
Qty: 90 CAPSULE | Refills: 1 | Status: SHIPPED | OUTPATIENT
Start: 2025-02-11

## 2025-02-11 NOTE — TELEPHONE ENCOUNTER
Refill request(s) approved--esomeprazole--90-day supply with 1 refill.    Refill protocol details (computer-generated) reviewed, as available.

## 2025-02-12 NOTE — PROGRESS NOTES
Pat you are doing okay overall was getting  [facility]    Cancer Daniel at [facility]  [address], [address] [place] [id number]  W: [phone]  F: [phone]    Reason for Visit:   Danyell Dumont is a 57 y.o. female who is seen for follow up of  Left breast cancer- ER+ND+HER2 yina neg    Treatment History:   6/19: Mammogram:  Left breast mass with skin thickening, 2 suspicious nodes. Mass measures 1.4 x 0.9 x 1.3  6/19: MRI breast: Multicentric left breast carcinoma. Non-masslike enhancement extends from the nipple to the posterior third, involves all quadrants, and measures 106 x 44 x 79 mm.  5/28/19: Biopsy breast- IDC % % HER 2 negative, skin biopsy benign , node biopsy mostly adipose tissue with atypical cells . BRCA1 and 2 negative. CT and bone scan negative for metastasis. Mammaprint with insufficient tissue for testing. Repeat biopsy of axillary node 6/20/19 positive for metastatic disease- repeat mammaprint - high risk  7/19/19: cycle 1 neoadjuvant of dd AC-T  9/12/19: US of breast shows increased malignant microcalcification in the left breast, etiology included tx related necrosis vs extension of disease. Decreased size in left axillary LN.    10/22/19: calcifications in the left upper outer quadrant are stable, calcifications in left axillary LN are not changed   1/21/20: b/l mastectomy   2/2020: Letrozole then switched to Anastrazole due to side effects  5/2020:Completed RT    History of Present Illness:   Patient is a 57 y.o. seen for L breast cancer.     She felt a sensation in the shower about May 2019. She also noted a lump and  an inverted nipple. She had a physician friend look at this who directed her to mammogram and recommended she see Dr. Elizalde. This led to imaging and diagnosis as above. She completed neoadjuvant ddACT followed by bilateral mastectomy on 1/21/20. On letrozole then switched to Anastrazole.       She has weight gain, she has

## 2025-02-13 ENCOUNTER — OFFICE VISIT (OUTPATIENT)
Age: 60
End: 2025-02-13
Payer: MEDICAID

## 2025-02-13 VITALS
DIASTOLIC BLOOD PRESSURE: 93 MMHG | SYSTOLIC BLOOD PRESSURE: 158 MMHG | OXYGEN SATURATION: 95 % | WEIGHT: 226.4 LBS | TEMPERATURE: 97.9 F | BODY MASS INDEX: 35.46 KG/M2 | RESPIRATION RATE: 18 BRPM | HEART RATE: 83 BPM

## 2025-02-13 DIAGNOSIS — C50.412 MALIGNANT NEOPLASM OF UPPER-OUTER QUADRANT OF LEFT FEMALE BREAST, UNSPECIFIED ESTROGEN RECEPTOR STATUS (HCC): Primary | ICD-10-CM

## 2025-02-13 PROCEDURE — 99213 OFFICE O/P EST LOW 20 MIN: CPT | Performed by: INTERNAL MEDICINE

## 2025-02-13 NOTE — PROGRESS NOTES
Danyell Dumont is a 59 y.o. female    Chief Complaint   Patient presents with    Follow-up     Malignant neoplasm of upper-outer quadrant of left female breast, unspecified estrogen receptor status (HCC)     1. Have you been to the ER, urgent care clinic since your last visit?  Hospitalized since your last visit?No    2. Have you seen or consulted any other health care providers outside of the [facility] since your last visit?  Include any pap smears or colon screening. No

## 2025-02-24 ENCOUNTER — OFFICE VISIT (OUTPATIENT)
Age: 60
End: 2025-02-24
Payer: MEDICAID

## 2025-02-24 VITALS
WEIGHT: 228.2 LBS | DIASTOLIC BLOOD PRESSURE: 100 MMHG | TEMPERATURE: 96.5 F | SYSTOLIC BLOOD PRESSURE: 124 MMHG | HEIGHT: 67 IN | HEART RATE: 84 BPM | BODY MASS INDEX: 35.82 KG/M2 | OXYGEN SATURATION: 96 %

## 2025-02-24 DIAGNOSIS — R74.8 ABNORMAL LIVER ENZYMES: Primary | ICD-10-CM

## 2025-02-24 PROCEDURE — 91200 LIVER ELASTOGRAPHY: CPT | Performed by: STUDENT IN AN ORGANIZED HEALTH CARE EDUCATION/TRAINING PROGRAM

## 2025-02-24 PROCEDURE — 99204 OFFICE O/P NEW MOD 45 MIN: CPT | Performed by: STUDENT IN AN ORGANIZED HEALTH CARE EDUCATION/TRAINING PROGRAM

## 2025-02-24 ASSESSMENT — PATIENT HEALTH QUESTIONNAIRE - PHQ9
3. TROUBLE FALLING OR STAYING ASLEEP: NOT AT ALL
2. FEELING DOWN, DEPRESSED OR HOPELESS: NOT AT ALL
9. THOUGHTS THAT YOU WOULD BE BETTER OFF DEAD, OR OF HURTING YOURSELF: NOT AT ALL
DEPRESSION UNABLE TO ASSESS: FUNCTIONAL CAPACITY MOTIVATION LIMITS ACCURACY
5. POOR APPETITE OR OVEREATING: NOT AT ALL
SUM OF ALL RESPONSES TO PHQ QUESTIONS 1-9: 0
SUM OF ALL RESPONSES TO PHQ QUESTIONS 1-9: 0
8. MOVING OR SPEAKING SO SLOWLY THAT OTHER PEOPLE COULD HAVE NOTICED. OR THE OPPOSITE, BEING SO FIGETY OR RESTLESS THAT YOU HAVE BEEN MOVING AROUND A LOT MORE THAN USUAL: NOT AT ALL
SUM OF ALL RESPONSES TO PHQ QUESTIONS 1-9: 0
1. LITTLE INTEREST OR PLEASURE IN DOING THINGS: NOT AT ALL
7. TROUBLE CONCENTRATING ON THINGS, SUCH AS READING THE NEWSPAPER OR WATCHING TELEVISION: NOT AT ALL
SUM OF ALL RESPONSES TO PHQ QUESTIONS 1-9: 0
4. FEELING TIRED OR HAVING LITTLE ENERGY: NOT AT ALL
6. FEELING BAD ABOUT YOURSELF - OR THAT YOU ARE A FAILURE OR HAVE LET YOURSELF OR YOUR FAMILY DOWN: NOT AT ALL

## 2025-02-24 ASSESSMENT — ANXIETY QUESTIONNAIRES
2. NOT BEING ABLE TO STOP OR CONTROL WORRYING: NOT AT ALL
5. BEING SO RESTLESS THAT IT IS HARD TO SIT STILL: NOT AT ALL
3. WORRYING TOO MUCH ABOUT DIFFERENT THINGS: NOT AT ALL
1. FEELING NERVOUS, ANXIOUS, OR ON EDGE: NOT AT ALL
GAD7 TOTAL SCORE: 0
IF YOU CHECKED OFF ANY PROBLEMS ON THIS QUESTIONNAIRE, HOW DIFFICULT HAVE THESE PROBLEMS MADE IT FOR YOU TO DO YOUR WORK, TAKE CARE OF THINGS AT HOME, OR GET ALONG WITH OTHER PEOPLE: NOT DIFFICULT AT ALL
7. FEELING AFRAID AS IF SOMETHING AWFUL MIGHT HAPPEN: NOT AT ALL
4. TROUBLE RELAXING: NOT AT ALL
6. BECOMING EASILY ANNOYED OR IRRITABLE: NOT AT ALL

## 2025-02-24 NOTE — PROGRESS NOTES
Chief Complaint   Patient presents with    New Patient     New Pt     Vitals:    02/24/25 1015   BP: (!) 124/100   Site: Right Upper Arm   Position: Sitting   Pulse: 84   Temp: (!) 96.5 °F (35.8 °C)   TempSrc: Temporal   SpO2: 96%   Weight: 103.5 kg (228 lb 3.2 oz)   Height: 1.702 m (5' 7\")     .  \"Have you been to the ER, urgent care clinic since your last visit?  Hospitalized since your last visit?\"    NO    “Have you seen or consulted any other health care providers outside of [facility] since your last visit?”    NO            Click Here for Release of Records Request    
elevated AST and ALT since 10/2023. Ms Dumont denies history of liver disease or history of jaundice, hematemasis, hematochezia, melena, ascites, or confusion. She is concerned over her elevated liver enzymes due to her family history of liver disease. Three of her sibling have hemochromatosis. Brother has cirrhosis related to HFE mutation.     In regards to her oncology history, she was diagnosed with left breast carcinoma in May 2019.  She completed neoadjuvant therapy followed by bilateral mastectomy on 1/21/20. She completed anastrozole this year.     Her liver ultrasound was moved because the tech was sick. She didn't want to delay visit with me.     Today she is feeling well other than some anxiety about her visit.       ASSESSMENT AND PLAN:    Metabolic dysfunction associated Steatoic Liver Disease (MASLD) formerly called NAFLD.  Fibroscan 2/2025:  LSM 4.8 kPa;  dB/m; IQR 14%  - Metabolic risk factors include obesity and HLD  - Labs including hepatic function panel ordered today  - The patient was counseled regarding diet and exercise to achieve weight loss. Recommend 10% reduction in body weight. The best diet for patients with steatotic liver is one with low in carbohydrates and enriched with protein such as mediterranean diet.   - The patient was counseled on importance of optimizing metabolic comorbidites including diabetes, hypertension, hyperlipidemia and obesity.  - The GLP1 agonist class of diabetic agents is effective in achieving weight loss and helping to control Type 2 DM.  Some of these agents are currently being evaluated in clinical trials as a possible treatment for MASLD.      2. Abnormal liver enzymes  HFE genetic testing was normal. Serologies for other causes of CLD including autoimmune, A1AT, abnormal copper metabolism and viral hepatitis are negative  Suspect her abnormal liver enzymes are related to MASLD        SYSTEM REVIEW NOT RELATED TO LIVER DISEASE OR REVIEWED

## 2025-02-25 LAB
A2 MACROGLOB SERPL-MCNC: 94 MG/DL (ref 110–276)
ALBUMIN SERPL-MCNC: 4.3 G/DL (ref 3.9–4.9)
ALP SERPL-CCNC: 107 IU/L (ref 44–121)
ALT SERPL W P-5'-P-CCNC: 89 IU/L (ref 0–40)
ALT SERPL-CCNC: 76 IU/L (ref 0–32)
APO A-I SERPL-MCNC: 179 MG/DL (ref 116–209)
AST SERPL W P-5'-P-CCNC: 62 IU/L (ref 0–40)
AST SERPL-CCNC: 57 IU/L (ref 0–40)
BILIRUB DIRECT SERPL-MCNC: 0.19 MG/DL (ref 0–0.4)
BILIRUB SERPL-MCNC: 0.5 MG/DL (ref 0–1.2)
BILIRUB SERPL-MCNC: 0.7 MG/DL (ref 0–1.2)
CHOLEST SERPL-MCNC: 213 MG/DL (ref 100–199)
FIBROSIS SCORING:: ABNORMAL
FIBROSIS STAGE SERPL QL: ABNORMAL
GGT SERPL-CCNC: 46 IU/L (ref 0–60)
GLUCOSE SERPL-MCNC: 117 MG/DL (ref 70–99)
HAPTOGLOB SERPL-MCNC: 117 MG/DL (ref 37–355)
LABORATORY COMMENT REPORT: ABNORMAL
LIVER FIBR SCORE SERPL CALC.FIBROSURE: 0.06 (ref 0–0.21)
LIVER STEATOSIS GRADE SERPL QL: ABNORMAL
LIVER STEATOSIS SCORE SERPL: 0.84 (ref 0–0.4)
NASH GRADE SERPL QL: ABNORMAL
NASH INTERPRETATION SERPL-IMP: ABNORMAL
NASH SCORE SERPL: 0.78 (ref 0–0.25)
NASH SCORING: ABNORMAL
PROT SERPL-MCNC: 6.6 G/DL (ref 6–8.5)
STEATOSIS SCORING: ABNORMAL
TEST PERFORMANCE INFO SPEC: ABNORMAL
TEST PERFORMANCE INFO SPEC: ABNORMAL
TRIGL SERPL-MCNC: 201 MG/DL (ref 0–149)

## 2025-02-26 ENCOUNTER — HOSPITAL ENCOUNTER (OUTPATIENT)
Age: 60
Discharge: HOME | End: 2025-03-01
Payer: MEDICAID

## 2025-02-26 DIAGNOSIS — Z83.49 FH: HEMOCHROMATOSIS: ICD-10-CM

## 2025-02-26 DIAGNOSIS — R79.89 ELEVATED LFTS: ICD-10-CM

## 2025-02-26 PROCEDURE — 76700 US EXAM ABDOM COMPLETE: CPT

## 2025-03-04 ENCOUNTER — OFFICE VISIT (OUTPATIENT)
Age: 60
End: 2025-03-04
Payer: MEDICAID

## 2025-03-04 VITALS
HEIGHT: 67 IN | TEMPERATURE: 97.6 F | SYSTOLIC BLOOD PRESSURE: 137 MMHG | WEIGHT: 223.8 LBS | OXYGEN SATURATION: 100 % | DIASTOLIC BLOOD PRESSURE: 83 MMHG | HEART RATE: 76 BPM | BODY MASS INDEX: 35.12 KG/M2 | RESPIRATION RATE: 18 BRPM

## 2025-03-04 DIAGNOSIS — R60.0 BILATERAL LEG EDEMA: ICD-10-CM

## 2025-03-04 DIAGNOSIS — E78.00 ELEVATED LDL CHOLESTEROL LEVEL: ICD-10-CM

## 2025-03-04 DIAGNOSIS — L30.8 PRURITIC DERMATITIS: ICD-10-CM

## 2025-03-04 DIAGNOSIS — G47.33 OSA ON CPAP: ICD-10-CM

## 2025-03-04 DIAGNOSIS — K76.0 METABOLIC DYSFUNCTION-ASSOCIATED STEATOTIC LIVER DISEASE (MASLD): ICD-10-CM

## 2025-03-04 DIAGNOSIS — R73.03 PREDIABETES: Primary | ICD-10-CM

## 2025-03-04 PROCEDURE — 99214 OFFICE O/P EST MOD 30 MIN: CPT | Performed by: INTERNAL MEDICINE

## 2025-03-04 RX ORDER — HYDROCORTISONE 25 MG/G
CREAM TOPICAL
Qty: 30 G | Refills: 1 | Status: SHIPPED | OUTPATIENT
Start: 2025-03-04

## 2025-03-04 SDOH — ECONOMIC STABILITY: FOOD INSECURITY: WITHIN THE PAST 12 MONTHS, YOU WORRIED THAT YOUR FOOD WOULD RUN OUT BEFORE YOU GOT MONEY TO BUY MORE.: NEVER TRUE

## 2025-03-04 SDOH — ECONOMIC STABILITY: FOOD INSECURITY: WITHIN THE PAST 12 MONTHS, THE FOOD YOU BOUGHT JUST DIDN'T LAST AND YOU DIDN'T HAVE MONEY TO GET MORE.: NEVER TRUE

## 2025-03-04 ASSESSMENT — PATIENT HEALTH QUESTIONNAIRE - PHQ9
SUM OF ALL RESPONSES TO PHQ QUESTIONS 1-9: 1
SUM OF ALL RESPONSES TO PHQ QUESTIONS 1-9: 1
1. LITTLE INTEREST OR PLEASURE IN DOING THINGS: NOT AT ALL
8. MOVING OR SPEAKING SO SLOWLY THAT OTHER PEOPLE COULD HAVE NOTICED. OR THE OPPOSITE, BEING SO FIGETY OR RESTLESS THAT YOU HAVE BEEN MOVING AROUND A LOT MORE THAN USUAL: NOT AT ALL
6. FEELING BAD ABOUT YOURSELF - OR THAT YOU ARE A FAILURE OR HAVE LET YOURSELF OR YOUR FAMILY DOWN: NOT AT ALL
10. IF YOU CHECKED OFF ANY PROBLEMS, HOW DIFFICULT HAVE THESE PROBLEMS MADE IT FOR YOU TO DO YOUR WORK, TAKE CARE OF THINGS AT HOME, OR GET ALONG WITH OTHER PEOPLE: NOT DIFFICULT AT ALL
5. POOR APPETITE OR OVEREATING: NOT AT ALL
SUM OF ALL RESPONSES TO PHQ QUESTIONS 1-9: 1
4. FEELING TIRED OR HAVING LITTLE ENERGY: NOT AT ALL
3. TROUBLE FALLING OR STAYING ASLEEP: NOT AT ALL
SUM OF ALL RESPONSES TO PHQ QUESTIONS 1-9: 1
9. THOUGHTS THAT YOU WOULD BE BETTER OFF DEAD, OR OF HURTING YOURSELF: NOT AT ALL
2. FEELING DOWN, DEPRESSED OR HOPELESS: SEVERAL DAYS
7. TROUBLE CONCENTRATING ON THINGS, SUCH AS READING THE NEWSPAPER OR WATCHING TELEVISION: NOT AT ALL

## 2025-03-04 NOTE — PROGRESS NOTES
RM: 17  Chief Complaint   Patient presents with    stage 3 fatty liver disease     having foggy brain    Medication Problem    Swelling     On both feet     Rash     On the back and want to see Dr Sidhu to look at it      nothing is not working for this fatty liver      Vitals:    03/04/25 1508   BP: (!) 171/143   Pulse: 76   Resp: 18   Temp: 97.6 °F (36.4 °C)   SpO2: 100%      FASTING: No  Have you been to the ER, urgent care clinic since your last visit?  Hospitalized since your last visit?\"    NO  “Have you seen or consulted any other health care providers outside of [facility] since your last visit?”    NO    Click Here for Release of Records Request

## 2025-03-04 NOTE — PATIENT INSTRUCTIONS
Use over the counter, topical calamine or caladryl or topical benadryl for the itching/rash.    Use colloidal oatmeal baths and/or moisturizers to help with itching.    Use Benadryl/diphenhydramine as directed/needed for itching, but oral Benadryl is sedating.

## 2025-03-04 NOTE — PROGRESS NOTES
Danyell Dumont (: 1965) is a 59 y.o. female, established patient, here for evaluation of the following chief complaint(s):  Chief Complaint   Patient presents with    stage 3 fatty liver disease     having foggy brain    Medication Problem    Swelling     On both feet     Rash     On the back and want to see Dr Sidhu to look at it      nothing is not working for this fatty liver       Assessment and Plan:      Diagnosis Orders   1. Prediabetes  tirzepatide-weight management (ZEPBOUND) 2.5 MG/0.5ML SOAJ subCUTAneous auto-injector pen    tirzepatide-weight management (ZEPBOUND) 5 MG/0.5ML SOAJ subCUTAneous auto-injector pen    Ludmila Gan MD, Bariatrics (non Surgical), [facility] ([facility])      2. FELECIA on CPAP  tirzepatide-weight management (ZEPBOUND) 2.5 MG/0.5ML SOAJ subCUTAneous auto-injector pen    tirzepatide-weight management (ZEPBOUND) 5 MG/0.5ML SOAJ subCUTAneous auto-injector pen    Ludmila Gan MD, Bariatrics (non Surgical), [facility] ([facility])      3. Elevated LDL cholesterol level  tirzepatide-weight management (ZEPBOUND) 2.5 MG/0.5ML SOAJ subCUTAneous auto-injector pen    tirzepatide-weight management (ZEPBOUND) 5 MG/0.5ML SOAJ subCUTAneous auto-injector pen    Ludmila Gan MD, Bariatrics (non Surgical), [facility] ([facility])      4. Metabolic dysfunction-associated steatotic liver disease (MASLD)  tirzepatide-weight management (ZEPBOUND) 2.5 MG/0.5ML SOAJ subCUTAneous auto-injector pen    tirzepatide-weight management (ZEPBOUND) 5 MG/0.5ML SOAJ subCUTAneous auto-injector pen    Ludmila Gan MD, Bariatrics (non Surgical), [facility] ([facility])      5. BMI 35.0-35.9,adult  tirzepatide-weight management (ZEPBOUND) 2.5 MG/0.5ML SOAJ subCUTAneous auto-injector pen    tirzepatide-weight management (ZEPBOUND) 5 MG/0.5ML SOAJ subCUTAneous auto-injector pen    [facility] -

## 2025-03-12 ENCOUNTER — TELEPHONE (OUTPATIENT)
Age: 60
End: 2025-03-12

## 2025-03-16 DIAGNOSIS — F33.42 MAJOR DEPRESSIVE DISORDER, RECURRENT, IN FULL REMISSION: ICD-10-CM

## 2025-03-17 NOTE — TELEPHONE ENCOUNTER
Last appointment: 03/04/2025 MD Siduh   Next appointment: 04/14/2025 MD Sidhu   Previous refill encounter(s):   09/21/2024 Zoloft #90 with 1 refill.     For Pharmacy Admin Tracking Only    Program: Medication Refill  Intervention Detail: New Rx: 1, reason: Patient Preference  Time Spent (min): 5    Requested Prescriptions     Pending Prescriptions Disp Refills    sertraline (ZOLOFT) 50 MG tablet [Pharmacy Med Name: SERTRALINE HCL 50 MG TABLET] 90 tablet 1     Sig: TAKE 1 TABLET BY MOUTH DAILY

## 2025-04-04 ENCOUNTER — TELEPHONE (OUTPATIENT)
Age: 60
End: 2025-04-04

## 2025-04-04 NOTE — TELEPHONE ENCOUNTER
Called patient regarding referral to our [facility]. Patient did not answer so I left a vmail with our contact information.

## 2025-04-04 NOTE — TELEPHONE ENCOUNTER
Patient called regarding referral. Patient states she is not interested as insurance does not cover meal replacements. Explained [facility] option; patient declined; closing referral

## 2025-04-14 ENCOUNTER — TELEPHONE (OUTPATIENT)
Age: 60
End: 2025-04-14

## 2025-04-14 ENCOUNTER — OFFICE VISIT (OUTPATIENT)
Age: 60
End: 2025-04-14
Payer: MEDICAID

## 2025-04-14 VITALS
BODY MASS INDEX: 35.06 KG/M2 | HEIGHT: 67 IN | WEIGHT: 223.4 LBS | TEMPERATURE: 97.4 F | HEART RATE: 68 BPM | SYSTOLIC BLOOD PRESSURE: 130 MMHG | RESPIRATION RATE: 20 BRPM | OXYGEN SATURATION: 96 % | DIASTOLIC BLOOD PRESSURE: 84 MMHG

## 2025-04-14 DIAGNOSIS — R73.03 PREDIABETES: ICD-10-CM

## 2025-04-14 DIAGNOSIS — K76.0 METABOLIC DYSFUNCTION-ASSOCIATED STEATOTIC LIVER DISEASE (MASLD): ICD-10-CM

## 2025-04-14 PROCEDURE — 99214 OFFICE O/P EST MOD 30 MIN: CPT | Performed by: INTERNAL MEDICINE

## 2025-04-14 SDOH — ECONOMIC STABILITY: FOOD INSECURITY: WITHIN THE PAST 12 MONTHS, THE FOOD YOU BOUGHT JUST DIDN'T LAST AND YOU DIDN'T HAVE MONEY TO GET MORE.: NEVER TRUE

## 2025-04-14 SDOH — ECONOMIC STABILITY: FOOD INSECURITY: WITHIN THE PAST 12 MONTHS, YOU WORRIED THAT YOUR FOOD WOULD RUN OUT BEFORE YOU GOT MONEY TO BUY MORE.: NEVER TRUE

## 2025-04-14 ASSESSMENT — PATIENT HEALTH QUESTIONNAIRE - PHQ9
10. IF YOU CHECKED OFF ANY PROBLEMS, HOW DIFFICULT HAVE THESE PROBLEMS MADE IT FOR YOU TO DO YOUR WORK, TAKE CARE OF THINGS AT HOME, OR GET ALONG WITH OTHER PEOPLE: NOT DIFFICULT AT ALL
8. MOVING OR SPEAKING SO SLOWLY THAT OTHER PEOPLE COULD HAVE NOTICED. OR THE OPPOSITE, BEING SO FIGETY OR RESTLESS THAT YOU HAVE BEEN MOVING AROUND A LOT MORE THAN USUAL: NOT AT ALL
6. FEELING BAD ABOUT YOURSELF - OR THAT YOU ARE A FAILURE OR HAVE LET YOURSELF OR YOUR FAMILY DOWN: NOT AT ALL
3. TROUBLE FALLING OR STAYING ASLEEP: NOT AT ALL
1. LITTLE INTEREST OR PLEASURE IN DOING THINGS: NOT AT ALL
7. TROUBLE CONCENTRATING ON THINGS, SUCH AS READING THE NEWSPAPER OR WATCHING TELEVISION: NOT AT ALL
5. POOR APPETITE OR OVEREATING: NOT AT ALL
9. THOUGHTS THAT YOU WOULD BE BETTER OFF DEAD, OR OF HURTING YOURSELF: NOT AT ALL
SUM OF ALL RESPONSES TO PHQ QUESTIONS 1-9: 0
4. FEELING TIRED OR HAVING LITTLE ENERGY: NOT AT ALL
SUM OF ALL RESPONSES TO PHQ QUESTIONS 1-9: 0
SUM OF ALL RESPONSES TO PHQ QUESTIONS 1-9: 0
2. FEELING DOWN, DEPRESSED OR HOPELESS: NOT AT ALL
SUM OF ALL RESPONSES TO PHQ QUESTIONS 1-9: 0

## 2025-04-14 NOTE — TELEPHONE ENCOUNTER
Needs a new patient appt w/Dr Hatch per Dr SUH Veteran/ Dx: prediabetes & MASLD, L/m on vm requesting a c/b to [facility] appt.

## 2025-04-14 NOTE — PROGRESS NOTES
Danyell Dumont (: 1965) is a 59 y.o. female, established patient, here for evaluation of the following chief complaint(s):  Chief Complaint   Patient presents with    Follow-up    No vaccines today       Assessment and Plan:      Diagnosis Orders   1. BMI 34.0-34.9,adult  [facility] - Marissa Hatch MD, CardiologyTeo ([facility])      2. Prediabetes  [facility]issa Hatch MD, [facility] ([facility])      3. Metabolic dysfunction-associated steatotic liver disease (MASLD)  [facility] - Marissa Hatch MD, [facility] ([facility])          1-3:  Wants to change cardiology from Dr. Lewis--not [facility]--to [facility] provider.  Dr. Lewis goes to [facility] per pt and prefers [facility] provider now, since Dr. Lewis is now out of network.    Planning Weight Watchers through her insurance as below.  Not planning to continue with [facility].    Future Lab monitoring reviewed.      Return in about 6 months (around 10/14/2025) for yearly physical, fasting labs.  lab results and schedule of future lab studies reviewed with patient  reviewed medications and side effects in detail    For additional documentation of information and/or recommendations discussed this visit, please see notes in instructions.    Plan and evaluation (above) reviewed with pt at visit  Patient voiced understanding of plan and provided with time to ask/review questions.  After Visit Summary (AVS) provided to pt after visit with additional instructions as needed/reviewed.      Future Appointments   Date Time Provider Department Center   2025 10:30 AM Prasanth Galeano MD RDE [facility] AMB   2025 10:00 AM Tammy Cota MD [facility] AMB   --Updated future visits after patient check-out.      History of Present Illness:     Notes (nursing/rooming note copied below in italics and/or gray-shaded):  As above and in nursing note.    FASTING: No       Here for follow-up from 2025 visit.  Plan then to start

## 2025-04-14 NOTE — PATIENT INSTRUCTIONS
If the Zepbound is approved, you could do an interim follow-up office visit after 6-8 weeks of the 5mg weekly dose.    If not approved, can just monitor at next physical as reviewed.

## 2025-04-14 NOTE — PROGRESS NOTES
RM: 16  Chief Complaint   Patient presents with    Follow-up    No vaccines today      Vitals:    04/14/25 1408   BP: 130/84   Pulse: 68   Resp: 20   Temp: 97.4 °F (36.3 °C)   SpO2: 96%      FASTING: No  Have you been to the ER, urgent care clinic since your last visit?  Hospitalized since your last visit?\"    NO  “Have you seen or consulted any other health care providers outside of [facility] since your last visit?”    NO    Click Here for Release of Records Request

## 2025-04-15 ENCOUNTER — TELEPHONE (OUTPATIENT)
Age: 60
End: 2025-04-15

## 2025-04-15 NOTE — TELEPHONE ENCOUNTER
Needs a new patient appt w/Dr Hatch per Dr SUH Bruning/ Dx: Prediabetes & MASLD, l/m on [facility]

## 2025-05-12 ENCOUNTER — OFFICE VISIT (OUTPATIENT)
Age: 60
End: 2025-05-12
Payer: MEDICAID

## 2025-05-12 VITALS — HEIGHT: 67 IN | WEIGHT: 217 LBS | BODY MASS INDEX: 34.06 KG/M2

## 2025-05-12 DIAGNOSIS — Z90.13 S/P MASTECTOMY, BILATERAL: Primary | ICD-10-CM

## 2025-05-12 PROCEDURE — 99213 OFFICE O/P EST LOW 20 MIN: CPT | Performed by: SURGERY

## 2025-05-12 NOTE — PROGRESS NOTES
HISTORY OF PRESENT ILLNESS  Danyell Dumont is a 59 y.o. female     HPI ESTABLISHED patient here for LEFT breast lump w/ pain. Patient is experiencing some pain in axillary area of left side.       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  19 - biopsies of LEFT breast and LEFT axilla.   Mammaprint high risk.   19 - port insertion  19 - started AC - Dr. Cr  19 - 12/3/19 - Taxol infusions  20: BILATERAL mastectomy - Dr. Elizalde and Dr. Freire  RIGHT breast  - benign  LEFT breast - 2.5mm IDC; node positive 2/3; T1aN1a; FISH negative  2020: started letrozole - had side effect of significant joint pain; switched to anastrozole  2020: Completed RT - Dr. Friedman  2020: bilateral breast implant exchange with Dr. Freire        Family history -   Possibly maternal grandmother with breast cancer but not sure?   Father - colon cancer    Past Medical History:   Diagnosis Date    Anxiety     Arthritis     NECK    Basal cell carcinoma of skin     FACE, CHEST, BACK    Breast cancer (HCC)     LEFT    Goiter     Motion sickness     Obesity     Organizing pneumonia (HCC)     felt to be radiation induced    Postsurgical hypothyroidism 2021    S/p thyroidectomy for goiter     Radiation dermatitis     RADIATION RECALL    Sleep apnea     CPAP    Squamous cell skin cancer     Dr. Cash LEFT FACE    Vertigo      Past Surgical History:   Procedure Laterality Date    BREAST RECONSTRUCTION Bilateral 2020    IMMEDIATE BILATERAL BREAST RECONSTRUCTION WITH TISSUE EXPANDERS AND ALLODERM GRAFTS performed by Colt Freire III, MD at [facility] AMBULATORY OR     SECTION      COLONOSCOPY      JOINT REPLACEMENT  2023    KNEE ARTHROSCOPY Left     LUMBAR LAMINECTOMY      x 2    MASTECTOMY,

## 2025-06-10 ENCOUNTER — APPOINTMENT (OUTPATIENT)
Facility: HOSPITAL | Age: 60
DRG: 047 | End: 2025-06-10
Payer: MEDICAID

## 2025-06-10 ENCOUNTER — HOSPITAL ENCOUNTER (INPATIENT)
Facility: HOSPITAL | Age: 60
LOS: 2 days | Discharge: HOME | DRG: 047 | End: 2025-06-12
Attending: EMERGENCY MEDICINE | Admitting: INTERNAL MEDICINE
Payer: MEDICAID

## 2025-06-10 DIAGNOSIS — R29.90 STROKE-LIKE SYMPTOMS: ICD-10-CM

## 2025-06-10 DIAGNOSIS — H53.9 VISUAL CHANGES: Primary | ICD-10-CM

## 2025-06-10 LAB
ALBUMIN SERPL-MCNC: 3.8 G/DL (ref 3.5–5)
ALBUMIN/GLOB SERPL: 1.1 (ref 1.1–2.2)
ALP SERPL-CCNC: 124 U/L (ref 45–117)
ALT SERPL-CCNC: 27 U/L (ref 12–78)
ANION GAP SERPL CALC-SCNC: 5 MMOL/L (ref 2–12)
AST SERPL-CCNC: 18 U/L (ref 15–37)
BASOPHILS # BLD: 0.04 K/UL (ref 0–0.1)
BASOPHILS NFR BLD: 0.5 % (ref 0–1)
BILIRUB SERPL-MCNC: 0.3 MG/DL (ref 0.2–1)
BUN SERPL-MCNC: 17 MG/DL (ref 6–20)
BUN/CREAT SERPL: 20 (ref 12–20)
CALCIUM SERPL-MCNC: 9.4 MG/DL (ref 8.5–10.1)
CHLORIDE SERPL-SCNC: 101 MMOL/L (ref 97–108)
CO2 SERPL-SCNC: 28 MMOL/L (ref 21–32)
COMMENT:: NORMAL
CREAT SERPL-MCNC: 0.84 MG/DL (ref 0.55–1.02)
DIFFERENTIAL METHOD BLD: ABNORMAL
EOSINOPHIL # BLD: 0.15 K/UL (ref 0–0.4)
EOSINOPHIL NFR BLD: 1.8 % (ref 0–7)
ERYTHROCYTE [DISTWIDTH] IN BLOOD BY AUTOMATED COUNT: 11 % (ref 11.5–14.5)
GLOBULIN SER CALC-MCNC: 3.6 G/DL (ref 2–4)
GLUCOSE BLD STRIP.AUTO-MCNC: 97 MG/DL (ref 65–117)
GLUCOSE SERPL-MCNC: 97 MG/DL (ref 65–100)
HCT VFR BLD AUTO: 41.4 % (ref 35–47)
HGB BLD-MCNC: 14.1 G/DL (ref 11.5–16)
IMM GRANULOCYTES # BLD AUTO: 0.04 K/UL (ref 0–0.04)
IMM GRANULOCYTES NFR BLD AUTO: 0.5 % (ref 0–0.5)
INR PPP: 1 (ref 0.9–1.1)
LYMPHOCYTES # BLD: 2.62 K/UL (ref 0.8–3.5)
LYMPHOCYTES NFR BLD: 32.1 % (ref 12–49)
MCH RBC QN AUTO: 34 PG (ref 26–34)
MCHC RBC AUTO-ENTMCNC: 34.1 G/DL (ref 30–36.5)
MCV RBC AUTO: 99.8 FL (ref 80–99)
MONOCYTES # BLD: 0.63 K/UL (ref 0–1)
MONOCYTES NFR BLD: 7.7 % (ref 5–13)
NEUTS SEG # BLD: 4.67 K/UL (ref 1.8–8)
NEUTS SEG NFR BLD: 57.4 % (ref 32–75)
NRBC # BLD: 0 K/UL (ref 0–0.01)
NRBC BLD-RTO: 0 PER 100 WBC
PLATELET # BLD AUTO: 262 K/UL (ref 150–400)
PMV BLD AUTO: 10 FL (ref 8.9–12.9)
POTASSIUM SERPL-SCNC: 3.9 MMOL/L (ref 3.5–5.1)
PROT SERPL-MCNC: 7.4 G/DL (ref 6.4–8.2)
PROTHROMBIN TIME: 10.4 SEC (ref 9.2–11.2)
RBC # BLD AUTO: 4.15 M/UL (ref 3.8–5.2)
SERVICE CMNT-IMP: NORMAL
SODIUM SERPL-SCNC: 134 MMOL/L (ref 136–145)
SPECIMEN HOLD: NORMAL
TROPONIN I SERPL HS-MCNC: 4 NG/L (ref 0–51)
WBC # BLD AUTO: 8.2 K/UL (ref 3.6–11)

## 2025-06-10 PROCEDURE — 4A03X5D MEASUREMENT OF ARTERIAL FLOW, INTRACRANIAL, EXTERNAL APPROACH: ICD-10-PCS | Performed by: STUDENT IN AN ORGANIZED HEALTH CARE EDUCATION/TRAINING PROGRAM

## 2025-06-10 PROCEDURE — 2060000000 HC ICU INTERMEDIATE R&B

## 2025-06-10 PROCEDURE — 71045 X-RAY EXAM CHEST 1 VIEW: CPT

## 2025-06-10 PROCEDURE — 84484 ASSAY OF TROPONIN QUANT: CPT

## 2025-06-10 PROCEDURE — 70498 CT ANGIOGRAPHY NECK: CPT

## 2025-06-10 PROCEDURE — 85025 COMPLETE CBC W/AUTO DIFF WBC: CPT

## 2025-06-10 PROCEDURE — 93005 ELECTROCARDIOGRAM TRACING: CPT

## 2025-06-10 PROCEDURE — G0378 HOSPITAL OBSERVATION PER HR: HCPCS

## 2025-06-10 PROCEDURE — 6370000000 HC RX 637 (ALT 250 FOR IP): Performed by: EMERGENCY MEDICINE

## 2025-06-10 PROCEDURE — 6360000004 HC RX CONTRAST MEDICATION: Performed by: EMERGENCY MEDICINE

## 2025-06-10 PROCEDURE — 70450 CT HEAD/BRAIN W/O DYE: CPT

## 2025-06-10 PROCEDURE — 99285 EMERGENCY DEPT VISIT HI MDM: CPT

## 2025-06-10 PROCEDURE — 99291 CRITICAL CARE FIRST HOUR: CPT | Performed by: NURSE PRACTITIONER

## 2025-06-10 PROCEDURE — 0042T CT BRAIN PERFUSION: CPT

## 2025-06-10 PROCEDURE — 82962 GLUCOSE BLOOD TEST: CPT

## 2025-06-10 PROCEDURE — 85610 PROTHROMBIN TIME: CPT

## 2025-06-10 PROCEDURE — 80053 COMPREHEN METABOLIC PANEL: CPT

## 2025-06-10 RX ORDER — ASPIRIN 325 MG
325 TABLET ORAL
Status: COMPLETED | OUTPATIENT
Start: 2025-06-10 | End: 2025-06-10

## 2025-06-10 RX ORDER — IOPAMIDOL 755 MG/ML
100 INJECTION, SOLUTION INTRAVASCULAR
Status: COMPLETED | OUTPATIENT
Start: 2025-06-10 | End: 2025-06-10

## 2025-06-10 RX ADMIN — IOPAMIDOL 80 ML: 755 INJECTION, SOLUTION INTRAVENOUS at 20:26

## 2025-06-10 RX ADMIN — ASPIRIN 325 MG: 325 TABLET ORAL at 22:39

## 2025-06-10 RX ADMIN — IOPAMIDOL 40 ML: 755 INJECTION, SOLUTION INTRAVENOUS at 20:26

## 2025-06-10 ASSESSMENT — PAIN DESCRIPTION - FREQUENCY: FREQUENCY: CONTINUOUS

## 2025-06-10 ASSESSMENT — PAIN SCALES - GENERAL: PAINLEVEL_OUTOF10: 2

## 2025-06-10 ASSESSMENT — PAIN DESCRIPTION - DESCRIPTORS: DESCRIPTORS: DISCOMFORT;ACHING

## 2025-06-10 ASSESSMENT — PAIN DESCRIPTION - PAIN TYPE: TYPE: ACUTE PAIN

## 2025-06-10 ASSESSMENT — PAIN - FUNCTIONAL ASSESSMENT
PAIN_FUNCTIONAL_ASSESSMENT: 0-10
PAIN_FUNCTIONAL_ASSESSMENT: PREVENTS OR INTERFERES SOME ACTIVE ACTIVITIES AND ADLS

## 2025-06-10 ASSESSMENT — PAIN DESCRIPTION - ORIENTATION: ORIENTATION: ANTERIOR

## 2025-06-10 ASSESSMENT — PAIN DESCRIPTION - ONSET: ONSET: SUDDEN

## 2025-06-10 ASSESSMENT — PAIN DESCRIPTION - LOCATION: LOCATION: HEAD

## 2025-06-10 NOTE — ED NOTES
7:53 PM  I have evaluated the patient as the Provider in Rapid Medical Evaluation (RME). I have reviewed her vital signs and the triage nurse assessment. I have talked with the patient and any available family and advised that I am the provider in triage and have ordered the appropriate study to initiate their work up based on the clinical presentation during my assessment. I have advised that the patient will be accommodated in the Main ED as soon as possible. I have also requested to contact the triage nurse or myself immediately if the patient experiences any changes in their condition during this brief waiting period.  Jeremiah Buchanan PA-C       This is a 59-year-old female who presents to the emergency department tonight with changes to her vision, headache.  20 minutes prior to arrival was making dinner, was making a new recipe and looking at her laptop screen when she notes it appeared as if someone had \"smeared oil over her computer\" making it blurry and difficult to see.  Reports this is still happening though slightly improved.  On the way here reports headache being on.  Reports it is not from the head.  She denies neck pain, weakness, speech changes, difficulty ambulating.  She additionally mentions she recently started taking doxycycline for superimposed infection after skin lesion removal.      Jeremiah Buchanan PA-C  06/10/25 2010

## 2025-06-10 NOTE — ED TRIAGE NOTES
Pt reports \"it looked like someone smudged the computer and I tried wiping it off\" Pt reports sudden onset of to both eyes that has slightly improved. Pt reports within the last 10 minutes sudden headache that is progressively worsened. \"It feels like I'm not here. Like on a cloud\" Pt denies new neck pain. Denies weakness, denies speech changes, gait disturbance.     Code stroke level 1 van- called     Pt reports recently started doxycycline

## 2025-06-11 ENCOUNTER — APPOINTMENT (OUTPATIENT)
Facility: HOSPITAL | Age: 60
DRG: 047 | End: 2025-06-11
Attending: INTERNAL MEDICINE
Payer: MEDICAID

## 2025-06-11 ENCOUNTER — APPOINTMENT (OUTPATIENT)
Facility: HOSPITAL | Age: 60
DRG: 047 | End: 2025-06-11
Payer: MEDICAID

## 2025-06-11 LAB
AMPHET UR QL SCN: NEGATIVE
APPEARANCE UR: CLEAR
BACTERIA URNS QL MICRO: NEGATIVE /HPF
BARBITURATES UR QL SCN: NEGATIVE
BENZODIAZ UR QL: NEGATIVE
BILIRUB UR QL: NEGATIVE
CANNABINOIDS UR QL SCN: NEGATIVE
CHOLEST SERPL-MCNC: 152 MG/DL
COCAINE UR QL SCN: NEGATIVE
COLOR UR: NORMAL
COMMENT:: NORMAL
CRP SERPL-MCNC: 0.62 MG/DL (ref 0–0.3)
EKG ATRIAL RATE: 71 BPM
EKG DIAGNOSIS: NORMAL
EKG P AXIS: 71 DEGREES
EKG P-R INTERVAL: 210 MS
EKG Q-T INTERVAL: 392 MS
EKG QRS DURATION: 100 MS
EKG QTC CALCULATION (BAZETT): 425 MS
EKG R AXIS: -21 DEGREES
EKG T AXIS: 39 DEGREES
EKG VENTRICULAR RATE: 71 BPM
EPITH CASTS URNS QL MICRO: NORMAL /LPF
EST. AVERAGE GLUCOSE BLD GHB EST-MCNC: 91 MG/DL
FOLATE SERPL-MCNC: 7.8 NG/ML (ref 5–21)
GLUCOSE UR STRIP.AUTO-MCNC: NEGATIVE MG/DL
HBA1C MFR BLD: 4.8 % (ref 4–5.6)
HDLC SERPL-MCNC: 45 MG/DL
HDLC SERPL: 3.4 (ref 0–5)
HGB UR QL STRIP: NEGATIVE
HYALINE CASTS URNS QL MICRO: NORMAL /LPF (ref 0–5)
KETONES UR QL STRIP.AUTO: NEGATIVE MG/DL
LDLC SERPL CALC-MCNC: 86.8 MG/DL (ref 0–100)
LEUKOCYTE ESTERASE UR QL STRIP.AUTO: NEGATIVE
Lab: NORMAL
MAGNESIUM SERPL-MCNC: 2 MG/DL (ref 1.6–2.4)
METHADONE UR QL: NEGATIVE
NITRITE UR QL STRIP.AUTO: NEGATIVE
OPIATES UR QL: NEGATIVE
PCP UR QL: NEGATIVE
PH UR STRIP: 5.5 (ref 5–8)
PHOSPHATE SERPL-MCNC: 4.3 MG/DL (ref 2.6–4.7)
PROT UR STRIP-MCNC: NEGATIVE MG/DL
RBC #/AREA URNS HPF: NORMAL /HPF (ref 0–5)
SP GR UR REFRACTOMETRY: 1.02 (ref 1–1.03)
SPECIMEN HOLD: NORMAL
TRIGL SERPL-MCNC: 101 MG/DL
TROPONIN I SERPL HS-MCNC: <4 NG/L (ref 0–51)
TROPONIN I SERPL HS-MCNC: <4 NG/L (ref 0–51)
TSH SERPL DL<=0.05 MIU/L-ACNC: 0.14 UIU/ML (ref 0.36–3.74)
URINE CULTURE IF INDICATED: NORMAL
UROBILINOGEN UR QL STRIP.AUTO: 0.2 EU/DL (ref 0.2–1)
VIT B12 SERPL-MCNC: 717 PG/ML (ref 193–986)
VLDLC SERPL CALC-MCNC: 20.2 MG/DL
WBC URNS QL MICRO: NORMAL /HPF (ref 0–4)

## 2025-06-11 PROCEDURE — 6360000004 HC RX CONTRAST MEDICATION: Performed by: INTERNAL MEDICINE

## 2025-06-11 PROCEDURE — 97161 PT EVAL LOW COMPLEX 20 MIN: CPT

## 2025-06-11 PROCEDURE — 80307 DRUG TEST PRSMV CHEM ANLYZR: CPT

## 2025-06-11 PROCEDURE — A9579 GAD-BASE MR CONTRAST NOS,1ML: HCPCS | Performed by: INTERNAL MEDICINE

## 2025-06-11 PROCEDURE — 6370000000 HC RX 637 (ALT 250 FOR IP): Performed by: INTERNAL MEDICINE

## 2025-06-11 PROCEDURE — 84100 ASSAY OF PHOSPHORUS: CPT

## 2025-06-11 PROCEDURE — 84443 ASSAY THYROID STIM HORMONE: CPT

## 2025-06-11 PROCEDURE — 82607 VITAMIN B-12: CPT

## 2025-06-11 PROCEDURE — 2060000000 HC ICU INTERMEDIATE R&B

## 2025-06-11 PROCEDURE — 70553 MRI BRAIN STEM W/O & W/DYE: CPT

## 2025-06-11 PROCEDURE — 84484 ASSAY OF TROPONIN QUANT: CPT

## 2025-06-11 PROCEDURE — 93010 ELECTROCARDIOGRAM REPORT: CPT | Performed by: INTERNAL MEDICINE

## 2025-06-11 PROCEDURE — 82746 ASSAY OF FOLIC ACID SERUM: CPT

## 2025-06-11 PROCEDURE — 6360000002 HC RX W HCPCS: Performed by: INTERNAL MEDICINE

## 2025-06-11 PROCEDURE — 93308 TTE F-UP OR LMTD: CPT

## 2025-06-11 PROCEDURE — 96372 THER/PROPH/DIAG INJ SC/IM: CPT

## 2025-06-11 PROCEDURE — 86140 C-REACTIVE PROTEIN: CPT

## 2025-06-11 PROCEDURE — 93306 TTE W/DOPPLER COMPLETE: CPT | Performed by: INTERNAL MEDICINE

## 2025-06-11 PROCEDURE — 97165 OT EVAL LOW COMPLEX 30 MIN: CPT

## 2025-06-11 PROCEDURE — 2580000003 HC RX 258: Performed by: INTERNAL MEDICINE

## 2025-06-11 PROCEDURE — 97535 SELF CARE MNGMENT TRAINING: CPT

## 2025-06-11 PROCEDURE — 6370000000 HC RX 637 (ALT 250 FOR IP): Performed by: HOSPITALIST

## 2025-06-11 PROCEDURE — 83735 ASSAY OF MAGNESIUM: CPT

## 2025-06-11 PROCEDURE — G0378 HOSPITAL OBSERVATION PER HR: HCPCS

## 2025-06-11 PROCEDURE — 80061 LIPID PANEL: CPT

## 2025-06-11 PROCEDURE — 83036 HEMOGLOBIN GLYCOSYLATED A1C: CPT

## 2025-06-11 PROCEDURE — 97116 GAIT TRAINING THERAPY: CPT

## 2025-06-11 PROCEDURE — 81001 URINALYSIS AUTO W/SCOPE: CPT

## 2025-06-11 RX ORDER — TRAZODONE HYDROCHLORIDE 50 MG/1
50 TABLET ORAL NIGHTLY PRN
Status: DISCONTINUED | OUTPATIENT
Start: 2025-06-11 | End: 2025-06-12 | Stop reason: HOSPADM

## 2025-06-11 RX ORDER — SODIUM CHLORIDE 9 MG/ML
INJECTION, SOLUTION INTRAVENOUS PRN
Status: DISCONTINUED | OUTPATIENT
Start: 2025-06-11 | End: 2025-06-12 | Stop reason: HOSPADM

## 2025-06-11 RX ORDER — POLYETHYLENE GLYCOL 3350 17 G/17G
17 POWDER, FOR SOLUTION ORAL DAILY PRN
Status: DISCONTINUED | OUTPATIENT
Start: 2025-06-11 | End: 2025-06-12 | Stop reason: HOSPADM

## 2025-06-11 RX ORDER — ONDANSETRON 4 MG/1
4 TABLET, ORALLY DISINTEGRATING ORAL EVERY 8 HOURS PRN
Status: DISCONTINUED | OUTPATIENT
Start: 2025-06-11 | End: 2025-06-12 | Stop reason: HOSPADM

## 2025-06-11 RX ORDER — LIOTHYRONINE SODIUM 5 UG/1
5 TABLET ORAL
Status: DISCONTINUED | OUTPATIENT
Start: 2025-06-11 | End: 2025-06-12 | Stop reason: HOSPADM

## 2025-06-11 RX ORDER — SODIUM CHLORIDE 0.9 % (FLUSH) 0.9 %
5-40 SYRINGE (ML) INJECTION EVERY 12 HOURS SCHEDULED
Status: DISCONTINUED | OUTPATIENT
Start: 2025-06-11 | End: 2025-06-12 | Stop reason: HOSPADM

## 2025-06-11 RX ORDER — SODIUM CHLORIDE 0.9 % (FLUSH) 0.9 %
5-40 SYRINGE (ML) INJECTION PRN
Status: DISCONTINUED | OUTPATIENT
Start: 2025-06-11 | End: 2025-06-12 | Stop reason: HOSPADM

## 2025-06-11 RX ORDER — SODIUM CHLORIDE 0.9 % (FLUSH) 0.9 %
5-40 SYRINGE (ML) INJECTION 2 TIMES DAILY
Status: DISCONTINUED | OUTPATIENT
Start: 2025-06-11 | End: 2025-06-12 | Stop reason: HOSPADM

## 2025-06-11 RX ORDER — ASPIRIN 81 MG/1
81 TABLET, CHEWABLE ORAL DAILY
Status: DISCONTINUED | OUTPATIENT
Start: 2025-06-11 | End: 2025-06-12 | Stop reason: HOSPADM

## 2025-06-11 RX ORDER — ACETAMINOPHEN 325 MG/1
650 TABLET ORAL EVERY 4 HOURS PRN
Status: DISCONTINUED | OUTPATIENT
Start: 2025-06-11 | End: 2025-06-12 | Stop reason: HOSPADM

## 2025-06-11 RX ORDER — ASPIRIN 300 MG/1
300 SUPPOSITORY RECTAL DAILY
Status: DISCONTINUED | OUTPATIENT
Start: 2025-06-11 | End: 2025-06-12 | Stop reason: HOSPADM

## 2025-06-11 RX ORDER — ATORVASTATIN CALCIUM 20 MG/1
80 TABLET, FILM COATED ORAL NIGHTLY
Status: DISCONTINUED | OUTPATIENT
Start: 2025-06-11 | End: 2025-06-12 | Stop reason: HOSPADM

## 2025-06-11 RX ORDER — GADOTERIDOL 279.3 MG/ML
19 INJECTION INTRAVENOUS
Status: COMPLETED | OUTPATIENT
Start: 2025-06-11 | End: 2025-06-11

## 2025-06-11 RX ORDER — OXYCODONE HYDROCHLORIDE 5 MG/1
5 TABLET ORAL EVERY 4 HOURS PRN
Status: DISCONTINUED | OUTPATIENT
Start: 2025-06-11 | End: 2025-06-12 | Stop reason: HOSPADM

## 2025-06-11 RX ORDER — SODIUM CHLORIDE 9 MG/ML
INJECTION, SOLUTION INTRAVENOUS CONTINUOUS
Status: DISPENSED | OUTPATIENT
Start: 2025-06-11 | End: 2025-06-11

## 2025-06-11 RX ORDER — FOLIC ACID 0.4 MG
400 TABLET ORAL DAILY
Status: DISCONTINUED | OUTPATIENT
Start: 2025-06-11 | End: 2025-06-12 | Stop reason: HOSPADM

## 2025-06-11 RX ORDER — IBUPROFEN 400 MG/1
800 TABLET, FILM COATED ORAL ONCE
Status: COMPLETED | OUTPATIENT
Start: 2025-06-11 | End: 2025-06-11

## 2025-06-11 RX ORDER — ENOXAPARIN SODIUM 100 MG/ML
40 INJECTION SUBCUTANEOUS DAILY
Status: DISCONTINUED | OUTPATIENT
Start: 2025-06-11 | End: 2025-06-12 | Stop reason: HOSPADM

## 2025-06-11 RX ORDER — ANASTROZOLE 1 MG/1
1 TABLET ORAL NIGHTLY
Status: DISCONTINUED | OUTPATIENT
Start: 2025-06-11 | End: 2025-06-11

## 2025-06-11 RX ORDER — LEVOTHYROXINE SODIUM 112 UG/1
112 TABLET ORAL DAILY
Status: DISCONTINUED | OUTPATIENT
Start: 2025-06-11 | End: 2025-06-12 | Stop reason: HOSPADM

## 2025-06-11 RX ORDER — ONDANSETRON 2 MG/ML
4 INJECTION INTRAMUSCULAR; INTRAVENOUS EVERY 6 HOURS PRN
Status: DISCONTINUED | OUTPATIENT
Start: 2025-06-11 | End: 2025-06-12 | Stop reason: HOSPADM

## 2025-06-11 RX ORDER — IPRATROPIUM BROMIDE AND ALBUTEROL SULFATE 2.5; .5 MG/3ML; MG/3ML
1 SOLUTION RESPIRATORY (INHALATION) EVERY 4 HOURS PRN
Status: DISCONTINUED | OUTPATIENT
Start: 2025-06-11 | End: 2025-06-12 | Stop reason: HOSPADM

## 2025-06-11 RX ORDER — PANTOPRAZOLE SODIUM 40 MG/1
40 TABLET, DELAYED RELEASE ORAL
Status: DISCONTINUED | OUTPATIENT
Start: 2025-06-11 | End: 2025-06-12 | Stop reason: HOSPADM

## 2025-06-11 RX ORDER — MORPHINE SULFATE 2 MG/ML
2 INJECTION, SOLUTION INTRAMUSCULAR; INTRAVENOUS EVERY 4 HOURS PRN
Status: DISCONTINUED | OUTPATIENT
Start: 2025-06-11 | End: 2025-06-12 | Stop reason: HOSPADM

## 2025-06-11 RX ADMIN — IBUPROFEN 800 MG: 400 TABLET, FILM COATED ORAL at 14:16

## 2025-06-11 RX ADMIN — LIOTHYRONINE SODIUM 5 MCG: 5 TABLET ORAL at 08:46

## 2025-06-11 RX ADMIN — ENOXAPARIN SODIUM 40 MG: 100 INJECTION SUBCUTANEOUS at 08:47

## 2025-06-11 RX ADMIN — SERTRALINE HYDROCHLORIDE 50 MG: 50 TABLET ORAL at 21:52

## 2025-06-11 RX ADMIN — GADOTERIDOL 19 ML: 279.3 INJECTION, SOLUTION INTRAVENOUS at 19:45

## 2025-06-11 RX ADMIN — SODIUM CHLORIDE: 0.9 INJECTION, SOLUTION INTRAVENOUS at 05:38

## 2025-06-11 RX ADMIN — ATORVASTATIN CALCIUM 80 MG: 20 TABLET, FILM COATED ORAL at 21:51

## 2025-06-11 RX ADMIN — LEVOTHYROXINE SODIUM 112 MCG: 0.11 TABLET ORAL at 08:46

## 2025-06-11 RX ADMIN — ACETAMINOPHEN 650 MG: 325 TABLET ORAL at 21:52

## 2025-06-11 RX ADMIN — OXYCODONE 5 MG: 5 TABLET ORAL at 21:52

## 2025-06-11 RX ADMIN — ASPIRIN 81 MG: 81 TABLET, CHEWABLE ORAL at 08:46

## 2025-06-11 ASSESSMENT — PAIN DESCRIPTION - LOCATION
LOCATION: NECK
LOCATION: NECK
LOCATION: HEAD

## 2025-06-11 ASSESSMENT — PAIN SCALES - GENERAL
PAINLEVEL_OUTOF10: 5
PAINLEVEL_OUTOF10: 7
PAINLEVEL_OUTOF10: 5

## 2025-06-11 ASSESSMENT — PAIN DESCRIPTION - PAIN TYPE: TYPE: CHRONIC PAIN

## 2025-06-11 ASSESSMENT — PAIN DESCRIPTION - ORIENTATION: ORIENTATION: RIGHT

## 2025-06-11 NOTE — PROGRESS NOTES
PHYSICAL THERAPY EVALUATION/DISCHARGE    Patient: Danyell Dumont (59 y.o. female)  Date: 2025  Primary Diagnosis: Stroke-like symptoms [R29.90]       Precautions:              ASSESSMENT AND RECOMMENDATIONS:  Based on the objective data below, the patient is at functional baseline. Pt seen for PT evaluation following acute visual change and headache prompting code stroke. CT head and CTA negative for acute process, MRI pending at time of evaluation. Pt demos WNL strength, coordination and balance; performed all aspects of mobility independently. Pt left seated EOB in NAD, all needs met.     Functional Outcome Measure:  The patient scored 56/56 on the Bellamy Balance Test outcome measure which is indicative of low fall risk.      Further skilled acute physical therapy is not indicated at this time.       PLAN :  Recommendation for discharge: (in order for the patient to meet his/her long term goals):   No skilled physical therapy    Other factors to consider for discharge: no additional factors    IF patient discharges home will need the following DME: none       SUBJECTIVE:   Patient stated “I feel fine now.”    OBJECTIVE DATA SUMMARY:     Past Medical History:   Diagnosis Date    Anxiety     Arthritis     NECK    Basal cell carcinoma of skin     FACE, CHEST, BACK    Breast cancer (HCC) 2019    LEFT    Goiter     Motion sickness     Obesity     Organizing pneumonia (HCC)     felt to be radiation induced    Postsurgical hypothyroidism 2021    S/p thyroidectomy for goiter     Radiation dermatitis     RADIATION RECALL    Sleep apnea     CPAP    Squamous cell skin cancer     Dr. Cash LEFT FACE    Vertigo      Past Surgical History:   Procedure Laterality Date    BREAST RECONSTRUCTION Bilateral 2020    IMMEDIATE BILATERAL BREAST RECONSTRUCTION WITH TISSUE EXPANDERS AND ALLODERM GRAFTS performed by Colt Freire III, MD at [facility] AMBULATORY OR     SECTION      COLONOSCOPY      JOINT  the patient evaluation is determined to be of the following complexity level: Low

## 2025-06-11 NOTE — PROGRESS NOTES
Code Stroke Documentation      Symptoms: Headache and vision changes which resolve in CT scnner   Baseline mRS:   0   Last Known Well: 193   Medical hx: Past Medical History:   Diagnosis Date    Anxiety     Arthritis     NECK    Basal cell carcinoma of skin     FACE, CHEST, BACK    Breast cancer (HCC) 2019    LEFT    Goiter     Motion sickness     Obesity     Organizing pneumonia (HCC)     felt to be radiation induced    Postsurgical hypothyroidism 2021    S/p thyroidectomy for goiter     Radiation dermatitis     RADIATION RECALL    Sleep apnea     CPAP    Squamous cell skin cancer     Dr. Cash LEFT FACE    Vertigo       Vitals: Vitals:    06/10/25 2000   BP: (!) 158/85   Pulse: 78   Resp: 18   Temp: 97.7 °F (36.5 °C)   SpO2: 98%      AC/APT:  No   VAN: Negative   NIHSS: 1a-LOC: 0  1b-Month/Age: 0  1c-Open/Close Hand: 0  2-Best Gaze: 0   3-Visual Fields: 0  4-Facial Palsy: 0  5a-Left Arm: 0  5b-Right Arm: 0  6a-Left Le  6b-Right Le  7-Limb Ataxia: 0  8-Sensory: 0  9-Best Language: 0  10-Dysarthria: 0  11-Extinction/Inattention: 0  TOTAL SCORE: 0   Imaging (personally reviewed): CT: No acute abnormalities     CTA: No LVO    CTP: No perfusion mismatch   Plan: tNK Candidate: NO    Mechanical thrombectomy Candidate: NO    *Perform dysphagia screening prior to any PO intake*     Discussed with: tele-neuro Dr. Hernadez, ED physician Dr. Johnson, primary nurse, and patient       Arrival time:     I have spent 45 min of critical care time involved in lab review, consultations with specialist, family decision making and documentation. During this entire length of time I was immediately available to the patient.    Polly Villanueva, APRN - NP  Neurovascular Nurse Practitioner  [phone]

## 2025-06-11 NOTE — ED PROVIDER NOTES
[facility] EMERGENCY DEPARTMENT  EMERGENCY DEPARTMENT ENCOUNTER      Pt Name: Danyell Dumont  MRN: [id number]  Birthdate 1965  Date of evaluation: 6/10/2025  Provider: Glenny Johnson DO    CHIEF COMPLAINT       Chief Complaint   Patient presents with    Eye Problem    Headache         HISTORY OF PRESENT ILLNESS   (Location/Symptom, Timing/Onset, Context/Setting, Quality, Duration, Modifying Factors, Severity)  Note limiting factors.   HPI      Review of External Medical Records:     Nursing Notes were reviewed.    REVIEW OF SYSTEMS    (2-9 systems for level 4, 10 or more for level 5)     Review of Systems    Except as noted above the remainder of the review of systems was reviewed and negative.       PAST MEDICAL HISTORY     Past Medical History:   Diagnosis Date    Anxiety     Arthritis     NECK    Basal cell carcinoma of skin     FACE, CHEST, BACK    Breast cancer (HCC)     LEFT    Goiter     Motion sickness     Obesity     Organizing pneumonia (HCC)     felt to be radiation induced    Postsurgical hypothyroidism 2021    S/p thyroidectomy for goiter     Radiation dermatitis     RADIATION RECALL    Sleep apnea     CPAP    Squamous cell skin cancer     Dr. Cash LEFT FACE    Vertigo          SURGICAL HISTORY       Past Surgical History:   Procedure Laterality Date    BREAST RECONSTRUCTION Bilateral 2020    IMMEDIATE BILATERAL BREAST RECONSTRUCTION WITH TISSUE EXPANDERS AND ALLODERM GRAFTS performed by Colt Freire III, MD at [facility] AMBULATORY OR     SECTION      COLONOSCOPY      JOINT REPLACEMENT  2023    KNEE ARTHROSCOPY Left     LUMBAR LAMINECTOMY      x 2    MASTECTOMY, BILATERAL  2019    MOHS SURGERY      x 4 times     SHOULDER ARTHROPLASTY Left 2023    LEFT TOTAL SHOULDER ARTHROPLASTY (GEN W/EXPAREL BLOCK) performed by Kelley Judd MD (Jody) at [facility] MAIN OR    SHOULDER ARTHROSCOPY Right     TOTAL THYROIDECTOMY      US BIOPSY LYMPH NODE   PM      PATIENT REFERRED TO:  No follow-up provider specified.    DISCHARGE MEDICATIONS:  New Prescriptions    No medications on file         (Please note that portions of this note were completed with a voice recognition program.  Efforts were made to edit the dictations but occasionally words are mis-transcribed.)    Glenny Johnson DO (electronically signed)  Emergency Attending Physician / Physician Assistant / Nurse Practitioner             Glenny Johnson DO  06/10/25 8713       Glenny Johnson DO  06/10/25 1423

## 2025-06-11 NOTE — PROGRESS NOTES
Speech Therapy Contact Note    Order received and chart reviewed. MRI Brain pending. During brief interview, patient denies decline in swallow, speech, or language function from baseline. No aphasia or motor speech impairments noted during this encounter. Patient is currently on a regular texture diet with thin liquids. Will continue to follow for results of further neurologic work-up and complete formal SLP evaluation as clinically warranted.     Alisia Sanchez, CCC-SLP

## 2025-06-11 NOTE — PROGRESS NOTES
[facility]  Hospitalist Group                                                                                          Hospitalist Progress Note  Urmila Phillips MD  Answering service: [phone] OR [phone] from in house phone        Date of Service:  2025  NAME:  Danyell Dumont  :  1965  MRN:  [id number]       Admission Summary:     \"Danyell Dumont is a 59 y.o. female with past medical history significant for left breast cancer with resultant bilateral mastectomy and reconstruction with tissue expander, FELECIA on CPAP, mood disorder, hypothyroidism, presented at the emergency room with difficulty with vision.  Onset was very sudden.  Difficulty with vision is in the form of blurring of vision and affect both eyes.  This was also associated with headache which is located at the frontal part of the head.  She denies prior episode of TIA or CVA.  She recently started taking doxycycline for skin lesion after this was removed.  Patient was last admitted to this hospital 2020, patient was admitted and underwent bilateral mastectomy and reconstruction with tissue expanders.  CT scan of the head without contrast was negative.  CTA of the head and neck did not show large vessel occlusion.  Patient was seen by teleneurologist at request of ED physician with recommendation for admission for stroke workup.\"    Interval history / Subjective:     Patient seen and examined at the bedside.  She stated that she feels much more better.  No visual disturbance or headache.  No extremities tingling sensation or weakness.  No left-sided chest pain or palpitation.     Assessment & Plan:     Visual disturbance and headache possible due to elevated BP  -resolved, no headache, visual disturbance, tingling or numbness sensation of extremities.  -CT head no acute intracranial abnormalities.   -CTA head no large vessel occlusion or hemodynamically significant stenosis. No perfusion

## 2025-06-11 NOTE — PROGRESS NOTES
patient had two or more falls in the past year or any fall with injury in the past year?: Yes (1 fall in [place])    Hand Dominance: right     EXAMINATION OF PERFORMANCE DEFICITS:    Cognitive/Behavioral Status:    Orientation  Overall Orientation Status: Within Normal Limits  Orientation Level: Oriented X4  Cognition  Overall Cognitive Status: WNL    Skin: intact where visible    Edema: none noted    Hearing:   Hearing  Hearing: Within functional limits    Vision/Perceptual:    Vision - Basic Assessment  Prior Vision: Wears glasses only for reading  Patient Visual Report: No visual complaint reported.  Visual Field Cut: No  Oculo Motor Control: WNL     Vision  Vision: Impaired  Vision Exceptions: Wears glasses for reading  Perception  Overall Perceptual Status: WFL        Range of Motion:   AROM: Within functional limits  PROM: Within functional limits      Strength:  Strength: Within functional limits      Coordination:  Coordination: Within functional limits     Coordination: Within functional limits      Tone & Sensation:   Tone: Normal  Sensation: Intact    Balance:     Balance  Sitting: Intact  Standing: Intact      Functional Mobility and Transfers for ADLs:  Bed Mobility:     Bed Mobility Training  Bed Mobility Training: Yes  Overall Level of Assistance: Independent  Supine to Sit: Independent  Sit to Supine: Independent  Scooting: Independent    Transfers:     Transfer Training  Transfer Training: Yes  Overall Level of Assistance: Independent  Sit to Stand: Independent  Stand to Sit: Independent  Toilet Transfer: Independent (infer to commode)      ADL Assessment:          Feeding: Independent;Based on clinical judgement         Grooming: Independent;Based on clinical judgement       UE Bathing: Independent;Based on clinical judgement       LE Bathing: Independent;Based on clinical judgement       UE Dressing: Independent;Based on clinical judgement       LE Dressing: Independent       Toileting:  FAST  Education Method: Demonstration;Verbal;Printed Information/Hand-outs  Barriers to Learning: None  Education Outcome: Verbalized understanding;Demonstrated understanding    Thank you for this referral.  Rosa Norris OTR/L  Minutes: 19    Occupational Therapy Evaluation Charge Determination   History Examination Decision-Making   LOW Complexity : Brief history review  LOW Complexity: 1-3 Performance deficits relating to physical, cognitive, or psychosocial skills that result in activity limitations and/or participation restrictions LOW Complexity: No comorbidities that affect functional and  no verbal  or physical assist needed to complete eval tasks   Based on the above components, the patient evaluation is determined to be of the following complexity level: Low

## 2025-06-11 NOTE — H&P
History and Physical    Date of Service:  6/11/2025  Primary Care Provider: Mark Anthony Sidhu MD  Source of information: The patient and review of EMR    Chief Complaint: Eye Problem and Headache      History of Presenting Illness:   Danyell Dumont is a 59 y.o. female with past medical history significant for left breast cancer with resultant bilateral mastectomy and reconstruction with tissue expander, FELECIA on CPAP, mood disorder, hypothyroidism, presented at the emergency room with difficulty with vision.  Onset was very sudden.  Difficulty with vision is in the form of blurring of vision and affect both eyes.  This was also associated with headache which is located at the frontal part of the head.  She denies prior episode of TIA or CVA.  She recently started taking doxycycline for skin lesion after this was removed.  Patient was last admitted to this hospital January 2020, patient was admitted and underwent bilateral mastectomy and reconstruction with tissue expanders.  CT scan of the head without contrast was negative.  CTA of the head and neck did not show large vessel occlusion.  Patient was seen by teleneurologist at request of ED physician with recommendation for admission for stroke workup.       REVIEW OF SYSTEMS:  REVIEW OF SYSTEMS:  HEAD, EYES, EARS, NOSE AND THROAT:  No headache.  No dizziness.  Positive for blurring of vision .  No photophobia.  RESPIRATORY SYSTEM: No shortness of breath.  No cough.  No hemoptysis.  CARDIOVASCULAR SYSTEM:  No chest pain.  No orthopnea.  No palpitations.  GASTROINTESTINAL SYSTEM:  No nausea or vomiting.  No diarrhea.  No constipation.  GENITOURINARY SYSTEM:  No dysuria, no urgency, and no frequency.     All other systems are reviewed and they are negative.        Past Medical History:   Diagnosis Date    Anxiety     Arthritis     NECK    Basal cell carcinoma of skin     FACE, CHEST, BACK    Breast cancer (HCC) 2019    LEFT    Goiter     Motion  undergoing outpatient weight management program.    4.  Left breast cancer s/p bilateral mastectomy POA: This is in remission.    5.  Hyponatremia POA: Fluid therapy and monitor sodium level.    6.  Obstructive sleep apnea POA: Will place the patient on CPAP at bedtime.    7.  Mood disorder POA: Will continue Zoloft.    8.  Elevated blood pressure without diagnosis of hypertension POA: Will monitor blood pressure closely allowing for permissive hypertension for the next 24 to 48 hours.  Patient may require antihypertensive medication at the time of discharge home if average blood pressure reading remains elevated.    9.  Mild intermittent asthma without complication POA: Will continue DuoNeb as needed.          DIET: ADULT DIET; Regular   ISOLATION PRECAUTIONS: No active isolations  CODE STATUS: Full Code   Central Line:   None  DVT PROPHYLAXIS: Lovenox  FUNCTIONAL STATUS PRIOR TO HOSPITALIZATION: Fully active and ambulatory; able to carry on all self-care without restriction.  Ambulatory status/function: By self   EARLY MOBILITY ASSESSMENT: Recommend routine ambulation while hospitalized with the assistance of nursing staff  ANTICIPATED DISCHARGE: Greater than 48 hours.  ANTICIPATED DISPOSITION: Home  EMERGENCY CONTACT/SURROGATE DECISION MAKER: Fior King, domestic partner    CRITICAL CARE WAS PERFORMED FOR THIS ENCOUNTER: NO.      Signed By: Bethany Berger MD     June 11, 2025         Please note that this dictation may have been completed with Dragon, the [employer] voice recognition software.  Quite often unanticipated grammatical, syntax, homophones, and other interpretive errors are inadvertently transcribed by the computer software.  Please disregard these errors.  Please excuse any errors that have escaped final proofreading.

## 2025-06-12 VITALS
TEMPERATURE: 97.5 F | BODY MASS INDEX: 34.05 KG/M2 | RESPIRATION RATE: 12 BRPM | DIASTOLIC BLOOD PRESSURE: 83 MMHG | WEIGHT: 216.93 LBS | HEART RATE: 77 BPM | OXYGEN SATURATION: 96 % | SYSTOLIC BLOOD PRESSURE: 140 MMHG | HEIGHT: 67 IN

## 2025-06-12 LAB
ALBUMIN SERPL-MCNC: 3.4 G/DL (ref 3.5–5)
ALBUMIN/GLOB SERPL: 1 (ref 1.1–2.2)
ALP SERPL-CCNC: 106 U/L (ref 45–117)
ALT SERPL-CCNC: 26 U/L (ref 12–78)
ANION GAP SERPL CALC-SCNC: 5 MMOL/L (ref 2–12)
AST SERPL-CCNC: 12 U/L (ref 15–37)
BASOPHILS # BLD: 0.04 K/UL (ref 0–0.1)
BASOPHILS NFR BLD: 0.5 % (ref 0–1)
BILIRUB SERPL-MCNC: 0.4 MG/DL (ref 0.2–1)
BUN SERPL-MCNC: 10 MG/DL (ref 6–20)
BUN/CREAT SERPL: 14 (ref 12–20)
CALCIUM SERPL-MCNC: 9.2 MG/DL (ref 8.5–10.1)
CHLORIDE SERPL-SCNC: 106 MMOL/L (ref 97–108)
CO2 SERPL-SCNC: 28 MMOL/L (ref 21–32)
CREAT SERPL-MCNC: 0.69 MG/DL (ref 0.55–1.02)
DIFFERENTIAL METHOD BLD: ABNORMAL
ECHO AO ASC DIAM: 3.4 CM
ECHO AO ASCENDING AORTA INDEX: 1.63 CM/M2
ECHO AV AREA PEAK VELOCITY: 2.6 CM2
ECHO AV AREA VTI: 2.3 CM2
ECHO AV AREA/BSA PEAK VELOCITY: 1.3 CM2/M2
ECHO AV AREA/BSA VTI: 1.1 CM2/M2
ECHO AV MEAN GRADIENT: 4 MMHG
ECHO AV MEAN VELOCITY: 1 M/S
ECHO AV PEAK GRADIENT: 6 MMHG
ECHO AV PEAK VELOCITY: 1.3 M/S
ECHO AV VELOCITY RATIO: 0.77
ECHO AV VTI: 30.6 CM
ECHO BSA: 2.14 M2
ECHO LV EDV A2C: 81 ML
ECHO LV EDV A4C: 88 ML
ECHO LV EDV BP: 86 ML (ref 56–104)
ECHO LV EDV INDEX A4C: 42 ML/M2
ECHO LV EDV INDEX BP: 41 ML/M2
ECHO LV EDV NDEX A2C: 39 ML/M2
ECHO LV EF PHYSICIAN: 65 %
ECHO LV EJECTION FRACTION A2C: 70 %
ECHO LV EJECTION FRACTION A4C: 75 %
ECHO LV EJECTION FRACTION BIPLANE: 73 % (ref 55–100)
ECHO LV ESV A2C: 24 ML
ECHO LV ESV A4C: 22 ML
ECHO LV ESV BP: 23 ML (ref 19–49)
ECHO LV ESV INDEX A2C: 12 ML/M2
ECHO LV ESV INDEX A4C: 11 ML/M2
ECHO LV ESV INDEX BP: 11 ML/M2
ECHO LVOT AREA: 3.1 CM2
ECHO LVOT AV VTI INDEX: 0.74
ECHO LVOT DIAM: 2 CM
ECHO LVOT MEAN GRADIENT: 2 MMHG
ECHO LVOT PEAK GRADIENT: 4 MMHG
ECHO LVOT PEAK VELOCITY: 1 M/S
ECHO LVOT STROKE VOLUME INDEX: 34.3 ML/M2
ECHO LVOT SV: 71.3 ML
ECHO LVOT VTI: 22.7 CM
EOSINOPHIL # BLD: 0.19 K/UL (ref 0–0.4)
EOSINOPHIL NFR BLD: 2.5 % (ref 0–7)
ERYTHROCYTE [DISTWIDTH] IN BLOOD BY AUTOMATED COUNT: 10.9 % (ref 11.5–14.5)
GLOBULIN SER CALC-MCNC: 3.3 G/DL (ref 2–4)
GLUCOSE SERPL-MCNC: 92 MG/DL (ref 65–100)
HCT VFR BLD AUTO: 40.7 % (ref 35–47)
HGB BLD-MCNC: 13.8 G/DL (ref 11.5–16)
IMM GRANULOCYTES # BLD AUTO: 0.03 K/UL (ref 0–0.04)
IMM GRANULOCYTES NFR BLD AUTO: 0.4 % (ref 0–0.5)
LYMPHOCYTES # BLD: 2.68 K/UL (ref 0.8–3.5)
LYMPHOCYTES NFR BLD: 35.3 % (ref 12–49)
MCH RBC QN AUTO: 34 PG (ref 26–34)
MCHC RBC AUTO-ENTMCNC: 33.9 G/DL (ref 30–36.5)
MCV RBC AUTO: 100.2 FL (ref 80–99)
MONOCYTES # BLD: 0.54 K/UL (ref 0–1)
MONOCYTES NFR BLD: 7.1 % (ref 5–13)
NEUTS SEG # BLD: 4.12 K/UL (ref 1.8–8)
NEUTS SEG NFR BLD: 54.2 % (ref 32–75)
NRBC # BLD: 0 K/UL (ref 0–0.01)
NRBC BLD-RTO: 0 PER 100 WBC
PLATELET # BLD AUTO: 249 K/UL (ref 150–400)
PMV BLD AUTO: 10.1 FL (ref 8.9–12.9)
POTASSIUM SERPL-SCNC: 3.7 MMOL/L (ref 3.5–5.1)
PROT SERPL-MCNC: 6.7 G/DL (ref 6.4–8.2)
RBC # BLD AUTO: 4.06 M/UL (ref 3.8–5.2)
SODIUM SERPL-SCNC: 139 MMOL/L (ref 136–145)
TROPONIN I SERPL HS-MCNC: <4 NG/L (ref 0–51)
WBC # BLD AUTO: 7.6 K/UL (ref 3.6–11)

## 2025-06-12 PROCEDURE — 6370000000 HC RX 637 (ALT 250 FOR IP): Performed by: HOSPITALIST

## 2025-06-12 PROCEDURE — 6360000002 HC RX W HCPCS: Performed by: INTERNAL MEDICINE

## 2025-06-12 PROCEDURE — 6370000000 HC RX 637 (ALT 250 FOR IP): Performed by: INTERNAL MEDICINE

## 2025-06-12 PROCEDURE — 80053 COMPREHEN METABOLIC PANEL: CPT

## 2025-06-12 PROCEDURE — 84484 ASSAY OF TROPONIN QUANT: CPT

## 2025-06-12 PROCEDURE — 2500000003 HC RX 250 WO HCPCS: Performed by: INTERNAL MEDICINE

## 2025-06-12 PROCEDURE — 85025 COMPLETE CBC W/AUTO DIFF WBC: CPT

## 2025-06-12 PROCEDURE — 96372 THER/PROPH/DIAG INJ SC/IM: CPT

## 2025-06-12 PROCEDURE — G0378 HOSPITAL OBSERVATION PER HR: HCPCS

## 2025-06-12 RX ORDER — ATORVASTATIN CALCIUM 20 MG/1
20 TABLET, FILM COATED ORAL DAILY
Qty: 30 TABLET | Refills: 3 | Status: SHIPPED | OUTPATIENT
Start: 2025-06-12

## 2025-06-12 RX ORDER — ASPIRIN 81 MG/1
81 TABLET, CHEWABLE ORAL DAILY
Qty: 30 TABLET | Refills: 3 | Status: SHIPPED | OUTPATIENT
Start: 2025-06-13

## 2025-06-12 RX ADMIN — ASPIRIN 81 MG: 81 TABLET, CHEWABLE ORAL at 09:01

## 2025-06-12 RX ADMIN — SODIUM CHLORIDE, PRESERVATIVE FREE 10 ML: 5 INJECTION INTRAVENOUS at 09:02

## 2025-06-12 RX ADMIN — ACETAMINOPHEN 650 MG: 325 TABLET ORAL at 08:57

## 2025-06-12 RX ADMIN — ENOXAPARIN SODIUM 40 MG: 100 INJECTION SUBCUTANEOUS at 09:01

## 2025-06-12 RX ADMIN — LIOTHYRONINE SODIUM 5 MCG: 5 TABLET ORAL at 09:01

## 2025-06-12 RX ADMIN — LEVOTHYROXINE SODIUM 112 MCG: 0.11 TABLET ORAL at 09:01

## 2025-06-12 RX ADMIN — Medication 400 MCG: at 09:01

## 2025-06-12 RX ADMIN — SODIUM CHLORIDE, PRESERVATIVE FREE 10 ML: 5 INJECTION INTRAVENOUS at 09:05

## 2025-06-12 ASSESSMENT — PAIN DESCRIPTION - LOCATION: LOCATION: NECK;HEAD

## 2025-06-12 ASSESSMENT — PAIN DESCRIPTION - DESCRIPTORS: DESCRIPTORS: SHOOTING;SHARP

## 2025-06-12 ASSESSMENT — PAIN SCALES - GENERAL: PAINLEVEL_OUTOF10: 7

## 2025-06-12 NOTE — PROGRESS NOTES
Speech Pathology Note    Reviewed chart and note SLP orders received as part of stroke order set. Pertinent medical information below. Patient denied any decline in motor speech, language, cognitive, or swallowing function    Given pertinent medical information as below, formal SLP evaluation not clinically indicated at this time. Will sign off. Please re-consult if further needs arise. Thank you.    Chief Complaint   Patient presents with    Eye Problem    Headache     Presenting symptoms: vision deficits    RN flowsheet documentation   ANGEL NURSING DYSPHAGIA SCREEN NIHSS   Swallow Screening  Is the patient unable to remain alert for testing?: No  Is the patient on a modified diet (thickened liquids) due to pre-existing dysphagia?: No  Is there presence of existing enteral tube feeding via the stomach or nose?: No  Is there presence of head-of-bed restrictions (less than 30 degrees)?: No  Is there presence of tracheotomy tube?: No  Is the patient ordered nothing-by-mouth status?: No  3 oz Water Swallow Screen: Pass      Current diet:ADULT DIET; Regular NIHSS Stroke Scale  NIH Stroke Scale Assessed: Yes  Interval: Hand-off/Transfer  Level of Consciousness (1a): Alert  LOC Questions (1b): Answers both correctly  LOC Commands (1c): Performs both tasks correctly  Best Gaze (2): Normal  Visual (3): No visual loss  Facial Palsy (4): Normal symmetrical movement  Motor Arm, Left (5a): No drift  Motor Arm, Right (5b): No drift  Motor Leg, Left (6a): No drift  Motor Leg, Right (6b): No drift  Limb Ataxia (7): Absent  Sensory (8): Normal  Best Language (9): No aphasia  Dysarthria (10): Normal  Extinction and Inattention (11): No abnormality  Total: 0     Head imaging   CT HEAD RESULTS MRI RESULTS   CT Head WO Contrast: Results for orders placed during the hospital encounter of 06/10/25    CT HEAD WO CONTRAST    Narrative  EXAM: CT CODE NEURO HEAD WO CONTRAST  ACC#: [id number]    INDICATION: Stroke    COMPARISON:

## 2025-06-12 NOTE — PLAN OF CARE
Problem: Discharge Planning  Goal: Discharge to home or other facility with appropriate resources  6/12/2025 1127 by Magalie Dominguez, RN  Outcome: Completed  Flowsheets (Taken 6/12/2025 0800)  Discharge to home or other facility with appropriate resources:   Identify barriers to discharge with patient and caregiver   Arrange for needed discharge resources and transportation as appropriate   Identify discharge learning needs (meds, wound care, etc)   Refer to discharge planning if patient needs post-hospital services based on physician order or complex needs related to functional status, cognitive ability or social support system  6/12/2025 0445 by Luck, Rylen, RN  Outcome: Progressing  Flowsheets (Taken 6/11/2025 2000)  Discharge to home or other facility with appropriate resources:   Identify barriers to discharge with patient and caregiver   Arrange for needed discharge resources and transportation as appropriate   Identify discharge learning needs (meds, wound care, etc)   Arrange for interpreters to assist at discharge as needed   Refer to discharge planning if patient needs post-hospital services based on physician order or complex needs related to functional status, cognitive ability or social support system     Problem: Pain  Goal: Verbalizes/displays adequate comfort level or baseline comfort level  6/12/2025 1127 by Magalie Dominguez, RN  Outcome: Completed  6/12/2025 0445 by Luck, Rylen, RN  Outcome: Progressing     Problem: Chronic Conditions and Co-morbidities  Goal: Patient's chronic conditions and co-morbidity symptoms are monitored and maintained or improved  6/12/2025 1127 by Magalie Dominguez, RN  Outcome: Completed  Flowsheets (Taken 6/12/2025 0800)  Care Plan - Patient's Chronic Conditions and Co-Morbidity Symptoms are Monitored and Maintained or Improved:   Monitor and assess patient's chronic conditions and comorbid symptoms for stability, deterioration, or improvement   Collaborate with multidisciplinary

## 2025-06-12 NOTE — CARE COORDINATION
10am: Pt discussed in IDRS - Pt has no current MARKELL needs. Stroke negative, low RUR, and spouse to transport at dc. CM available if any needs arise.    Dahlia Watts, KATHLEENW

## 2025-06-12 NOTE — DISCHARGE INSTRUCTIONS
- Please take aspirin 81 mg and lipitor 20 mg  - Follow up PCP   - Monitor your BP daily and follow up with PCP

## 2025-06-12 NOTE — PLAN OF CARE
Problem: Discharge Planning  Goal: Discharge to home or other facility with appropriate resources  6/12/2025 0445 by Luck, Rylen, RN  Outcome: Progressing  Flowsheets (Taken 6/11/2025 2000)  Discharge to home or other facility with appropriate resources:   Identify barriers to discharge with patient and caregiver   Arrange for needed discharge resources and transportation as appropriate   Identify discharge learning needs (meds, wound care, etc)   Arrange for interpreters to assist at discharge as needed   Refer to discharge planning if patient needs post-hospital services based on physician order or complex needs related to functional status, cognitive ability or social support system  6/11/2025 1808 by Magalie Dominguez RN  Outcome: Progressing  Flowsheets (Taken 6/11/2025 1348)  Discharge to home or other facility with appropriate resources:   Arrange for needed discharge resources and transportation as appropriate   Identify barriers to discharge with patient and caregiver   Identify discharge learning needs (meds, wound care, etc)   Refer to discharge planning if patient needs post-hospital services based on physician order or complex needs related to functional status, cognitive ability or social support system     Problem: Pain  Goal: Verbalizes/displays adequate comfort level or baseline comfort level  6/12/2025 0445 by Luck, Rylen, RN  Outcome: Progressing  6/11/2025 1808 by Magalie Dominguez RN  Outcome: Progressing     Problem: Chronic Conditions and Co-morbidities  Goal: Patient's chronic conditions and co-morbidity symptoms are monitored and maintained or improved  6/12/2025 0445 by Luck, Rylen, RN  Outcome: Progressing  Flowsheets (Taken 6/11/2025 2000)  Care Plan - Patient's Chronic Conditions and Co-Morbidity Symptoms are Monitored and Maintained or Improved:   Monitor and assess patient's chronic conditions and comorbid symptoms for stability, deterioration, or improvement   Update acute care plan with

## 2025-06-12 NOTE — DISCHARGE SUMMARY
Discharge Summary   Please note that this dictation was completed with [employer], the computer voice recognition software.  Quite often unanticipated grammatical, syntax, homophones, and other interpretive errors are inadvertently transcribed by the computer software.  Please disregard these errors.  Please excuse any errors that have escaped final proofreading.    PATIENT ID: Danyell Dumont  MRN: [id number]   YOB: 1965    DATE OF ADMISSION: 6/10/2025  8:15 PM    DATE OF DISCHARGE: 6/12/2025  PRIMARY CARE PROVIDER: Mark Anthony Sidhu MD         ATTENDING PHYSICIAN: Kobe Fernandez MD  DISCHARGING PROVIDER: Kobe Fernandez MD       CONSULTATIONS: IP CONSULT TO TELE-NEUROLOGY  IP CONSULT TO HOSPITALIST  IP CONSULT TO CASE MANAGEMENT    PROCEDURES/SURGERIES: * No surgery found *    ADMITTING HPI from excerpted H&P   Danyell Dumont is a 59 y.o. female with past medical history significant for left breast cancer with resultant bilateral mastectomy and reconstruction with tissue expander, FELECIA on CPAP, mood disorder, hypothyroidism, presented at the emergency room with difficulty with vision.  Onset was very sudden.  Difficulty with vision is in the form of blurring of vision and affect both eyes.  This was also associated with headache which is located at the frontal part of the head.  She denies prior episode of TIA or CVA.  She recently started taking doxycycline for skin lesion after this was removed.  Patient was last admitted to this hospital January 2020, patient was admitted and underwent bilateral mastectomy and reconstruction with tissue expanders.  CT scan of the head without contrast was negative.  CTA of the head and neck did not show large vessel occlusion.  Patient was seen by teleneurologist at request of ED physician with recommendation for admission for stroke workup.\"          HOSPITAL COURSE & DISCHARGE DIAGNOSIS/ PLAN:     Suspected TIA  CVA ruled out  -CT head and neck no  to auscultation bilaterally   CVS: S1 S2 heard, Capillary refill less than 2 seconds  Abd: soft/ Non tender, non distended, BS physiological,   Ext: no clubbing, no cyanosis, no edema, brisk 2+ DP pulses  Neuro/Psych: pleasant mood and affect, CN 2-12 grossly intact, sensory grossly within normal limit, Strength 5/5 in all extremities, DTR 1+ x 4  Skin: warm     CHRONIC MEDICAL DIAGNOSES:      Greater than 31 minutes were spent with the patient on counseling and coordination of care    Signed:   Kobe Fernandez MD  6/12/2025  3:27 PM

## 2025-06-21 NOTE — PROGRESS NOTES
Physician Progress Note      PATIENT:               LARRY LEMUS  CSN #:                  [id number]  :                       1965  ADMIT DATE:       6/10/2025 8:15 PM  DISCH DATE:        2025 2:46 PM  RESPONDING  PROVIDER #:        Kobe Fernandez MD          QUERY TEXT:    Please clarify in documentation the relationship, if any, between  Blurred vision/Headache and the following medical conditions. Are the   conditions:    Hx: Skin CA; Left Breast CA; Anxiety; Postsurgical hypothyroidism; Sleep apnea   w/ CPAP    C/O: Blurred vision and Headache    6/10 VS: BP: 158/85; 160/77; 154/81; 169/82;171/89;    6/10 CT Head: Neg  6/10 CT Brain perfusion: Neg  .10 CTA Head/Neck: Neg  6/10 CXR: Neg   MRI Brain: Neg'    6/10 Labs: Gluc: 97; Trop. hs: 4; <4; Choles: 152; LDL: 86.8; Tri; Hgb   A1c: 4.8;    6/10 AP: \"Stroke-like symptoms POA\".     IM PN: \"Visual disturbance and headache possible due to elevated BP\".     DC Summary: \"Suspected TIA  CVA ruled out\".    The clinical indicators include:  Options provided:  -- Blurred vision and headache are related to or associated with suspected TIA  -- Blurred vision and headache are related to or associated with elevated BP  -- Other - I will add my own diagnosis  -- Disagree - Not applicable / Not valid  -- Disagree - Clinically unable to determine / Unknown  -- Refer to Clinical Documentation Reviewer    PROVIDER RESPONSE TEXT:    The patient has blurred vision and headache that are related to or associated   with suspected TIA.    Query created by: Liudmila Arnold on 2025 11:13 AM      Electronically signed by:  Kobe Fernandez MD 2025 2:14 PM

## 2025-07-02 ENCOUNTER — OFFICE VISIT (OUTPATIENT)
Age: 60
End: 2025-07-02
Payer: MEDICAID

## 2025-07-02 VITALS
HEIGHT: 67 IN | DIASTOLIC BLOOD PRESSURE: 75 MMHG | BODY MASS INDEX: 32.49 KG/M2 | OXYGEN SATURATION: 96 % | WEIGHT: 207 LBS | SYSTOLIC BLOOD PRESSURE: 114 MMHG | HEART RATE: 85 BPM | RESPIRATION RATE: 16 BRPM | TEMPERATURE: 97.6 F

## 2025-07-02 DIAGNOSIS — G45.9 TIA (TRANSIENT ISCHEMIC ATTACK): Primary | ICD-10-CM

## 2025-07-02 DIAGNOSIS — E89.0 POSTSURGICAL HYPOTHYROIDISM: ICD-10-CM

## 2025-07-02 DIAGNOSIS — K76.0 METABOLIC DYSFUNCTION-ASSOCIATED STEATOTIC LIVER DISEASE (MASLD): ICD-10-CM

## 2025-07-02 DIAGNOSIS — R73.03 PREDIABETES: ICD-10-CM

## 2025-07-02 PROCEDURE — 99214 OFFICE O/P EST MOD 30 MIN: CPT | Performed by: INTERNAL MEDICINE

## 2025-07-02 NOTE — PROGRESS NOTES
Danyell Dumont (: 1965) is a 59 y.o. female, established patient, here for evaluation of the following chief complaint(s):  Chief Complaint   Patient presents with    Follow-Up from Hospital     Patient was looking at laptop but suddenly couldn't see the words on the computer patient states it was like \"grease\" was smeared over the screen and the words were jumbled up. Patient states it lasted about 20-30 minutes and have not had issues since.        Assessment and Plan:      Diagnosis Orders   1. TIA (transient ischemic attack)--Suspected with ER eval 6-10-25.  Juan Pablo Keys MD, [facility] ([address]      2. Prediabetes  Juan Pablo Keys MD, [facility] ([address]      3. Metabolic dysfunction-associated steatotic liver disease (MASLD)            1-2:  Referral(s) and referral coordination reviewed with patient at visit.    3:  Management/following with specialist.  Assessment & Plan  1. Vision problem.  She experienced a sudden onset of blurry vision affecting both eyes, lasting 20 to 30 minutes, accompanied by a frontal headache. This episode occurred on 06/10/2025 and has not recurred since. A comprehensive evaluation at [facility] included a CT head without contrast, CTA of the neck, and a brain MRI, all of which were normal. She was discharged on aspirin and atorvastatin but has only been taking the aspirin. A referral to neurology at [facility] will be made for further evaluation. She is advised to schedule an appointment with an ophthalmologist for a dilated retinal exam to rule out any underlying issues.    --Cholesterol management.  Her cholesterol levels have normalized since discontinuing anastrozole. She has declined to start atorvastatin despite recommendations. Both neurology and cardiology will be consulted for further management of her cholesterol levels.    --Cardiology follow-up.  She has not yet scheduled an appointment with Dr. Hatch, the

## 2025-07-02 NOTE — PROGRESS NOTES
RM:17    Chief Complaint   Patient presents with    Follow-Up from Hospital     Patient was looking at laptop but suddenly couldn't see the words on the computer patient states it was like \"grease\" was smeared over the screen and the words were jumbled up. Patient states it lasted about 20-30 minutes and have not had issues since.        Fasting Yes    Vitals:    07/02/25 1203 07/02/25 1215   BP: 105/72 114/75   BP Site: Right Upper Arm Right Upper Arm   Patient Position: Sitting Sitting   BP Cuff Size: Large Adult Small Adult   Pulse: 85    Resp: 16    Temp: 97.6 °F (36.4 °C)    TempSrc: Oral    SpO2: 96%    Weight: 93.9 kg (207 lb)    Height: 1.702 m (5' 7\")         Have you been to the ER, urgent care clinic since your last visit?  Hospitalized since your last visit?   YES - When: approximately 06/10/2025 ago.  Where and Why: [facility] for vision issues, possible TIA.    Have you seen or consulted any other health care providers outside our system since your last visit?   NO      “Have you had a colorectal cancer screening such as a colonoscopy/FIT/Cologuard?    NO    No colonoscopy on file  Date of last Cologuard: 4/29/2022  No FIT/FOBT on file   No flexible sigmoidoscopy on file             Click Here for Release of Records Request   AVS  education, follow up, and recommendations provided and addressed with patient.  services used to advise patient

## 2025-07-16 ENCOUNTER — OFFICE VISIT (OUTPATIENT)
Age: 60
End: 2025-07-16
Payer: MEDICAID

## 2025-07-16 VITALS
WEIGHT: 202 LBS | HEIGHT: 67 IN | HEART RATE: 101 BPM | SYSTOLIC BLOOD PRESSURE: 106 MMHG | BODY MASS INDEX: 31.71 KG/M2 | DIASTOLIC BLOOD PRESSURE: 75 MMHG

## 2025-07-16 DIAGNOSIS — E89.0 POSTSURGICAL HYPOTHYROIDISM: Primary | ICD-10-CM

## 2025-07-16 LAB
T3 SERPL-MCNC: 104 NG/DL (ref 71–180)
T4 FREE SERPL-MCNC: 1.39 NG/DL (ref 0.82–1.77)
TSH SERPL DL<=0.005 MIU/L-ACNC: 0.17 UIU/ML (ref 0.45–4.5)

## 2025-07-16 PROCEDURE — 99214 OFFICE O/P EST MOD 30 MIN: CPT | Performed by: INTERNAL MEDICINE

## 2025-07-16 RX ORDER — LEVOTHYROXINE SODIUM 112 UG/1
112 TABLET ORAL DAILY
Qty: 90 TABLET | Refills: 3 | Status: SHIPPED | OUTPATIENT
Start: 2025-07-16

## 2025-07-16 RX ORDER — LIOTHYRONINE SODIUM 5 UG/1
5 TABLET ORAL
Qty: 90 TABLET | Refills: 3 | Status: SHIPPED | OUTPATIENT
Start: 2025-07-16

## 2025-07-16 NOTE — PATIENT INSTRUCTIONS
1) TSH is a thyroid test.  Your level is 0.17 which is slightly low and below goal of 0.45-2.0.  The TSH goes opposite of your thyroid dose and suggests your dose of levoxyl is slightly more than you need.  I will decrease your dose to 1 tab on Mon-Sat and 1/2 tab on Sun and have sent a new prescription to your local pharmacy.  Keep taking liothyroine 1 tab daily 7 days a week and I refilled this for 1 year also.    2) Please repeat your labs in 6-8 weeks.   I will send you a message through [facility] with your lab results.    3) I will plan on seeing you back in one year but if you feel you need to have your labs checked sooner, please let me know especially if your weight were to be down by more than 12-15 lbs.

## 2025-07-16 NOTE — PROGRESS NOTES
technology. The patient (or guardian, if applicable) and other individuals in attendance at the appointment consented to the use of AI, including the recording.

## 2025-08-01 RX ORDER — LEVOTHYROXINE SODIUM 112 UG/1
TABLET ORAL
Qty: 90 TABLET | Refills: 0 | Status: SHIPPED | OUTPATIENT
Start: 2025-08-01

## 2025-08-01 NOTE — TELEPHONE ENCOUNTER
Spoke with pharmacist Abbie. Levoxyl prescription was sent in on 7/16/25. Directions changed. Please delete this.

## 2025-08-06 RX ORDER — LEVOTHYROXINE SODIUM 112 UG/1
TABLET ORAL
Qty: 90 TABLET | Refills: 3 | Status: SHIPPED | OUTPATIENT
Start: 2025-08-06

## 2025-08-11 RX ORDER — LIOTHYRONINE SODIUM 5 UG/1
5 TABLET ORAL
Qty: 90 TABLET | Refills: 3 | Status: SHIPPED | OUTPATIENT
Start: 2025-08-11

## 2025-08-27 DIAGNOSIS — E89.0 POSTSURGICAL HYPOTHYROIDISM: ICD-10-CM

## (undated) DEVICE — Device

## (undated) DEVICE — RESERVOIR,SUCTION,100CC,SILICONE: Brand: MEDLINE

## (undated) DEVICE — SUTURE MCRYL SZ 3-0 L27IN ABSRB UD L19MM PS-2 3/8 CIR PRIM Y427H

## (undated) DEVICE — WRAP SURG W1.31XL1.34M CARD FOR PT 165-172CM THERMOWRP

## (undated) DEVICE — GLOVE ORTHO 8   MSG9480

## (undated) DEVICE — CURVED, SMALL JAW, OPEN SEALER/DIVIDER: Brand: LIGASURE

## (undated) DEVICE — STRIP,CLOSURE,WOUND,MEDI-STRIP,1/2X4: Brand: MEDLINE

## (undated) DEVICE — REM POLYHESIVE ADULT PATIENT RETURN ELECTRODE: Brand: VALLEYLAB

## (undated) DEVICE — HANDPIECE SET WITH BONE CLEANING TIP AND SUCTION TUBE: Brand: INTERPULSE

## (undated) DEVICE — KIT,1200CC CANISTER,3/16"X6' TUBING: Brand: MEDLINE INDUSTRIES, INC.

## (undated) DEVICE — DRAPE,U/ SHT,SPLIT,PLAS,STERIL: Brand: MEDLINE

## (undated) DEVICE — SUTURE MCRYL SZ 4-0 L27IN ABSRB UD L19MM PS-2 1/2 CIR PRIM Y426H

## (undated) DEVICE — SPONGE LAP 18X18IN STRL -- 5/PK

## (undated) DEVICE — GOWN,PREVENTION PLUS,XLN/2XL,ST,22/CS: Brand: MEDLINE

## (undated) DEVICE — BRA SURG POST-OP LG 42IN --

## (undated) DEVICE — DRAIN SURG 15FR SIL RND CHN W/ TRCR FULL FLUT DBL WRP TRAD

## (undated) DEVICE — HOOD, PEEL-AWAY: Brand: FLYTE

## (undated) DEVICE — SUTURE PROL SZ 2-0 L36IN NONABSORBABLE BLU SH L26MM 1/2 CIR 8523H

## (undated) DEVICE — NEEDLE HYPO 22GA L1.5IN BLK S STL HUB POLYPR SHLD REG BVL

## (undated) DEVICE — SUTURE VCRL SZ 3-0 L27IN ABSRB UD FS-2 L19MM 1/2 CIR J423H

## (undated) DEVICE — STERILE POLYISOPRENE POWDER-FREE SURGICAL GLOVES WITH EMOLLIENT COATING: Brand: PROTEXIS

## (undated) DEVICE — SPONGE GZ W4XL4IN COT 12 PLY TYP VII WVN C FLD DSGN

## (undated) DEVICE — SST TWIST DRILL, STANDARD, 2MM DIA. X 127MM: Brand: MICROAIRE®

## (undated) DEVICE — GOWN,SIRUS,NONRNF,SETINSLV,XL,20/CS: Brand: MEDLINE

## (undated) DEVICE — CONTINU-FLO SOLUTION SET, 2 INJECTION SITES, MALE LUER LOCK ADAPTER WITH RETRACTABLE COLLAR, LARGE BORE STOPCOCK WITH ROTATING MALE LUER LOCK EXTENSION SET, 2 INJECTION SITES, MALE LUER LOCK ADAPTER WITH RETRACTABLE COLLAR: Brand: INTERLINK/CONTINU-FLO

## (undated) DEVICE — SYR 10ML LUER LOK 1/5ML GRAD --

## (undated) DEVICE — KENDALL DL ECG CABLE AND LEAD WIRE SYSTEM, 3-LEAD, SINGLE PATIENT USE: Brand: KENDALL

## (undated) DEVICE — SUTURE FIBERWIRE SZ 5 L38IN NONABSORBABLE BLU L48MM 1/2 AR7211

## (undated) DEVICE — SUTURE PERMA-HAND SZ 3-0 L18IN NONABSORBABLE BLK L24MM PS-1 1684G

## (undated) DEVICE — TOTAL JOINT - SMH: Brand: MEDLINE INDUSTRIES, INC.

## (undated) DEVICE — (D)PREP SKN CHLRAPRP APPL 26ML -- CONVERT TO ITEM 371833

## (undated) DEVICE — NEEDLE HYPO 25GA L1.5IN BVL ORIENTED ECLIPSE

## (undated) DEVICE — SUTURE FIBERWIRE SZ 2 L38IN NONABSORBABLE BLU WHT BLK AR7201

## (undated) DEVICE — STERILE POLYISOPRENE POWDER-FREE SURGICAL GLOVES: Brand: PROTEXIS

## (undated) DEVICE — COVER US PRB W12XL244CM FLD IORT STR TIP

## (undated) DEVICE — DRESSING,GAUZE,XEROFORM,CURAD,5"X9",ST: Brand: CURAD

## (undated) DEVICE — NEEDLE HYPO 22GA L1.5IN BLK POLYPR HUB S STL REG BVL STR

## (undated) DEVICE — SUTURE PDS II SZ 3-0 L27IN ABSRB VLT L26MM SH 1/2 CIR Z316H

## (undated) DEVICE — UNDERPAD INCON STD 36X23IN --

## (undated) DEVICE — 1010 S-DRAPE TOWEL DRAPE 10/BX: Brand: STERI-DRAPE™

## (undated) DEVICE — PAD,ABDOMINAL,5"X9",ST,LF,25/BX: Brand: MEDLINE INDUSTRIES, INC.

## (undated) DEVICE — CHEST/BREAST-LF: Brand: MEDLINE INDUSTRIES, INC.

## (undated) DEVICE — TOTAL TRAY, 16FR 10ML SIL FOLEY, URN: Brand: MEDLINE

## (undated) DEVICE — TRAY PREP DRY W/ PREM GLV 2 APPL 6 SPNG 2 UNDPD 1 OVERWRAP

## (undated) DEVICE — SPONGE GZ W4XL4IN COT 12 PLY TYP VII WVN C FLD DSGN STERILE

## (undated) DEVICE — SUT SLK 2-0SH 30IN BLK --

## (undated) DEVICE — SUTURE VCRL SZ 2-0 L36IN ABSRB UD L36MM CT-1 1/2 CIR J945H

## (undated) DEVICE — SYRINGE MED 10ML LUERLOCK TIP W/O SFTY DISP

## (undated) DEVICE — COVER LT HNDL PLAS RIG 1 PER PK

## (undated) DEVICE — GARMENT,MEDLINE,DVT,INT,CALF,MED, GEN2: Brand: MEDLINE

## (undated) DEVICE — DRESSING,GAUZE,XEROFORM,CURAD,1"X8",ST: Brand: CURAD

## (undated) DEVICE — GLOVE SURG SZ 65 L12IN FNGR THK79MIL GRN LTX FREE

## (undated) DEVICE — 4-PORT MANIFOLD: Brand: NEPTUNE 2

## (undated) DEVICE — INSULATED BLADE ELECTRODE;CAUTION: FOR MANUFACTURING, PROCESSING, OR REPACKING.: Brand: EDGE

## (undated) DEVICE — STERILE SURGICAL BLADE SIZE 15: Brand: CARDINAL HEALTH

## (undated) DEVICE — SOLUTION IV 1000ML 0.9% SOD CHL

## (undated) DEVICE — DRESSING HYDROCOLLOID BORDER 35X10 IN ALUM PRIMASEAL

## (undated) DEVICE — SOLUTION LACTATED RINGERS INJECTION USP

## (undated) DEVICE — INTENDED FOR TISSUE SEPARATION, AND OTHER PROCEDURES THAT REQUIRE A SHARP SURGICAL BLADE TO PUNCTURE OR CUT.: Brand: BARD-PARKER ® CARBON RIB-BACK BLADES

## (undated) DEVICE — SUTURE VCRL SZ 2-0 L27IN ABSRB UD L26MM CT-2 1/2 CIR J269H

## (undated) DEVICE — SLING ARM AD UNIV

## (undated) DEVICE — MASTISOL ADHESIVE LIQ 2/3ML

## (undated) DEVICE — DEVICE SECUREMENT 1/32IN POLYETH FOAM F ANCHR URIN CATH

## (undated) DEVICE — SUTURE FIBERWIRE SZ 2 W/ TAPERED NEEDLE BLUE L38IN NONABSORB BLU L26.5MM 1/2 CIRCLE AR7200

## (undated) DEVICE — SOLUTION IRRIG 1000ML STRL H2O USP PLAS POUR BTL

## (undated) DEVICE — SOLUTION SURG PREP 26 CC PURPREP

## (undated) DEVICE — SUTURE MCRYL SZ 4-0 L18IN ABSRB UD L16MM PC-3 3/8 CIR PRIM Y845G

## (undated) DEVICE — SOLUTION IV 500ML 0.9% SOD CHL FLX CONT

## (undated) DEVICE — COLLECTION SET 21GA 1.25IN --

## (undated) DEVICE — SURGIFOAM SPNG SZ 12-7

## (undated) DEVICE — SET PIN MOD GLEN SYS

## (undated) DEVICE — SUTURE VCRL SZ 3-0 L27IN ABSRB UD L26MM SH 1/2 CIR J416H

## (undated) DEVICE — DRAPE XR C ARM 41X74IN LF --

## (undated) DEVICE — HEWSON SUTURE RETRIEVER: Brand: HEWSON SUTURE RETRIEVER

## (undated) DEVICE — COVER,MAYO STAND,STERILE: Brand: MEDLINE

## (undated) DEVICE — APPLICATOR BNDG 1MM ADH PREMIERPRO EXOFIN

## (undated) DEVICE — INTENT OT USE PROVIDES A STERILE INTERFACE BETWEEN THE OPERATING ROOM SURGICAL LAMPS (NON-STERILE) AND THE SURGEON OR STAFF WORKING IN THE STERILE FIELD.: Brand: ASPEN® ALC PLUS LIGHT HANDLE COVER

## (undated) DEVICE — YANKAUER,FLEXIBLE HANDLE,REGLR CAPACITY: Brand: MEDLINE INDUSTRIES, INC.

## (undated) DEVICE — SCRUB DRY SURG EZ SCRUB BRUSH PREOPERATIVE GRN

## (undated) DEVICE — SUTURE MCRYL SZ 3-0 L27IN ABSRB UD L24MM PS-1 3/8 CIR PRIM Y936H

## (undated) DEVICE — DRAPE,REIN 53X77,STERILE: Brand: MEDLINE

## (undated) DEVICE — INFECTION CONTROL KIT SYS

## (undated) DEVICE — BLADE SAW W073XL276IN THK0031IN CUT THK0036IN REPL SAG

## (undated) DEVICE — DRAPE,CHEST,FENES,15X10,STERIL: Brand: MEDLINE

## (undated) DEVICE — INSULATED BLADE ELECTRODE: Brand: EDGE

## (undated) DEVICE — RETRACTOR 50-101-1 RADIALUX US: Brand: RADIALUX™

## (undated) DEVICE — SMOKE EVACUATION PENCIL: Brand: VALLEYLAB

## (undated) DEVICE — TOWEL SURG W17XL27IN STD BLU COT NONFENESTRATED PREWASHED

## (undated) DEVICE — CONNECTOR IV REG NDLLSS FEM LUERLOCK OR ADPT DEHP FREE [415067] [B BRAUN MEDICAL INC]

## (undated) DEVICE — HANDLE LT SNAP ON ULT DURABLE LENS FOR TRUMPF ALC DISPOSABLE

## (undated) DEVICE — TRIAL SCREW CAGE SHOULDER ARTHROPLASTY STERILE ECLIPSE

## (undated) DEVICE — SUTURE ETHLN SZ 2-0 L18IN NONABSORBABLE BLK L26MM FS 3/8 664G

## (undated) DEVICE — ROCKER SWITCH PENCIL BLADE ELECTRODE, HOLSTER: Brand: EDGE

## (undated) DEVICE — MARKER,SKIN,WI/RULER AND LABELS: Brand: MEDLINE

## (undated) DEVICE — TUBING SUCT 12FR MAL ALUM SHFT FN CAP VENT UNIV CONN W/ OBT

## (undated) DEVICE — GLOVE SURG SZ 8 L12IN FNGR THK94MIL STD WHT LTX FREE

## (undated) DEVICE — DRESSING HYDROFIBER AQUACEL AG ADVANTAGE 3.5X10 IN

## (undated) DEVICE — BLADE ELECTRODE: Brand: EDGE

## (undated) DEVICE — SURGICAL PROCEDURE PACK BASIN MAJ SET CUST NO CAUT

## (undated) DEVICE — SUTURE PROL SZ 3-0 L18IN NONABSORBABLE BLU L19MM PC-5 3/8 8632G

## (undated) DEVICE — SOL IRR SOD CL 0.9% 1000ML BTL --

## (undated) DEVICE — HEX-LOCKING BLADE ELECTRODE: Brand: EDGE

## (undated) DEVICE — APPLIER CLP M L11IN TI MULT RNG HNDL 30 CLP STR LIGACLP

## (undated) DEVICE — 3M™ TEGADERM™ TRANSPARENT FILM DRESSING FRAME STYLE, 1624W, 2-3/8 IN X 2-3/4 IN (6 CM X 7 CM), 100/CT 4CT/CASE: Brand: 3M™ TEGADERM™